# Patient Record
Sex: FEMALE | Race: WHITE | NOT HISPANIC OR LATINO
[De-identification: names, ages, dates, MRNs, and addresses within clinical notes are randomized per-mention and may not be internally consistent; named-entity substitution may affect disease eponyms.]

---

## 2017-02-01 RX ORDER — ATORVASTATIN CALCIUM 20 MG/1
TABLET, FILM COATED ORAL
Qty: 90 TABLET | Refills: 0 | Status: SHIPPED | OUTPATIENT
Start: 2017-02-01 | End: 2017-04-17

## 2017-02-10 ENCOUNTER — LAB SERVICES (OUTPATIENT)
Dept: OTHER | Age: 72
End: 2017-02-10

## 2017-02-10 ENCOUNTER — CHARTING TRANS (OUTPATIENT)
Dept: OTHER | Age: 72
End: 2017-02-10

## 2017-02-10 LAB — RAPID STREP GROUP A: POSITIVE

## 2017-02-10 ASSESSMENT — PAIN SCALES - GENERAL: PAINLEVEL_OUTOF10: 8

## 2017-02-12 ENCOUNTER — APPOINTMENT (OUTPATIENT)
Dept: GENERAL RADIOLOGY | Facility: HOSPITAL | Age: 72
End: 2017-02-12
Attending: EMERGENCY MEDICINE
Payer: MEDICARE

## 2017-02-12 ENCOUNTER — HOSPITAL ENCOUNTER (EMERGENCY)
Facility: HOSPITAL | Age: 72
Discharge: HOME | End: 2017-02-12
Attending: EMERGENCY MEDICINE
Payer: MEDICARE

## 2017-02-12 ENCOUNTER — APPOINTMENT (OUTPATIENT)
Dept: CT IMAGING | Facility: HOSPITAL | Age: 72
End: 2017-02-12
Attending: EMERGENCY MEDICINE
Payer: MEDICARE

## 2017-02-12 VITALS
OXYGEN SATURATION: 99 % | HEART RATE: 78 BPM | DIASTOLIC BLOOD PRESSURE: 88 MMHG | RESPIRATION RATE: 17 BRPM | TEMPERATURE: 97 F | SYSTOLIC BLOOD PRESSURE: 160 MMHG

## 2017-02-12 DIAGNOSIS — J02.9 ACUTE VIRAL PHARYNGITIS: Primary | ICD-10-CM

## 2017-02-12 LAB
ALBUMIN SERPL-MCNC: 3.5 G/DL (ref 3.5–4.8)
ALP LIVER SERPL-CCNC: 131 U/L (ref 55–142)
ALT SERPL-CCNC: 16 U/L (ref 14–54)
AST SERPL-CCNC: 12 U/L (ref 15–41)
BASOPHILS # BLD AUTO: 0.06 X10(3) UL (ref 0–0.1)
BASOPHILS NFR BLD AUTO: 0.4 %
BILIRUB SERPL-MCNC: 0.5 MG/DL (ref 0.1–2)
BUN BLD-MCNC: 9 MG/DL (ref 8–20)
CALCIUM BLD-MCNC: 8.5 MG/DL (ref 8.3–10.3)
CHLORIDE: 107 MMOL/L (ref 101–111)
CO2: 25 MMOL/L (ref 22–32)
CREAT BLD-MCNC: 0.99 MG/DL (ref 0.55–1.02)
EOSINOPHIL # BLD AUTO: 0.29 X10(3) UL (ref 0–0.3)
EOSINOPHIL NFR BLD AUTO: 2 %
ERYTHROCYTE [DISTWIDTH] IN BLOOD BY AUTOMATED COUNT: 14.9 % (ref 11.5–16)
GLUCOSE BLD-MCNC: 155 MG/DL (ref 70–99)
HCT VFR BLD AUTO: 36 % (ref 34–50)
HGB BLD-MCNC: 12 G/DL (ref 12–16)
IMMATURE GRANULOCYTE COUNT: 0.06 X10(3) UL (ref 0–1)
IMMATURE GRANULOCYTE RATIO %: 0.4 %
LYMPHOCYTES # BLD AUTO: 2.58 X10(3) UL (ref 0.9–4)
LYMPHOCYTES NFR BLD AUTO: 18.1 %
M PROTEIN MFR SERPL ELPH: 7.1 G/DL (ref 6.1–8.3)
MCH RBC QN AUTO: 27.6 PG (ref 27–33.2)
MCHC RBC AUTO-ENTMCNC: 33.3 G/DL (ref 31–37)
MCV RBC AUTO: 82.9 FL (ref 81–100)
MONOCYTES # BLD AUTO: 0.76 X10(3) UL (ref 0.1–0.6)
MONOCYTES NFR BLD AUTO: 5.3 %
NEUTROPHIL ABS PRELIM: 10.49 X10 (3) UL (ref 1.3–6.7)
NEUTROPHILS # BLD AUTO: 10.49 X10(3) UL (ref 1.3–6.7)
NEUTROPHILS NFR BLD AUTO: 73.8 %
PLATELET # BLD AUTO: 368 10(3)UL (ref 150–450)
POTASSIUM SERPL-SCNC: 3.7 MMOL/L (ref 3.6–5.1)
RBC # BLD AUTO: 4.34 X10(6)UL (ref 3.8–5.1)
RED CELL DISTRIBUTION WIDTH-SD: 45.5 FL (ref 35.1–46.3)
SODIUM SERPL-SCNC: 141 MMOL/L (ref 136–144)
WBC # BLD AUTO: 14.2 X10(3) UL (ref 4–13)

## 2017-02-12 PROCEDURE — 70491 CT SOFT TISSUE NECK W/DYE: CPT

## 2017-02-12 PROCEDURE — 96374 THER/PROPH/DIAG INJ IV PUSH: CPT

## 2017-02-12 PROCEDURE — 80053 COMPREHEN METABOLIC PANEL: CPT | Performed by: EMERGENCY MEDICINE

## 2017-02-12 PROCEDURE — 70360 X-RAY EXAM OF NECK: CPT

## 2017-02-12 PROCEDURE — 99284 EMERGENCY DEPT VISIT MOD MDM: CPT

## 2017-02-12 PROCEDURE — 85025 COMPLETE CBC W/AUTO DIFF WBC: CPT | Performed by: EMERGENCY MEDICINE

## 2017-02-12 RX ORDER — AMOXICILLIN 875 MG/1
875 TABLET, COATED ORAL 2 TIMES DAILY
COMMUNITY
End: 2017-02-20

## 2017-02-12 RX ORDER — METHYLPREDNISOLONE 4 MG/1
TABLET ORAL
Qty: 1 PACKAGE | Refills: 0 | Status: SHIPPED | OUTPATIENT
Start: 2017-02-12 | End: 2017-02-17

## 2017-02-12 RX ORDER — DEXAMETHASONE SODIUM PHOSPHATE 4 MG/ML
10 VIAL (ML) INJECTION ONCE
Status: COMPLETED | OUTPATIENT
Start: 2017-02-12 | End: 2017-02-12

## 2017-02-12 RX ORDER — MAGNESIUM HYDROXIDE/ALUMINUM HYDROXICE/SIMETHICONE 120; 1200; 1200 MG/30ML; MG/30ML; MG/30ML
10 SUSPENSION ORAL 4 TIMES DAILY PRN
Qty: 148 ML | Refills: 0 | Status: SHIPPED | OUTPATIENT
Start: 2017-02-12 | End: 2017-06-15

## 2017-02-12 RX ORDER — MAGNESIUM HYDROXIDE/ALUMINUM HYDROXICE/SIMETHICONE 120; 1200; 1200 MG/30ML; MG/30ML; MG/30ML
30 SUSPENSION ORAL ONCE
Status: COMPLETED | OUTPATIENT
Start: 2017-02-12 | End: 2017-02-12

## 2017-02-12 RX ORDER — CLINDAMYCIN HYDROCHLORIDE 300 MG/1
300 CAPSULE ORAL 3 TIMES DAILY
Qty: 30 CAPSULE | Refills: 0 | Status: SHIPPED | OUTPATIENT
Start: 2017-02-12 | End: 2017-02-22

## 2017-02-12 NOTE — ED INITIAL ASSESSMENT (HPI)
Pt presents with complaint of sore throat states dx with strep 3 days ago placed on antibiotics pain worse tonight

## 2017-02-12 NOTE — ED PROVIDER NOTES
Patient Seen in: [facility] Emergency Department    History   Patient presents with:  Sore Throat    Stated Complaint: h/o strep, sore throat sx worse tonight.     HPI    28-year-old female with a history of anxiety, type 2 diabetes, rheumatoid arthr MOUTH 2 (TWO) TIMES DAILY. escitalopram 20 MG Oral Tab,  Take 1 tablet (20 mg total) by mouth daily. QUEtiapine Fumarate 50 MG Oral Tab,  Take 1 tablet (50 mg total) by mouth nightly.    ClonazePAM 0.5 MG Oral Tab,  Take 1 tablet (0.5 mg total) by mouth Extraocular movements are intact. Pupils equally round reactive to light. Patient is noted to have mild anterior cervical lymphadenopathy. Her oropharynx is injected.   She has multiple ulcerated lesions on the soft palate and does not appear to be consi shortened and thickened and may be subject to technique but cannot rule out epiglottitis. Patient clinically does not present with symptoms suggesting epiglottitis. She has no respiratory difficulty. She is tolerating secretions.   Radiology had suggeste

## 2017-02-20 ENCOUNTER — OFFICE VISIT (OUTPATIENT)
Dept: FAMILY MEDICINE CLINIC | Facility: CLINIC | Age: 72
End: 2017-02-20

## 2017-02-20 VITALS
TEMPERATURE: 98 F | BODY MASS INDEX: 36.8 KG/M2 | DIASTOLIC BLOOD PRESSURE: 76 MMHG | OXYGEN SATURATION: 98 % | HEART RATE: 80 BPM | HEIGHT: 62 IN | RESPIRATION RATE: 18 BRPM | SYSTOLIC BLOOD PRESSURE: 126 MMHG | WEIGHT: 200 LBS

## 2017-02-20 DIAGNOSIS — R07.81 RIB PAIN ON LEFT SIDE: ICD-10-CM

## 2017-02-20 DIAGNOSIS — M54.12 CERVICAL RADICULOPATHY: Primary | ICD-10-CM

## 2017-02-20 DIAGNOSIS — J01.10 ACUTE NON-RECURRENT FRONTAL SINUSITIS: ICD-10-CM

## 2017-02-20 DIAGNOSIS — J35.01 CHRONIC TONSILLITIS: ICD-10-CM

## 2017-02-20 PROCEDURE — 99214 OFFICE O/P EST MOD 30 MIN: CPT | Performed by: FAMILY MEDICINE

## 2017-02-20 RX ORDER — AMOXICILLIN AND CLAVULANATE POTASSIUM 875; 125 MG/1; MG/1
1 TABLET, FILM COATED ORAL 2 TIMES DAILY
Qty: 20 TABLET | Refills: 0 | Status: SHIPPED | OUTPATIENT
Start: 2017-02-20 | End: 2017-03-02

## 2017-02-20 RX ORDER — FLUTICASONE PROPIONATE 50 MCG
2 SPRAY, SUSPENSION (ML) NASAL DAILY
Qty: 1 BOTTLE | Refills: 1 | Status: SHIPPED | OUTPATIENT
Start: 2017-02-20 | End: 2017-06-15

## 2017-02-20 RX ORDER — ESCITALOPRAM OXALATE 10 MG/1
TABLET ORAL
Refills: 3 | COMMUNITY
Start: 2016-12-30 | End: 2017-02-20

## 2017-02-20 NOTE — PROGRESS NOTES
HPI:    Patient ID: Makenna Yoder is a 80year old female. HPI Comments: No leg swelling or leg cramps. Sore Throat   This is a chronic problem. Episode onset: 2-3 wks. Progression since onset: slightly improved.  The pain is worse on the left shena and neck pain. Skin: Negative for itching. Neurological: Negative for tingling and numbness. All other systems reviewed and are negative.              Current Outpatient Prescriptions:  Amoxicillin-Pot Clavulanate 875-125 MG Oral Tab Take 1 tablet by Physical Exam   Constitutional: She appears well-developed and well-nourished. No distress.    HENT:   Right Ear: External ear normal.   Left Ear: External ear normal.   Nose: Nose normal.   Mouth/Throat: Oropharynx is clear and moist. No oropharyngeal ex Fluticasone Propionate 50 MCG/ACT Nasal Suspension; 2 sprays by Each Nare route daily. Dispense: 1 Bottle; Refill: 1    3. Rib pain on left side  - XR RIBS WITH CHEST (3 VIEWS), LEFT  (CPT=71101); Future    4.  Cervical radiculopathy  spurling test negativ

## 2017-02-21 ENCOUNTER — HOSPITAL ENCOUNTER (OUTPATIENT)
Dept: GENERAL RADIOLOGY | Age: 72
Discharge: HOME | End: 2017-02-21
Attending: FAMILY MEDICINE
Payer: MEDICARE

## 2017-02-21 DIAGNOSIS — R07.81 RIB PAIN ON LEFT SIDE: ICD-10-CM

## 2017-02-21 PROCEDURE — 71101 X-RAY EXAM UNILAT RIBS/CHEST: CPT

## 2017-02-24 ENCOUNTER — OFFICE VISIT (OUTPATIENT)
Dept: FAMILY MEDICINE CLINIC | Facility: CLINIC | Age: 72
End: 2017-02-24

## 2017-02-24 VITALS
HEIGHT: 62 IN | RESPIRATION RATE: 16 BRPM | BODY MASS INDEX: 35.7 KG/M2 | SYSTOLIC BLOOD PRESSURE: 126 MMHG | WEIGHT: 194 LBS | HEART RATE: 82 BPM | DIASTOLIC BLOOD PRESSURE: 80 MMHG | TEMPERATURE: 99 F

## 2017-02-24 DIAGNOSIS — B00.1 COLD SORE: ICD-10-CM

## 2017-02-24 DIAGNOSIS — J03.90 TONSILLITIS: Primary | ICD-10-CM

## 2017-02-24 DIAGNOSIS — R19.7 DIARRHEA, UNSPECIFIED TYPE: ICD-10-CM

## 2017-02-24 PROCEDURE — 99214 OFFICE O/P EST MOD 30 MIN: CPT | Performed by: FAMILY MEDICINE

## 2017-02-25 NOTE — PROGRESS NOTES
HPI:    Patient ID: Gurjit Lewis is a 80year old female. HPI Comments: Cold sore on left upper lip x 4 days. No change. No treatment tried. Minimal pain. Sore Throat   This is a new problem.  The current episode started more than 1 month ago Oral Tab Take 1 tablet (20 mg total) by mouth daily. Disp: 30 tablet Rfl: 3   QUEtiapine Fumarate 50 MG Oral Tab Take 1 tablet (50 mg total) by mouth nightly.  Disp: 30 tablet Rfl: 3   ClonazePAM 0.5 MG Oral Tab Take 1 tablet (0.5 mg total) by mouth 2 (two) discharge. No scleral icterus. Neck: Normal range of motion. Neck supple. No thyromegaly present. Improving left submandibular gland swelling. Decreased tenderness also.    Cardiovascular: Normal rate, regular rhythm, normal heart sounds and intact dis

## 2017-02-28 ENCOUNTER — PATIENT OUTREACH (OUTPATIENT)
Dept: CASE MANAGEMENT | Age: 72
End: 2017-02-28

## 2017-03-02 ENCOUNTER — PATIENT OUTREACH (OUTPATIENT)
Dept: CASE MANAGEMENT | Age: 72
End: 2017-03-02

## 2017-03-02 DIAGNOSIS — E78.5 HYPERLIPIDEMIA, UNSPECIFIED HYPERLIPIDEMIA TYPE: ICD-10-CM

## 2017-03-02 DIAGNOSIS — E11.9 TYPE 2 DIABETES MELLITUS WITHOUT COMPLICATION, WITHOUT LONG-TERM CURRENT USE OF INSULIN (HCC): ICD-10-CM

## 2017-03-02 DIAGNOSIS — F32.A DEPRESSIVE DISORDER: Primary | ICD-10-CM

## 2017-03-02 PROCEDURE — 99490 CHRNC CARE MGMT STAFF 1ST 20: CPT

## 2017-03-02 NOTE — PROGRESS NOTES
3/2/2017  Spoke to Jose Duffy at length about CCM, current care plan and performed CCM assessment with Jose Duffy reviewed meds and compliance. Reviewed pt No diagnosis found.      Support system:  and Daughter  (patient has a son to not a good support system) patient just ana luisa a picture of her daughter and son           Diabetes changes.      Care Plan: Reviewed and updated where needed  Sending education on: plate diabetic education and depression   Time Spent Total: 30 min medical record review, telephone comm

## 2017-03-03 ENCOUNTER — OFFICE VISIT (OUTPATIENT)
Dept: FAMILY MEDICINE CLINIC | Facility: CLINIC | Age: 72
End: 2017-03-03

## 2017-03-03 VITALS
TEMPERATURE: 98 F | HEART RATE: 80 BPM | WEIGHT: 194 LBS | OXYGEN SATURATION: 98 % | SYSTOLIC BLOOD PRESSURE: 122 MMHG | HEIGHT: 62 IN | DIASTOLIC BLOOD PRESSURE: 76 MMHG | RESPIRATION RATE: 16 BRPM | BODY MASS INDEX: 35.7 KG/M2

## 2017-03-03 DIAGNOSIS — K52.1 ANTIBIOTIC-ASSOCIATED DIARRHEA: Primary | ICD-10-CM

## 2017-03-03 DIAGNOSIS — E04.9 GOITER: ICD-10-CM

## 2017-03-03 DIAGNOSIS — R53.83 FATIGUE, UNSPECIFIED TYPE: ICD-10-CM

## 2017-03-03 DIAGNOSIS — B00.2: ICD-10-CM

## 2017-03-03 DIAGNOSIS — T36.95XA ANTIBIOTIC-ASSOCIATED DIARRHEA: Primary | ICD-10-CM

## 2017-03-03 PROCEDURE — 99213 OFFICE O/P EST LOW 20 MIN: CPT | Performed by: FAMILY MEDICINE

## 2017-03-03 NOTE — PROGRESS NOTES
Here with . Has had a very tumultuous medical encounter over the past several weeks started with weakness and flulike illness then she developed very serious sore throat and was concerned about loss of airway.   She was seen in our practice several

## 2017-03-07 RX ORDER — LANCETS 28 GAUGE
EACH MISCELLANEOUS
Qty: 200 EACH | Refills: 2 | Status: SHIPPED | OUTPATIENT
Start: 2017-03-07 | End: 2019-01-03

## 2017-03-07 RX ORDER — BLOOD-GLUCOSE METER
KIT MISCELLANEOUS
Qty: 200 STRIP | Refills: 2 | Status: SHIPPED | OUTPATIENT
Start: 2017-03-07 | End: 2019-01-03

## 2017-03-08 ENCOUNTER — PATIENT OUTREACH (OUTPATIENT)
Dept: CASE MANAGEMENT | Age: 72
End: 2017-03-08

## 2017-03-08 NOTE — PROGRESS NOTES
Spoke to patient called to see how she is doing. Patient relates doing okay feeling a little better day by day. Patient relates her knee weakness is getting better. [facility].  Pt is wait

## 2017-04-04 ENCOUNTER — PATIENT OUTREACH (OUTPATIENT)
Dept: CASE MANAGEMENT | Age: 72
End: 2017-04-04

## 2017-04-04 DIAGNOSIS — F32.A DEPRESSION, UNSPECIFIED DEPRESSION TYPE: Primary | ICD-10-CM

## 2017-04-04 DIAGNOSIS — IMO0002 OSTEOARTHROSIS INVOLVING LOWER LEG: ICD-10-CM

## 2017-04-04 DIAGNOSIS — E11.9 TYPE 2 DIABETES MELLITUS WITHOUT COMPLICATION, WITHOUT LONG-TERM CURRENT USE OF INSULIN (HCC): ICD-10-CM

## 2017-04-04 DIAGNOSIS — E78.5 HYPERLIPIDEMIA, UNSPECIFIED HYPERLIPIDEMIA TYPE: ICD-10-CM

## 2017-04-04 PROCEDURE — 99490 CHRNC CARE MGMT STAFF 1ST 20: CPT

## 2017-04-04 NOTE — PROGRESS NOTES
Called patient to follow up with her and see how things were doing with her .  Last time we talked she was worried that her  had cancer because he had something show up on a test. Since then it seems her  has been cleared but is going t for diabetic care. Time spent with patient 22  Time spent this month  22      Next follow up Patient is going to call Thursday or Friday after appointment if I don't hear from her I will give a call to follow up.

## 2017-04-05 ENCOUNTER — TELEPHONE (OUTPATIENT)
Dept: FAMILY MEDICINE CLINIC | Facility: CLINIC | Age: 72
End: 2017-04-05

## 2017-04-06 ENCOUNTER — LAB ENCOUNTER (OUTPATIENT)
Dept: LAB | Facility: HOSPITAL | Age: 72
End: 2017-04-06
Attending: INTERNAL MEDICINE
Payer: MEDICARE

## 2017-04-06 DIAGNOSIS — R19.7 DIARRHEA, UNSPECIFIED TYPE: ICD-10-CM

## 2017-04-06 DIAGNOSIS — R14.0 ABDOMINAL DISTENSION (GASEOUS): ICD-10-CM

## 2017-04-06 DIAGNOSIS — R19.4 CHANGE IN BOWEL HABITS: ICD-10-CM

## 2017-04-06 PROCEDURE — 87045 FECES CULTURE AEROBIC BACT: CPT

## 2017-04-06 PROCEDURE — 87046 STOOL CULTR AEROBIC BACT EA: CPT

## 2017-04-06 PROCEDURE — 87209 SMEAR COMPLEX STAIN: CPT

## 2017-04-06 PROCEDURE — 87177 OVA AND PARASITES SMEARS: CPT

## 2017-04-06 PROCEDURE — 87015 SPECIMEN INFECT AGNT CONCNTJ: CPT

## 2017-04-06 PROCEDURE — 82656 EL-1 FECAL QUAL/SEMIQ: CPT

## 2017-04-06 PROCEDURE — 87269 GIARDIA AG IF: CPT

## 2017-04-06 PROCEDURE — 87427 SHIGA-LIKE TOXIN AG IA: CPT

## 2017-04-11 ENCOUNTER — TELEPHONE (OUTPATIENT)
Dept: FAMILY MEDICINE CLINIC | Facility: CLINIC | Age: 72
End: 2017-04-11

## 2017-04-11 NOTE — TELEPHONE ENCOUNTER
Documentation for Diabetic supplies faxed to [phone] including last 3 OV notes, Diabetic Educator notes dated 3/4/15 and labs dated 2-12-17 and 10-26-16 with confirmation received. Task completed.

## 2017-04-17 RX ORDER — ATORVASTATIN CALCIUM 20 MG/1
TABLET, FILM COATED ORAL
Qty: 90 TABLET | Refills: 0 | Status: SHIPPED | OUTPATIENT
Start: 2017-04-17 | End: 2017-06-28

## 2017-04-26 ENCOUNTER — HOSPITAL ENCOUNTER (OUTPATIENT)
Dept: CT IMAGING | Facility: HOSPITAL | Age: 72
Discharge: HOME | End: 2017-04-26
Attending: INTERNAL MEDICINE
Payer: MEDICARE

## 2017-04-26 DIAGNOSIS — R14.0 ABDOMINAL BLOATING: ICD-10-CM

## 2017-04-26 DIAGNOSIS — R10.12 LEFT UPPER QUADRANT PAIN: ICD-10-CM

## 2017-04-26 PROCEDURE — 74177 CT ABD & PELVIS W/CONTRAST: CPT

## 2017-06-08 ENCOUNTER — HOSPITAL ENCOUNTER (OUTPATIENT)
Dept: MRI IMAGING | Facility: HOSPITAL | Age: 72
Discharge: HOME | End: 2017-06-08
Attending: PHYSICIAN ASSISTANT
Payer: MEDICARE

## 2017-06-08 DIAGNOSIS — M54.9 BACK PAIN, UNSPECIFIED BACK LOCATION, UNSPECIFIED BACK PAIN LATERALITY, UNSPECIFIED CHRONICITY: ICD-10-CM

## 2017-06-08 DIAGNOSIS — M43.16 SPONDYLOLISTHESIS, LUMBAR REGION: ICD-10-CM

## 2017-06-08 PROCEDURE — 72148 MRI LUMBAR SPINE W/O DYE: CPT | Performed by: PHYSICIAN ASSISTANT

## 2017-06-13 ENCOUNTER — TELEPHONE (OUTPATIENT)
Dept: FAMILY MEDICINE CLINIC | Facility: CLINIC | Age: 72
End: 2017-06-13

## 2017-06-13 DIAGNOSIS — G95.20 SPINAL CORD COMPRESSION (HCC): ICD-10-CM

## 2017-06-13 DIAGNOSIS — E78.5 HYPERLIPIDEMIA, UNSPECIFIED HYPERLIPIDEMIA TYPE: ICD-10-CM

## 2017-06-13 DIAGNOSIS — M54.16 LUMBAR RADICULOPATHY, CHRONIC: ICD-10-CM

## 2017-06-13 DIAGNOSIS — E11.9 TYPE 2 DIABETES MELLITUS WITHOUT COMPLICATION, WITHOUT LONG-TERM CURRENT USE OF INSULIN (HCC): ICD-10-CM

## 2017-06-13 DIAGNOSIS — Z01.818 PREOP EXAMINATION: Primary | ICD-10-CM

## 2017-06-13 NOTE — TELEPHONE ENCOUNTER
Patient called and scheduled a pre-op physical with Dr Francisco Carpenter for 6-21-17. She is having surgery with Dr Nabeel Muniz on 7-17-17. She states she is due to have her A1C checked. She would like to have this done at time of her preop.    Can her order [facility]

## 2017-06-13 NOTE — TELEPHONE ENCOUNTER
Pt having surgery on 7/17/17, per Dr. Jennyfer Carmona needs:    Pre-op Medical clearance is necessary.     The following needs to be completed pre-operatively:  H & P--scheduled 6/21/17  CBC  Comprehensive Metabolic Panel  PT/PTT/INR  UA with reflex to culture  EKG  M

## 2017-06-15 ENCOUNTER — TELEPHONE (OUTPATIENT)
Dept: FAMILY MEDICINE CLINIC | Facility: CLINIC | Age: 72
End: 2017-06-15

## 2017-06-15 RX ORDER — OLANZAPINE 2.5 MG/1
2.5 TABLET ORAL NIGHTLY
COMMUNITY
End: 2017-06-20

## 2017-06-16 ENCOUNTER — PATIENT OUTREACH (OUTPATIENT)
Dept: CASE MANAGEMENT | Age: 72
End: 2017-06-16

## 2017-06-16 DIAGNOSIS — F32.A DEPRESSION, UNSPECIFIED DEPRESSION TYPE: ICD-10-CM

## 2017-06-16 DIAGNOSIS — M51.37 DDD (DEGENERATIVE DISC DISEASE), LUMBOSACRAL: Primary | ICD-10-CM

## 2017-06-16 DIAGNOSIS — E11.9 TYPE 2 DIABETES MELLITUS WITHOUT COMPLICATION, WITHOUT LONG-TERM CURRENT USE OF INSULIN (HCC): ICD-10-CM

## 2017-06-16 PROCEDURE — 99490 CHRNC CARE MGMT STAFF 1ST 20: CPT

## 2017-06-16 NOTE — PROGRESS NOTES
6/16/2017  Spoke to Chaparro Davila at length about CCM, current care plan and performed CCM assessment with Chaparro Davila reviewed meds and compliance.  Reviewed Patient Active Problem List:     Depression hx suicide attempt     Chronic back pain     Cervical radiculopathy information to help them especially after surgery. Patient did finish the cavus she painted for her daughter and grandson. Patient is working at keeping herself busy to try and not to worry about the procedure.  Patient has an appointment with Dr. Pires Lob Rehana Romo, [facility] ([facility] 14 )    Monday July 17, 2017  9:00 AM Hospital Surgery with Lazaro Valencia MD [facility] at [address] [place] [address])    Friday July 21, 2017 10:00 AM Follow up w

## 2017-06-21 ENCOUNTER — HOSPITAL ENCOUNTER (OUTPATIENT)
Dept: PHYSICAL THERAPY | Facility: HOSPITAL | Age: 72
Discharge: HOME | End: 2017-06-21
Attending: ORTHOPAEDIC SURGERY
Payer: MEDICARE

## 2017-06-21 DIAGNOSIS — M48.061 LUMBAR STENOSIS: ICD-10-CM

## 2017-06-26 ENCOUNTER — APPOINTMENT (OUTPATIENT)
Dept: LAB | Age: 72
End: 2017-06-26
Attending: FAMILY MEDICINE
Payer: MEDICARE

## 2017-06-26 ENCOUNTER — LABORATORY ENCOUNTER (OUTPATIENT)
Dept: LAB | Age: 72
End: 2017-06-26
Attending: FAMILY MEDICINE
Payer: MEDICARE

## 2017-06-26 DIAGNOSIS — M54.16 LUMBAR RADICULOPATHY, CHRONIC: ICD-10-CM

## 2017-06-26 DIAGNOSIS — Z01.818 PREOP EXAMINATION: ICD-10-CM

## 2017-06-26 DIAGNOSIS — E11.9 TYPE 2 DIABETES MELLITUS WITHOUT COMPLICATION, WITHOUT LONG-TERM CURRENT USE OF INSULIN (HCC): ICD-10-CM

## 2017-06-26 DIAGNOSIS — G95.20 SPINAL CORD COMPRESSION (HCC): ICD-10-CM

## 2017-06-26 DIAGNOSIS — E78.5 HYPERLIPIDEMIA, UNSPECIFIED HYPERLIPIDEMIA TYPE: ICD-10-CM

## 2017-06-26 PROCEDURE — 83036 HEMOGLOBIN GLYCOSYLATED A1C: CPT

## 2017-06-26 PROCEDURE — 87081 CULTURE SCREEN ONLY: CPT

## 2017-06-26 PROCEDURE — 93010 ELECTROCARDIOGRAM REPORT: CPT | Performed by: INTERNAL MEDICINE

## 2017-06-26 PROCEDURE — 85730 THROMBOPLASTIN TIME PARTIAL: CPT

## 2017-06-26 PROCEDURE — 81003 URINALYSIS AUTO W/O SCOPE: CPT

## 2017-06-26 PROCEDURE — 85610 PROTHROMBIN TIME: CPT

## 2017-06-26 PROCEDURE — 93005 ELECTROCARDIOGRAM TRACING: CPT

## 2017-06-26 PROCEDURE — 36415 COLL VENOUS BLD VENIPUNCTURE: CPT

## 2017-06-28 ENCOUNTER — OFFICE VISIT (OUTPATIENT)
Dept: FAMILY MEDICINE CLINIC | Facility: CLINIC | Age: 72
End: 2017-06-28

## 2017-06-28 VITALS
WEIGHT: 183 LBS | DIASTOLIC BLOOD PRESSURE: 80 MMHG | TEMPERATURE: 98 F | HEART RATE: 76 BPM | HEIGHT: 64 IN | RESPIRATION RATE: 16 BRPM | SYSTOLIC BLOOD PRESSURE: 130 MMHG | BODY MASS INDEX: 31.24 KG/M2

## 2017-06-28 DIAGNOSIS — M51.26 LUMBAR DISC HERNIATION: ICD-10-CM

## 2017-06-28 DIAGNOSIS — M54.50 CHRONIC RIGHT-SIDED LOW BACK PAIN WITHOUT SCIATICA: ICD-10-CM

## 2017-06-28 DIAGNOSIS — E11.9 CONTROLLED TYPE 2 DIABETES MELLITUS WITHOUT COMPLICATION, WITHOUT LONG-TERM CURRENT USE OF INSULIN (HCC): ICD-10-CM

## 2017-06-28 DIAGNOSIS — Z01.818 PREOP EXAMINATION: Primary | ICD-10-CM

## 2017-06-28 DIAGNOSIS — G89.29 CHRONIC RIGHT-SIDED LOW BACK PAIN WITHOUT SCIATICA: ICD-10-CM

## 2017-06-28 DIAGNOSIS — E78.5 HYPERLIPIDEMIA, UNSPECIFIED HYPERLIPIDEMIA TYPE: ICD-10-CM

## 2017-06-28 DIAGNOSIS — R94.31 ABNORMAL EKG: ICD-10-CM

## 2017-06-28 PROCEDURE — 99214 OFFICE O/P EST MOD 30 MIN: CPT | Performed by: FAMILY MEDICINE

## 2017-06-28 RX ORDER — ATORVASTATIN CALCIUM 20 MG/1
TABLET, FILM COATED ORAL
Qty: 90 TABLET | Refills: 3 | Status: SHIPPED | OUTPATIENT
Start: 2017-06-28 | End: 2018-07-09

## 2017-06-29 NOTE — PROGRESS NOTES
HPI:    Patient ID: Craig Li is a 80year old female. Pt is here for preop. She has had chronic low back pain which limits her standing to just 3 mins at a time. Tried conservative treatment without relief.   She had MRI which showed bulging d for 2 weeks max at a time, then 1 week break before reusing.  Disp: 45 g Rfl: 2   FREESTYLE LANCETS Does not apply Misc TEST ONCE DAILY Disp: 200 each Rfl: 2   FREESTYLE LITE TEST In Vitro Strip TEST ONCE DAILY Disp: 200 strip Rfl: 2   Iron Combinations (IR insulin (hcc)  Labs are fine  EKG with slight abnormality  Getting Stress test to make sure heart is ok for surgery  HLD and DMII controlled. No orders of the defined types were placed in this encounter.       Meds This Visit:  Signed Prescriptions Disp

## 2017-07-01 ENCOUNTER — HOSPITAL ENCOUNTER (OUTPATIENT)
Dept: CV DIAGNOSTICS | Facility: HOSPITAL | Age: 72
Discharge: HOME | End: 2017-07-01
Attending: FAMILY MEDICINE
Payer: MEDICARE

## 2017-07-01 DIAGNOSIS — G89.29 CHRONIC RIGHT-SIDED LOW BACK PAIN WITHOUT SCIATICA: ICD-10-CM

## 2017-07-01 DIAGNOSIS — Z01.818 PREOP EXAMINATION: ICD-10-CM

## 2017-07-01 DIAGNOSIS — R94.31 ABNORMAL EKG: ICD-10-CM

## 2017-07-01 DIAGNOSIS — M51.26 LUMBAR DISC HERNIATION: ICD-10-CM

## 2017-07-01 DIAGNOSIS — M54.50 CHRONIC RIGHT-SIDED LOW BACK PAIN WITHOUT SCIATICA: ICD-10-CM

## 2017-07-01 PROCEDURE — 93018 CV STRESS TEST I&R ONLY: CPT | Performed by: FAMILY MEDICINE

## 2017-07-01 PROCEDURE — 78452 HT MUSCLE IMAGE SPECT MULT: CPT | Performed by: FAMILY MEDICINE

## 2017-07-01 PROCEDURE — 93017 CV STRESS TEST TRACING ONLY: CPT | Performed by: FAMILY MEDICINE

## 2017-07-05 ENCOUNTER — TELEPHONE (OUTPATIENT)
Dept: FAMILY MEDICINE CLINIC | Facility: CLINIC | Age: 72
End: 2017-07-05

## 2017-07-07 ENCOUNTER — PATIENT OUTREACH (OUTPATIENT)
Dept: CASE MANAGEMENT | Age: 72
End: 2017-07-07

## 2017-07-07 DIAGNOSIS — F32.A DEPRESSIVE DISORDER: ICD-10-CM

## 2017-07-07 DIAGNOSIS — IMO0002 OSTEOARTHROSIS INVOLVING LOWER LEG: ICD-10-CM

## 2017-07-07 DIAGNOSIS — M54.12 CERVICAL RADICULOPATHY: ICD-10-CM

## 2017-07-07 DIAGNOSIS — E11.9 TYPE 2 DIABETES MELLITUS WITHOUT COMPLICATION, WITHOUT LONG-TERM CURRENT USE OF INSULIN (HCC): ICD-10-CM

## 2017-07-07 DIAGNOSIS — F60.3 BORDERLINE PERSONALITY DISORDER (HCC): ICD-10-CM

## 2017-07-07 DIAGNOSIS — M51.37 DDD (DEGENERATIVE DISC DISEASE), LUMBOSACRAL: ICD-10-CM

## 2017-07-07 DIAGNOSIS — E78.5 HYPERLIPIDEMIA, UNSPECIFIED HYPERLIPIDEMIA TYPE: ICD-10-CM

## 2017-07-07 PROCEDURE — 99490 CHRNC CARE MGMT STAFF 1ST 20: CPT

## 2017-07-07 NOTE — PROGRESS NOTES
7/7/2017  Spoke to Neville Barron at length about CCM, current care plan and performed CCM assessment with Neville Barron reviewed meds and compliance.  Reviewed pt Patient Active Problem List:     Depression hx suicide attempt     Chronic back pain     Cervical radiculopath potential barriers are present that could prevent compliance with medication regimen. At this time, no barriers reported. [place] reports is stable on all medications and denies experiencing any side effects.     Your appointments     Date & Time Appointment available to seniors and persons with disabilities who reside in [facility] or [address] [place] [address]. Seniors include all residents age 72 years and older.  Persons with disabilities must be at least 12years old and obtain an

## 2017-07-11 ENCOUNTER — ANESTHESIA EVENT (OUTPATIENT)
Dept: SURGERY | Facility: HOSPITAL | Age: 72
DRG: 458 | End: 2017-07-11
Payer: MEDICARE

## 2017-07-17 ENCOUNTER — SURGERY (OUTPATIENT)
Age: 72
End: 2017-07-17

## 2017-07-17 ENCOUNTER — ANESTHESIA (OUTPATIENT)
Dept: SURGERY | Facility: HOSPITAL | Age: 72
DRG: 458 | End: 2017-07-17
Payer: MEDICARE

## 2017-07-17 ENCOUNTER — HOSPITAL ENCOUNTER (INPATIENT)
Facility: HOSPITAL | Age: 72
LOS: 1 days | Discharge: HOME | DRG: 458 | End: 2017-07-18
Attending: ORTHOPAEDIC SURGERY | Admitting: ORTHOPAEDIC SURGERY
Payer: MEDICARE

## 2017-07-17 ENCOUNTER — APPOINTMENT (OUTPATIENT)
Dept: GENERAL RADIOLOGY | Facility: HOSPITAL | Age: 72
DRG: 458 | End: 2017-07-17
Attending: ORTHOPAEDIC SURGERY
Payer: MEDICARE

## 2017-07-17 DIAGNOSIS — M48.061 LUMBAR STENOSIS: Primary | ICD-10-CM

## 2017-07-17 LAB
ERYTHROCYTE [DISTWIDTH] IN BLOOD BY AUTOMATED COUNT: 15.6 % (ref 11.5–16)
EST. AVERAGE GLUCOSE BLD GHB EST-MCNC: 148 MG/DL (ref 68–126)
GLUCOSE BLD-MCNC: 117 MG/DL (ref 65–99)
GLUCOSE BLD-MCNC: 127 MG/DL (ref 65–99)
GLUCOSE BLD-MCNC: 128 MG/DL (ref 65–99)
GLUCOSE BLD-MCNC: 155 MG/DL (ref 65–99)
GLUCOSE BLD-MCNC: 169 MG/DL (ref 65–99)
HBA1C MFR BLD HPLC: 6.8 % (ref ?–5.7)
HCT VFR BLD AUTO: 32.9 % (ref 34–50)
HGB BLD-MCNC: 10.4 G/DL (ref 12–16)
MCH RBC QN AUTO: 26.9 PG (ref 27–33.2)
MCHC RBC AUTO-ENTMCNC: 31.6 G/DL (ref 31–37)
MCV RBC AUTO: 85.2 FL (ref 81–100)
PLATELET # BLD AUTO: 344 10(3)UL (ref 150–450)
RBC # BLD AUTO: 3.86 X10(6)UL (ref 3.8–5.1)
RED CELL DISTRIBUTION WIDTH-SD: 48 FL (ref 35.1–46.3)
WBC # BLD AUTO: 22 X10(3) UL (ref 4–13)

## 2017-07-17 PROCEDURE — 76001 XR FLUOROSCOPE EXAM >1 HR EXTENSIVE (CPT=76001): CPT | Performed by: ORTHOPAEDIC SURGERY

## 2017-07-17 PROCEDURE — 4A11X4G MONITORING OF PERIPHERAL NERVOUS ELECTRICAL ACTIVITY, INTRAOPERATIVE, EXTERNAL APPROACH: ICD-10-PCS | Performed by: ORTHOPAEDIC SURGERY

## 2017-07-17 PROCEDURE — 0ST40ZZ RESECTION OF LUMBOSACRAL DISC, OPEN APPROACH: ICD-10-PCS | Performed by: ORTHOPAEDIC SURGERY

## 2017-07-17 PROCEDURE — 00BY0ZZ EXCISION OF LUMBAR SPINAL CORD, OPEN APPROACH: ICD-10-PCS | Performed by: ORTHOPAEDIC SURGERY

## 2017-07-17 PROCEDURE — 99222 1ST HOSP IP/OBS MODERATE 55: CPT | Performed by: HOSPITALIST

## 2017-07-17 PROCEDURE — 82962 GLUCOSE BLOOD TEST: CPT

## 2017-07-17 PROCEDURE — 07DS3ZZ EXTRACTION OF VERTEBRAL BONE MARROW, PERCUTANEOUS APPROACH: ICD-10-PCS | Performed by: ORTHOPAEDIC SURGERY

## 2017-07-17 PROCEDURE — 0SG30A1 FUSION OF LUMBOSACRAL JOINT WITH INTERBODY FUSION DEVICE, POSTERIOR APPROACH, POSTERIOR COLUMN, OPEN APPROACH: ICD-10-PCS | Performed by: ORTHOPAEDIC SURGERY

## 2017-07-17 PROCEDURE — 0ST20ZZ RESECTION OF LUMBAR VERTEBRAL DISC, OPEN APPROACH: ICD-10-PCS | Performed by: ORTHOPAEDIC SURGERY

## 2017-07-17 PROCEDURE — 0SG00A1 FUSION OF LUMBAR VERTEBRAL JOINT WITH INTERBODY FUSION DEVICE, POSTERIOR APPROACH, POSTERIOR COLUMN, OPEN APPROACH: ICD-10-PCS | Performed by: ORTHOPAEDIC SURGERY

## 2017-07-17 DEVICE — RELINE LCK SCRW 5.5 OPEN TULIP: Type: IMPLANTABLE DEVICE | Site: BACK | Status: FUNCTIONAL

## 2017-07-17 DEVICE — RELINE MAS MOD SCREW 6.5X45 2C: Type: IMPLANTABLE DEVICE | Site: BACK | Status: FUNCTIONAL

## 2017-07-17 DEVICE — RELINE MAS MOD REDUCTION EXT: Type: IMPLANTABLE DEVICE | Site: BACK | Status: FUNCTIONAL

## 2017-07-17 DEVICE — OBLIQUE TLIF 10X10X30MM 12D: Type: IMPLANTABLE DEVICE | Site: BACK | Status: FUNCTIONAL

## 2017-07-17 DEVICE — RELINE MAS TI ROD 5.5X55 LRDTC: Type: IMPLANTABLE DEVICE | Site: BACK | Status: FUNCTIONAL

## 2017-07-17 DEVICE — RELINE MAS RED SCREW 6.5X45 2C: Type: IMPLANTABLE DEVICE | Site: BACK | Status: FUNCTIONAL

## 2017-07-17 DEVICE — OBLIQUE TLIF 8X10X30MM 12D: Type: IMPLANTABLE DEVICE | Site: BACK | Status: FUNCTIONAL

## 2017-07-17 DEVICE — OSTEOCEL PRO BONE MATRIX LG: Type: IMPLANTABLE DEVICE | Site: BACK | Status: FUNCTIONAL

## 2017-07-17 RX ORDER — ONDANSETRON 2 MG/ML
4 INJECTION INTRAMUSCULAR; INTRAVENOUS AS NEEDED
Status: DISCONTINUED | OUTPATIENT
Start: 2017-07-17 | End: 2017-07-17 | Stop reason: HOSPADM

## 2017-07-17 RX ORDER — NALOXONE HYDROCHLORIDE 0.4 MG/ML
80 INJECTION, SOLUTION INTRAMUSCULAR; INTRAVENOUS; SUBCUTANEOUS AS NEEDED
Status: DISCONTINUED | OUTPATIENT
Start: 2017-07-17 | End: 2017-07-17 | Stop reason: HOSPADM

## 2017-07-17 RX ORDER — DEXTROSE MONOHYDRATE 25 G/50ML
50 INJECTION, SOLUTION INTRAVENOUS
Status: DISCONTINUED | OUTPATIENT
Start: 2017-07-17 | End: 2017-07-18

## 2017-07-17 RX ORDER — HYDROMORPHONE HYDROCHLORIDE 1 MG/ML
INJECTION, SOLUTION INTRAMUSCULAR; INTRAVENOUS; SUBCUTANEOUS
Status: COMPLETED
Start: 2017-07-17 | End: 2017-07-17

## 2017-07-17 RX ORDER — ONDANSETRON 2 MG/ML
4 INJECTION INTRAMUSCULAR; INTRAVENOUS EVERY 4 HOURS PRN
Status: ACTIVE | OUTPATIENT
Start: 2017-07-17 | End: 2017-07-18

## 2017-07-17 RX ORDER — ESCITALOPRAM OXALATE 20 MG/1
20 TABLET ORAL DAILY
Status: DISCONTINUED | OUTPATIENT
Start: 2017-07-17 | End: 2017-07-18

## 2017-07-17 RX ORDER — CELECOXIB 200 MG/1
CAPSULE ORAL
Status: COMPLETED
Start: 2017-07-17 | End: 2017-07-17

## 2017-07-17 RX ORDER — POLYETHYLENE GLYCOL 3350 17 G/17G
17 POWDER, FOR SOLUTION ORAL DAILY PRN
Status: DISCONTINUED | OUTPATIENT
Start: 2017-07-17 | End: 2017-07-18

## 2017-07-17 RX ORDER — BISACODYL 10 MG
10 SUPPOSITORY, RECTAL RECTAL
Status: DISCONTINUED | OUTPATIENT
Start: 2017-07-17 | End: 2017-07-18

## 2017-07-17 RX ORDER — GABAPENTIN 600 MG/1
600 TABLET ORAL ONCE
Status: COMPLETED | OUTPATIENT
Start: 2017-07-17 | End: 2017-07-17

## 2017-07-17 RX ORDER — SODIUM PHOSPHATE, DIBASIC AND SODIUM PHOSPHATE, MONOBASIC 7; 19 G/133ML; G/133ML
1 ENEMA RECTAL ONCE AS NEEDED
Status: DISCONTINUED | OUTPATIENT
Start: 2017-07-17 | End: 2017-07-18

## 2017-07-17 RX ORDER — MORPHINE SULFATE 15 MG/1
15 TABLET ORAL EVERY 4 HOURS PRN
Status: DISCONTINUED | OUTPATIENT
Start: 2017-07-17 | End: 2017-07-18

## 2017-07-17 RX ORDER — DEXTROSE MONOHYDRATE 25 G/50ML
50 INJECTION, SOLUTION INTRAVENOUS
Status: DISCONTINUED | OUTPATIENT
Start: 2017-07-17 | End: 2017-07-17 | Stop reason: HOSPADM

## 2017-07-17 RX ORDER — METOCLOPRAMIDE HYDROCHLORIDE 5 MG/ML
10 INJECTION INTRAMUSCULAR; INTRAVENOUS EVERY 6 HOURS PRN
Status: DISCONTINUED | OUTPATIENT
Start: 2017-07-17 | End: 2017-07-18

## 2017-07-17 RX ORDER — DIPHENHYDRAMINE HCL 25 MG
25 CAPSULE ORAL EVERY 4 HOURS PRN
Status: DISCONTINUED | OUTPATIENT
Start: 2017-07-17 | End: 2017-07-18

## 2017-07-17 RX ORDER — CEFAZOLIN SODIUM 1 G/3ML
INJECTION, POWDER, FOR SOLUTION INTRAMUSCULAR; INTRAVENOUS
Status: DISCONTINUED | OUTPATIENT
Start: 2017-07-17 | End: 2017-07-17 | Stop reason: HOSPADM

## 2017-07-17 RX ORDER — BACITRACIN 50000 [USP'U]/1
INJECTION, POWDER, LYOPHILIZED, FOR SOLUTION INTRAMUSCULAR AS NEEDED
Status: DISCONTINUED | OUTPATIENT
Start: 2017-07-17 | End: 2017-07-17 | Stop reason: HOSPADM

## 2017-07-17 RX ORDER — CLONAZEPAM 0.5 MG/1
0.5 TABLET ORAL 2 TIMES DAILY PRN
Status: DISCONTINUED | OUTPATIENT
Start: 2017-07-17 | End: 2017-07-18

## 2017-07-17 RX ORDER — HYDROMORPHONE HYDROCHLORIDE 1 MG/ML
0.4 INJECTION, SOLUTION INTRAMUSCULAR; INTRAVENOUS; SUBCUTANEOUS EVERY 5 MIN PRN
Status: DISCONTINUED | OUTPATIENT
Start: 2017-07-17 | End: 2017-07-17 | Stop reason: HOSPADM

## 2017-07-17 RX ORDER — QUETIAPINE 25 MG/1
50 TABLET, FILM COATED ORAL NIGHTLY
Status: DISCONTINUED | OUTPATIENT
Start: 2017-07-17 | End: 2017-07-18

## 2017-07-17 RX ORDER — BUPIVACAINE HYDROCHLORIDE AND EPINEPHRINE 5; 5 MG/ML; UG/ML
INJECTION, SOLUTION EPIDURAL; INTRACAUDAL; PERINEURAL AS NEEDED
Status: DISCONTINUED | OUTPATIENT
Start: 2017-07-17 | End: 2017-07-17 | Stop reason: HOSPADM

## 2017-07-17 RX ORDER — DIPHENHYDRAMINE HYDROCHLORIDE 50 MG/ML
25 INJECTION INTRAMUSCULAR; INTRAVENOUS EVERY 4 HOURS PRN
Status: DISCONTINUED | OUTPATIENT
Start: 2017-07-17 | End: 2017-07-18

## 2017-07-17 RX ORDER — OXYCODONE HYDROCHLORIDE 10 MG/1
10 TABLET ORAL EVERY 4 HOURS PRN
Status: DISCONTINUED | OUTPATIENT
Start: 2017-07-17 | End: 2017-07-18

## 2017-07-17 RX ORDER — ATORVASTATIN CALCIUM 20 MG/1
20 TABLET, FILM COATED ORAL NIGHTLY
Status: DISCONTINUED | OUTPATIENT
Start: 2017-07-17 | End: 2017-07-18

## 2017-07-17 RX ORDER — GABAPENTIN 600 MG/1
TABLET ORAL
Status: COMPLETED
Start: 2017-07-17 | End: 2017-07-17

## 2017-07-17 RX ORDER — SODIUM CHLORIDE 9 MG/ML
INJECTION, SOLUTION INTRAVENOUS CONTINUOUS
Status: DISCONTINUED | OUTPATIENT
Start: 2017-07-17 | End: 2017-07-18

## 2017-07-17 RX ORDER — SODIUM CHLORIDE, SODIUM LACTATE, POTASSIUM CHLORIDE, CALCIUM CHLORIDE 600; 310; 30; 20 MG/100ML; MG/100ML; MG/100ML; MG/100ML
INJECTION, SOLUTION INTRAVENOUS CONTINUOUS
Status: DISCONTINUED | OUTPATIENT
Start: 2017-07-17 | End: 2017-07-18

## 2017-07-17 RX ORDER — SENNOSIDES 8.6 MG
17.2 TABLET ORAL NIGHTLY
Status: DISCONTINUED | OUTPATIENT
Start: 2017-07-17 | End: 2017-07-18

## 2017-07-17 RX ORDER — DIAZEPAM 5 MG/1
5 TABLET ORAL EVERY 6 HOURS PRN
Status: DISCONTINUED | OUTPATIENT
Start: 2017-07-17 | End: 2017-07-18

## 2017-07-17 RX ORDER — CELECOXIB 200 MG/1
200 CAPSULE ORAL ONCE
Status: COMPLETED | OUTPATIENT
Start: 2017-07-17 | End: 2017-07-17

## 2017-07-17 RX ORDER — DOCUSATE SODIUM 100 MG/1
100 CAPSULE, LIQUID FILLED ORAL 2 TIMES DAILY
Status: DISCONTINUED | OUTPATIENT
Start: 2017-07-17 | End: 2017-07-18

## 2017-07-17 RX ORDER — OXYCODONE HYDROCHLORIDE 5 MG/1
5 TABLET ORAL EVERY 4 HOURS PRN
Status: DISCONTINUED | OUTPATIENT
Start: 2017-07-17 | End: 2017-07-18

## 2017-07-17 RX ORDER — ONDANSETRON 2 MG/ML
INJECTION INTRAMUSCULAR; INTRAVENOUS
Status: COMPLETED
Start: 2017-07-17 | End: 2017-07-17

## 2017-07-17 RX ORDER — ONDANSETRON 2 MG/ML
4 INJECTION INTRAMUSCULAR; INTRAVENOUS ONCE
Status: COMPLETED | OUTPATIENT
Start: 2017-07-17 | End: 2017-07-17

## 2017-07-17 RX ORDER — CYCLOBENZAPRINE HCL 10 MG
10 TABLET ORAL EVERY 8 HOURS PRN
Status: DISCONTINUED | OUTPATIENT
Start: 2017-07-17 | End: 2017-07-18

## 2017-07-17 NOTE — CONSULTS
[facility]  CONSULT     Janice Neto Rubin Patient Status:  Inpatient    10/12/1945 MRN [id number]   [facility] 3SW-A Attending Wade Tavares MD   [facility] Day # 0 PCP Ernestine Avalos DO     Reason for consult: Medical Management    Re Stroke Mother    • cad [Other] [OTHER] Sister        Allergies: No Known Allergies    Medications:    No current facility-administered medications on file prior to encounter.    Current Outpatient Prescriptions on File Prior to Encounter:  acetaminophen 500 cyanosis. Integument: No rashes or lesions. Psychiatric: Appropriate mood and affect. Diagnostic Data:      Labs:  No results for input(s): WBC, HGB, MCV, PLT, BAND, INR in the last 72 hours.     Invalid input(s): LYM#, MONO#, BASOS#, EOSIN#    No r

## 2017-07-17 NOTE — ANESTHESIA POSTPROCEDURE EVALUATION
[address] Patient Status:  Surgery Admit   Age/Gender 80year old female MRN [id number]   [facility] SURGERY Attending Joseph Garcia MD   [facility] Day # 0 PCP Yeni Barrientos DO       Anesthesia Post-op Note    Procedure

## 2017-07-17 NOTE — BRIEF OP NOTE
Pre-Operative Diagnosis: LUMBAR 4-LUMBAR 5, LUMBAR 5-SARAL 1 STENOSIS, SPONDYLOLISTHESIS, KYPHOSIS     Post-Operative Diagnosis: LUMBAR 4-LUMBAR 5, LUMBAR 5-SARAL 1 STENOSIS, SPONDYLOLISTHESIS, KYPHOSIS     Procedure Performed:   Procedure(s):  LUMBAR 4

## 2017-07-17 NOTE — PLAN OF CARE
PAIN - ADULT    • Verbalizes/displays adequate comfort level or patient's stated pain goal Progressing        SAFETY ADULT - FALL    • Free from fall injury Progressing            Patient has pain controlled, drowsy, tolerating meals, will try to ambulate

## 2017-07-17 NOTE — ANESTHESIA PREPROCEDURE EVALUATION
PRE-OP EVALUATION    Patient Name: Adam Ewing    Pre-op Diagnosis: LUMBAR 4-LUMBAR 5, LUMBAR 5-SARAL 1 STENOSIS, SPONDYLOLISTHESIS, KYPHOSIS    Procedure(s):  LUMBAR 4-LUMBAR 5, LUMBAR 5-SACRAL 1 DECOMPRESSION INSTRUMENTED FUSION      Surgeon(s) and (ALEVE OR) Take by mouth as needed. Disp:  Rfl:    acetaminophen 500 MG Oral Tab Take 2 tablets by mouth (1000 mg) the morning of surgery.  Disp: 2 tablet Rfl: 0   [DISCONTINUED] ClonazePAM 0.5 MG Oral Tab Take 1 tablet (0.5 mg total) by mouth 2 (two) times 14:12,  Inverted T waves have replaced nonspecific T wave abnormality in Inferior leads  Confirmed by Karl Solorzano M.D., [address] (9) on 6/26/2017 12:39:05 PM    ECG reviewed.   Exercise tolerance: good     MET: >4    (+) obesity     (+) hyperlipidemia patient.       Plan/risks discussed with: patient                Present on Admission:  **None**

## 2017-07-17 NOTE — H&P
Progress Notes        HPI:    Patient ID: Leydi Williamson is a 80year old female.     Pt is here for preop. She has had chronic low back pain which limits her standing to just 3 mins at a time. Tried conservative treatment without relief.   She had MRI topically 2 (two) times daily. Use for 2 weeks max at a time, then 1 week break before reusing.  Disp: 45 g Rfl: 2   FREESTYLE LANCETS Does not apply Misc TEST ONCE DAILY Disp: 200 each Rfl: 2   FREESTYLE LITE TEST In Vitro Strip TEST ONCE DAILY Disp: 200 s complication, without long-term current use of insulin (hcc)  Labs are fine  EKG with slight abnormality  Getting Stress test to make sure heart is ok for surgery  HLD and DMII controlled.     No orders of the defined types were placed in this encounter.

## 2017-07-17 NOTE — PROGRESS NOTES
S: She denies back pain or leg pain. No numbness    Inspection:  Awake alert No acute distress. No difficulty breathing     Blood pressure 108/62, pulse 91, temperature 97.9 °F (36.6 °C), temperature source Oral, resp.  rate 16, height 5' 4\" (1.626 m), we

## 2017-07-18 VITALS
BODY MASS INDEX: 32.61 KG/M2 | HEIGHT: 64 IN | RESPIRATION RATE: 16 BRPM | SYSTOLIC BLOOD PRESSURE: 140 MMHG | HEART RATE: 77 BPM | WEIGHT: 191 LBS | DIASTOLIC BLOOD PRESSURE: 42 MMHG | TEMPERATURE: 99 F | OXYGEN SATURATION: 95 %

## 2017-07-18 LAB
ERYTHROCYTE [DISTWIDTH] IN BLOOD BY AUTOMATED COUNT: 15.5 % (ref 11.5–16)
GLUCOSE BLD-MCNC: 114 MG/DL (ref 65–99)
GLUCOSE BLD-MCNC: 130 MG/DL (ref 65–99)
HCT VFR BLD AUTO: 30.9 % (ref 34–50)
HGB BLD-MCNC: 9.7 G/DL (ref 12–16)
MCH RBC QN AUTO: 27 PG (ref 27–33.2)
MCHC RBC AUTO-ENTMCNC: 31.4 G/DL (ref 31–37)
MCV RBC AUTO: 86.1 FL (ref 81–100)
PLATELET # BLD AUTO: 294 10(3)UL (ref 150–450)
RBC # BLD AUTO: 3.59 X10(6)UL (ref 3.8–5.1)
RED CELL DISTRIBUTION WIDTH-SD: 48.7 FL (ref 35.1–46.3)
WBC # BLD AUTO: 12.3 X10(3) UL (ref 4–13)

## 2017-07-18 PROCEDURE — 82962 GLUCOSE BLOOD TEST: CPT

## 2017-07-18 PROCEDURE — 99232 SBSQ HOSP IP/OBS MODERATE 35: CPT | Performed by: INTERNAL MEDICINE

## 2017-07-18 RX ORDER — HYDROCODONE BITARTRATE AND ACETAMINOPHEN 10; 325 MG/1; MG/1
2 TABLET ORAL EVERY 6 HOURS PRN
Status: DISCONTINUED | OUTPATIENT
Start: 2017-07-18 | End: 2017-07-18

## 2017-07-18 RX ORDER — HYDROCODONE BITARTRATE AND ACETAMINOPHEN 10; 325 MG/1; MG/1
1 TABLET ORAL EVERY 6 HOURS PRN
Status: DISCONTINUED | OUTPATIENT
Start: 2017-07-18 | End: 2017-07-18

## 2017-07-18 NOTE — PLAN OF CARE
Diabetes/Glucose Control    • Glucose maintained within prescribed range Progressing        PAIN - ADULT    • Verbalizes/displays adequate comfort level or patient's stated pain goal Progressing        Patient/Family Goals    • Patient/Family [facility]

## 2017-07-18 NOTE — PHYSICAL THERAPY NOTE
PHYSICAL THERAPY QUICK EVALUATION - INPATIENT    Room Number: 382/382-A  Evaluation Date: 7/18/2017  Presenting Problem: S/p L4-L5,L5-S1 Decompression /Instrumented fusion on 07/17/17  Physician Order: PT Eval and Treat    Problem List  Active Problems: recovering @ home.  does not drive though daughter lives close by & will be assisting with driving needs. No recent falls reported. Patient played tennis prior to surgery. SUBJECTIVE  \"Pain is better after walking. \" Patient was participatory & mo FIM definations    Skilled Therapy Provided: Evaluation completed,Patient was educated & instructed in post surgical precautions & proper body mechanics,Issued handouts for same. Patient was able to don chair back brace independently while sitting @ edge of

## 2017-07-18 NOTE — PROGRESS NOTES
[facility]  Progress Note     Moisés Mendoza Patient Status:  Inpatient    10/12/1945 MRN [id number]   [facility] 3SW-A Attending Anna Vargas MD   [address] Day # 1 PCP Shelton Daily DO     Chief Complaint: lumbar stenosis, back p pain control  3. PT/OT  2. DM2  1. SSI  3. Anxiety/Depression  1. Continue home meds  4.  Dyslipidemia  1. statin    Plan of care: DC planning    Quality:  · DVT Prophylaxis: SCD  · CODE status: Full  · Moore: none  · Central line: none    Estimated date of

## 2017-07-18 NOTE — PROGRESS NOTES
Patient attended discharge spine education/snack class. Printed discharge education sheet provided and reviewed. Teach back done. Questions solicited and answered. Tolerated activity well.

## 2017-07-18 NOTE — PROGRESS NOTES
S: She has controlled back pain no leg pain. Feels well. Inspection:  Awake alert No acute distress. No difficulty breathing     Blood pressure 119/65, pulse 78, temperature 98.2 °F (36.8 °C), temperature source Oral, resp.  rate 16, height 5' 4\" (1.62

## 2017-07-18 NOTE — CM/SW NOTE
07/18/17 1600   CM/SW Referral Data   Referral Source Physician;Social Work (self-referral)   Reason for Referral Discharge planning   Informant Patient   Pertinent Medical Hx   Primary Care Physician Name Melia Lack   Patient Info   Patient's Mental Status

## 2017-07-18 NOTE — PLAN OF CARE
Patient cleared from all services for discharge. Patient states that her daughter will pick her up and she works until [employer] today. Writer informed patient that discharge ready within 30 minutes.  Patient requests a 4:00pm discharge as that is when her transp

## 2017-07-19 ENCOUNTER — PATIENT OUTREACH (OUTPATIENT)
Dept: CASE MANAGEMENT | Age: 72
End: 2017-07-19

## 2017-07-19 DIAGNOSIS — Z02.9 ENCOUNTERS FOR ADMINISTRATIVE PURPOSE: ICD-10-CM

## 2017-07-19 NOTE — PROGRESS NOTES
Initial Post Discharge Follow Up   Discharge Date: 7/18/17  Contact Date: 7/19/2017    Consent Verification:  Assessment Completed With: Patient  HIPAA Verified?   Yes    Discharge Dx:  S/p LUMBAR 4-LUMBAR 5, LUMBAR 5-SACRAL 1 DECOMPRESSION INSTRUMENTED Oral Tab Take 1 tablet by mouth every 8 hours as needed for muscle spasms (Patient taking differently: Take 1 tablet by mouth every 8 hours as needed for muscle spasms ) Disp: 30 tablet Rfl: 1   HYDROcodone-acetaminophen  MG Oral Tab Take 1-2 tablets medication’s side effects discussed? yes  o Do you have any questions about your new medication?  No  • Did you  your discharge medications when you left the hospital? Yes  • May I go over your medications with you to make sure we are not missing any 2017  2:30 PM CDT Follow up with Haris Marquez MD [address] ([address])    Aug 29, 2017  2:30 PM CDT POSTOP with Eden Ma MD [address] at [address] [place], [place] [address]Elsie Bloom, [place], Casilda Koyanagi numbness/tingling in extremities, etc.  Pt to call Dr. Ramón Arce office with an update and further recommendations. [x]  Discharge Summary, medications reviewed/discussed/and reconciled with patient, and orders reviewed and discussed.

## 2017-07-20 NOTE — CM/SW NOTE
07/20/17 0800   Discharge disposition   Discharged to: Home or Self   Discharge transportation Private car   d/c 7/18

## 2017-07-26 ENCOUNTER — OFFICE VISIT (OUTPATIENT)
Dept: FAMILY MEDICINE CLINIC | Facility: CLINIC | Age: 72
End: 2017-07-26

## 2017-07-26 VITALS
DIASTOLIC BLOOD PRESSURE: 70 MMHG | HEIGHT: 64 IN | SYSTOLIC BLOOD PRESSURE: 128 MMHG | RESPIRATION RATE: 18 BRPM | TEMPERATURE: 98 F | BODY MASS INDEX: 32.44 KG/M2 | HEART RATE: 82 BPM | WEIGHT: 190 LBS | OXYGEN SATURATION: 96 %

## 2017-07-26 DIAGNOSIS — R30.0 BURNING WITH URINATION: Primary | ICD-10-CM

## 2017-07-26 DIAGNOSIS — M54.42 CHRONIC BILATERAL LOW BACK PAIN WITH BILATERAL SCIATICA: ICD-10-CM

## 2017-07-26 DIAGNOSIS — M79.89 LEFT LEG SWELLING: ICD-10-CM

## 2017-07-26 DIAGNOSIS — K59.00 CONSTIPATION, UNSPECIFIED CONSTIPATION TYPE: ICD-10-CM

## 2017-07-26 DIAGNOSIS — M54.41 CHRONIC BILATERAL LOW BACK PAIN WITH BILATERAL SCIATICA: ICD-10-CM

## 2017-07-26 DIAGNOSIS — G89.29 CHRONIC BILATERAL LOW BACK PAIN WITH BILATERAL SCIATICA: ICD-10-CM

## 2017-07-26 LAB
APPEARANCE: CLEAR
BILIRUBIN: NEGATIVE
GLUCOSE (URINE DIPSTICK): NEGATIVE MG/DL
KETONES (URINE DIPSTICK): NEGATIVE MG/DL
LEUKOCYTES: NEGATIVE
MULTISTIX LOT#: NORMAL NUMERIC
NITRITE, URINE: NEGATIVE
OCCULT BLOOD: NEGATIVE
PH, URINE: 5.5 (ref 4.5–8)
SPECIFIC GRAVITY: 1.02 (ref 1–1.03)
URINE-COLOR: YELLOW
UROBILINOGEN,SEMI-QN: 0.2 MG/DL (ref 0–1.9)

## 2017-07-26 PROCEDURE — 87186 SC STD MICRODIL/AGAR DIL: CPT | Performed by: FAMILY MEDICINE

## 2017-07-26 PROCEDURE — 99495 TRANSJ CARE MGMT MOD F2F 14D: CPT | Performed by: FAMILY MEDICINE

## 2017-07-26 PROCEDURE — 87086 URINE CULTURE/COLONY COUNT: CPT | Performed by: FAMILY MEDICINE

## 2017-07-26 PROCEDURE — 87088 URINE BACTERIA CULTURE: CPT | Performed by: FAMILY MEDICINE

## 2017-07-26 PROCEDURE — 81003 URINALYSIS AUTO W/O SCOPE: CPT | Performed by: FAMILY MEDICINE

## 2017-07-26 RX ORDER — NITROFURANTOIN 25; 75 MG/1; MG/1
100 CAPSULE ORAL 2 TIMES DAILY
Qty: 14 CAPSULE | Refills: 0 | Status: SHIPPED | OUTPATIENT
Start: 2017-07-26 | End: 2018-02-05 | Stop reason: ALTCHOICE

## 2017-07-26 RX ORDER — AMOXICILLIN 250 MG
2 CAPSULE ORAL DAILY
Qty: 40 TABLET | Refills: 1 | Status: SHIPPED | OUTPATIENT
Start: 2017-07-26 | End: 2017-12-07

## 2017-07-29 NOTE — OPERATIVE REPORT
[facility]    PATIENT'S NAME: Marco Antonio Caban   ATTENDING PHYSICIAN: Herrera Barraza M.D. OPERATING PHYSICIAN: Herrera Barraza M.D.    PATIENT ACCOUNT#:   [de-identified]    LOCATION:  [address] [place] [address]  MEDICAL RECORD #:   [id number]       DATE OF BIRTH: data.  All bony prominences were padded. The back was sterilely prepped and draped. AP fluoroscopy was wheeled in. We marked pedicles at all levels outlined above bilaterally.   Two separate incisions 1-1/2 fingerbreadths lateral to this approximately an tissue over the pars and facet. Pre-operative measurements documenting a mismatch between pelvic incidence and lumbar lordosis was made, thus documenting clinically significant kyphosis. Our attention first turned to the L4-L5 level.   The facet was o removed with sharp curettes, Kerrisons, and a pituitary. It was sent to Pathology for analysis. Our attention then turned to utilization of the separate contralateral incision.   With a retractor in place we localized the far lateral region of the level decompressed. Contralateral decompression was undertaken thus creating a bilateral decompression and bilateral microforaminotomy and hemilaminotomy using undercutting technique.   With undercutting technique and a Young ball in place, we excised ligamentu obtained. Paulieies Dills was placed over the neural elements. Tulip heads were placed on the contralateral side. Then, with dilators in place, the screws were placed. Instrumentation was placed on the right over K-wires in standard technique and fluoroscopy.

## 2017-07-31 NOTE — PROGRESS NOTES
HPI:    Edda De Jesus is a 80year old female here today for hospital follow up. Discharge Date: 7/18/17  Hospital Discharge Diagnosis: lumar radiculopathy  TCM Diagnosis: lumbar radiculopath  Moderate or High Risk?  Moderate  Discharge Summary was Rfl: 0   QUEtiapine Fumarate 50 MG Oral Tab Take 1 tablet (50 mg total) by mouth nightly. Disp: 30 tablet Rfl: 0   ClonazePAM 0.5 MG Oral Tab Take 1 tablet (0.5 mg total) by mouth 2 (two) times daily as needed.  Disp: 60 tablet Rfl: 1   MetFORMIN HCl 500 MG right knee   • Type II or unspecified type diabetes mellitus without mention of complication, not stated as uncontrolled    • Visual impairment     glasses      Past Surgical History:  No date: APPENDECTOMY  No date: COLONOSCOPY  No date: LAPAROSCOPY,DIAGN sensory are grossly intact      ASSESSMENT/ PLAN:   Diagnoses and all orders for this visit:    Burning with urination  -     URINALYSIS, AUTO, W/O SCOPE  -     URINE CULTURE, ROUTINE  -     Nitrofurantoin Monohyd Macro (MACROBID) 100 MG Oral Cap;  Take 1 c

## 2017-08-02 ENCOUNTER — OFFICE VISIT (OUTPATIENT)
Dept: FAMILY MEDICINE CLINIC | Facility: CLINIC | Age: 72
End: 2017-08-02

## 2017-08-02 VITALS
WEIGHT: 190 LBS | HEIGHT: 62 IN | SYSTOLIC BLOOD PRESSURE: 124 MMHG | DIASTOLIC BLOOD PRESSURE: 80 MMHG | OXYGEN SATURATION: 98 % | HEART RATE: 74 BPM | BODY MASS INDEX: 34.96 KG/M2 | RESPIRATION RATE: 16 BRPM | TEMPERATURE: 98 F

## 2017-08-02 DIAGNOSIS — N39.0 URINARY TRACT INFECTION WITHOUT HEMATURIA, SITE UNSPECIFIED: Primary | ICD-10-CM

## 2017-08-02 LAB
APPEARANCE: CLEAR
MULTISTIX LOT#: NORMAL NUMERIC
PH, URINE: 6 (ref 4.5–8)
PROTEIN (URINE DIPSTICK): 30 MG/DL
SPECIFIC GRAVITY: 1.02 (ref 1–1.03)
UROBILINOGEN,SEMI-QN: 0.2 MG/DL (ref 0–1.9)

## 2017-08-02 PROCEDURE — 99213 OFFICE O/P EST LOW 20 MIN: CPT | Performed by: FAMILY MEDICINE

## 2017-08-02 PROCEDURE — 81003 URINALYSIS AUTO W/O SCOPE: CPT | Performed by: FAMILY MEDICINE

## 2017-08-02 NOTE — PROGRESS NOTES
HPI:    Patient ID: More Saldana is a 80year old female. Dysuria    This is a new problem. Episode onset: 1.5 wks ago. The problem occurs every urination. The problem has been resolved. The quality of the pain is described as burning.  The pain is 2 (TWO) TIMES DAILY. Disp: 180 tablet Rfl: 3   atorvastatin 20 MG Oral Tab TAKE 1 TABLET BY MOUTH EVERY EVENING AT BEDTIME Disp: 90 tablet Rfl: 3   OMEPRAZOLE OR Take 20 mg by mouth every other day.  Disp:  Rfl:    Magnesium Hydroxide (MILK OF MAGNESIA OR) URINALYSIS, AUTO, W/O SCOPE      Orders Placed This Encounter      URINALYSIS, AUTO, W/O SCOPE    Meds This Visit:  No prescriptions requested or ordered in this encounter    Imaging & Referrals:  None       BT#[id number]

## 2017-09-01 ENCOUNTER — PATIENT OUTREACH (OUTPATIENT)
Dept: CASE MANAGEMENT | Age: 72
End: 2017-09-01

## 2017-09-01 NOTE — PROGRESS NOTES
9/1/2017  Spoke to Judy Hoffmann at length about CCM, current care plan and performed CCM assessment with Judy Hoffmann reviewed meds and compliance.  Reviewed pt Patient Active Problem List:     Depression hx suicide attempt     Chronic back pain     Cervical radiculopath [facility] )    Oct 06, 2017 10:00 AM CDT Follow up with Mel Pate, [address] [facility] ([facility] )    Oct 10, 2017  2:50 PM CDT POSTOP with Aileen Flores MD [address] ([address] [facility] [employer] at [address] [facility])

## 2017-09-19 ENCOUNTER — TELEPHONE (OUTPATIENT)
Dept: FAMILY MEDICINE CLINIC | Facility: CLINIC | Age: 72
End: 2017-09-19

## 2017-09-19 NOTE — TELEPHONE ENCOUNTER
Diabetes eye exam done on  8/14/17 [facility]  [facility]

## 2017-09-20 ENCOUNTER — MED REC SCAN ONLY (OUTPATIENT)
Dept: FAMILY MEDICINE CLINIC | Facility: CLINIC | Age: 72
End: 2017-09-20

## 2017-10-05 ENCOUNTER — PATIENT OUTREACH (OUTPATIENT)
Dept: CASE MANAGEMENT | Age: 72
End: 2017-10-05

## 2017-10-05 NOTE — PROGRESS NOTES
Coordination of education, review of chart, update to pt record, compilation and mailing resources/materials: Flu education, Why get the flu vaccine, and request to get flu vaccine with PCP and or Kayla mccoy.   Time: 5 min

## 2017-11-06 ENCOUNTER — HOSPITAL ENCOUNTER (OUTPATIENT)
Dept: PHYSICAL THERAPY | Facility: HOSPITAL | Age: 72
Setting detail: THERAPIES SERIES
End: 2017-11-06
Attending: ORTHOPAEDIC SURGERY
Payer: MEDICARE

## 2017-11-06 DIAGNOSIS — Z98.1 STATUS POST LUMBAR SPINAL FUSION: ICD-10-CM

## 2017-11-06 DIAGNOSIS — M79.605 LEFT LEG PAIN: ICD-10-CM

## 2017-11-06 DIAGNOSIS — Z47.89 ORTHOPEDIC AFTERCARE: ICD-10-CM

## 2017-11-06 PROCEDURE — 97162 PT EVAL MOD COMPLEX 30 MIN: CPT

## 2017-11-06 PROCEDURE — 97110 THERAPEUTIC EXERCISES: CPT

## 2017-11-08 ENCOUNTER — HOSPITAL ENCOUNTER (OUTPATIENT)
Dept: PHYSICAL THERAPY | Facility: HOSPITAL | Age: 72
Setting detail: THERAPIES SERIES
Discharge: HOME | End: 2017-11-08
Attending: ORTHOPAEDIC SURGERY
Payer: MEDICARE

## 2017-11-08 PROCEDURE — 97140 MANUAL THERAPY 1/> REGIONS: CPT

## 2017-11-08 PROCEDURE — 97110 THERAPEUTIC EXERCISES: CPT

## 2017-11-08 NOTE — PROGRESS NOTES
Dx: Left leg pain (M79.605)  Status post lumbar spinal fusion (Z98.1)  Orthopedic aftercare (Z47.89)           Authorized # of Visits:  NA (10 per POC)          Next MD visit: none scheduled  Fall Risk: standard         Precautions: n/a             Subject nerve to improve participation in daily activities (10 visits)  · Pt will be independent and compliant with comprehensive HEP to maintain progress achieved in PT (10 visits)      Plan: continue with neural mobilization; lumbar-pelvic stabilization  Date: 1

## 2017-11-13 ENCOUNTER — HOSPITAL ENCOUNTER (OUTPATIENT)
Dept: PHYSICAL THERAPY | Facility: HOSPITAL | Age: 72
Setting detail: THERAPIES SERIES
Discharge: HOME | End: 2017-11-13
Attending: ORTHOPAEDIC SURGERY
Payer: MEDICARE

## 2017-11-13 PROCEDURE — 97110 THERAPEUTIC EXERCISES: CPT

## 2017-11-13 PROCEDURE — 97140 MANUAL THERAPY 1/> REGIONS: CPT

## 2017-11-13 NOTE — PROGRESS NOTES
Dx: Left leg pain (M79.605)  Status post lumbar spinal fusion (Z98.1)  Orthopedic aftercare (Z47.89)           Authorized # of Visits:  NA (10 per POC)          Next MD visit: none scheduled  Fall Risk: standard         Precautions: n/a             Subject independent and compliant with comprehensive HEP to maintain progress achieved in PT (10 visits)      Plan: continue with neural mobilization; lumbar-pelvic stabilization  Date: 11/8/2017 [place]#: 2/10 Date: 11/13/17               [place]#: 3/10 Date:

## 2017-11-15 ENCOUNTER — HOSPITAL ENCOUNTER (OUTPATIENT)
Dept: PHYSICAL THERAPY | Facility: HOSPITAL | Age: 72
Setting detail: THERAPIES SERIES
Discharge: HOME | End: 2017-11-15
Attending: ORTHOPAEDIC SURGERY
Payer: MEDICARE

## 2017-11-15 PROCEDURE — 97140 MANUAL THERAPY 1/> REGIONS: CPT

## 2017-11-15 PROCEDURE — 97110 THERAPEUTIC EXERCISES: CPT

## 2017-11-15 NOTE — PROGRESS NOTES
Dx: Left leg pain (M79.605)  Status post lumbar spinal fusion (Z98.1)  Orthopedic aftercare (Z47.89)           Authorized # of Visits:  NA (10 per POC)          Next MD visit: none scheduled  Fall Risk: standard         Precautions: n/a             Subject for improved lumbar mobility to tolerate sitting >30 minutes for work and home activities (10 visits)  · Pt will demonstrate decreased tone in R piriformis to facilitate decrease in radicular symptoms (10 visits)  · Pt will demonstrate improved neural mobi

## 2017-11-20 ENCOUNTER — APPOINTMENT (OUTPATIENT)
Dept: PHYSICAL THERAPY | Facility: HOSPITAL | Age: 72
End: 2017-11-20
Attending: ORTHOPAEDIC SURGERY
Payer: MEDICARE

## 2017-11-22 ENCOUNTER — HOSPITAL ENCOUNTER (OUTPATIENT)
Dept: PHYSICAL THERAPY | Facility: HOSPITAL | Age: 72
Setting detail: THERAPIES SERIES
Discharge: HOME | End: 2017-11-22
Attending: ORTHOPAEDIC SURGERY
Payer: MEDICARE

## 2017-11-22 PROCEDURE — 97110 THERAPEUTIC EXERCISES: CPT

## 2017-11-22 PROCEDURE — 97140 MANUAL THERAPY 1/> REGIONS: CPT

## 2017-11-22 NOTE — PROGRESS NOTES
Dx: Left leg pain (M79.605)  Status post lumbar spinal fusion (Z98.1)  Orthopedic aftercare (Z47.89)           Authorized # of Visits:  NA (10 per POC)          Next MD visit: 11/28/17 appt with Dr. Meenakshi Siu  Fall Risk: standard         Precautions: n/a decreased paraspinal mm tension for improved lumbar mobility to tolerate sitting >30 minutes for work and home activities (10 visits)  · Pt will demonstrate decreased tone in R piriformis to facilitate decrease in radicular symptoms (10 visits)  · Pt will mobilization in s/l         R sciatic nerve glides, distal  R peroneals STM         R clam shells BTB x 20         --      Skilled Services: pt education, manual therapy, neural mobilization    Charges: man x 2 25, there ex x 1 15'       Total Timed Treatm

## 2017-11-27 ENCOUNTER — HOSPITAL ENCOUNTER (OUTPATIENT)
Dept: PHYSICAL THERAPY | Facility: HOSPITAL | Age: 72
Setting detail: THERAPIES SERIES
Discharge: HOME | End: 2017-11-27
Attending: ORTHOPAEDIC SURGERY
Payer: MEDICARE

## 2017-11-27 PROCEDURE — 97112 NEUROMUSCULAR REEDUCATION: CPT

## 2017-11-27 PROCEDURE — 97110 THERAPEUTIC EXERCISES: CPT

## 2017-11-27 PROCEDURE — 97140 MANUAL THERAPY 1/> REGIONS: CPT

## 2017-11-29 ENCOUNTER — HOSPITAL ENCOUNTER (OUTPATIENT)
Dept: PHYSICAL THERAPY | Facility: HOSPITAL | Age: 72
Setting detail: THERAPIES SERIES
Discharge: HOME | End: 2017-11-29
Attending: ORTHOPAEDIC SURGERY
Payer: MEDICARE

## 2017-11-29 PROCEDURE — 97110 THERAPEUTIC EXERCISES: CPT

## 2017-11-29 PROCEDURE — 97140 MANUAL THERAPY 1/> REGIONS: CPT

## 2017-11-29 NOTE — PROGRESS NOTES
Dx: Left leg pain (M79.605)  Status post lumbar spinal fusion (Z98.1)  Orthopedic aftercare (Z47.89)           Authorized # of Visits:  NA (10 per POC)          Next MD visit: 11/28/17 appt with Dr. Irlanda Cruz  Fall Risk: standard         Precautions: n/a nerve to improve participation in daily activities. Progressing  · Pt will be independent and compliant with comprehensive HEP to maintain progress achieved in PT.  Progressing/continuous      Plan: continue with neural mobilization; lumbar-pelvic stabiliza stretch (R PSIS pain provocation)    -- -- R hip extension mobilization in s/l R hip extension mobilization in s/l [facility] abdominal stabilization 2 x 10 with manual feedback T board calf stretch 30 sec x 2       R sciatic nerve glides, distal  R peroneals ST

## 2017-12-04 ENCOUNTER — HOSPITAL ENCOUNTER (OUTPATIENT)
Dept: PHYSICAL THERAPY | Facility: HOSPITAL | Age: 72
Setting detail: THERAPIES SERIES
Discharge: HOME | End: 2017-12-04
Attending: ORTHOPAEDIC SURGERY
Payer: MEDICARE

## 2017-12-04 PROCEDURE — 97110 THERAPEUTIC EXERCISES: CPT

## 2017-12-04 PROCEDURE — 97140 MANUAL THERAPY 1/> REGIONS: CPT

## 2017-12-04 PROCEDURE — 97112 NEUROMUSCULAR REEDUCATION: CPT

## 2017-12-04 NOTE — PROGRESS NOTES
Dx: Left leg pain (M79.605)  Status post lumbar spinal fusion (Z98.1)  Orthopedic aftercare (Z47.89)           Authorized # of Visits:  NA (10 per POC)          Next MD visit: 11/28/17 appt with Dr. Jennyfer Carmona  Fall Risk: standard         Precautions: n/a improved neural mobility of R sciatic nerve to improve participation in daily activities. Progressing  · Pt will be independent and compliant with comprehensive HEP to maintain progress achieved in PT.  Progressing/continuous      Plan: continue with neural rotation 3 x 10, R, manual stabilization to avoid trunk rotation SKTC abdominal stabilization with manual feedback 2 x 10  R ROMA/LARYIR stretch (R PSIS pain provocation) R   -- -- R hip extension mobilization in s/l R hip extension mobilization in s/l DK

## 2017-12-06 ENCOUNTER — HOSPITAL ENCOUNTER (OUTPATIENT)
Dept: PHYSICAL THERAPY | Facility: HOSPITAL | Age: 72
Setting detail: THERAPIES SERIES
Discharge: HOME | End: 2017-12-06
Attending: ORTHOPAEDIC SURGERY
Payer: MEDICARE

## 2017-12-06 PROCEDURE — 97140 MANUAL THERAPY 1/> REGIONS: CPT

## 2017-12-06 PROCEDURE — 97112 NEUROMUSCULAR REEDUCATION: CPT

## 2017-12-06 PROCEDURE — 97110 THERAPEUTIC EXERCISES: CPT

## 2017-12-06 NOTE — PROGRESS NOTES
Dx: Left leg pain (M79.605)  Status post lumbar spinal fusion (Z98.1)  Orthopedic aftercare (Z47.89)           Authorized # of Visits:  NA (10 per POC)          Next MD visit: 11/28/17 appt with Dr. Teena Khan  Fall Risk: standard         Precautions: n/a 3/10 Date: 11/15/17              TX#: 4/10 Date: 11/22/17              TX#: 5/10 Date: 11/27/17              TX#: 6/10 Date: 11/29/17              TX#: 7/10 Date:  12/4/17             TX#: 8/10 Date:  12/6/17             TX#: 9/14   NU step L5 5' L5 5' L5 extension mobilization in s/l DKTC abdominal stabilization 2 x 10 with manual feedback T board calf stretch 30 sec x 2 30 sec x 2 30 sec x 2      R sciatic nerve glides, distal  R peroneals STM Lateral knee fall outs abdominal stabilization x 12, R DLP 1 b

## 2017-12-07 PROBLEM — M79.18 MYOFASCIAL PAIN ON RIGHT SIDE: Status: ACTIVE | Noted: 2017-12-07

## 2017-12-07 PROBLEM — Z98.1 S/P LUMBAR FUSION: Status: ACTIVE | Noted: 2017-12-07

## 2017-12-07 PROBLEM — M25.551 PAIN OF RIGHT HIP JOINT: Status: ACTIVE | Noted: 2017-12-07

## 2017-12-07 PROBLEM — M54.16 LUMBAR RADICULOPATHY: Status: ACTIVE | Noted: 2017-12-07

## 2017-12-11 ENCOUNTER — HOSPITAL ENCOUNTER (OUTPATIENT)
Dept: PHYSICAL THERAPY | Facility: HOSPITAL | Age: 72
Setting detail: THERAPIES SERIES
Discharge: HOME | End: 2017-12-11
Attending: ORTHOPAEDIC SURGERY
Payer: MEDICARE

## 2017-12-11 PROCEDURE — 97110 THERAPEUTIC EXERCISES: CPT

## 2017-12-11 PROCEDURE — 97112 NEUROMUSCULAR REEDUCATION: CPT

## 2017-12-11 NOTE — PROGRESS NOTES
Dx: Left leg pain (M79.605)  Status post lumbar spinal fusion (Z98.1)  Orthopedic aftercare (Z47.89)           Authorized # of Visits:  NA (10 per POC)          Next MD visit: 11/28/17 appt with Dr. Irlanda Cruz  Fall Risk: standard         Precautions: n/a home activities. Progressing  · Pt will demonstrate decreased tone in R piriformis to facilitate decrease in radicular symptoms. Progressing  · Pt will demonstrate improved neural mobility of R sciatic nerve to improve participation in daily activities.  Pr forward/backward steps on left     DLP 1 black cord 3 x 10 DKTC abdominal stabilization x 15 L/R  Lateral knee fall outs abdominal stabilization x 15, R X 15 L/R Seated on SB: arm raises  Marching  APT/PPT  Arm leg/raises 3 x 10     -- Squats with wall bar

## 2017-12-13 ENCOUNTER — HOSPITAL ENCOUNTER (OUTPATIENT)
Dept: PHYSICAL THERAPY | Facility: HOSPITAL | Age: 72
Setting detail: THERAPIES SERIES
Discharge: HOME | End: 2017-12-13
Attending: ORTHOPAEDIC SURGERY
Payer: MEDICARE

## 2017-12-13 PROCEDURE — 97112 NEUROMUSCULAR REEDUCATION: CPT

## 2017-12-13 PROCEDURE — 97110 THERAPEUTIC EXERCISES: CPT

## 2017-12-13 NOTE — PROGRESS NOTES
Dx: Left leg pain (M79.605)  Status post lumbar spinal fusion (Z98.1)  Orthopedic aftercare (Z47.89)           Authorized # of Visits:  NA (10 per POC)          Next MD visit: 11/28/17 appt with Dr. Lee Shepherd  Fall Risk: standard         Precautions: n/a Progressing  · Pt will be independent and compliant with comprehensive HEP to maintain progress achieved in PT.  Progressing/continuous      Plan: continue with neural mobilization; lumbar-pelvic stabilization  Date: 11/8/2017 TX#: 2/10 Date: 11/13/17 rocking     DLP 1 black cord 3 x 10 DKTC abdominal stabilization x 15 L/R  Lateral knee fall outs abdominal stabilization x 15, R X 15 L/R Seated on SB: arm raises  Marching  APT/PPT  Arm leg/raises 3 x 10 Airex cone taps x 15 L/R  Airex DLS balance with E

## 2017-12-18 ENCOUNTER — HOSPITAL ENCOUNTER (OUTPATIENT)
Dept: PHYSICAL THERAPY | Facility: HOSPITAL | Age: 72
Setting detail: THERAPIES SERIES
Discharge: HOME | End: 2017-12-18
Attending: ORTHOPAEDIC SURGERY
Payer: MEDICARE

## 2017-12-18 PROCEDURE — 97110 THERAPEUTIC EXERCISES: CPT

## 2017-12-18 PROCEDURE — 97140 MANUAL THERAPY 1/> REGIONS: CPT

## 2017-12-18 NOTE — PROGRESS NOTES
Dx: Left leg pain (M79.605)  Status post lumbar spinal fusion (Z98.1)  Orthopedic aftercare (Z47.89)           Authorized # of Visits:  NA (10 per POC)          Next MD visit: 11/28/17 appt with Dr. Octavio Son  Fall Risk: standard         Precautions: n/a with comprehensive HEP to maintain progress achieved in PT.  Progressing/continuous      Plan: continue with neural mobilization; lumbar-pelvic stabilization  Date: 11/8/2017 TX#: 2/10 Date:  12/6/17             TX#: 9/14 Date:  12/11/17             TX#: 8 mini-squats x 15        Shuttle R leg press 1 black, 1 yellow cords 2 x 10  R SLB with plyoback x 15 -- (B) hamstring and piriformis stretch  --   Skilled Services: pt education, manual therapy, neural mobilization    Charges: there ex x 2 30', man x 1 10

## 2017-12-20 ENCOUNTER — HOSPITAL ENCOUNTER (OUTPATIENT)
Dept: PHYSICAL THERAPY | Facility: HOSPITAL | Age: 72
Setting detail: THERAPIES SERIES
Discharge: HOME | End: 2017-12-20
Attending: ORTHOPAEDIC SURGERY
Payer: MEDICARE

## 2017-12-20 PROCEDURE — 97140 MANUAL THERAPY 1/> REGIONS: CPT

## 2017-12-20 PROCEDURE — 97110 THERAPEUTIC EXERCISES: CPT

## 2017-12-20 NOTE — PROGRESS NOTES
Physical Therapy Discharge Summary    Pt has attended 13 visits in Physical Therapy. Subjective: Maury Ellison reports some residual hypersensitivity along lateral aspect of right lower leg and dorsum of R foot.  No longer feels burning, numbness and/or pain a mobility of right hip caudal and lateral glides  Palpation: No tenderness at R PSIS. Strength:  Hip extension: R: 3/5; L: 3/5    Flexibility: Decreased flexibility restrictions in R piriformis, hip flexors and quadriceps, gastrocnemius    Special tests: [phone]. Sincerely,  Electronically signed by therapist: Alton Amaya, PT    [de-identified] certification required: Yes  I certify the need for these services furnished under this plan of treatment and while under my care.     X____________________ balance T board stretch and rocking    Airex cone tapping x 15 L/R HEP review; posture review; body mechanics review    Sitting on SB: alternate arm raises x 10 L/R  Marching x 15 L/R  On Airex: marching x 15 L/R  Airex single leg stance with cone tap x 15

## 2018-01-12 ENCOUNTER — CHARTING TRANS (OUTPATIENT)
Dept: OTHER | Age: 73
End: 2018-01-12

## 2018-01-15 ENCOUNTER — PATIENT OUTREACH (OUTPATIENT)
Dept: CASE MANAGEMENT | Age: 73
End: 2018-01-15

## 2018-01-15 NOTE — PROGRESS NOTES
Called patient and left a message for patient to call back when she can. Reviewed patient chart. Time spent 6 mins.

## 2018-02-05 ENCOUNTER — TELEPHONE (OUTPATIENT)
Dept: FAMILY MEDICINE CLINIC | Facility: CLINIC | Age: 73
End: 2018-02-05

## 2018-02-05 ENCOUNTER — HOSPITAL ENCOUNTER (OUTPATIENT)
Dept: GENERAL RADIOLOGY | Age: 73
Discharge: HOME | End: 2018-02-05
Attending: FAMILY MEDICINE
Payer: MEDICARE

## 2018-02-05 ENCOUNTER — PRIOR ORIGINAL RECORDS (OUTPATIENT)
Dept: OTHER | Age: 73
End: 2018-02-05

## 2018-02-05 ENCOUNTER — OFFICE VISIT (OUTPATIENT)
Dept: FAMILY MEDICINE CLINIC | Facility: CLINIC | Age: 73
End: 2018-02-05

## 2018-02-05 ENCOUNTER — APPOINTMENT (OUTPATIENT)
Dept: LAB | Age: 73
End: 2018-02-05
Attending: FAMILY MEDICINE
Payer: MEDICARE

## 2018-02-05 VITALS
OXYGEN SATURATION: 97 % | HEIGHT: 64 IN | BODY MASS INDEX: 32.1 KG/M2 | DIASTOLIC BLOOD PRESSURE: 88 MMHG | RESPIRATION RATE: 20 BRPM | SYSTOLIC BLOOD PRESSURE: 124 MMHG | WEIGHT: 188 LBS | TEMPERATURE: 98 F | HEART RATE: 97 BPM

## 2018-02-05 DIAGNOSIS — Z23 NEEDS FLU SHOT: ICD-10-CM

## 2018-02-05 DIAGNOSIS — Z00.00 MEDICARE ANNUAL WELLNESS VISIT, SUBSEQUENT: ICD-10-CM

## 2018-02-05 DIAGNOSIS — R05.9 COUGH: ICD-10-CM

## 2018-02-05 DIAGNOSIS — E11.9 TYPE 2 DIABETES MELLITUS WITHOUT COMPLICATION, WITHOUT LONG-TERM CURRENT USE OF INSULIN (HCC): ICD-10-CM

## 2018-02-05 DIAGNOSIS — Z12.39 BREAST CANCER SCREENING: ICD-10-CM

## 2018-02-05 DIAGNOSIS — Z23 NEED FOR VACCINATION FOR PNEUMOCOCCUS: ICD-10-CM

## 2018-02-05 DIAGNOSIS — L40.9 PSORIASIS: ICD-10-CM

## 2018-02-05 DIAGNOSIS — Z01.419 ENCOUNTER FOR WELL WOMAN EXAM WITH ROUTINE GYNECOLOGICAL EXAM: ICD-10-CM

## 2018-02-05 DIAGNOSIS — Z00.00 MEDICARE ANNUAL WELLNESS VISIT, SUBSEQUENT: Primary | ICD-10-CM

## 2018-02-05 DIAGNOSIS — Z23 NEED FOR VACCINATION: ICD-10-CM

## 2018-02-05 LAB
ALBUMIN SERPL-MCNC: 3.5 G/DL (ref 3.5–4.8)
ALP LIVER SERPL-CCNC: 116 U/L (ref 55–142)
ALT SERPL-CCNC: 19 U/L (ref 14–54)
AST SERPL-CCNC: 14 U/L (ref 15–41)
BASOPHILS # BLD AUTO: 0.05 X10(3) UL (ref 0–0.1)
BASOPHILS NFR BLD AUTO: 0.4 %
BILIRUB SERPL-MCNC: 0.5 MG/DL (ref 0.1–2)
BUN BLD-MCNC: 11 MG/DL (ref 8–20)
CALCIUM BLD-MCNC: 8.9 MG/DL (ref 8.3–10.3)
CHLORIDE: 107 MMOL/L (ref 101–111)
CHOLEST SMN-MCNC: 167 MG/DL (ref ?–200)
CO2: 27 MMOL/L (ref 22–32)
CREAT BLD-MCNC: 1.02 MG/DL (ref 0.55–1.02)
EOSINOPHIL # BLD AUTO: 0.14 X10(3) UL (ref 0–0.3)
EOSINOPHIL NFR BLD AUTO: 1.1 %
ERYTHROCYTE [DISTWIDTH] IN BLOOD BY AUTOMATED COUNT: 15.1 % (ref 11.5–16)
EST. AVERAGE GLUCOSE BLD GHB EST-MCNC: 148 MG/DL (ref 68–126)
GLUCOSE BLD-MCNC: 113 MG/DL (ref 70–99)
HBA1C MFR BLD HPLC: 6.8 % (ref ?–5.7)
HCT VFR BLD AUTO: 38.8 % (ref 34–50)
HDLC SERPL-MCNC: 51 MG/DL (ref 45–?)
HDLC SERPL: 3.27 {RATIO} (ref ?–4.44)
HGB BLD-MCNC: 12.3 G/DL (ref 12–16)
IMMATURE GRANULOCYTE COUNT: 0.08 X10(3) UL (ref 0–1)
IMMATURE GRANULOCYTE RATIO %: 0.6 %
LDLC SERPL CALC-MCNC: 92 MG/DL (ref ?–130)
LYMPHOCYTES # BLD AUTO: 3.04 X10(3) UL (ref 0.9–4)
LYMPHOCYTES NFR BLD AUTO: 23.2 %
M PROTEIN MFR SERPL ELPH: 7.4 G/DL (ref 6.1–8.3)
MCH RBC QN AUTO: 27.6 PG (ref 27–33.2)
MCHC RBC AUTO-ENTMCNC: 31.7 G/DL (ref 31–37)
MCV RBC AUTO: 87.2 FL (ref 81–100)
MONOCYTES # BLD AUTO: 0.53 X10(3) UL (ref 0.1–0.6)
MONOCYTES NFR BLD AUTO: 4 %
NEUTROPHIL ABS PRELIM: 9.28 X10 (3) UL (ref 1.3–6.7)
NEUTROPHILS # BLD AUTO: 9.28 X10(3) UL (ref 1.3–6.7)
NEUTROPHILS NFR BLD AUTO: 70.7 %
NONHDLC SERPL-MCNC: 116 MG/DL (ref ?–130)
PLATELET # BLD AUTO: 449 10(3)UL (ref 150–450)
POTASSIUM SERPL-SCNC: 4.3 MMOL/L (ref 3.6–5.1)
RBC # BLD AUTO: 4.45 X10(6)UL (ref 3.8–5.1)
RED CELL DISTRIBUTION WIDTH-SD: 48.4 FL (ref 35.1–46.3)
SODIUM SERPL-SCNC: 141 MMOL/L (ref 136–144)
TRIGL SERPL-MCNC: 118 MG/DL (ref ?–150)
VLDLC SERPL CALC-MCNC: 24 MG/DL (ref 5–40)
WBC # BLD AUTO: 13.1 X10(3) UL (ref 4–13)

## 2018-02-05 PROCEDURE — 36415 COLL VENOUS BLD VENIPUNCTURE: CPT | Performed by: FAMILY MEDICINE

## 2018-02-05 PROCEDURE — 90670 PCV13 VACCINE IM: CPT | Performed by: FAMILY MEDICINE

## 2018-02-05 PROCEDURE — G0008 ADMIN INFLUENZA VIRUS VAC: HCPCS | Performed by: FAMILY MEDICINE

## 2018-02-05 PROCEDURE — 80053 COMPREHEN METABOLIC PANEL: CPT | Performed by: FAMILY MEDICINE

## 2018-02-05 PROCEDURE — 90686 IIV4 VACC NO PRSV 0.5 ML IM: CPT | Performed by: FAMILY MEDICINE

## 2018-02-05 PROCEDURE — 87624 HPV HI-RISK TYP POOLED RSLT: CPT | Performed by: FAMILY MEDICINE

## 2018-02-05 PROCEDURE — 71046 X-RAY EXAM CHEST 2 VIEWS: CPT | Performed by: FAMILY MEDICINE

## 2018-02-05 PROCEDURE — 83036 HEMOGLOBIN GLYCOSYLATED A1C: CPT

## 2018-02-05 PROCEDURE — 85025 COMPLETE CBC W/AUTO DIFF WBC: CPT | Performed by: FAMILY MEDICINE

## 2018-02-05 PROCEDURE — G0439 PPPS, SUBSEQ VISIT: HCPCS | Performed by: FAMILY MEDICINE

## 2018-02-05 PROCEDURE — 88175 CYTOPATH C/V AUTO FLUID REDO: CPT | Performed by: FAMILY MEDICINE

## 2018-02-05 PROCEDURE — 80061 LIPID PANEL: CPT | Performed by: FAMILY MEDICINE

## 2018-02-05 PROCEDURE — G0009 ADMIN PNEUMOCOCCAL VACCINE: HCPCS | Performed by: FAMILY MEDICINE

## 2018-02-05 RX ORDER — BETAMETHASONE DIPROPIONATE 0.5 MG/G
1 CREAM TOPICAL 2 TIMES DAILY
Qty: 45 G | Refills: 1 | Status: SHIPPED | OUTPATIENT
Start: 2018-02-05 | End: 2018-05-21

## 2018-02-05 NOTE — TELEPHONE ENCOUNTER
Received some injections today--Pneumonia and Flu shot. Now has the shakes, heart is beating fast.  Also Short of Breath. Thinks she is having a reaction to the shot. Call sent to Triage.

## 2018-02-05 NOTE — TELEPHONE ENCOUNTER
Symptoms started about 1 hour ago, pulse was 105 and pt took it twice. Felt short of breath. Took a Clonazepam 1/2 hour ago. Heart \"beating\" feels like it is \"settling down\". Inquired if needs to call 911, pt states no.  Pt talking well with no shortnes

## 2018-02-05 NOTE — PATIENT INSTRUCTIONS
Get Diabetic eye exam      Advance Medical Directive    An advance medical directive is a form that lets you plan ahead for the care you’d want if you could no longer express your wishes.  This statement outlines the medical treatment you’d want or names th

## 2018-02-05 NOTE — TELEPHONE ENCOUNTER
Per YP: have pt come back. Informed pt, pt will leave her home now for here. Again, pt states symptoms are gone now. Task completed.

## 2018-02-05 NOTE — PROGRESS NOTES
HPI:   Cassidy Ryder is a 71year old female who presents for a Medicare Subsequent Annual Wellness visit (Pt already had Initial Annual Wellness). Cough   This is a chronic problem. Episode onset: 1/1/18. The problem has been gradually improving.  Sanjeev Sims on screening of functional status.    Vision Problems? : Yes                Depression Screening (PHQ-2/PHQ-9): Over the LAST 2 WEEKS   Little interest or pleasure in doing things (over the last two weeks)?: Several days (reports lots of anxiety)  Feeling d Osteopenia     Borderline personality disorder     Major depressive disorder, recurrent episode, moderate (HCC)     MDD (major depressive disorder)     Depressive disorder     Fracture of metatarsal bone of left foot     Trigger ring finger of right hand daily. ClonazePAM 0.5 MG Oral Tab Take 1 tablet (0.5 mg total) by mouth 2 (two) times daily as needed. QUEtiapine Fumarate 50 MG Oral Tab Take 1 tablet (50 mg total) by mouth nightly.    MetFORMIN HCl 500 MG Oral Tab TAKE 1 TABLET BY MOUTH 2 (TWO) TIMES otherwise  SKIN: denies any unusual skin lesions  EYES: denies blurred vision or double vision  HEENT: denies nasal congestion, sinus pain or ST  LUNGS: denies shortness of breath with exertion  CARDIOVASCULAR: denies chest pain on exertion  GI: denies abd 01/29/2016   • Flulaval 0.5 ml 6 months & older (66434) 02/05/2018   • HIGH DOSE FLU 65 YRS AND OLDER PRSV FREE SINGLE D (14454) FLU CLINIC 10/28/2016   • Influenza Vaccine, No Preserv, 3YR + 10/03/2014   • Methylprednisolone 40 Mg Inj 12/07/2017, 02/02/20 Exercise  How would you describe your daily physical activity?: Moderate  How would you describe your current health state?: Fair  How do you maintain positive mental well-being?: Social Interaction      This section provided for quick review of chart, sep applicable     Immunizations (Update Immunization Activity if applicable)     Influenza  Covered Annually 02/05/2018 Please get every year    Pneumococcal 15 (Prevnar)  Covered Once after 65 No vaccine history found Please get once after your 65th birthday exam  Annually Data entered on: 8/14/2017   Last Dilated Eye Exam 8/14/2017     No flowsheet data found.             Template: [facility]B MEDICARE ANNUAL ASSESSMENT FEMALE Cecilio Marin [[id number]]

## 2018-02-06 LAB — HPV I/H RISK 1 DNA SPEC QL NAA+PROBE: NEGATIVE

## 2018-03-14 ENCOUNTER — PATIENT OUTREACH (OUTPATIENT)
Dept: CASE MANAGEMENT | Age: 73
End: 2018-03-14

## 2018-03-14 NOTE — PROGRESS NOTES
Sent removal letter from [facility]. Have not been able to get a hold of patient since July.

## 2018-03-21 ENCOUNTER — PATIENT OUTREACH (OUTPATIENT)
Dept: CASE MANAGEMENT | Age: 73
End: 2018-03-21

## 2018-03-21 DIAGNOSIS — F32.A DEPRESSIVE DISORDER: ICD-10-CM

## 2018-03-21 DIAGNOSIS — E11.9 TYPE 2 DIABETES MELLITUS WITHOUT COMPLICATION, WITHOUT LONG-TERM CURRENT USE OF INSULIN (HCC): ICD-10-CM

## 2018-03-21 DIAGNOSIS — F60.3 BORDERLINE PERSONALITY DISORDER (HCC): ICD-10-CM

## 2018-03-21 DIAGNOSIS — F33.1 MAJOR DEPRESSIVE DISORDER, RECURRENT EPISODE, MODERATE (HCC): ICD-10-CM

## 2018-03-21 DIAGNOSIS — F41.1 ANXIETY STATE: ICD-10-CM

## 2018-03-21 PROCEDURE — 99490 CHRNC CARE MGMT STAFF 1ST 20: CPT

## 2018-03-21 NOTE — PROGRESS NOTES
3/21/2018  Spoke to Elisabet Smith at length about CCM, current care plan and performed CCM assessment with Elisabet Smith reviewed meds and compliance.  Reviewed pt Anxiety state  Borderline personality disorder  Depressive disorder  Type 2 diabetes mellitus without complic with medication regimen. At this time, no barriers reported. Luis Ray reports is stable on all medications and denies experiencing any side effects.     Your appointments     Date & Time Appointment Department [facility])    Mar 23, 2018 10:00 AM CDT Follow up wi

## 2018-04-12 ENCOUNTER — PATIENT OUTREACH (OUTPATIENT)
Dept: CASE MANAGEMENT | Age: 73
End: 2018-04-12

## 2018-04-12 NOTE — PROGRESS NOTES
Patient relates overall she is doing fine.  Patient relates she will call me if she would like to re enroll in the program.

## 2018-04-13 PROBLEM — M17.11 PRIMARY OSTEOARTHRITIS OF RIGHT KNEE: Status: ACTIVE | Noted: 2018-04-13

## 2018-04-17 ENCOUNTER — PRIOR ORIGINAL RECORDS (OUTPATIENT)
Dept: OTHER | Age: 73
End: 2018-04-17

## 2018-04-23 ENCOUNTER — HOSPITAL ENCOUNTER (OUTPATIENT)
Dept: CV DIAGNOSTICS | Facility: HOSPITAL | Age: 73
Discharge: HOME | End: 2018-04-23
Attending: INTERNAL MEDICINE
Payer: MEDICARE

## 2018-04-23 DIAGNOSIS — R00.2 PALPITATIONS: ICD-10-CM

## 2018-04-23 DIAGNOSIS — R07.9 CHEST PAIN: ICD-10-CM

## 2018-04-23 PROCEDURE — 93227 XTRNL ECG REC<48 HR R&I: CPT | Performed by: INTERNAL MEDICINE

## 2018-04-23 PROCEDURE — 93226 XTRNL ECG REC<48 HR SCAN A/R: CPT | Performed by: INTERNAL MEDICINE

## 2018-04-23 PROCEDURE — 93225 XTRNL ECG REC<48 HRS REC: CPT | Performed by: INTERNAL MEDICINE

## 2018-04-24 ENCOUNTER — HOSPITAL ENCOUNTER (OUTPATIENT)
Dept: CV DIAGNOSTICS | Facility: HOSPITAL | Age: 73
Discharge: HOME | End: 2018-04-24
Attending: INTERNAL MEDICINE
Payer: MEDICARE

## 2018-04-24 ENCOUNTER — PRIOR ORIGINAL RECORDS (OUTPATIENT)
Dept: OTHER | Age: 73
End: 2018-04-24

## 2018-04-24 DIAGNOSIS — R07.9 CHEST PAIN: ICD-10-CM

## 2018-04-24 DIAGNOSIS — R00.2 PALPITATIONS: ICD-10-CM

## 2018-04-24 PROCEDURE — 78452 HT MUSCLE IMAGE SPECT MULT: CPT | Performed by: INTERNAL MEDICINE

## 2018-04-24 PROCEDURE — 93017 CV STRESS TEST TRACING ONLY: CPT | Performed by: INTERNAL MEDICINE

## 2018-04-24 PROCEDURE — 93018 CV STRESS TEST I&R ONLY: CPT | Performed by: INTERNAL MEDICINE

## 2018-04-25 ENCOUNTER — PRIOR ORIGINAL RECORDS (OUTPATIENT)
Dept: OTHER | Age: 73
End: 2018-04-25

## 2018-04-27 ENCOUNTER — PRIOR ORIGINAL RECORDS (OUTPATIENT)
Dept: OTHER | Age: 73
End: 2018-04-27

## 2018-05-02 ENCOUNTER — MYAURORA ACCOUNT LINK (OUTPATIENT)
Dept: OTHER | Age: 73
End: 2018-05-02

## 2018-05-02 ENCOUNTER — PRIOR ORIGINAL RECORDS (OUTPATIENT)
Dept: OTHER | Age: 73
End: 2018-05-02

## 2018-05-04 ENCOUNTER — LAB ENCOUNTER (OUTPATIENT)
Dept: LAB | Facility: HOSPITAL | Age: 73
End: 2018-05-04
Attending: INTERNAL MEDICINE
Payer: MEDICARE

## 2018-05-04 DIAGNOSIS — R69 DIAGNOSIS UNKNOWN: Primary | ICD-10-CM

## 2018-05-04 PROCEDURE — 84520 ASSAY OF UREA NITROGEN: CPT

## 2018-05-04 PROCEDURE — 36415 COLL VENOUS BLD VENIPUNCTURE: CPT

## 2018-05-04 PROCEDURE — 82565 ASSAY OF CREATININE: CPT

## 2018-05-09 LAB
ALBUMIN: 3.5 G/DL
ALKALINE PHOSPHATATE(ALK PHOS): 116 IU/L
BILIRUBIN TOTAL: 0.5 MG/DL
BUN: 11 MG/DL
CALCIUM: 8.9 MG/DL
CHLORIDE: 107 MEQ/L
CHOLESTEROL, TOTAL: 167 MG/DL
CREATININE, SERUM: 1.02 MG/DL
GLUCOSE: 113 MG/DL
HDL CHOLESTEROL: 51 MG/DL
HEMATOCRIT: 38.8 %
HEMOGLOBIN: 12.3 G/DL
LDL CHOLESTEROL: 92 MG/DL
PLATELETS: 449 K/UL
POTASSIUM, SERUM: 4.3 MEQ/L
PROTEIN, TOTAL: 7.4 G/DL
RED BLOOD COUNT: 4.45 X 10-6/U
SGOT (AST): 14 IU/L
SGPT (ALT): 19 IU/L
SODIUM: 141 MEQ/L
TRIGLYCERIDES: 118 MG/DL
WHITE BLOOD COUNT: 13.1 X 10-3/U

## 2018-05-14 ENCOUNTER — HOSPITAL ENCOUNTER (OUTPATIENT)
Dept: CT IMAGING | Facility: HOSPITAL | Age: 73
Discharge: HOME | End: 2018-05-14
Attending: INTERNAL MEDICINE
Payer: MEDICARE

## 2018-05-14 DIAGNOSIS — R00.2 HEART PALPITATIONS: ICD-10-CM

## 2018-05-14 DIAGNOSIS — R07.2 CHEST PAIN, PRECORDIAL: ICD-10-CM

## 2018-05-14 PROCEDURE — 75574 CT ANGIO HRT W/3D IMAGE: CPT | Performed by: INTERNAL MEDICINE

## 2018-05-14 RX ORDER — NITROGLYCERIN 0.4 MG/1
TABLET SUBLINGUAL
Status: COMPLETED
Start: 2018-05-14 | End: 2018-05-14

## 2018-05-14 RX ADMIN — NITROGLYCERIN 0.4 MG: 0.4 TABLET SUBLINGUAL at 08:39:00

## 2018-05-16 ENCOUNTER — HOSPITAL ENCOUNTER (OUTPATIENT)
Dept: PHYSICAL THERAPY | Facility: HOSPITAL | Age: 73
Setting detail: THERAPIES SERIES
Discharge: HOME | End: 2018-05-16
Attending: ORTHOPAEDIC SURGERY
Payer: MEDICARE

## 2018-05-16 ENCOUNTER — PRIOR ORIGINAL RECORDS (OUTPATIENT)
Dept: OTHER | Age: 73
End: 2018-05-16

## 2018-05-16 PROCEDURE — 97110 THERAPEUTIC EXERCISES: CPT

## 2018-05-16 PROCEDURE — 97162 PT EVAL MOD COMPLEX 30 MIN: CPT

## 2018-05-16 NOTE — PROGRESS NOTES
LOWER EXTREMITY EVALUATION:   Referring Physician: Dr. Xenia Larson  Diagnosis: Sprain of medial collateral ligament of right knee, subsequent encounter (U44.288Q)  Osteoarthritis of right knee, unspecified osteoarthritis type (M17.11)  Pes anserine bursitis Metformin); depression; R PCL and medial meniscus tear     ASSESSMENT  Davy Lawrence is a 70 yo female who presents to physical therapy for R knee pain after falling through screen door onto her patio and twisting her knee two weeks ago.  Has history of tear to rig quad stretch in prone; quad sets with tactile feedback, SAQ's, SLR's  Evaluation followed by patient education provided on plan of care, continue with ice, hinged brace, HEP.    Patient was instructed in and issued a HEP for: short arc quads, SLR\"s, quad s treatment options and has agreed to actively participate in planning and for this course of care. Thank you for your referral. Please co-sign or sign and return this letter via fax as soon as possible to [phone].  If you have any questions, please c

## 2018-05-18 ENCOUNTER — HOSPITAL ENCOUNTER (OUTPATIENT)
Dept: PHYSICAL THERAPY | Facility: HOSPITAL | Age: 73
Setting detail: THERAPIES SERIES
Discharge: HOME | End: 2018-05-18
Attending: ORTHOPAEDIC SURGERY
Payer: MEDICARE

## 2018-05-18 PROCEDURE — 97110 THERAPEUTIC EXERCISES: CPT

## 2018-05-18 PROCEDURE — 97014 ELECTRIC STIMULATION THERAPY: CPT

## 2018-05-18 PROCEDURE — 97140 MANUAL THERAPY 1/> REGIONS: CPT

## 2018-05-18 NOTE — PROGRESS NOTES
Dx:  Sprain of medial collateral ligament of right knee, subsequent encounter (I63.881L)  Osteoarthritis of right knee, unspecified osteoarthritis type (M17.11)  Pes anserine bursitis (M70.50)  Authorized # of Visits:  12 per POC         Next MD visit: non TX#: 2/12 Date:               TX#: 3/   Date:               TX#: 4/ Date:               TX#: 5/ Date:               TX#: 6/ Date:               TX#: 7/ Date:               TX#: 8/   STM R knee 8' MCL, quad tendon, quads, ITB         Patellar mobs, R, Gr II

## 2018-05-21 DIAGNOSIS — L40.9 PSORIASIS: ICD-10-CM

## 2018-05-23 ENCOUNTER — HOSPITAL ENCOUNTER (OUTPATIENT)
Dept: PHYSICAL THERAPY | Facility: HOSPITAL | Age: 73
Setting detail: THERAPIES SERIES
Discharge: HOME | End: 2018-05-23
Attending: ORTHOPAEDIC SURGERY
Payer: MEDICARE

## 2018-05-23 PROCEDURE — 97140 MANUAL THERAPY 1/> REGIONS: CPT

## 2018-05-23 PROCEDURE — 97016 VASOPNEUMATIC DEVICE THERAPY: CPT

## 2018-05-23 PROCEDURE — 97110 THERAPEUTIC EXERCISES: CPT

## 2018-05-23 NOTE — PROGRESS NOTES
Dx:  Sprain of medial collateral ligament of right knee, subsequent encounter (Q35.150C)  Osteoarthritis of right knee, unspecified osteoarthritis type (M17.11)  Pes anserine bursitis (M70.50)  Authorized # of Visits:  12 per POC         Next MD visit: non visits)  · Pt will demonstrate increased hip ER/ABD strength to 4+/5 to perform stepping and squatting activities without excessive femoral IR/ADD (12 visits)  · Pt will improve SLS to 5s to improve safety with gait on uneven surfaces such as grass and sli

## 2018-05-25 ENCOUNTER — HOSPITAL ENCOUNTER (OUTPATIENT)
Dept: PHYSICAL THERAPY | Facility: HOSPITAL | Age: 73
Setting detail: THERAPIES SERIES
Discharge: HOME | End: 2018-05-25
Attending: ORTHOPAEDIC SURGERY
Payer: MEDICARE

## 2018-05-25 PROCEDURE — 97140 MANUAL THERAPY 1/> REGIONS: CPT

## 2018-05-25 PROCEDURE — 97016 VASOPNEUMATIC DEVICE THERAPY: CPT

## 2018-05-25 PROCEDURE — 97110 THERAPEUTIC EXERCISES: CPT

## 2018-05-25 RX ORDER — BETAMETHASONE DIPROPIONATE 0.5 MG/G
CREAM TOPICAL
Qty: 45 G | Refills: 1 | Status: ON HOLD | OUTPATIENT
Start: 2018-05-25 | End: 2019-01-24

## 2018-05-25 NOTE — PROGRESS NOTES
Dx:  Sprain of medial collateral ligament of right knee, subsequent encounter (T00.745Z)  Osteoarthritis of right knee, unspecified osteoarthritis type (M17.11)  Pes anserine bursitis (M70.50)  Authorized # of Visits:  12 per POC         Next MD visit: non 3/12   Date:  5/25/18             TX#: 4/12 Date:               TX#: 5/ Date:               TX#: 6/ Date:               TX#: 7/ Date:               TX#: 8/ STM R knee 8' MCL, quad tendon, quads, ITB Patellar mobs, R, Gr II-III R, Gr III       Patellar mo

## 2018-05-30 ENCOUNTER — HOSPITAL ENCOUNTER (OUTPATIENT)
Dept: PHYSICAL THERAPY | Facility: HOSPITAL | Age: 73
Setting detail: THERAPIES SERIES
Discharge: HOME | End: 2018-05-30
Attending: ORTHOPAEDIC SURGERY
Payer: MEDICARE

## 2018-05-30 PROCEDURE — 97110 THERAPEUTIC EXERCISES: CPT

## 2018-05-30 PROCEDURE — 97016 VASOPNEUMATIC DEVICE THERAPY: CPT

## 2018-05-30 NOTE — PROGRESS NOTES
Dx:  Sprain of medial collateral ligament of right knee, subsequent encounter (F65.329P)  Osteoarthritis of right knee, unspecified osteoarthritis type (M17.11)  Pes anserine bursitis (M70.50)  Authorized # of Visits:  12 per POC         Next MD visit: non for pain relief, [facility] knee strengthening in partial range, hip abduction/extension  Date: 5/18/2018 TX#: 2/12 Date: 5/23/18              TX#: 3/12   Date:  5/25/18             TX#: 4/12 Date: 5/30/18              TX#: 5/12 Date:               TX#: 6/ Date:

## 2018-06-01 ENCOUNTER — HOSPITAL ENCOUNTER (OUTPATIENT)
Dept: PHYSICAL THERAPY | Facility: HOSPITAL | Age: 73
Setting detail: THERAPIES SERIES
Discharge: HOME | End: 2018-06-01
Attending: ORTHOPAEDIC SURGERY
Payer: MEDICARE

## 2018-06-01 PROCEDURE — 97016 VASOPNEUMATIC DEVICE THERAPY: CPT

## 2018-06-01 PROCEDURE — 97110 THERAPEUTIC EXERCISES: CPT

## 2018-06-01 PROCEDURE — 97140 MANUAL THERAPY 1/> REGIONS: CPT

## 2018-06-01 NOTE — PROGRESS NOTES
Dx:  Sprain of medial collateral ligament of right knee, subsequent encounter (J33.051K)  Osteoarthritis of right knee, unspecified osteoarthritis type (M17.11)  Pes anserine bursitis (M70.50)  Authorized # of Visits:  12 per POC         Next MD visit: non 6/1/18             TX#: 6/12 Date:               TX#: 7/ Date:               TX#: 8/ STM R knee 8' MCL, quad tendon, quads, ITB Patellar mobs, R, Gr II-III R, Gr III -- R, Gr III     Patellar mobs, R, Gr II-III R knee valgus gapping at 60 deg of knee FL

## 2018-06-04 ENCOUNTER — HOSPITAL ENCOUNTER (OUTPATIENT)
Dept: PHYSICAL THERAPY | Facility: HOSPITAL | Age: 73
Setting detail: THERAPIES SERIES
Discharge: HOME | End: 2018-06-04
Attending: ORTHOPAEDIC SURGERY
Payer: MEDICARE

## 2018-06-04 PROCEDURE — 97110 THERAPEUTIC EXERCISES: CPT

## 2018-06-04 PROCEDURE — 97140 MANUAL THERAPY 1/> REGIONS: CPT

## 2018-06-04 PROCEDURE — 97016 VASOPNEUMATIC DEVICE THERAPY: CPT

## 2018-06-04 NOTE — PROGRESS NOTES
Progress Physical Therapy Summary    Pt has attended 8 visits in Physical Therapy. Subjective: Pain is > 50% better. No ibuprofen for the last few days. Continues to wear brace.  She is hesitant to walk normally because feels like R knee wants to give ascend 1 flight of stairs reciprocally without UE assist. Progressing;   · Pt will increase hip and knee strength to grossly 4+/5 to be able to get up and down from the floor safely.  Progressing  · Pt will demonstrate increased hip ER/ABD strength to 4+/5 furnished under this plan of treatment and while under my care.     X___________________________________________________ Date____________________    Certification From: 6/4/2424  To:9/2/2018            Date: 5/18/2018 TX#: 2/12 Date: 5/23/18              TX 15 sec, form correction  4\" FSU's x 15    4\" LSU's x12 (pain)  Tandem walk 6\" x 15    6\" X 15    Calf stretch    Skilled Services: manual therapy, patient education    Charges: there ex x 2 25, manual x 1 20, compression x 1 15       Total Timed Treatm

## 2018-06-06 PROBLEM — S83.411D SPRAIN OF MEDIAL COLLATERAL LIGAMENT OF RIGHT KNEE, SUBSEQUENT ENCOUNTER: Status: ACTIVE | Noted: 2018-06-06

## 2018-06-06 PROBLEM — M70.50 PES ANSERINE BURSITIS: Status: ACTIVE | Noted: 2018-06-06

## 2018-06-06 NOTE — ADDENDUM NOTE
Encounter addended by: Reece Briggs PT on: 6/6/2018 10:47 AM<BR>    Actions taken: Charge Capture section accepted

## 2018-06-08 ENCOUNTER — HOSPITAL ENCOUNTER (OUTPATIENT)
Dept: PHYSICAL THERAPY | Facility: HOSPITAL | Age: 73
Setting detail: THERAPIES SERIES
Discharge: HOME | End: 2018-06-08
Attending: ORTHOPAEDIC SURGERY
Payer: MEDICARE

## 2018-06-08 PROCEDURE — 97016 VASOPNEUMATIC DEVICE THERAPY: CPT

## 2018-06-08 PROCEDURE — 97110 THERAPEUTIC EXERCISES: CPT

## 2018-06-08 PROCEDURE — 97140 MANUAL THERAPY 1/> REGIONS: CPT

## 2018-06-08 NOTE — PROGRESS NOTES
Dx:  Sprain of medial collateral ligament of right knee, subsequent encounter (K02.149T)  Osteoarthritis of right knee, unspecified osteoarthritis type (M17.11)  Pes anserine bursitis (M70.50)  Authorized # of Visits:  12 per POC         Next MD visit: non TX#: 7/12 Date: 6/8/18              TX#: 8/12   STM R knee 8' MCL, quad tendon, quads, ITB Patellar mobs, R, Gr II-III R, Gr III -- R, Gr III R, Gr III NU step L4 5'   Patellar mobs, R, Gr II-III R knee valgus gapping at 60 deg of knee FL for jacob elroy   Skilled Services: manual therapy, patient education    Charges: there ex x 2 30, manual x 1 10, compression x 1 15       Total Timed Treatment: 55 min  Total Treatment Time: 60 min

## 2018-06-11 ENCOUNTER — HOSPITAL ENCOUNTER (OUTPATIENT)
Dept: PHYSICAL THERAPY | Facility: HOSPITAL | Age: 73
Setting detail: THERAPIES SERIES
Discharge: HOME | End: 2018-06-11
Attending: ORTHOPAEDIC SURGERY
Payer: MEDICARE

## 2018-06-11 PROCEDURE — 97110 THERAPEUTIC EXERCISES: CPT

## 2018-06-11 PROCEDURE — 97140 MANUAL THERAPY 1/> REGIONS: CPT

## 2018-06-11 PROCEDURE — 95992 CANALITH REPOSITIONING PROC: CPT

## 2018-06-11 PROCEDURE — 97016 VASOPNEUMATIC DEVICE THERAPY: CPT

## 2018-06-11 NOTE — PROGRESS NOTES
Dx:  Sprain of medial collateral ligament of right knee, subsequent encounter (B17.981I)  Osteoarthritis of right knee, unspecified osteoarthritis type (M17.11)  Pes anserine bursitis (M70.50)  Authorized # of Visits:  12 per POC         Next MD visit: non TX#: 9/12      STM R knee 8' MCL, quad tendon, quads, ITB Patellar mobs, R, Gr II-III R, Gr III -- R, Gr III R, Gr III NU step L4 5' --      Patellar mobs, R, Gr II-III R knee valgus gapping at 60 deg of knee FL for pain relief, intermittent 10' 10' 15    Calf stretch Airex R weight-shfit (hands on) with short-term balance (hands off)  T board calf stretch  T board A/P rocking T board calf stretch 30 sec x 2      Skilled Services: manual therapy, patient education    Charges: there ex x 2 30, manual x

## 2018-06-15 ENCOUNTER — APPOINTMENT (OUTPATIENT)
Dept: PHYSICAL THERAPY | Facility: HOSPITAL | Age: 73
End: 2018-06-15
Payer: MEDICARE

## 2018-06-18 ENCOUNTER — HOSPITAL ENCOUNTER (OUTPATIENT)
Dept: PHYSICAL THERAPY | Facility: HOSPITAL | Age: 73
Setting detail: THERAPIES SERIES
Discharge: HOME | End: 2018-06-18
Attending: FAMILY MEDICINE
Payer: MEDICARE

## 2018-06-18 PROCEDURE — 97140 MANUAL THERAPY 1/> REGIONS: CPT

## 2018-06-18 PROCEDURE — 97110 THERAPEUTIC EXERCISES: CPT

## 2018-06-18 PROCEDURE — 97016 VASOPNEUMATIC DEVICE THERAPY: CPT

## 2018-06-18 NOTE — PROGRESS NOTES
Dx:  Sprain of medial collateral ligament of right knee, subsequent encounter (F86.273R)  Osteoarthritis of right knee, unspecified osteoarthritis type (M17.11)  Pes anserine bursitis (M70.50)  Authorized # of Visits:  12 per POC         Next MD visit: non TX#: 4/12 Date: 5/30/18              TX#: 5/12 Date:  6/1/18             TX#: 6/12 Date: 6/4/18              TX#: 7/12 Date: 6/8/18              TX#: 8/12 Date: 6/11/18              TX#: 9/12 Date: 6/18/18              TX#: 10/12     [facility] R knee 8' [facility], stance with weight-shift R and hold   Forward lunge stance with weight-shift R  Lateral lunge with weight-shift R X 10    X 10 X 10    X 10  Orange XB lateral steps x 20 L/R --       Airex R weight shift and hold Hamstring curls in standing x 20 L/R  SLB t

## 2018-06-22 ENCOUNTER — HOSPITAL ENCOUNTER (OUTPATIENT)
Dept: PHYSICAL THERAPY | Facility: HOSPITAL | Age: 73
Setting detail: THERAPIES SERIES
Discharge: HOME | End: 2018-06-22
Attending: ORTHOPAEDIC SURGERY
Payer: MEDICARE

## 2018-06-22 PROCEDURE — 97140 MANUAL THERAPY 1/> REGIONS: CPT

## 2018-06-22 PROCEDURE — 97016 VASOPNEUMATIC DEVICE THERAPY: CPT

## 2018-06-22 PROCEDURE — 97110 THERAPEUTIC EXERCISES: CPT

## 2018-06-22 NOTE — PROGRESS NOTES
Physical Therapy Discharge Summary    Pt has attended 11 visits in Physical Therapy. Subjective: Judy Hoffmann states that her level of pain in R knee has not changed during the past four sessions. She continues to be limited with walking, stairs.  She experie pain with sitting or knee flexion  · Pt will improve quad strength to 5/5 to ascend 1 flight of stairs reciprocally without UE assist. Not met   · Pt will increase hip and knee strength to grossly 4+/5 to be able to get up and down from the floor safely.  Josie Dale Date: 6/22/18              TX#: 11/12    STM R knee 8' MCL, quad tendon, quads, ITB Patellar mobs, R, Gr II-III R, Gr III -- R, Gr III R, Gr III NU step L4 5' -- -- Stationary bike l1 5'    Patellar mobs, R, Gr II-III R knee valgus gapping at 60 deg of kne weight-shift R X 10    X 10 X 10    X 10  Orange XB lateral steps x 20 L/R -- --      Airex R weight shift and hold Hamstring curls in standing x 20 L/R  SLB training (B)  Wall squat, partial, with hold 5 x 15 sec, form correction  4\" FSU's x 15    4\" LS

## 2018-06-29 ENCOUNTER — PRIOR ORIGINAL RECORDS (OUTPATIENT)
Dept: OTHER | Age: 73
End: 2018-06-29

## 2018-07-03 ENCOUNTER — PRIOR ORIGINAL RECORDS (OUTPATIENT)
Dept: OTHER | Age: 73
End: 2018-07-03

## 2018-07-03 PROBLEM — M89.8X6 PAIN OF RIGHT TIBIA: Status: ACTIVE | Noted: 2018-07-03

## 2018-07-10 RX ORDER — ATORVASTATIN CALCIUM 20 MG/1
TABLET, FILM COATED ORAL
Qty: 90 TABLET | Refills: 2 | Status: SHIPPED | OUTPATIENT
Start: 2018-07-10 | End: 2018-12-18

## 2018-07-11 ENCOUNTER — TELEPHONE (OUTPATIENT)
Dept: FAMILY MEDICINE CLINIC | Facility: CLINIC | Age: 73
End: 2018-07-11

## 2018-07-11 NOTE — TELEPHONE ENCOUNTER
See attached drug interaction notification:  Drug interaction-cardia and lipitor    Looks like Diltiazem ordered by another office. Last OV YP 2-5-18.

## 2018-07-12 NOTE — TELEPHONE ENCOUNTER
Spoke with pharmacist at [facility], it is Diltiazam with Lipitor, gave approval for Lipitor per YP. Task completed.

## 2018-07-13 NOTE — TELEPHONE ENCOUNTER
Called and told patient OZ'M were called in. Looks like YP wanted to know if she wanted Atorvastatin or Pravastatin. Atorvastatin was ordered. Told patient if we ordered the wrong medication to call us and let us know.

## 2018-07-15 ENCOUNTER — HOSPITAL ENCOUNTER (OUTPATIENT)
Dept: MRI IMAGING | Facility: HOSPITAL | Age: 73
Discharge: HOME | End: 2018-07-15
Attending: ORTHOPAEDIC SURGERY
Payer: MEDICARE

## 2018-07-15 DIAGNOSIS — M89.8X6 PAIN OF RIGHT TIBIA: ICD-10-CM

## 2018-07-15 PROCEDURE — 73718 MRI LOWER EXTREMITY W/O DYE: CPT | Performed by: ORTHOPAEDIC SURGERY

## 2018-10-24 ENCOUNTER — APPOINTMENT (OUTPATIENT)
Dept: LAB | Age: 73
End: 2018-10-24
Attending: FAMILY MEDICINE
Payer: MEDICARE

## 2018-10-24 DIAGNOSIS — E11.9 TYPE 2 DIABETES MELLITUS WITHOUT COMPLICATION, WITHOUT LONG-TERM CURRENT USE OF INSULIN (HCC): ICD-10-CM

## 2018-10-24 PROCEDURE — 85025 COMPLETE CBC W/AUTO DIFF WBC: CPT | Performed by: FAMILY MEDICINE

## 2018-10-24 PROCEDURE — 36415 COLL VENOUS BLD VENIPUNCTURE: CPT

## 2018-10-24 PROCEDURE — 80053 COMPREHEN METABOLIC PANEL: CPT | Performed by: FAMILY MEDICINE

## 2018-10-24 PROCEDURE — 83036 HEMOGLOBIN GLYCOSYLATED A1C: CPT

## 2018-10-29 ENCOUNTER — PRIOR ORIGINAL RECORDS (OUTPATIENT)
Dept: OTHER | Age: 73
End: 2018-10-29

## 2018-11-02 VITALS
SYSTOLIC BLOOD PRESSURE: 102 MMHG | DIASTOLIC BLOOD PRESSURE: 60 MMHG | TEMPERATURE: 98.2 F | HEIGHT: 64 IN | HEART RATE: 94 BPM | RESPIRATION RATE: 18 BRPM | WEIGHT: 185 LBS | BODY MASS INDEX: 31.58 KG/M2

## 2018-11-05 VITALS
HEART RATE: 90 BPM | DIASTOLIC BLOOD PRESSURE: 60 MMHG | BODY MASS INDEX: 34.15 KG/M2 | SYSTOLIC BLOOD PRESSURE: 104 MMHG | WEIGHT: 200 LBS | RESPIRATION RATE: 18 BRPM | HEIGHT: 64 IN | TEMPERATURE: 98.2 F

## 2018-12-10 ENCOUNTER — PATIENT OUTREACH (OUTPATIENT)
Dept: FAMILY MEDICINE CLINIC | Facility: CLINIC | Age: 73
End: 2018-12-10

## 2018-12-18 ENCOUNTER — OFFICE VISIT (OUTPATIENT)
Dept: FAMILY MEDICINE CLINIC | Facility: CLINIC | Age: 73
End: 2018-12-18
Payer: MEDICARE

## 2018-12-18 VITALS
TEMPERATURE: 98 F | DIASTOLIC BLOOD PRESSURE: 76 MMHG | HEIGHT: 64 IN | WEIGHT: 198 LBS | BODY MASS INDEX: 33.8 KG/M2 | HEART RATE: 80 BPM | SYSTOLIC BLOOD PRESSURE: 118 MMHG

## 2018-12-18 DIAGNOSIS — E11.9 CONTROLLED TYPE 2 DIABETES MELLITUS WITHOUT COMPLICATION, WITHOUT LONG-TERM CURRENT USE OF INSULIN (HCC): Primary | ICD-10-CM

## 2018-12-18 DIAGNOSIS — Z12.39 BREAST CANCER SCREENING: ICD-10-CM

## 2018-12-18 PROCEDURE — 82570 ASSAY OF URINE CREATININE: CPT | Performed by: FAMILY MEDICINE

## 2018-12-18 PROCEDURE — 99213 OFFICE O/P EST LOW 20 MIN: CPT | Performed by: FAMILY MEDICINE

## 2018-12-18 PROCEDURE — 82043 UR ALBUMIN QUANTITATIVE: CPT | Performed by: FAMILY MEDICINE

## 2018-12-18 RX ORDER — ATORVASTATIN CALCIUM 20 MG/1
TABLET, FILM COATED ORAL
Qty: 90 TABLET | Refills: 1 | Status: ON HOLD | OUTPATIENT
Start: 2019-01-02 | End: 2019-01-24

## 2018-12-18 NOTE — PROGRESS NOTES
HPI:    Patient ID: Paola August is a 68year old female. Diabetes   She presents for her follow-up diabetic visit. She has type 2 diabetes mellitus. No MedicAlert identification noted. Her disease course has been stable.  There are no diabetic associ daily. Disp: 7 tablet Rfl: 1   QUEtiapine Fumarate 50 MG Oral Tab Take 1 tablet (50 mg total) by mouth nightly. Disp: 30 tablet Rfl: 2   DilTIAZem HCl 120 MG Oral Tab Take 120 mg by mouth 4 (four) times daily.  Disp:  Rfl:    Meloxicam 15 MG Oral Tab Take 1 cancer screening    Return in about 10 days (around 12/28/2018), or if symptoms worsen or fail to improve.     1. Controlled type 2 diabetes mellitus without complication, without long-term current use of insulin (HCC)  - HEMOGLOBIN A1C; Future  - Pt's suga

## 2019-01-01 ENCOUNTER — EXTERNAL RECORD (OUTPATIENT)
Dept: HEALTH INFORMATION MANAGEMENT | Facility: OTHER | Age: 74
End: 2019-01-01

## 2019-01-03 ENCOUNTER — TELEPHONE (OUTPATIENT)
Dept: FAMILY MEDICINE CLINIC | Facility: CLINIC | Age: 74
End: 2019-01-03

## 2019-01-03 RX ORDER — BLOOD-GLUCOSE METER
KIT MISCELLANEOUS
Qty: 200 STRIP | Refills: 2 | Status: SHIPPED | OUTPATIENT
Start: 2019-01-03 | End: 2021-04-13

## 2019-01-03 RX ORDER — LANCETS 28 GAUGE
EACH MISCELLANEOUS
Qty: 200 EACH | Refills: 2 | Status: SHIPPED | OUTPATIENT
Start: 2019-01-03 | End: 2021-04-13

## 2019-01-04 ENCOUNTER — PRIOR ORIGINAL RECORDS (OUTPATIENT)
Dept: OTHER | Age: 74
End: 2019-01-04

## 2019-01-07 ENCOUNTER — HOSPITAL ENCOUNTER (OUTPATIENT)
Dept: MAMMOGRAPHY | Age: 74
Discharge: HOME | End: 2019-01-07
Attending: FAMILY MEDICINE
Payer: MEDICARE

## 2019-01-07 ENCOUNTER — PRIOR ORIGINAL RECORDS (OUTPATIENT)
Dept: OTHER | Age: 74
End: 2019-01-07

## 2019-01-07 ENCOUNTER — HOSPITAL ENCOUNTER (OUTPATIENT)
Dept: PHYSICAL THERAPY | Facility: HOSPITAL | Age: 74
Discharge: HOME | End: 2019-01-07
Attending: ORTHOPAEDIC SURGERY
Payer: MEDICARE

## 2019-01-07 ENCOUNTER — LAB ENCOUNTER (OUTPATIENT)
Dept: LAB | Facility: HOSPITAL | Age: 74
End: 2019-01-07
Attending: FAMILY MEDICINE
Payer: MEDICARE

## 2019-01-07 DIAGNOSIS — Z12.39 BREAST CANCER SCREENING: ICD-10-CM

## 2019-01-07 DIAGNOSIS — M17.11 PRIMARY OSTEOARTHRITIS OF RIGHT KNEE: ICD-10-CM

## 2019-01-07 LAB
ANION GAP SERPL CALC-SCNC: 8 MMOL/L (ref 0–18)
ANTIBODY SCREEN: NEGATIVE
BASOPHILS # BLD AUTO: 0.08 X10(3) UL (ref 0–0.1)
BASOPHILS NFR BLD AUTO: 0.6 %
BUN BLD-MCNC: 14 MG/DL (ref 8–20)
BUN/CREAT SERPL: 13 (ref 10–20)
CALCIUM BLD-MCNC: 9.1 MG/DL (ref 8.3–10.3)
CHLORIDE SERPL-SCNC: 106 MMOL/L (ref 101–111)
CO2 SERPL-SCNC: 25 MMOL/L (ref 22–32)
CREAT BLD-MCNC: 1.08 MG/DL (ref 0.55–1.02)
EOSINOPHIL # BLD AUTO: 0.35 X10(3) UL (ref 0–0.3)
EOSINOPHIL NFR BLD AUTO: 2.7 %
ERYTHROCYTE [DISTWIDTH] IN BLOOD BY AUTOMATED COUNT: 15 % (ref 11.5–16)
GLUCOSE BLD-MCNC: 116 MG/DL (ref 70–99)
HCT VFR BLD AUTO: 37.4 % (ref 34–50)
HGB BLD-MCNC: 11.7 G/DL (ref 12–16)
IMMATURE GRANULOCYTE COUNT: 0.07 X10(3) UL (ref 0–1)
IMMATURE GRANULOCYTE RATIO %: 0.5 %
LYMPHOCYTES # BLD AUTO: 3.7 X10(3) UL (ref 0.9–4)
LYMPHOCYTES NFR BLD AUTO: 28.2 %
MCH RBC QN AUTO: 26.7 PG (ref 27–33.2)
MCHC RBC AUTO-ENTMCNC: 31.3 G/DL (ref 31–37)
MCV RBC AUTO: 85.4 FL (ref 81–100)
MONOCYTES # BLD AUTO: 0.69 X10(3) UL (ref 0.1–1)
MONOCYTES NFR BLD AUTO: 5.3 %
NEUTROPHIL ABS PRELIM: 8.21 X10 (3) UL (ref 1.3–6.7)
NEUTROPHILS # BLD AUTO: 8.21 X10(3) UL (ref 1.3–6.7)
NEUTROPHILS NFR BLD AUTO: 62.7 %
OSMOLALITY SERPL CALC.SUM OF ELEC: 289 MOSM/KG (ref 275–295)
PLATELET # BLD AUTO: 474 10(3)UL (ref 150–450)
POTASSIUM SERPL-SCNC: 4.3 MMOL/L (ref 3.6–5.1)
RBC # BLD AUTO: 4.38 X10(6)UL (ref 3.8–5.1)
RED CELL DISTRIBUTION WIDTH-SD: 47.4 FL (ref 35.1–46.3)
RH BLOOD TYPE: POSITIVE
SODIUM SERPL-SCNC: 139 MMOL/L (ref 136–144)
WBC # BLD AUTO: 13.1 X10(3) UL (ref 4–13)

## 2019-01-07 PROCEDURE — 87081 CULTURE SCREEN ONLY: CPT

## 2019-01-07 PROCEDURE — 36415 COLL VENOUS BLD VENIPUNCTURE: CPT

## 2019-01-07 PROCEDURE — 86900 BLOOD TYPING SEROLOGIC ABO: CPT

## 2019-01-07 PROCEDURE — 86850 RBC ANTIBODY SCREEN: CPT

## 2019-01-07 PROCEDURE — 77067 SCR MAMMO BI INCL CAD: CPT | Performed by: FAMILY MEDICINE

## 2019-01-07 PROCEDURE — 77063 BREAST TOMOSYNTHESIS BI: CPT | Performed by: FAMILY MEDICINE

## 2019-01-07 PROCEDURE — 85025 COMPLETE CBC W/AUTO DIFF WBC: CPT

## 2019-01-07 PROCEDURE — 86901 BLOOD TYPING SEROLOGIC RH(D): CPT

## 2019-01-07 PROCEDURE — 80048 BASIC METABOLIC PNL TOTAL CA: CPT

## 2019-01-08 ENCOUNTER — OFFICE VISIT (OUTPATIENT)
Dept: FAMILY MEDICINE CLINIC | Facility: CLINIC | Age: 74
End: 2019-01-08
Payer: MEDICARE

## 2019-01-08 ENCOUNTER — TELEPHONE (OUTPATIENT)
Dept: FAMILY MEDICINE CLINIC | Facility: CLINIC | Age: 74
End: 2019-01-08

## 2019-01-08 VITALS
RESPIRATION RATE: 16 BRPM | HEIGHT: 64 IN | BODY MASS INDEX: 34.15 KG/M2 | SYSTOLIC BLOOD PRESSURE: 128 MMHG | DIASTOLIC BLOOD PRESSURE: 80 MMHG | TEMPERATURE: 98 F | OXYGEN SATURATION: 98 % | WEIGHT: 200 LBS | HEART RATE: 104 BPM

## 2019-01-08 DIAGNOSIS — Z01.818 PRE-OP EXAMINATION: Primary | ICD-10-CM

## 2019-01-08 DIAGNOSIS — R30.0 DYSURIA: ICD-10-CM

## 2019-01-08 LAB
APPEARANCE: CLEAR
MULTISTIX LOT#: NORMAL NUMERIC
PH, URINE: 5.5 (ref 4.5–8)
SPECIFIC GRAVITY: 1.01 (ref 1–1.03)
URINE-COLOR: YELLOW
UROBILINOGEN,SEMI-QN: 0.2 MG/DL (ref 0–1.9)

## 2019-01-08 PROCEDURE — 99214 OFFICE O/P EST MOD 30 MIN: CPT | Performed by: FAMILY MEDICINE

## 2019-01-08 PROCEDURE — 81003 URINALYSIS AUTO W/O SCOPE: CPT | Performed by: FAMILY MEDICINE

## 2019-01-08 RX ORDER — CIPROFLOXACIN 250 MG/1
250 TABLET, FILM COATED ORAL 2 TIMES DAILY
Qty: 14 TABLET | Refills: 0 | Status: SHIPPED | OUTPATIENT
Start: 2019-01-08 | End: 2019-01-15

## 2019-01-08 RX ORDER — IBUPROFEN 600 MG/1
TABLET ORAL
Refills: 1 | Status: ON HOLD | COMMUNITY
Start: 2018-12-22 | End: 2019-01-26

## 2019-01-08 NOTE — TELEPHONE ENCOUNTER
Pre admission testing informed no EKG done pt was cleared by cardiology Dr. Mariangel Andrea per Dr. Carley Chung note.

## 2019-01-08 NOTE — TELEPHONE ENCOUNTER
Rx Request      Disp:                    R:      Associated Dx:    Last Visit:    Last Refilled:    Protocol Passed?  Annika Ferro       No[  ]

## 2019-01-08 NOTE — TELEPHONE ENCOUNTER
Dr Sulaiman Tracey like the Pt to come in for a nurse visit next week and have her urine rechecked.  Please call to schedule an appointment

## 2019-01-08 NOTE — PROGRESS NOTES
I have been asked to service medical consultant for this patient. She is scheduled for January 24 right knee replacement by Dr. Jerilyn Friedman at the [address]   She is failed any and all nonsurgical remedies and poses pain and decline of quality of nontender she has a swollen tender right knee he has never had a DVT nor pulmonary embolus. We spent some time talking about the significance of DVT prophylaxis.     Mental status exam normal    The assessment severe disabling internal derangement of the

## 2019-01-09 ENCOUNTER — PRIOR ORIGINAL RECORDS (OUTPATIENT)
Dept: OTHER | Age: 74
End: 2019-01-09

## 2019-01-09 ENCOUNTER — TELEPHONE (OUTPATIENT)
Dept: FAMILY MEDICINE CLINIC | Facility: CLINIC | Age: 74
End: 2019-01-09

## 2019-01-09 NOTE — TELEPHONE ENCOUNTER
Pt states someone called her and left a message about coming in to see a nurse but her  erased it  Please call back ok to leave detailed message  Pt states she can come anytime next week

## 2019-01-15 ENCOUNTER — PRIOR ORIGINAL RECORDS (OUTPATIENT)
Dept: OTHER | Age: 74
End: 2019-01-15

## 2019-01-15 ENCOUNTER — TELEPHONE (OUTPATIENT)
Dept: FAMILY MEDICINE CLINIC | Facility: CLINIC | Age: 74
End: 2019-01-15

## 2019-01-15 NOTE — TELEPHONE ENCOUNTER
Pt states since Friday has been having vaginal bleeding,  Wearing a pad,  No pain,   Pt concerned, no available openings.  Advised pt to go to ER for eval. Pt agrees and will go today

## 2019-01-16 ENCOUNTER — OFFICE VISIT (OUTPATIENT)
Dept: FAMILY MEDICINE CLINIC | Facility: CLINIC | Age: 74
End: 2019-01-16
Payer: MEDICARE

## 2019-01-16 VITALS
DIASTOLIC BLOOD PRESSURE: 76 MMHG | WEIGHT: 200.81 LBS | HEART RATE: 104 BPM | SYSTOLIC BLOOD PRESSURE: 120 MMHG | OXYGEN SATURATION: 98 % | RESPIRATION RATE: 18 BRPM | TEMPERATURE: 98 F | HEIGHT: 64 IN | BODY MASS INDEX: 34.28 KG/M2

## 2019-01-16 DIAGNOSIS — R30.0 DYSURIA: Primary | ICD-10-CM

## 2019-01-16 PROBLEM — N95.2 POST-MENOPAUSAL ATROPHIC VAGINITIS: Status: ACTIVE | Noted: 2019-01-16

## 2019-01-16 LAB
GLUCOSE (URINE DIPSTICK): NEGATIVE MG/DL
MULTISTIX LOT#: ABNORMAL NUMERIC
NITRITE, URINE: NEGATIVE
OCCULT BLOOD: NEGATIVE
PH, URINE: 6 (ref 4.5–8)
SPECIFIC GRAVITY: 1.3 (ref 1–1.03)

## 2019-01-16 PROCEDURE — 87186 SC STD MICRODIL/AGAR DIL: CPT | Performed by: FAMILY MEDICINE

## 2019-01-16 PROCEDURE — 81003 URINALYSIS AUTO W/O SCOPE: CPT | Performed by: FAMILY MEDICINE

## 2019-01-16 PROCEDURE — 87088 URINE BACTERIA CULTURE: CPT | Performed by: FAMILY MEDICINE

## 2019-01-16 PROCEDURE — 99214 OFFICE O/P EST MOD 30 MIN: CPT | Performed by: FAMILY MEDICINE

## 2019-01-16 PROCEDURE — 87086 URINE CULTURE/COLONY COUNT: CPT | Performed by: FAMILY MEDICINE

## 2019-01-16 NOTE — PROGRESS NOTES
Patient is here for follow-up I had done a preoperative evaluation for next weeks right knee replacement and it was discovered that she had a UTI that was adequately treated with antibiotics.   She has completed the antibiotic course and is back here with a

## 2019-01-17 ENCOUNTER — PRIOR ORIGINAL RECORDS (OUTPATIENT)
Dept: OTHER | Age: 74
End: 2019-01-17

## 2019-01-17 ENCOUNTER — MYAURORA ACCOUNT LINK (OUTPATIENT)
Dept: OTHER | Age: 74
End: 2019-01-17

## 2019-01-17 ENCOUNTER — HOSPITAL ENCOUNTER (OUTPATIENT)
Dept: GENERAL RADIOLOGY | Facility: HOSPITAL | Age: 74
Discharge: HOME | End: 2019-01-17
Attending: INTERNAL MEDICINE
Payer: MEDICARE

## 2019-01-17 ENCOUNTER — APPOINTMENT (OUTPATIENT)
Dept: LAB | Facility: HOSPITAL | Age: 74
End: 2019-01-17
Attending: INTERNAL MEDICINE
Payer: MEDICARE

## 2019-01-17 DIAGNOSIS — Z01.818 PRE-OP TESTING: ICD-10-CM

## 2019-01-17 LAB
BUN: 14 MG/DL
CALCIUM: 9.1 MG/DL
CHLORIDE: 106 MEQ/L
CREATININE, SERUM: 1.08 MG/DL
GLUCOSE: 116 MG/DL
HEMATOCRIT: 37.4 %
HEMOGLOBIN: 11.7 G/DL
PLATELETS: 474 K/UL
POTASSIUM, SERUM: 4.3 MEQ/L
RED BLOOD COUNT: 4.38 X 10-6/U
SODIUM: 139 MEQ/L
WHITE BLOOD COUNT: 13.1 X 10-3/U

## 2019-01-17 PROCEDURE — 71046 X-RAY EXAM CHEST 2 VIEWS: CPT | Performed by: INTERNAL MEDICINE

## 2019-01-17 NOTE — H&P
Jefm Esthela  : 10/12/1945  ACCOUNT:  [id number]  [phone]  PCP:    TODAY'S DATE: 2019  DICTATED BY:  [Dr. Jonas Hauser      CHIEF COMPLAINT: [Followup of Chest pain, precordial.]    HPI:    [On 2019, Marly Alfaro, a 68year old female, premature CAD. Negative for AAA. SOCIAL HISTORY: SMOKING: Former tobacco use. used to smoke but quit. CAFFEINE: 2 cups daily. ALCOHOL: denies drinking. EXERCISE: no regular exercise. DIET: diabetic. MARITAL STATUS: .      ALLERGIES: No Known Allergi 04/17/18 DULoxetine HCl        60MG      1 daily                                  04/17/18 MetFORMIN HCl ER      500MG     2 x day                                  04/17/18 SEROquel              50MG      daily

## 2019-01-18 RX ORDER — QUETIAPINE 50 MG/1
50 TABLET, FILM COATED ORAL NIGHTLY
COMMUNITY
End: 2019-03-15

## 2019-01-18 RX ORDER — ASPIRIN 81 MG/1
81 TABLET, CHEWABLE ORAL DAILY
Status: ON HOLD | COMMUNITY
End: 2019-01-24

## 2019-01-18 RX ORDER — HYDROCODONE BITARTRATE AND ACETAMINOPHEN 10; 325 MG/1; MG/1
1-2 TABLET ORAL EVERY 4 HOURS PRN
COMMUNITY
End: 2019-02-22 | Stop reason: ALTCHOICE

## 2019-01-19 ENCOUNTER — HOSPITAL ENCOUNTER (EMERGENCY)
Facility: HOSPITAL | Age: 74
Discharge: HOME | End: 2019-01-19
Attending: EMERGENCY MEDICINE
Payer: MEDICARE

## 2019-01-19 ENCOUNTER — APPOINTMENT (OUTPATIENT)
Dept: ULTRASOUND IMAGING | Facility: HOSPITAL | Age: 74
End: 2019-01-19
Attending: EMERGENCY MEDICINE
Payer: MEDICARE

## 2019-01-19 VITALS
SYSTOLIC BLOOD PRESSURE: 124 MMHG | WEIGHT: 200 LBS | HEIGHT: 64 IN | RESPIRATION RATE: 18 BRPM | DIASTOLIC BLOOD PRESSURE: 87 MMHG | TEMPERATURE: 97 F | BODY MASS INDEX: 34.15 KG/M2 | OXYGEN SATURATION: 98 % | HEART RATE: 97 BPM

## 2019-01-19 DIAGNOSIS — N93.9 ABNORMAL UTERINE BLEEDING: Primary | ICD-10-CM

## 2019-01-19 LAB
ALBUMIN SERPL-MCNC: 3.3 G/DL (ref 3.1–4.5)
ALBUMIN/GLOB SERPL: 0.9 {RATIO} (ref 1–2)
ALP LIVER SERPL-CCNC: 111 U/L (ref 55–142)
ALT SERPL-CCNC: 16 U/L (ref 14–54)
ANION GAP SERPL CALC-SCNC: 8 MMOL/L (ref 0–18)
APTT PPP: 30.2 SECONDS (ref 26.1–34.6)
AST SERPL-CCNC: 18 U/L (ref 15–41)
BASOPHILS # BLD AUTO: 0.06 X10(3) UL (ref 0–0.1)
BASOPHILS NFR BLD AUTO: 0.6 %
BILIRUB SERPL-MCNC: 0.3 MG/DL (ref 0.1–2)
BILIRUB UR QL STRIP.AUTO: NEGATIVE
BUN BLD-MCNC: 11 MG/DL (ref 8–20)
BUN/CREAT SERPL: 11 (ref 10–20)
CALCIUM BLD-MCNC: 8.8 MG/DL (ref 8.3–10.3)
CHLORIDE SERPL-SCNC: 106 MMOL/L (ref 101–111)
CLARITY UR REFRACT.AUTO: CLEAR
CO2 SERPL-SCNC: 25 MMOL/L (ref 22–32)
CREAT BLD-MCNC: 1 MG/DL (ref 0.55–1.02)
EOSINOPHIL # BLD AUTO: 0.3 X10(3) UL (ref 0–0.3)
EOSINOPHIL NFR BLD AUTO: 3 %
ERYTHROCYTE [DISTWIDTH] IN BLOOD BY AUTOMATED COUNT: 14.9 % (ref 11.5–16)
GLOBULIN PLAS-MCNC: 3.7 G/DL (ref 2.8–4.4)
GLUCOSE BLD-MCNC: 176 MG/DL (ref 70–99)
GLUCOSE UR STRIP.AUTO-MCNC: NEGATIVE MG/DL
HCT VFR BLD AUTO: 36.4 % (ref 34–50)
HGB BLD-MCNC: 11.8 G/DL (ref 12–16)
IMMATURE GRANULOCYTE COUNT: 0.05 X10(3) UL (ref 0–1)
IMMATURE GRANULOCYTE RATIO %: 0.5 %
INR BLD: 0.93 (ref 0.9–1.1)
KETONES UR STRIP.AUTO-MCNC: NEGATIVE MG/DL
LEUKOCYTE ESTERASE UR QL STRIP.AUTO: NEGATIVE
LYMPHOCYTES # BLD AUTO: 2.51 X10(3) UL (ref 0.9–4)
LYMPHOCYTES NFR BLD AUTO: 24.9 %
M PROTEIN MFR SERPL ELPH: 7 G/DL (ref 6.4–8.2)
MCH RBC QN AUTO: 27.3 PG (ref 27–33.2)
MCHC RBC AUTO-ENTMCNC: 32.4 G/DL (ref 31–37)
MCV RBC AUTO: 84.1 FL (ref 81–100)
MONOCYTES # BLD AUTO: 0.53 X10(3) UL (ref 0.1–1)
MONOCYTES NFR BLD AUTO: 5.3 %
NEUTROPHIL ABS PRELIM: 6.64 X10 (3) UL (ref 1.3–6.7)
NEUTROPHILS # BLD AUTO: 6.64 X10(3) UL (ref 1.3–6.7)
NEUTROPHILS NFR BLD AUTO: 65.7 %
NITRITE UR QL STRIP.AUTO: NEGATIVE
OSMOLALITY SERPL CALC.SUM OF ELEC: 292 MOSM/KG (ref 275–295)
PH UR STRIP.AUTO: 7 [PH] (ref 4.5–8)
PLATELET # BLD AUTO: 461 10(3)UL (ref 150–450)
POTASSIUM SERPL-SCNC: 3.9 MMOL/L (ref 3.6–5.1)
PROT UR STRIP.AUTO-MCNC: NEGATIVE MG/DL
PSA SERPL DL<=0.01 NG/ML-MCNC: 12.8 SECONDS (ref 12.4–14.7)
RBC # BLD AUTO: 4.33 X10(6)UL (ref 3.8–5.1)
RED CELL DISTRIBUTION WIDTH-SD: 45.6 FL (ref 35.1–46.3)
SODIUM SERPL-SCNC: 139 MMOL/L (ref 136–144)
SP GR UR STRIP.AUTO: 1.01 (ref 1–1.03)
UROBILINOGEN UR STRIP.AUTO-MCNC: <2 MG/DL
WBC # BLD AUTO: 10.1 X10(3) UL (ref 4–13)

## 2019-01-19 PROCEDURE — 96361 HYDRATE IV INFUSION ADD-ON: CPT

## 2019-01-19 PROCEDURE — 87086 URINE CULTURE/COLONY COUNT: CPT | Performed by: EMERGENCY MEDICINE

## 2019-01-19 PROCEDURE — 99285 EMERGENCY DEPT VISIT HI MDM: CPT

## 2019-01-19 PROCEDURE — 85025 COMPLETE CBC W/AUTO DIFF WBC: CPT | Performed by: EMERGENCY MEDICINE

## 2019-01-19 PROCEDURE — 96360 HYDRATION IV INFUSION INIT: CPT

## 2019-01-19 PROCEDURE — 87660 TRICHOMONAS VAGIN DIR PROBE: CPT | Performed by: EMERGENCY MEDICINE

## 2019-01-19 PROCEDURE — 76830 TRANSVAGINAL US NON-OB: CPT | Performed by: EMERGENCY MEDICINE

## 2019-01-19 PROCEDURE — 87510 GARDNER VAG DNA DIR PROBE: CPT | Performed by: EMERGENCY MEDICINE

## 2019-01-19 PROCEDURE — 81001 URINALYSIS AUTO W/SCOPE: CPT | Performed by: EMERGENCY MEDICINE

## 2019-01-19 PROCEDURE — 85730 THROMBOPLASTIN TIME PARTIAL: CPT | Performed by: EMERGENCY MEDICINE

## 2019-01-19 PROCEDURE — 80053 COMPREHEN METABOLIC PANEL: CPT | Performed by: EMERGENCY MEDICINE

## 2019-01-19 PROCEDURE — 76856 US EXAM PELVIC COMPLETE: CPT | Performed by: EMERGENCY MEDICINE

## 2019-01-19 PROCEDURE — 99284 EMERGENCY DEPT VISIT MOD MDM: CPT

## 2019-01-19 PROCEDURE — 85610 PROTHROMBIN TIME: CPT | Performed by: EMERGENCY MEDICINE

## 2019-01-19 PROCEDURE — 87480 CANDIDA DNA DIR PROBE: CPT | Performed by: EMERGENCY MEDICINE

## 2019-01-19 NOTE — ED NOTES
Lab contacted at this time regarding urine culture. They have urine culture tube in lab at this time.

## 2019-01-19 NOTE — ED NOTES
Pt back from [address] without incident. Pt awake and alert, skin w/d,resps reg/unlabored. Pt appears comfortable. Pt states she voided in [address]. Pt aware we are in need of a urine specimen.  Pt given water per request.

## 2019-01-19 NOTE — ED INITIAL ASSESSMENT (HPI)
Pt presents to the ED with complaints of abdominal cramping and vaginal bleeding today. Pt states she Is concerned because she is scheduled for knee replacement this week.  Pt states she did have some vaginal bleeding a week ago, ws seen by Dr Teresa Prieto at t

## 2019-01-20 NOTE — ED PROVIDER NOTES
Patient Seen in: [facility] Emergency Department    History   Patient presents with:  Eval-G (gynecologic)    Stated Complaint: Vaginal bleeding, no anticoagulants. Having Rt knee surgery on Thurs    HPI    Patient presents with vaginal bleeding.   Michael Brink vomiting)    • Psoriasis    • Rheumatoid arthritis ([facility] [address]    • Sleep apnea    • Stroke [facility])     per patient she was unaware but it was noted on a MRI-no effects   • Torn meniscus     right knee   • Type II or unspecified type diabetes mellitus without ment cm (5' 4\")   Wt 90.7 kg   SpO2 98%   BMI 34.33 kg/m²         Physical Exam    General: Alert and oriented x3 in no acute distress. HEENT: Normocephalic, atraumatic, pupils equal round and reactive to light, oropharynx clear. Neck: Supple.   Arnel Easter RAINBOW DRAW GOLD   URINE CULTURE, ROUTINE            Us Pelvis Ev (trns Abd And Endovag) (SIA=[id number])    Result Date: 1/19/2019  PROCEDURE:  US PELVIS EV (TRNS ABD AND ENDOVAG) (PDI=[id number])  COMPARISON:  None.   INDICATIONS:  Vaginal bleeding, urine shows rare bacteria but I suspect may be a contaminated specimen. She does not have symptoms of urinary tract infection so will not be started on antibiotics at this time although her urine was sent for culture.     Disposition and Plan     Clinical

## 2019-01-21 ENCOUNTER — HOSPITAL ENCOUNTER (OUTPATIENT)
Dept: INTERVENTIONAL RADIOLOGY/VASCULAR | Facility: HOSPITAL | Age: 74
Discharge: HOME | DRG: 470 | End: 2019-01-21
Attending: INTERNAL MEDICINE | Admitting: INTERNAL MEDICINE
Payer: MEDICARE

## 2019-01-21 VITALS
BODY MASS INDEX: 34.15 KG/M2 | RESPIRATION RATE: 17 BRPM | DIASTOLIC BLOOD PRESSURE: 61 MMHG | TEMPERATURE: 98 F | HEIGHT: 64 IN | OXYGEN SATURATION: 96 % | WEIGHT: 200 LBS | SYSTOLIC BLOOD PRESSURE: 125 MMHG | HEART RATE: 80 BPM

## 2019-01-21 DIAGNOSIS — I25.10 CAD (CORONARY ARTERY DISEASE): ICD-10-CM

## 2019-01-21 DIAGNOSIS — R07.9 CHEST PAIN: ICD-10-CM

## 2019-01-21 LAB — GLUCOSE BLD-MCNC: 154 MG/DL (ref 65–99)

## 2019-01-21 PROCEDURE — 4A023N7 MEASUREMENT OF CARDIAC SAMPLING AND PRESSURE, LEFT HEART, PERCUTANEOUS APPROACH: ICD-10-PCS | Performed by: INTERNAL MEDICINE

## 2019-01-21 PROCEDURE — B2111ZZ FLUOROSCOPY OF MULTIPLE CORONARY ARTERIES USING LOW OSMOLAR CONTRAST: ICD-10-PCS | Performed by: INTERNAL MEDICINE

## 2019-01-21 PROCEDURE — 99152 MOD SED SAME PHYS/QHP 5/>YRS: CPT

## 2019-01-21 PROCEDURE — B2151ZZ FLUOROSCOPY OF LEFT HEART USING LOW OSMOLAR CONTRAST: ICD-10-PCS | Performed by: INTERNAL MEDICINE

## 2019-01-21 PROCEDURE — 99153 MOD SED SAME PHYS/QHP EA: CPT

## 2019-01-21 PROCEDURE — 93458 L HRT ARTERY/VENTRICLE ANGIO: CPT

## 2019-01-21 PROCEDURE — 82962 GLUCOSE BLOOD TEST: CPT

## 2019-01-21 RX ORDER — HEPARIN SODIUM 5000 [USP'U]/ML
INJECTION, SOLUTION INTRAVENOUS; SUBCUTANEOUS
Status: COMPLETED
Start: 2019-01-21 | End: 2019-01-21

## 2019-01-21 RX ORDER — MIDAZOLAM HYDROCHLORIDE 1 MG/ML
INJECTION INTRAMUSCULAR; INTRAVENOUS
Status: COMPLETED
Start: 2019-01-21 | End: 2019-01-21

## 2019-01-21 RX ORDER — VERAPAMIL HYDROCHLORIDE 2.5 MG/ML
INJECTION, SOLUTION INTRAVENOUS
Status: COMPLETED
Start: 2019-01-21 | End: 2019-01-21

## 2019-01-21 RX ORDER — SODIUM CHLORIDE 9 MG/ML
INJECTION, SOLUTION INTRAVENOUS CONTINUOUS
Status: DISCONTINUED | OUTPATIENT
Start: 2019-01-21 | End: 2019-01-21

## 2019-01-21 RX ORDER — LIDOCAINE HYDROCHLORIDE 10 MG/ML
INJECTION, SOLUTION EPIDURAL; INFILTRATION; INTRACAUDAL; PERINEURAL
Status: COMPLETED
Start: 2019-01-21 | End: 2019-01-21

## 2019-01-21 RX ADMIN — SODIUM CHLORIDE: 9 INJECTION, SOLUTION INTRAVENOUS at 07:20:00

## 2019-01-21 NOTE — PROCEDURES
[facility] via Radial Artery Procedure Note    Bouchra Soto Location: Cath Lab    CSN [id number] MRN [id number]   Admission Date 1/21/2019 Procedure Date 1/21/2019   Attending Physician Corrina Hodge MD Procedure Physician Razia Blankenship, occluded. There are well-formed left to right collaterals. 2.  The LAD system is calcified with no significant disease  3. The left main coronary artery has no significant disease  4. The circumflex artery has no significant disease    Impression:  1.

## 2019-01-21 NOTE — PROGRESS NOTES
Discharge instructions explained to the patient and family,questions were answered, vitals are  Stable, iv removed, perlude removed and dressing applied,right radial access is clean and dry,patient was taken in a wheelchair to the car and went home in stab
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
## 2019-01-24 ENCOUNTER — APPOINTMENT (OUTPATIENT)
Dept: GENERAL RADIOLOGY | Facility: HOSPITAL | Age: 74
DRG: 470 | End: 2019-01-24
Attending: ORTHOPAEDIC SURGERY
Payer: MEDICARE

## 2019-01-24 ENCOUNTER — HOSPITAL ENCOUNTER (INPATIENT)
Facility: HOSPITAL | Age: 74
LOS: 2 days | Discharge: HOME HEALTH | DRG: 470 | End: 2019-01-26
Attending: ORTHOPAEDIC SURGERY | Admitting: ORTHOPAEDIC SURGERY
Payer: MEDICARE

## 2019-01-24 ENCOUNTER — ANESTHESIA EVENT (OUTPATIENT)
Dept: SURGERY | Facility: HOSPITAL | Age: 74
DRG: 470 | End: 2019-01-24
Payer: MEDICARE

## 2019-01-24 ENCOUNTER — ANESTHESIA (OUTPATIENT)
Dept: SURGERY | Facility: HOSPITAL | Age: 74
DRG: 470 | End: 2019-01-24
Payer: MEDICARE

## 2019-01-24 DIAGNOSIS — M17.11 PRIMARY OSTEOARTHRITIS OF RIGHT KNEE: Primary | ICD-10-CM

## 2019-01-24 LAB
GLUCOSE BLD-MCNC: 112 MG/DL (ref 65–99)
GLUCOSE BLD-MCNC: 123 MG/DL (ref 65–99)
GLUCOSE BLD-MCNC: 176 MG/DL (ref 65–99)
GLUCOSE BLD-MCNC: 233 MG/DL (ref 65–99)

## 2019-01-24 PROCEDURE — 0SRC0J9 REPLACEMENT OF RIGHT KNEE JOINT WITH SYNTHETIC SUBSTITUTE, CEMENTED, OPEN APPROACH: ICD-10-PCS | Performed by: ORTHOPAEDIC SURGERY

## 2019-01-24 PROCEDURE — 73560 X-RAY EXAM OF KNEE 1 OR 2: CPT | Performed by: ORTHOPAEDIC SURGERY

## 2019-01-24 PROCEDURE — 3E0T3BZ INTRODUCTION OF ANESTHETIC AGENT INTO PERIPHERAL NERVES AND PLEXI, PERCUTANEOUS APPROACH: ICD-10-PCS | Performed by: ANESTHESIOLOGY

## 2019-01-24 PROCEDURE — 99223 1ST HOSP IP/OBS HIGH 75: CPT | Performed by: HOSPITALIST

## 2019-01-24 DEVICE — ATTUNE KNEE SYSTEM TIBIAL INSERT ROTATING PLATFORM POSTERIOR STABILIZED 6 6MM AOX
Type: IMPLANTABLE DEVICE | Site: KNEE | Status: FUNCTIONAL
Brand: ATTUNE

## 2019-01-24 DEVICE — ATTUNE PATELLA MEDIALIZED ANATOMIC 35MM CEMENTED AOX
Type: IMPLANTABLE DEVICE | Site: KNEE | Status: FUNCTIONAL
Brand: ATTUNE

## 2019-01-24 DEVICE — SMARTSET GHV GENTAMICIN HIGH VISCOSITY BONE CEMENT 40G
Type: IMPLANTABLE DEVICE | Site: KNEE | Status: FUNCTIONAL
Brand: SMARTSET

## 2019-01-24 DEVICE — ATTUNE KNEE SYSTEM FEMORAL POSTERIOR STABILIZED NARROW SIZE 6N RIGHT CEMENTED
Type: IMPLANTABLE DEVICE | Site: KNEE | Status: FUNCTIONAL
Brand: ATTUNE

## 2019-01-24 RX ORDER — SODIUM CHLORIDE 9 MG/ML
INJECTION, SOLUTION INTRAVENOUS CONTINUOUS
Status: DISCONTINUED | OUTPATIENT
Start: 2019-01-24 | End: 2019-01-24 | Stop reason: HOSPADM

## 2019-01-24 RX ORDER — CEFAZOLIN SODIUM/WATER 2 G/20 ML
2 SYRINGE (ML) INTRAVENOUS EVERY 8 HOURS
Status: COMPLETED | OUTPATIENT
Start: 2019-01-24 | End: 2019-01-25

## 2019-01-24 RX ORDER — SODIUM PHOSPHATE, DIBASIC AND SODIUM PHOSPHATE, MONOBASIC 7; 19 G/133ML; G/133ML
1 ENEMA RECTAL ONCE AS NEEDED
Status: DISCONTINUED | OUTPATIENT
Start: 2019-01-24 | End: 2019-01-26

## 2019-01-24 RX ORDER — POLYETHYLENE GLYCOL 3350 17 G/17G
17 POWDER, FOR SOLUTION ORAL DAILY PRN
Status: DISCONTINUED | OUTPATIENT
Start: 2019-01-24 | End: 2019-01-26

## 2019-01-24 RX ORDER — CLONAZEPAM 0.5 MG/1
0.5 TABLET ORAL 2 TIMES DAILY PRN
COMMUNITY
End: 2019-02-25

## 2019-01-24 RX ORDER — QUETIAPINE 25 MG/1
50 TABLET, FILM COATED ORAL NIGHTLY
Status: DISCONTINUED | OUTPATIENT
Start: 2019-01-24 | End: 2019-01-26

## 2019-01-24 RX ORDER — ONDANSETRON 2 MG/ML
4 INJECTION INTRAMUSCULAR; INTRAVENOUS EVERY 4 HOURS PRN
Status: ACTIVE | OUTPATIENT
Start: 2019-01-24 | End: 2019-01-26

## 2019-01-24 RX ORDER — ACETAMINOPHEN 325 MG/1
650 TABLET ORAL 4 TIMES DAILY
Status: COMPLETED | OUTPATIENT
Start: 2019-01-24 | End: 2019-01-25

## 2019-01-24 RX ORDER — ACETAMINOPHEN 325 MG/1
650 TABLET ORAL ONCE
Status: COMPLETED | OUTPATIENT
Start: 2019-01-24 | End: 2019-01-24

## 2019-01-24 RX ORDER — DILTIAZEM HYDROCHLORIDE 60 MG/1
120 TABLET, FILM COATED ORAL DAILY
Status: DISCONTINUED | OUTPATIENT
Start: 2019-01-24 | End: 2019-01-26

## 2019-01-24 RX ORDER — HYDROMORPHONE HYDROCHLORIDE 1 MG/ML
0.8 INJECTION, SOLUTION INTRAMUSCULAR; INTRAVENOUS; SUBCUTANEOUS EVERY 2 HOUR PRN
Status: ACTIVE | OUTPATIENT
Start: 2019-01-24 | End: 2019-01-26

## 2019-01-24 RX ORDER — ATORVASTATIN CALCIUM 20 MG/1
20 TABLET, FILM COATED ORAL NIGHTLY
COMMUNITY
End: 2019-10-11

## 2019-01-24 RX ORDER — HYDROMORPHONE HYDROCHLORIDE 1 MG/ML
0.4 INJECTION, SOLUTION INTRAMUSCULAR; INTRAVENOUS; SUBCUTANEOUS EVERY 5 MIN PRN
Status: DISCONTINUED | OUTPATIENT
Start: 2019-01-24 | End: 2019-01-24 | Stop reason: HOSPADM

## 2019-01-24 RX ORDER — MEPERIDINE HYDROCHLORIDE 25 MG/ML
12.5 INJECTION INTRAMUSCULAR; INTRAVENOUS; SUBCUTANEOUS AS NEEDED
Status: DISCONTINUED | OUTPATIENT
Start: 2019-01-24 | End: 2019-01-24 | Stop reason: HOSPADM

## 2019-01-24 RX ORDER — SENNOSIDES 8.6 MG
17.2 TABLET ORAL NIGHTLY
Status: DISCONTINUED | OUTPATIENT
Start: 2019-01-24 | End: 2019-01-26

## 2019-01-24 RX ORDER — SODIUM CHLORIDE 9 MG/ML
INJECTION, SOLUTION INTRAVENOUS CONTINUOUS
Status: DISCONTINUED | OUTPATIENT
Start: 2019-01-24 | End: 2019-01-26

## 2019-01-24 RX ORDER — MELATONIN
325
Status: DISCONTINUED | OUTPATIENT
Start: 2019-01-25 | End: 2019-01-26

## 2019-01-24 RX ORDER — SODIUM CHLORIDE, SODIUM LACTATE, POTASSIUM CHLORIDE, CALCIUM CHLORIDE 600; 310; 30; 20 MG/100ML; MG/100ML; MG/100ML; MG/100ML
INJECTION, SOLUTION INTRAVENOUS CONTINUOUS
Status: DISCONTINUED | OUTPATIENT
Start: 2019-01-24 | End: 2019-01-24 | Stop reason: HOSPADM

## 2019-01-24 RX ORDER — QUETIAPINE 25 MG/1
50 TABLET, FILM COATED ORAL NIGHTLY
Status: DISCONTINUED | OUTPATIENT
Start: 2019-01-24 | End: 2019-01-24

## 2019-01-24 RX ORDER — METOCLOPRAMIDE HYDROCHLORIDE 5 MG/ML
10 INJECTION INTRAMUSCULAR; INTRAVENOUS AS NEEDED
Status: DISCONTINUED | OUTPATIENT
Start: 2019-01-24 | End: 2019-01-24 | Stop reason: HOSPADM

## 2019-01-24 RX ORDER — TIZANIDINE 2 MG/1
2 TABLET ORAL 3 TIMES DAILY PRN
Status: DISCONTINUED | OUTPATIENT
Start: 2019-01-24 | End: 2019-01-26

## 2019-01-24 RX ORDER — DIPHENHYDRAMINE HYDROCHLORIDE 50 MG/ML
12.5 INJECTION INTRAMUSCULAR; INTRAVENOUS EVERY 4 HOURS PRN
Status: DISCONTINUED | OUTPATIENT
Start: 2019-01-24 | End: 2019-01-26

## 2019-01-24 RX ORDER — DULOXETIN HYDROCHLORIDE 30 MG/1
90 CAPSULE, DELAYED RELEASE ORAL DAILY
Status: DISCONTINUED | OUTPATIENT
Start: 2019-01-24 | End: 2019-01-26

## 2019-01-24 RX ORDER — CLONAZEPAM 0.5 MG/1
0.5 TABLET ORAL 2 TIMES DAILY PRN
Status: DISCONTINUED | OUTPATIENT
Start: 2019-01-24 | End: 2019-01-26

## 2019-01-24 RX ORDER — KETOROLAC TROMETHAMINE 30 MG/ML
15 INJECTION, SOLUTION INTRAMUSCULAR; INTRAVENOUS EVERY 6 HOURS PRN
Status: DISPENSED | OUTPATIENT
Start: 2019-01-24 | End: 2019-01-25

## 2019-01-24 RX ORDER — ZOLPIDEM TARTRATE 5 MG/1
5 TABLET ORAL NIGHTLY PRN
Status: DISCONTINUED | OUTPATIENT
Start: 2019-01-24 | End: 2019-01-26

## 2019-01-24 RX ORDER — BISACODYL 10 MG
10 SUPPOSITORY, RECTAL RECTAL
Status: DISCONTINUED | OUTPATIENT
Start: 2019-01-24 | End: 2019-01-26

## 2019-01-24 RX ORDER — MIDAZOLAM HYDROCHLORIDE 1 MG/ML
1 INJECTION INTRAMUSCULAR; INTRAVENOUS EVERY 5 MIN PRN
Status: DISCONTINUED | OUTPATIENT
Start: 2019-01-24 | End: 2019-01-24 | Stop reason: HOSPADM

## 2019-01-24 RX ORDER — ATORVASTATIN CALCIUM 20 MG/1
20 TABLET, FILM COATED ORAL NIGHTLY
Status: DISCONTINUED | OUTPATIENT
Start: 2019-01-24 | End: 2019-01-26

## 2019-01-24 RX ORDER — ONDANSETRON 2 MG/ML
4 INJECTION INTRAMUSCULAR; INTRAVENOUS AS NEEDED
Status: DISCONTINUED | OUTPATIENT
Start: 2019-01-24 | End: 2019-01-24 | Stop reason: HOSPADM

## 2019-01-24 RX ORDER — NALOXONE HYDROCHLORIDE 0.4 MG/ML
80 INJECTION, SOLUTION INTRAMUSCULAR; INTRAVENOUS; SUBCUTANEOUS AS NEEDED
Status: DISCONTINUED | OUTPATIENT
Start: 2019-01-24 | End: 2019-01-24 | Stop reason: HOSPADM

## 2019-01-24 RX ORDER — HYDROMORPHONE HYDROCHLORIDE 1 MG/ML
0.2 INJECTION, SOLUTION INTRAMUSCULAR; INTRAVENOUS; SUBCUTANEOUS EVERY 2 HOUR PRN
Status: ACTIVE | OUTPATIENT
Start: 2019-01-24 | End: 2019-01-26

## 2019-01-24 RX ORDER — ASPIRIN 81 MG/1
81 TABLET ORAL DAILY
COMMUNITY
End: 2019-02-01 | Stop reason: ALTCHOICE

## 2019-01-24 RX ORDER — HYDROMORPHONE HYDROCHLORIDE 1 MG/ML
0.4 INJECTION, SOLUTION INTRAMUSCULAR; INTRAVENOUS; SUBCUTANEOUS EVERY 2 HOUR PRN
Status: DISPENSED | OUTPATIENT
Start: 2019-01-24 | End: 2019-01-26

## 2019-01-24 RX ORDER — BETAMETHASONE DIPROPIONATE 0.5 MG/G
CREAM TOPICAL AS NEEDED
COMMUNITY
End: 2019-02-22

## 2019-01-24 RX ORDER — OXYCODONE HYDROCHLORIDE 15 MG/1
15 TABLET ORAL EVERY 4 HOURS PRN
Status: ACTIVE | OUTPATIENT
Start: 2019-01-24 | End: 2019-01-26

## 2019-01-24 RX ORDER — ACETAMINOPHEN 500 MG
1000 TABLET ORAL ONCE
Status: DISCONTINUED | OUTPATIENT
Start: 2019-01-24 | End: 2019-01-24 | Stop reason: HOSPADM

## 2019-01-24 RX ORDER — PANTOPRAZOLE SODIUM 20 MG/1
20 TABLET, DELAYED RELEASE ORAL
Status: DISCONTINUED | OUTPATIENT
Start: 2019-01-25 | End: 2019-01-26

## 2019-01-24 RX ORDER — SODIUM CHLORIDE, SODIUM LACTATE, POTASSIUM CHLORIDE, CALCIUM CHLORIDE 600; 310; 30; 20 MG/100ML; MG/100ML; MG/100ML; MG/100ML
INJECTION, SOLUTION INTRAVENOUS CONTINUOUS
Status: DISCONTINUED | OUTPATIENT
Start: 2019-01-24 | End: 2019-01-24

## 2019-01-24 RX ORDER — CEFAZOLIN SODIUM/WATER 2 G/20 ML
2 SYRINGE (ML) INTRAVENOUS ONCE
Status: COMPLETED | OUTPATIENT
Start: 2019-01-24 | End: 2019-01-24

## 2019-01-24 RX ORDER — DEXTROSE MONOHYDRATE 25 G/50ML
50 INJECTION, SOLUTION INTRAVENOUS
Status: DISCONTINUED | OUTPATIENT
Start: 2019-01-24 | End: 2019-01-24 | Stop reason: HOSPADM

## 2019-01-24 RX ORDER — DIPHENHYDRAMINE HYDROCHLORIDE 50 MG/ML
25 INJECTION INTRAMUSCULAR; INTRAVENOUS ONCE AS NEEDED
Status: ACTIVE | OUTPATIENT
Start: 2019-01-24 | End: 2019-01-24

## 2019-01-24 RX ORDER — OXYCODONE HYDROCHLORIDE 5 MG/1
5 TABLET ORAL EVERY 4 HOURS PRN
Status: DISPENSED | OUTPATIENT
Start: 2019-01-24 | End: 2019-01-26

## 2019-01-24 RX ORDER — OXYCODONE HYDROCHLORIDE 10 MG/1
10 TABLET ORAL EVERY 4 HOURS PRN
Status: DISPENSED | OUTPATIENT
Start: 2019-01-24 | End: 2019-01-26

## 2019-01-24 RX ORDER — DIPHENHYDRAMINE HCL 25 MG
25 CAPSULE ORAL EVERY 4 HOURS PRN
Status: DISCONTINUED | OUTPATIENT
Start: 2019-01-24 | End: 2019-01-26

## 2019-01-24 RX ORDER — DOCUSATE SODIUM 100 MG/1
100 CAPSULE, LIQUID FILLED ORAL 2 TIMES DAILY
Status: DISCONTINUED | OUTPATIENT
Start: 2019-01-24 | End: 2019-01-26

## 2019-01-24 RX ORDER — METOCLOPRAMIDE HYDROCHLORIDE 5 MG/ML
10 INJECTION INTRAMUSCULAR; INTRAVENOUS EVERY 6 HOURS PRN
Status: ACTIVE | OUTPATIENT
Start: 2019-01-24 | End: 2019-01-26

## 2019-01-24 RX ORDER — ACETAMINOPHEN 325 MG/1
TABLET ORAL
Status: COMPLETED
Start: 2019-01-24 | End: 2019-01-24

## 2019-01-24 RX ORDER — TRAMADOL HYDROCHLORIDE 50 MG/1
50 TABLET ORAL EVERY 6 HOURS PRN
Status: DISCONTINUED | OUTPATIENT
Start: 2019-01-24 | End: 2019-01-26
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
## 2019-01-24 NOTE — OPERATIVE REPORT
PATIENT'S NAME: Naseem Locke   ATTENDING PHYSICIAN: Danis Cross MD   OPERATING PHYSICIAN: Danis Cross MD   PATIENT ACCOUNT#:   [de-identified]    LOCATION:  [address] [place] [address],[address] RECORD #:   [id number]    YOB: 1945  ADMI adequately visualized. Irrisept irrigation was placed in the wound and allowed to sit for 1 minute before evacuating the solution. The knee was then flexed and the tibia subluxed anteriorly.   The drill was used to gain access to the intramedullary canal. Attention was turned to the patella. The caliper was used to measure initial thickness before using the saw in a freehand technique to resect the articular surface of the patella. A size 35 was the appropriate fit and this was placed.   Drill holes were m tissue was then closed with inverted 2-0 Vicryl suture and the skin was closed with staples. A sterile dressing was placed. The patient tolerated the procedure well. There were no complications. Blood loss was approximately 20 mL.   Tourniquet time was

## 2019-01-24 NOTE — INTERVAL H&P NOTE
Pre-op Diagnosis: Primary osteoarthritis of right knee [M17.11]    The above referenced H&P was reviewed by Danis Cross MD on 1/24/2019, the patient was examined and no significant changes have occurred in the patient's condition since the H&P was per

## 2019-01-24 NOTE — ANESTHESIA POSTPROCEDURE EVALUATION
[address] Patient Status:  Surgery Admit   Age/Gender 68year old female MRN [id number]   [facility] SURGERY Attending Vineet Ibarra MD   Hosp Day # 0 PCP Janis Zaidi DO       Anesthesia Post-op Note    Procedu

## 2019-01-24 NOTE — ANESTHESIA PREPROCEDURE EVALUATION
PRE-OP EVALUATION    Patient Name: Doretta Fleischer    Pre-op Diagnosis: Primary osteoarthritis of right knee [M17.11]    Procedure(s):  RIGHT TOTAL KNEE ARTHROPLASTY    Surgeon(s) and Role:     Desi Ronquillo MD - Primary    Pre-op vitals reviewed. occlusion but good collateral flow    ECG reviewed.       MET: >4    (+) obesity  (+) hypertension   (+) hyperlipidemia  (+) CAD                                Endo/Other      (+) diabetes                     (+) arthritis  (+) rheumatoid arthritis     Pulm 01/19/2019         Airway      Mallampati: III  Mouth opening: <3 FB  TM distance: 4 - 6 cm  Neck ROM: limited Cardiovascular    Cardiovascular exam normal.  Rhythm: regular  Rate: normal  (-) murmur   Dental      Dental appliance(s): veneers       Pulmona

## 2019-01-24 NOTE — BRIEF OP NOTE
Pre-Operative Diagnosis: Primary osteoarthritis of right knee [M17.11]     Post-Operative Diagnosis: Primary osteoarthritis of right knee [M17.11]      Procedure Performed:   Procedure(s):  RIGHT TOTAL KNEE ARTHROPLASTY    Surgeon(s) and Role:     * Theo

## 2019-01-24 NOTE — PROGRESS NOTES
Pt received post op alert and oriented. Vitals stable. Rt knee ace wrap dressing is clean and dry. Sensation diminished to rt lower leg. Able to move and wiggle toes to right. Denies pain at present. Eager to eat. DTV.  Post op plan of care discussed with mateo

## 2019-01-24 NOTE — H&P
Jose Conklin   1/8/2019 9:00 AM Office Visit   MRN: [id number]   Description: 68year old female Provider: Patty Casper DO Department: [facility] [phone] Sheet   Click to print Donavonessa Circe 858 for scanning Patient has a current complaint of urinary frequency. Today's urinalysis showed only some white cells. She admittedly took a small portion of her 's penicillin in spite of her allergy to the same drug.    Physical examination a very pleasant lady in

## 2019-01-25 ENCOUNTER — APPOINTMENT (OUTPATIENT)
Dept: ULTRASOUND IMAGING | Facility: HOSPITAL | Age: 74
DRG: 470 | End: 2019-01-25
Attending: PHYSICIAN ASSISTANT
Payer: MEDICARE

## 2019-01-25 PROBLEM — Z47.89 ORTHOPEDIC AFTERCARE: Status: ACTIVE | Noted: 2019-01-25

## 2019-01-25 LAB
ERYTHROCYTE [DISTWIDTH] IN BLOOD BY AUTOMATED COUNT: 15.3 % (ref 11.5–16)
GLUCOSE BLD-MCNC: 159 MG/DL (ref 65–99)
GLUCOSE BLD-MCNC: 189 MG/DL (ref 65–99)
GLUCOSE BLD-MCNC: 213 MG/DL (ref 65–99)
GLUCOSE BLD-MCNC: 247 MG/DL (ref 65–99)
HCT VFR BLD AUTO: 31.5 % (ref 34–50)
HGB BLD-MCNC: 10.5 G/DL (ref 12–16)
MCH RBC QN AUTO: 27.6 PG (ref 27–33.2)
MCHC RBC AUTO-ENTMCNC: 33.3 G/DL (ref 31–37)
MCV RBC AUTO: 82.7 FL (ref 81–100)
PLATELET # BLD AUTO: 419 10(3)UL (ref 150–450)
RBC # BLD AUTO: 3.81 X10(6)UL (ref 3.8–5.1)
RED CELL DISTRIBUTION WIDTH-SD: 46.1 FL (ref 35.1–46.3)
WBC # BLD AUTO: 22 X10(3) UL (ref 4–13)

## 2019-01-25 PROCEDURE — 99232 SBSQ HOSP IP/OBS MODERATE 35: CPT | Performed by: HOSPITALIST

## 2019-01-25 PROCEDURE — 93971 EXTREMITY STUDY: CPT | Performed by: PHYSICIAN ASSISTANT

## 2019-01-25 RX ORDER — HYDROCODONE BITARTRATE AND ACETAMINOPHEN 10; 325 MG/1; MG/1
2 TABLET ORAL EVERY 4 HOURS PRN
Status: DISCONTINUED | OUTPATIENT
Start: 2019-01-26 | End: 2019-01-26

## 2019-01-25 RX ORDER — HYDROCODONE BITARTRATE AND ACETAMINOPHEN 10; 325 MG/1; MG/1
1 TABLET ORAL EVERY 4 HOURS PRN
Status: DISCONTINUED | OUTPATIENT
Start: 2019-01-26 | End: 2019-01-26

## 2019-01-25 NOTE — OCCUPATIONAL THERAPY NOTE
OCCUPATIONAL THERAPY QUICK EVALUATION - INPATIENT    Room Number: 353/353-A  Evaluation Date: 1/25/2019     Type of Evaluation: Quick Eval  Presenting Problem: s/p R TKA 1/24/19    Physician Order: IP Consult to Occupational Therapy  Reason for Therapy:  A 7/17/2017    Performed by Wade Tavares MD at [facility]   • KNEE TOTAL REPLACEMENT Right 1/24/2019    Performed by Sarai Yoder MD at [facility] MAIN OR   • LAPAROSCOPY,DIAGNOSTIC     • LUMBAR LAMINECTOMY POST LAT INTERBODY FUS 2 LEVEL N/A 7/17/2017    Per much help from another person does the patient currently need…  -   Putting on and taking off regular lower body clothing?: A Little(supervision)   -   Bathing (including washing, rinsing, drying)?: A Little(supervision)  -   Toileting, which includes usin transfers and dynamic reaching safely, without loss of balance, and at supervision to modified independent level; patient reports will have supervision at home from . Patient also with good recall and return demo following knee/surgical precautions.
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
## 2019-01-25 NOTE — PROGRESS NOTES
[facility]  Progress Note    Humberto Hollins Patient Status:  Inpatient    10/12/1945 MRN [id number]   [facility] 3SW-A Attending Deedee Peterson MD   Hosp Day # 1 PCP Cassie Kirby DO     SUBJECTIVE:  INTERVAL HISTORY: S/P  1  Pro 01/19/19   1113   PTP  12.8   INR  0.93         ASSESSMENT/PLAN:  1. US to rule out DVT  2. Continue pain management  3. Continue DVT prophylaxis   4. Continue PT/OT  5.  Discharge planning: Home with home health likely tomorrow as long as US negative and p

## 2019-01-25 NOTE — PROGRESS NOTES
[facility]  Progress Note     Paola August Patient Status:  Inpatient    10/12/1945 MRN [id number]   [facility] 3SW-A Attending Lawson Pérez MD   Hosp Day # 1 PCP Catrachito Haynes DO     Chief Complaint: DM  S:  Anxious.  or 90 mg Oral Daily   • DilTIAZem HCl  120 mg Oral Daily     ASSESSMENT / PLAN:   1. Osteoarthritis status post right total knee replacement  1. Per Dr. Chelo Pelaez   2. Spinal DJD- no acute issue   3. Type 2 diabetes  1. Insulin protocol  4.  HTN- Cont. cardizem

## 2019-01-25 NOTE — OCCUPATIONAL THERAPY NOTE
Attempted to see patient for OT eval this am, however patient off floor for extended period at ultrasound. Will reattempt as able. RN aware.

## 2019-01-25 NOTE — HOME CARE LIAISON
MET WITH PTNT TO DISCUSS HOME HEALTH SERVICES AND COVERAGE CRITERIA. PTNT AGREEABLE TO Bassam Washington. PTNT GIVEN RESIDENTIAL BROCHURE. RESIDENTIAL WITH PROVIDE SN/PT ON DISCHARGE.     Thank you for this referral,   Bard Dhaliwal

## 2019-01-25 NOTE — CM/SW NOTE
67 yo sp total knee replacement. Post op orders for discharge planning. PT is recommending home physical therapy. [facility] ortho joint journey plan for pt to discharge home with [facility]. Referral made to Morgan Almeida with [address]  She will meet wit

## 2019-01-26 VITALS
SYSTOLIC BLOOD PRESSURE: 117 MMHG | RESPIRATION RATE: 18 BRPM | TEMPERATURE: 99 F | WEIGHT: 198 LBS | OXYGEN SATURATION: 99 % | BODY MASS INDEX: 33.8 KG/M2 | HEIGHT: 64 IN | DIASTOLIC BLOOD PRESSURE: 59 MMHG | HEART RATE: 86 BPM

## 2019-01-26 LAB
ERYTHROCYTE [DISTWIDTH] IN BLOOD BY AUTOMATED COUNT: 15.4 % (ref 11.5–16)
GLUCOSE BLD-MCNC: 150 MG/DL (ref 65–99)
GLUCOSE BLD-MCNC: 157 MG/DL (ref 65–99)
HCT VFR BLD AUTO: 26.5 % (ref 34–50)
HGB BLD-MCNC: 8.7 G/DL (ref 12–16)
MCH RBC QN AUTO: 27.1 PG (ref 27–33.2)
MCHC RBC AUTO-ENTMCNC: 32.8 G/DL (ref 31–37)
MCV RBC AUTO: 82.6 FL (ref 81–100)
PLATELET # BLD AUTO: 350 10(3)UL (ref 150–450)
RBC # BLD AUTO: 3.21 X10(6)UL (ref 3.8–5.1)
RED CELL DISTRIBUTION WIDTH-SD: 46.6 FL (ref 35.1–46.3)
WBC # BLD AUTO: 16.3 X10(3) UL (ref 4–13)

## 2019-01-26 PROCEDURE — 99232 SBSQ HOSP IP/OBS MODERATE 35: CPT | Performed by: HOSPITALIST

## 2019-01-26 NOTE — PROGRESS NOTES
PIV removed. Pt has all belongings. Scripts at home for norco, ferrous sulfate, colace, and eliquis already filled. Pt went to DC class yesterday. Put DC video on at this time. Pt's daughter picking her up today.
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
## 2019-01-26 NOTE — PROGRESS NOTES
[facility]  Progress Note     Doretta Fleischer Patient Status:  Inpatient    10/12/1945 MRN [id number]   [facility] 3SW-A Attending Baron Derek MD   Hosp Day # 2 PCP Nghia Umana DO     Chief Complaint: DM  S:  Anxious abo 2. Spinal DJD- no acute issue   3. Type 2 diabetes  1. Insulin protocol  4. HTN- Cont. cardizem  5.  Dyslipidemia- statin    Ok to DC from medical standpoint     Quality:  · DVT Prophylaxis: Eliquis   · CODE status: full    Earnest Clay MD

## 2019-01-26 NOTE — PROGRESS NOTES
[facility]  Progress Note    Adam Coleyey Patient Status:  Inpatient    10/12/1945 MRN [id number]   [facility] 3SW-A Attending Darcie Alonso MD   [facility] Day # 2 PCP Elías Carrillo DO     Subjective:  Adam Ewing is a(n) 27 diabetes mellitus without complication, without long-term current use of insulin (HCC)     Anxiety     S/P lumbar fusion     Pain of right hip joint     Lumbar radiculopathy     Myofascial pain on right side     Primary osteoarthritis of right knee  Global

## 2019-01-26 NOTE — PROGRESS NOTES
Acute Pain Service    Post Op Day 2 Ortho Note    Assessed patient in chair. Patient rates pain 3/10 at rest and 4-5/10 with activity. Patient states Baron Arthur is working well to manage pain; denies itching/nausea/dizziness.     Patient able to bear weight on s

## 2019-01-26 NOTE — PROGRESS NOTES
Pt very aprehensive about discharging home today. States her  will not be able to help her much with ambulation. Pt states , \" I feel weak today. \"  Pt states she has a long hallway to go down after going in her front door of her apartment complex.

## 2019-01-26 NOTE — PROGRESS NOTES
Spoke with Dr. Sukhi Marina, pt can resume ASA 81mg after she is finished with eliquis script.  Pt states she takes ASA 81mg daily , not (325mg) which she took once before her angiogram test.

## 2019-01-28 ENCOUNTER — TELEPHONE (OUTPATIENT)
Dept: INTERNAL MEDICINE CLINIC | Facility: CLINIC | Age: 74
End: 2019-01-28

## 2019-01-28 ENCOUNTER — OFFICE VISIT (OUTPATIENT)
Dept: INTERNAL MEDICINE CLINIC | Facility: CLINIC | Age: 74
End: 2019-01-28
Payer: MEDICARE

## 2019-01-28 ENCOUNTER — PATIENT OUTREACH (OUTPATIENT)
Dept: CASE MANAGEMENT | Age: 74
End: 2019-01-28

## 2019-01-28 VITALS
OXYGEN SATURATION: 95 % | HEIGHT: 64 IN | BODY MASS INDEX: 34.37 KG/M2 | WEIGHT: 201.31 LBS | DIASTOLIC BLOOD PRESSURE: 74 MMHG | RESPIRATION RATE: 16 BRPM | HEART RATE: 106 BPM | SYSTOLIC BLOOD PRESSURE: 132 MMHG | TEMPERATURE: 98 F

## 2019-01-28 DIAGNOSIS — Z09 HOSPITAL DISCHARGE FOLLOW-UP: ICD-10-CM

## 2019-01-28 DIAGNOSIS — E11.9 TYPE 2 DIABETES MELLITUS WITHOUT COMPLICATION, WITHOUT LONG-TERM CURRENT USE OF INSULIN (HCC): ICD-10-CM

## 2019-01-28 DIAGNOSIS — Z13.31 SCREENING FOR DEPRESSION: ICD-10-CM

## 2019-01-28 DIAGNOSIS — E78.5 HYPERLIPIDEMIA, UNSPECIFIED HYPERLIPIDEMIA TYPE: ICD-10-CM

## 2019-01-28 DIAGNOSIS — Z02.9 ENCOUNTERS FOR UNSPECIFIED ADMINISTRATIVE PURPOSE: ICD-10-CM

## 2019-01-28 DIAGNOSIS — M17.11 PRIMARY OSTEOARTHRITIS OF RIGHT KNEE: ICD-10-CM

## 2019-01-28 DIAGNOSIS — F32.A DEPRESSIVE DISORDER: ICD-10-CM

## 2019-01-28 DIAGNOSIS — Z96.651 S/P TKR (TOTAL KNEE REPLACEMENT), RIGHT: Primary | ICD-10-CM

## 2019-01-28 DIAGNOSIS — I10 ESSENTIAL HYPERTENSION: ICD-10-CM

## 2019-01-28 DIAGNOSIS — Z91.81 AT RISK FOR FALLING: ICD-10-CM

## 2019-01-28 PROBLEM — M19.90 DEGENERATIVE JOINT DISEASE: Status: ACTIVE | Noted: 2019-01-28

## 2019-01-28 PROCEDURE — 1111F DSCHRG MED/CURRENT MED MERGE: CPT | Performed by: CLINICAL NURSE SPECIALIST

## 2019-01-28 PROCEDURE — 99495 TRANSJ CARE MGMT MOD F2F 14D: CPT | Performed by: CLINICAL NURSE SPECIALIST

## 2019-01-28 NOTE — DISCHARGE SUMMARY
Patient ID:  Carmella Desai  [id number]  53 year old  10/12/1945    Admit Date: 1/24/2019    Discharge Date and Time: 1/26/2019     Attending Physician: London De La Vega MD    Reason for admission: Primary osteoarthritis of right knee [M17.11]    Tiff Winston Historical    HYDROcodone-acetaminophen  MG Oral Tab  Take 1 tablet by mouth every 4 (four) hours as needed for Pain., Historical    apixaban 2.5 MG Oral Tab  Take 1 tablet (2.5 mg total) by mouth 2 (two) times daily for 12 days. , Normal, Disp-24 tab
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
## 2019-01-28 NOTE — CM/SW NOTE
01/28/19 0700   Discharge disposition   Expected discharge disposition Home-Health   Name of Facillity/Home Care/Hospice Residential   Discharge transportation Private car   DC 1/26/19

## 2019-01-28 NOTE — PATIENT INSTRUCTIONS
Patient Instructions:  1. Follow up with Dr. Maddy Su on 2/1/19  2. Follow up with Dr. Selene Lerma Physician Assistant on 1/29/19 at [address]  [phone]    Hold aspirin while you are taking Eliquis.        TOP FALL PREVENTION TIP use often within easy reach. BATHROOM:  Install grab bars on the bathroom walls beside the tub, shower and toilet. Use a non skid rubber mat in the tub/shower.   If you are unsteady on your feet you may want to use a shower chair/bench and a hand held connor

## 2019-01-28 NOTE — PROGRESS NOTES
Initial Post Discharge Follow Up   Discharge Date: 1/26/19  Contact Date: 1/28/2019    Consent Verification:  Assessment Completed With: Patient  HIPAA Verified?   Yes    Discharge Dx:  S/p right TKA    General:   • How have you been since your discharge heavy lifting or strenuous exercise, no baths (shower OK), hot tubs, pools, etc until cleared by surgeon. • Were you given a specific diet to follow at discharge?    no    Medications:  Pt states she does not have her med list in front of her as she is ge daily. Disp:  Rfl:      • May I go over your medications with you to make sure we are not missing anything?no, pt states she is upstairs getting ready for her appt and does not have meds with her or med list.   Are you having any concerns with constipation [facility])    Feb 01, 2019 10:00 AM CST Postop with DO Lizzie Marshall 26, [address] [place] [address], [place] ([address] [facility])    Feb 08, 2019  9:00 AM CST POSTOP with Marco Ross MD [facility] bring all medications and blood glucose meter/supplies if applicable.

## 2019-01-28 NOTE — PROGRESS NOTES
TRANSITIONAL CARE CLINIC PHARMACIST MEDICATION RECONCILIATION        Humberto Hollins MRN [id number]    10/12/1945 PCP Cassie Kirby DO       Comments: Medication history completed in [address] [place] [address] by pharmacist with patient and daughter.  Pa daily.    • Vitamin B-12 (VITAMIN B12) 1000 MCG Oral Tab Take 1,000 mcg by mouth daily. • Acetaminophen (ACETAMINOPHEN EXTRA STRENGTH) 500 MG Oral Cap Take 500-1,000 mg by mouth every 6 (six) hours as needed.       • FREESTYLE LANCETS Does not apply Misc

## 2019-01-28 NOTE — TELEPHONE ENCOUNTER
Call placed to patient and her daughter to follow up on patient status. Patient reported to JIHAN BILLINGS this AM (via phone) that she had fallen in the shower and was waiting for her daughter to arrive. Awaiting return call from patient or daughter.   Francine Live We

## 2019-02-01 ENCOUNTER — OFFICE VISIT (OUTPATIENT)
Dept: FAMILY MEDICINE CLINIC | Facility: CLINIC | Age: 74
End: 2019-02-01
Payer: MEDICARE

## 2019-02-01 VITALS
SYSTOLIC BLOOD PRESSURE: 108 MMHG | HEART RATE: 108 BPM | RESPIRATION RATE: 16 BRPM | HEIGHT: 64 IN | BODY MASS INDEX: 33.8 KG/M2 | WEIGHT: 198 LBS | DIASTOLIC BLOOD PRESSURE: 80 MMHG | TEMPERATURE: 98 F | OXYGEN SATURATION: 98 %

## 2019-02-01 DIAGNOSIS — M23.91 INTERNAL DERANGEMENT OF RIGHT KNEE: Primary | ICD-10-CM

## 2019-02-01 PROCEDURE — 99213 OFFICE O/P EST LOW 20 MIN: CPT | Performed by: FAMILY MEDICINE

## 2019-02-01 NOTE — PROGRESS NOTES
Here for transitional care visit. Within the past month has had a right knee replacement that went uneventfully. Unfortunately however last week she accidentally fell applying a severe valgus deformity to her right knee.   She was evaluated with wound misty

## 2019-02-04 ENCOUNTER — TELEPHONE (OUTPATIENT)
Dept: FAMILY MEDICINE CLINIC | Facility: CLINIC | Age: 74
End: 2019-02-04

## 2019-02-04 NOTE — TELEPHONE ENCOUNTER
Pt had knee surgery and is taking norco  She is down to her last pill  She has been trying to call her surgeon and states no one has returned her call from that office  She wants to know if Dr. Suzy Ontiveros can refill rx for her   Please advise

## 2019-02-08 PROBLEM — Z96.651 STATUS POST TOTAL RIGHT KNEE REPLACEMENT: Status: ACTIVE | Noted: 2019-01-28

## 2019-02-20 ENCOUNTER — HOSPITAL ENCOUNTER (OUTPATIENT)
Dept: PHYSICAL THERAPY | Facility: HOSPITAL | Age: 74
Setting detail: THERAPIES SERIES
Discharge: HOME | End: 2019-02-20
Attending: ORTHOPAEDIC SURGERY
Payer: MEDICARE

## 2019-02-20 DIAGNOSIS — Z96.651 STATUS POST TOTAL RIGHT KNEE REPLACEMENT: ICD-10-CM

## 2019-02-20 PROCEDURE — 97110 THERAPEUTIC EXERCISES: CPT

## 2019-02-20 PROCEDURE — 97140 MANUAL THERAPY 1/> REGIONS: CPT

## 2019-02-20 PROCEDURE — 97161 PT EVAL LOW COMPLEX 20 MIN: CPT

## 2019-02-20 NOTE — PROGRESS NOTES
LOWER EXTREMITY EVALUATION:   Referring Physician: Dr. Chelo Pelaez  Diagnosis:   Status post total right knee replacement (R40.263)     Date of Service: 2/20/2019     PATIENT SUMMARY   Jocelynn Garcia is a 68year old y/o female who presents to therapy t post-surgical presentation; bruising posterior right calf  Gait: mildly antalgic Trendelenburg gait on R; lacking terminal knee extension and has impaired heel/toe pattern on right  Palpation: generalized tenderness and warmth to palpation around right kne getting down to the floor (8 visits)  · Pt will improve quad strength to 5/5 to ascend 1 flight of stairs reciprocally without UE assist (8 visits)  · Pt will increase hip and knee strength to grossly 4+/5 to be able to get up and down from the floor safel

## 2019-02-22 ENCOUNTER — HOSPITAL ENCOUNTER (OUTPATIENT)
Dept: PHYSICAL THERAPY | Facility: HOSPITAL | Age: 74
Setting detail: THERAPIES SERIES
Discharge: HOME | End: 2019-02-22
Attending: ORTHOPAEDIC SURGERY
Payer: MEDICARE

## 2019-02-22 ENCOUNTER — OFFICE VISIT (OUTPATIENT)
Dept: FAMILY MEDICINE CLINIC | Facility: CLINIC | Age: 74
End: 2019-02-22
Payer: MEDICARE

## 2019-02-22 VITALS
BODY MASS INDEX: 33.72 KG/M2 | SYSTOLIC BLOOD PRESSURE: 110 MMHG | HEART RATE: 80 BPM | WEIGHT: 197.5 LBS | RESPIRATION RATE: 16 BRPM | TEMPERATURE: 98 F | HEIGHT: 64 IN | DIASTOLIC BLOOD PRESSURE: 68 MMHG
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
## 2019-02-22 DIAGNOSIS — S00.86XA INFECTED INSECT BITE OF FACE, INITIAL ENCOUNTER: Primary | ICD-10-CM

## 2019-02-22 DIAGNOSIS — L08.9 INFECTED INSECT BITE OF FACE, INITIAL ENCOUNTER: Primary | ICD-10-CM

## 2019-02-22 PROCEDURE — 97016 VASOPNEUMATIC DEVICE THERAPY: CPT

## 2019-02-22 PROCEDURE — 97110 THERAPEUTIC EXERCISES: CPT

## 2019-02-22 PROCEDURE — 99213 OFFICE O/P EST LOW 20 MIN: CPT | Performed by: FAMILY MEDICINE

## 2019-02-22 PROCEDURE — 97140 MANUAL THERAPY 1/> REGIONS: CPT

## 2019-02-22 RX ORDER — BETAMETHASONE DIPROPIONATE 0.5 MG/G
1 CREAM TOPICAL 2 TIMES DAILY PRN
Qty: 1 TUBE | Refills: 1 | Status: SHIPPED | OUTPATIENT
Start: 2019-02-22 | End: 2020-01-21

## 2019-02-22 RX ORDER — CEPHALEXIN 500 MG/1
500 CAPSULE ORAL 4 TIMES DAILY
Qty: 40 CAPSULE | Refills: 0 | Status: ON HOLD | OUTPATIENT
Start: 2019-02-22 | End: 2019-09-19

## 2019-02-22 NOTE — PROGRESS NOTES
HPI:    Patient ID: Colby Ramos is a 68year old female. Rash   This is a new problem. Episode onset: Monday morning. The problem has been gradually worsening since onset. The affected locations include the face.  The rash is characterized by redness mouth daily. Disp:  Rfl:    OMEPRAZOLE OR Take 20 mg by mouth daily as needed. Disp:  Rfl:    Magnesium Hydroxide (MILK OF MAGNESIA OR) Take 15 mL by mouth daily as needed.    Disp:  Rfl:    Iron Combinations (IRON COMPLEX OR) Take 1 tablet by mouth gayle psoriasis.).        Imaging & Referrals:  None       OE#[id number]

## 2019-02-22 NOTE — PROGRESS NOTES
Dx: Status post total right knee replacement (U54.065)            Authorized # of Visits:  8 per POC         Next MD visit: none scheduled  Fall Risk: standard         Precautions: n/a             Subjective: States that she is concerned R knee is not doin TX#: 7/ Date:               TX#: 8/   Stationary bike 1.5 holes showing L1 5' full revolutions back/forward (R lateral thigh pain at max flexion)         R knee PROM flexion/extension         R ITB/distal hamstring, lateral retinaculum STM

## 2019-02-25 ENCOUNTER — OFFICE VISIT (OUTPATIENT)
Dept: FAMILY MEDICINE CLINIC | Facility: CLINIC | Age: 74
End: 2019-02-25
Payer: MEDICARE

## 2019-02-25 ENCOUNTER — APPOINTMENT (OUTPATIENT)
Dept: LAB | Age: 74
End: 2019-02-25
Attending: FAMILY MEDICINE
Payer: MEDICARE

## 2019-02-25 VITALS
HEIGHT: 64 IN | WEIGHT: 191 LBS | SYSTOLIC BLOOD PRESSURE: 106 MMHG | BODY MASS INDEX: 32.61 KG/M2 | HEART RATE: 106 BPM | OXYGEN SATURATION: 98 % | RESPIRATION RATE: 18 BRPM | DIASTOLIC BLOOD PRESSURE: 70 MMHG | TEMPERATURE: 98 F

## 2019-02-25 DIAGNOSIS — Z96.651 STATUS POST RIGHT KNEE REPLACEMENT: ICD-10-CM

## 2019-02-25 DIAGNOSIS — R53.83 TIRED: ICD-10-CM

## 2019-02-25 DIAGNOSIS — L30.9 HAND DERMATITIS: ICD-10-CM
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
## 2019-02-25 LAB
ANION GAP SERPL CALC-SCNC: 8 MMOL/L (ref 0–18)
BASOPHILS # BLD AUTO: 0.05 X10(3) UL (ref 0–0.2)
BASOPHILS NFR BLD AUTO: 0.5 %
BUN BLD-MCNC: 9 MG/DL (ref 7–18)
BUN/CREAT SERPL: 9.4 (ref 10–20)
CALCIUM BLD-MCNC: 9.3 MG/DL (ref 8.5–10.1)
CHLORIDE SERPL-SCNC: 109 MMOL/L (ref 98–107)
CO2 SERPL-SCNC: 24 MMOL/L (ref 21–32)
CREAT BLD-MCNC: 0.96 MG/DL (ref 0.55–1.02)
DEPRECATED RDW RBC AUTO: 51.8 FL (ref 35.1–46.3)
EOSINOPHIL # BLD AUTO: 0.14 X10(3) UL (ref 0–0.7)
EOSINOPHIL NFR BLD AUTO: 1.5 %
ERYTHROCYTE [DISTWIDTH] IN BLOOD BY AUTOMATED COUNT: 16 % (ref 11–15)
GLUCOSE BLD-MCNC: 169 MG/DL (ref 70–99)
HCT VFR BLD AUTO: 40 % (ref 35–48)
HGB BLD-MCNC: 12.4 G/DL (ref 12–16)
IMM GRANULOCYTES # BLD AUTO: 0.03 X10(3) UL (ref 0–1)
IMM GRANULOCYTES NFR BLD: 0.3 %
LYMPHOCYTES # BLD AUTO: 2.23 X10(3) UL (ref 1–4)
LYMPHOCYTES NFR BLD AUTO: 24 %
MCH RBC QN AUTO: 27.3 PG (ref 26–34)
MCHC RBC AUTO-ENTMCNC: 31 G/DL (ref 31–37)
MCV RBC AUTO: 88.1 FL (ref 80–100)
MONOCYTES # BLD AUTO: 0.45 X10(3) UL (ref 0.1–1)
MONOCYTES NFR BLD AUTO: 4.8 %
NEUTROPHILS # BLD AUTO: 6.41 X10 (3) UL (ref 1.5–7.7)
NEUTROPHILS # BLD AUTO: 6.41 X10(3) UL (ref 1.5–7.7)
NEUTROPHILS NFR BLD AUTO: 68.9 %
OSMOLALITY SERPL CALC.SUM OF ELEC: 295 MOSM/KG (ref 275–295)
PLATELET # BLD AUTO: 424 10(3)UL (ref 150–450)
POTASSIUM SERPL-SCNC: 4.6 MMOL/L (ref 3.5–5.1)
RBC # BLD AUTO: 4.54 X10(6)UL (ref 3.8–5.3)
SODIUM SERPL-SCNC: 141 MMOL/L (ref 136–145)
TSI SER-ACNC: 1 MIU/ML (ref 0.36–3.74)
WBC # BLD AUTO: 9.3 X10(3) UL (ref 4–11)

## 2019-02-25 PROCEDURE — 80048 BASIC METABOLIC PNL TOTAL CA: CPT | Performed by: FAMILY MEDICINE

## 2019-02-25 PROCEDURE — 99214 OFFICE O/P EST MOD 30 MIN: CPT | Performed by: FAMILY MEDICINE

## 2019-02-25 PROCEDURE — 84443 ASSAY THYROID STIM HORMONE: CPT

## 2019-02-25 PROCEDURE — 85025 COMPLETE CBC W/AUTO DIFF WBC: CPT | Performed by: FAMILY MEDICINE

## 2019-02-25 PROCEDURE — 36415 COLL VENOUS BLD VENIPUNCTURE: CPT | Performed by: FAMILY MEDICINE

## 2019-02-25 NOTE — PROGRESS NOTES
HPI:    Patient ID: Betty Aquino is a 68year old female. Rash   This is a chronic problem. Episode onset: 1 week. The problem has been rapidly improving since onset. The affected locations include the face.  The rash is characterized by redness, pa Respiratory: Negative for cough and shortness of breath. Gastrointestinal: Negative for vomiting. Neurological: Negative for tingling and numbness. All other systems reviewed and are negative.              Current Outpatient Medications:  HYDROcodo Tab Take 1,000 mcg by mouth daily. Disp:  Rfl:      Allergies:  Propoxyphene            DIZZINESS   PHYSICAL EXAM:   Physical Exam   Constitutional: She appears well-developed and well-nourished.    Eyes: Conjunctivae are normal. Right eye exhibits no disch [id number]

## 2019-02-27 ENCOUNTER — HOSPITAL ENCOUNTER (OUTPATIENT)
Dept: PHYSICAL THERAPY | Facility: HOSPITAL | Age: 74
Setting detail: THERAPIES SERIES
Discharge: HOME | End: 2019-02-27
Attending: ORTHOPAEDIC SURGERY
Payer: MEDICARE

## 2019-02-27 PROCEDURE — 97110 THERAPEUTIC EXERCISES: CPT

## 2019-02-27 PROCEDURE — 97140 MANUAL THERAPY 1/> REGIONS: CPT

## 2019-02-27 PROCEDURE — 97016 VASOPNEUMATIC DEVICE THERAPY: CPT

## 2019-02-27 NOTE — PROGRESS NOTES
Dx: Status post total right knee replacement (Z83.450)            Authorized # of Visits:  8 per POC         Next MD visit: none scheduled  Fall Risk: standard         Precautions: n/a             Subjective: Reports pain along lateral side of right LE.  Sh Date:               TX#: 4/ Date:               TX#: 5/ Date:               TX#: 6/ Date:               TX#: 7/ Date:               TX#: 8/   Stationary bike 1.5 holes showing L1 5' full revolutions back/forward (R lateral thigh pain at max flexion) NU risa

## 2019-02-28 VITALS
SYSTOLIC BLOOD PRESSURE: 112 MMHG | HEIGHT: 64 IN | BODY MASS INDEX: 33.46 KG/M2 | WEIGHT: 196 LBS | DIASTOLIC BLOOD PRESSURE: 62 MMHG | HEART RATE: 82 BPM

## 2019-02-28 VITALS
SYSTOLIC BLOOD PRESSURE: 114 MMHG | DIASTOLIC BLOOD PRESSURE: 70 MMHG | WEIGHT: 195 LBS | BODY MASS INDEX: 33.29 KG/M2 | HEIGHT: 64 IN | HEART RATE: 88 BPM

## 2019-02-28 VITALS
HEART RATE: 86 BPM | HEIGHT: 64 IN | BODY MASS INDEX: 34.15 KG/M2 | SYSTOLIC BLOOD PRESSURE: 138 MMHG | DIASTOLIC BLOOD PRESSURE: 76 MMHG | WEIGHT: 200 LBS

## 2019-02-28 VITALS — SYSTOLIC BLOOD PRESSURE: 112 MMHG | DIASTOLIC BLOOD PRESSURE: 76 MMHG | HEART RATE: 84 BPM | WEIGHT: 201 LBS

## 2019-02-28 VITALS
HEIGHT: 64 IN | HEART RATE: 88 BPM | DIASTOLIC BLOOD PRESSURE: 88 MMHG | WEIGHT: 193 LBS | BODY MASS INDEX: 32.95 KG/M2 | SYSTOLIC BLOOD PRESSURE: 164 MMHG

## 2019-02-28 VITALS — SYSTOLIC BLOOD PRESSURE: 137 MMHG | DIASTOLIC BLOOD PRESSURE: 85 MMHG

## 2019-03-01 ENCOUNTER — HOSPITAL ENCOUNTER (OUTPATIENT)
Dept: PHYSICAL THERAPY | Facility: HOSPITAL | Age: 74
Setting detail: THERAPIES SERIES
Discharge: HOME | End: 2019-03-01
Attending: ORTHOPAEDIC SURGERY
Payer: MEDICARE

## 2019-03-01 PROCEDURE — 97110 THERAPEUTIC EXERCISES: CPT

## 2019-03-01 PROCEDURE — 97140 MANUAL THERAPY 1/> REGIONS: CPT

## 2019-03-01 NOTE — PROGRESS NOTES
Dx: Status post total right knee replacement (G31.594)            Authorized # of Visits:  8 per POC         Next MD visit: none scheduled  Fall Risk: standard         Precautions: n/a             Subjective: Thor Goode reports pain along right lateral upper an pattern and will exhibit proper heel strike during gait and terminal knee extension in stance (8 visits)  · Pt will improve knee AROM flexion to >125 degrees to improve ability to perform car transfers and getting down to the floor (8 visits)  · Pt will im board calf stretch, A/P rocking --       Leg press #25 x 30 6\" R FSU's x 15  4\" L FSD's x 15 with R BSU ([facility])  Hurdles walk posterior  Alternating partial lunges with posterior knee extension (heel down) HEP review       Game Ready medium compression, 36

## 2019-03-04 ENCOUNTER — HOSPITAL ENCOUNTER (OUTPATIENT)
Dept: GENERAL RADIOLOGY | Age: 74
Discharge: HOME | End: 2019-03-04
Attending: FAMILY MEDICINE
Payer: MEDICARE

## 2019-03-04 ENCOUNTER — OFFICE VISIT (OUTPATIENT)
Dept: FAMILY MEDICINE CLINIC | Facility: CLINIC | Age: 74
End: 2019-03-04
Payer: MEDICARE

## 2019-03-04 VITALS
BODY MASS INDEX: 32.61 KG/M2 | DIASTOLIC BLOOD PRESSURE: 78 MMHG | WEIGHT: 191 LBS | RESPIRATION RATE: 20 BRPM | TEMPERATURE: 97 F | HEIGHT: 64 IN | HEART RATE: 99 BPM | SYSTOLIC BLOOD PRESSURE: 108 MMHG | OXYGEN SATURATION: 98 %

## 2019-03-04 DIAGNOSIS — R68.89 FLU-LIKE SYMPTOMS: ICD-10-CM
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
## 2019-03-04 DIAGNOSIS — R06.89 DECREASED BREATH SOUNDS AT LEFT LUNG BASE: ICD-10-CM

## 2019-03-04 DIAGNOSIS — R42 VERTIGO: ICD-10-CM

## 2019-03-04 PROCEDURE — 71046 X-RAY EXAM CHEST 2 VIEWS: CPT | Performed by: FAMILY MEDICINE

## 2019-03-04 PROCEDURE — 99214 OFFICE O/P EST MOD 30 MIN: CPT | Performed by: FAMILY MEDICINE

## 2019-03-04 NOTE — PROGRESS NOTES
HPI:    Patient ID: Rachel Klein is a 68year old female. Rash   This is a chronic problem. Episode onset: 2 weeks. The problem has been rapidly improving since onset. The affected locations include the face.  The rash is characterized by redness, p (500 mg total) by mouth 4 (four) times daily. Disp: 40 capsule Rfl: 0   betamethasone dipropionate 0.05 % External Cream Apply 1 Application topically 2 (two) times daily as needed (for psoriasis. ).  Disp: 1 Tube Rfl: 1   Meloxicam 15 MG Oral Tab Take 1 tab motion. Skin: Skin is warm and dry. Rash noted. No erythema. No pallor. Rash has improved, tenderness on the right side of the face but better than previous visit   Vitals reviewed.            ASSESSMENT/PLAN:   Bug bite with infection, subsequent encou

## 2019-03-05 ENCOUNTER — TELEPHONE (OUTPATIENT)
Dept: FAMILY MEDICINE CLINIC | Facility: CLINIC | Age: 74
End: 2019-03-05

## 2019-03-05 NOTE — TELEPHONE ENCOUNTER
Written by Paul Slaughter DO on 3/4/2019  3:36 PM   Normal xray. Tiny Hurricane viral.  Follow up in a few days.        Called patient - verified patient's name and  - informed pt of doctor's note - patient verbalized understanding

## 2019-03-06 ENCOUNTER — HOSPITAL ENCOUNTER (OUTPATIENT)
Dept: PHYSICAL THERAPY | Facility: HOSPITAL | Age: 74
Setting detail: THERAPIES SERIES
Discharge: HOME | End: 2019-03-06
Attending: ORTHOPAEDIC SURGERY
Payer: MEDICARE

## 2019-03-06 PROCEDURE — 97140 MANUAL THERAPY 1/> REGIONS: CPT

## 2019-03-06 PROCEDURE — 97110 THERAPEUTIC EXERCISES: CPT

## 2019-03-06 NOTE — PROGRESS NOTES
Progress Physical Therapy Summary    Pt has attended 5 visits in Physical Therapy. Subjective: Judy Hoffmann reports improvements with levels of pain and function.  Reports intermittent right knee pain on medial side that can increase up to 6/10; reports later proper heel strike during gait and terminal knee extension in stance. Progressing  · Pt will improve knee AROM flexion to >125 degrees to improve ability to perform car transfers and getting down to the floor.  Progressing  · Pt will improve quad strength t Yes  I certify the need for these services furnished under this plan of treatment and while under my care.     X___________________________________________________ Date____________________    Certification From: 7/1/2617  To:6/4/2019        Date: 2/22/2019

## 2019-03-07 RX ORDER — ESCITALOPRAM OXALATE 5 MG/1
TABLET ORAL
COMMUNITY

## 2019-03-07 RX ORDER — CLONAZEPAM 0.5 MG/1
1 TABLET ORAL DAILY
COMMUNITY

## 2019-03-07 RX ORDER — AMOXICILLIN 875 MG/1
TABLET, COATED ORAL
COMMUNITY
End: 2019-09-17 | Stop reason: ALTCHOICE

## 2019-03-07 RX ORDER — DULOXETIN HYDROCHLORIDE 30 MG/1
CAPSULE, DELAYED RELEASE ORAL
COMMUNITY

## 2019-03-08 ENCOUNTER — HOSPITAL ENCOUNTER (OUTPATIENT)
Dept: PHYSICAL THERAPY | Facility: HOSPITAL | Age: 74
Setting detail: THERAPIES SERIES
Discharge: HOME | End: 2019-03-08
Attending: ORTHOPAEDIC SURGERY
Payer: MEDICARE

## 2019-03-08 PROCEDURE — 97140 MANUAL THERAPY 1/> REGIONS: CPT

## 2019-03-08 PROCEDURE — 97110 THERAPEUTIC EXERCISES: CPT

## 2019-03-08 NOTE — PROGRESS NOTES
Dx: Status post total right knee replacement (N89.665)            Authorized # of Visits:  8 per POC         Next MD visit: none scheduled  Fall Risk: standard         Precautions: n/a             Subjective: [place] states that she had an appointment with Date:   3/6/19            TX#: 5/8 Date: 3/8/19              TX#: 6/8 Date:               TX#: 7/ Date:               TX#: 8/   Stationary bike 1.5 holes showing L1 5' full revolutions back/forward (R lateral thigh pain at max flexion) NU step L5 5' L5 5'

## 2019-03-11 ENCOUNTER — HOSPITAL ENCOUNTER (OUTPATIENT)
Dept: PHYSICAL THERAPY | Facility: HOSPITAL | Age: 74
Setting detail: THERAPIES SERIES
Discharge: HOME | End: 2019-03-11
Attending: FAMILY MEDICINE
Payer: MEDICARE

## 2019-03-11 PROCEDURE — 97110 THERAPEUTIC EXERCISES: CPT

## 2019-03-11 NOTE — PROGRESS NOTES
Dx: Status post total right knee replacement (C15.585)            Authorized # of Visits:  8 per POC         Next MD visit: none scheduled  Fall Risk: standard         Precautions: n/a             Subjective: Has been having some pain in right lateral hip TX#: 2/8 Date:    2/27/19           TX#: 3/8   Date:   3/1/19            TX#: 4/8 Date:   3/6/19            TX#: 5/8 Date: 3/8/19              TX#: 6/8 Date:   3/11/19            TX#: 7/8 Date:               TX#: 8/   Stationary bike 1.5 holes showing L1 5 medium compression, 36F, 10' 10' -- -- -- --    Skilled Services:  Pt education, manual therapy, progression of there ex    Charges: there ex x 3 40'      Total Timed Treatment: 40 min  Total Treatment Time: 45 miin

## 2019-03-13 ENCOUNTER — HOSPITAL ENCOUNTER (OUTPATIENT)
Dept: PHYSICAL THERAPY | Facility: HOSPITAL | Age: 74
Setting detail: THERAPIES SERIES
Discharge: HOME | End: 2019-03-13
Attending: ORTHOPAEDIC SURGERY
Payer: MEDICARE

## 2019-03-13 PROCEDURE — 97110 THERAPEUTIC EXERCISES: CPT

## 2019-03-13 NOTE — PROGRESS NOTES
Dx: Status post total right knee replacement (G90.010)            Authorized # of Visits:  8 per POC         Next MD visit: none scheduled  Fall Risk: standard         Precautions: n/a             Subjective: She is still recovering from her \"bug\"; has 2/22/2019 TX#: 2/8 Date:    2/27/19           TX#: 3/8   Date:   3/1/19            TX#: 4/8 Date:   3/6/19            TX#: 5/8 Date: 3/8/19              TX#: 6/11 Date:   3/11/19            TX#: 7/11 Date: 3/13/19              TX#: 8/11   Stationary bike 1 knee extension (heel down) HEP review -- Cone taps 2 x 10 L/R    T board calf stretch 30 sec x 2  T board A/P rocking Leg press 2 black cords 2 x 10  R leg press only 1 black and 1 yellow cords 3 x 10  --    1 black and 1 grey, R leg press  3 x 10    Game

## 2019-03-15 ENCOUNTER — APPOINTMENT (OUTPATIENT)
Dept: PHYSICAL THERAPY | Facility: HOSPITAL | Age: 74
End: 2019-03-15
Attending: ORTHOPAEDIC SURGERY
Payer: MEDICARE

## 2019-03-20 ENCOUNTER — HOSPITAL ENCOUNTER (OUTPATIENT)
Dept: PHYSICAL THERAPY | Facility: HOSPITAL | Age: 74
Setting detail: THERAPIES SERIES
Discharge: HOME | End: 2019-03-20
Attending: ORTHOPAEDIC SURGERY
Payer: MEDICARE

## 2019-03-20 PROCEDURE — 97110 THERAPEUTIC EXERCISES: CPT

## 2019-03-20 NOTE — PROGRESS NOTES
Dx: Status post total right knee replacement (W78.641)            Authorized # of Visits:  8 per POC         Next MD visit: none scheduled  Fall Risk: standard         Precautions: n/a             Subjective: Pt states that R knee has been feeling pretty Date:    2/27/19           TX#: 3/8   Date:   3/1/19            TX#: 4/8 Date:   3/6/19            TX#: 5/8 Date: 3/8/19              TX#: 6/11 Date:   3/11/19            TX#: 7/11 Date: 3/13/19              TX#: 8/11 Date: 3/20/19              TX#: 9/11 x 10  2 x 10  T board calf stretch 3 x 30 sec   Leg press #25 x 30 6\" R FSU's x 15  4\" L FSD's x 15 with R BSU ([facility])  Hurdles walk posterior  Alternating partial lunges with posterior knee extension (heel down) HEP review -- Cone taps 2 x 10 L/R    T boa

## 2019-03-22 ENCOUNTER — HOSPITAL ENCOUNTER (OUTPATIENT)
Dept: PHYSICAL THERAPY | Facility: HOSPITAL | Age: 74
Setting detail: THERAPIES SERIES
Discharge: HOME | End: 2019-03-22
Attending: ORTHOPAEDIC SURGERY
Payer: MEDICARE

## 2019-03-22 PROCEDURE — 97110 THERAPEUTIC EXERCISES: CPT

## 2019-03-22 NOTE — PROGRESS NOTES
Dx: Status post total right knee replacement (K73.647)            Authorized # of Visits:  8 per POC         Next MD visit: none scheduled  Fall Risk: standard         Precautions: n/a             Subjective: Pt states that she developed some muscle jael Date:   3/6/19            TX#: 5/8 Date: 3/8/19              TX#: 6/11 Date:   3/11/19            TX#: 7/11 Date: 3/13/19              TX#: 8/11 Date: 3/20/19              TX#: 9/11 Date: 3/22/19              TX#: 10/11   Stationary bike 1.5 holes showing 2 x 10   TKE CKC BTB 2 x 10  2 x 10  2 x 10  T board calf stretch 3 x 30 sec 3 x 30 sec   Leg press #25 x 30 6\" R FSU's x 15  4\" L FSD's x 15 with R BSU ([facility])  Hurdles walk posterior  Alternating partial lunges with posterior knee extension (heel down) H

## 2019-03-29 ENCOUNTER — HOSPITAL ENCOUNTER (OUTPATIENT)
Dept: PHYSICAL THERAPY | Facility: HOSPITAL | Age: 74
Setting detail: THERAPIES SERIES
Discharge: HOME | End: 2019-03-29
Attending: FAMILY MEDICINE
Payer: MEDICARE

## 2019-03-29 PROCEDURE — 97110 THERAPEUTIC EXERCISES: CPT

## 2019-03-29 NOTE — PROGRESS NOTES
Progress Physical Therapy Summary    Pt has attended 11 visits in Physical Therapy.      Subjective: Judy Hoffmann states that right knee has been improving and feeling better up to the last weekend of March when she developed some pain and swelling along medial Balance: SLS: R 8 sec, L 15 sec    Goals:    · Pt will improve gait pattern and will exhibit proper heel strike during gait and terminal knee extension in stance.  Progressing  · Pt will improve knee AROM flexion to >125 degrees to improve ability to Jesse Lees, PT  Physician's certification required: Yes  I certify the need for these services furnished under this plan of treatment and while under my care.     X___________________________________________________ Date____________________    Certification sdly 3 x 10    SAQ 15 x 5 sec Double leg press 1 black and 1 grey 3 x 10  R leg press 1 black cord 3 x 10  2 black 3 x 10    1 black 3 x 10 R clam shells x 20  R hip ABD in sdly x 20 4\" LSU's 2 x 10     4\" FSD's and BSU's, 2 x 10 ([facility] with BSU's) 3 x 10

## 2019-04-03 ENCOUNTER — APPOINTMENT (OUTPATIENT)
Dept: PHYSICAL THERAPY | Facility: HOSPITAL | Age: 74
End: 2019-04-03
Attending: FAMILY MEDICINE
Payer: MEDICARE

## 2019-04-05 ENCOUNTER — HOSPITAL ENCOUNTER (OUTPATIENT)
Dept: PHYSICAL THERAPY | Facility: HOSPITAL | Age: 74
Setting detail: THERAPIES SERIES
Discharge: HOME | End: 2019-04-05
Attending: FAMILY MEDICINE
Payer: MEDICARE

## 2019-04-05 PROCEDURE — 97110 THERAPEUTIC EXERCISES: CPT

## 2019-04-05 PROCEDURE — 97140 MANUAL THERAPY 1/> REGIONS: CPT

## 2019-04-05 NOTE — PROGRESS NOTES
Dx: Status post total right knee replacement (Z98.054)            Authorized # of Visits:  8 per POC         Next MD visit: none scheduled  Fall Risk: standard         Precautions: n/a             Subjective: Had appt with Dr. Scott Blind and is going back i exercises    Date: 2/22/2019 TX#: 2/8 Date: 3/8/19              TX#: 6/11 Date:   3/11/19            TX#: 7/11 Date: 3/13/19              TX#: 8/11 Date: 3/20/19              TX#: 9/11 Date: 3/22/19              TX#: 10/11 Date: 3/30/19              TX#: 1 for quad stretch Green XB lateral steps   [employer] forward hip/knee   4\" R FSU's x 10 L/R Hamstring curls #2 2 x 10  2 x 10   TKE CKC BTB 2 x 10  2 x 10  2 x 10  T board calf stretch 3 x 30 sec 3 x 30 sec STM R ITB and lateral retinaculum Foot slider at th

## 2019-04-08 RX ORDER — ATORVASTATIN CALCIUM 20 MG/1
TABLET, FILM COATED ORAL DAILY
COMMUNITY

## 2019-04-08 RX ORDER — DILTIAZEM HYDROCHLORIDE 120 MG/1
CAPSULE, COATED, EXTENDED RELEASE ORAL
COMMUNITY
End: 2019-07-22 | Stop reason: SDUPTHER

## 2019-04-08 RX ORDER — QUETIAPINE FUMARATE 50 MG/1
TABLET, FILM COATED ORAL
COMMUNITY

## 2019-04-08 RX ORDER — METFORMIN HYDROCHLORIDE 500 MG/1
TABLET, EXTENDED RELEASE ORAL
COMMUNITY

## 2019-04-10 ENCOUNTER — HOSPITAL ENCOUNTER (OUTPATIENT)
Dept: PHYSICAL THERAPY | Facility: HOSPITAL | Age: 74
Setting detail: THERAPIES SERIES
Discharge: HOME | End: 2019-04-10
Attending: FAMILY MEDICINE
Payer: MEDICARE

## 2019-04-10 PROCEDURE — 97110 THERAPEUTIC EXERCISES: CPT

## 2019-04-10 NOTE — PROGRESS NOTES
Dx: Status post total right knee replacement (P26.667)            Authorized # of Visits:  17 per POC         Next MD visit: none scheduled  Fall Risk: standard         Precautions: n/a             Subjective: States that R knee feels much better today; t gluteals strengthening; quad stretching    Date: 2/22/2019 TX#: 2/8 Date: 3/8/19              TX#: 6/11 Date:   3/11/19            TX#: 7/11 Date: 3/30/19              TX#: 11/11 Date: 4/5/19              TX#: 12/17 Date: 4/5/19              TX#: 12/17   S 10  2 x 10   TKE CKC BTB 2 x 10  STM R ITB and lateral retinaculum Foot slider at the wall bar  2 x10 with hands on the bar  1 x 10, no hands CGA for balance DLS on Airex with alternate cone taps x 15 L/R   Leg press #25 x 30 Cone taps 2 x 10 L/R    T boar

## 2019-04-12 ENCOUNTER — HOSPITAL ENCOUNTER (OUTPATIENT)
Dept: PHYSICAL THERAPY | Facility: HOSPITAL | Age: 74
Setting detail: THERAPIES SERIES
Discharge: HOME | End: 2019-04-12
Attending: FAMILY MEDICINE
Payer: MEDICARE

## 2019-04-12 PROCEDURE — 97110 THERAPEUTIC EXERCISES: CPT

## 2019-04-12 NOTE — PROGRESS NOTES
Dx: Status post total right knee replacement (V22.403)            Authorized # of Visits:  17 per POC         Next MD visit: none scheduled  Fall Risk: standard         Precautions: n/a             Subjective: R knee has been feeling good.  Tape is still o stretching    Date: 2/22/2019 TX#: 2/8 Date: 3/8/19              TX#: 6/11 Date:   3/11/19            TX#: 7/11 Date: 3/30/19              TX#: 11/11 Date: 4/5/19              TX#: 12/17 Date: 4/5/19              TX#: 12/17 Date: 4/12/19              TX#: the bar 2 x 10    Sit to stand x 10 (R foot slightly forward) Sit to stand (R foot back) 2 x 10  2 x 10  Knee flexion in prone for quad stretch Green XB lateral steps  Green XB forward hip/knee Airex alternate forward lunges (form correction)  X 15 L/R   4

## 2019-04-17 ENCOUNTER — HOSPITAL ENCOUNTER (OUTPATIENT)
Dept: PHYSICAL THERAPY | Facility: HOSPITAL | Age: 74
Setting detail: THERAPIES SERIES
Discharge: HOME | End: 2019-04-17
Attending: FAMILY MEDICINE
Payer: MEDICARE

## 2019-04-17 PROCEDURE — 97110 THERAPEUTIC EXERCISES: CPT

## 2019-04-17 PROCEDURE — 97140 MANUAL THERAPY 1/> REGIONS: CPT

## 2019-04-17 NOTE — PROGRESS NOTES
Dx: Status post total right knee replacement (U69.434)            Authorized # of Visits:  17 per POC         Next MD visit: none scheduled  Fall Risk: standard         Precautions: n/a             Subjective: Reports that she had a bit of swelling in R k maintain progress achieved in PT.  Progressing/continuous    Plan: R knee functional strengthening; eccentric quads; gluteals strengthening; quad stretching    Date: 2/22/2019 TX#: 2/8 Date: 3/8/19              TX#: 6/11 Date:   3/11/19            TX#: 7/11 Gait correction to avoid R knee hyperextension at the end of the swing phase Chair eccentric squats x 10  X 10  X 10 (form correction to avoid quad dominant pattern) X 10    R patella glides, all planes 4\" FSU's 3 x 10 2 x 10  6\" 1 x 10  SLR 3 x 10  In s

## 2019-04-19 ENCOUNTER — APPOINTMENT (OUTPATIENT)
Dept: PHYSICAL THERAPY | Facility: HOSPITAL | Age: 74
End: 2019-04-19
Attending: FAMILY MEDICINE
Payer: MEDICARE

## 2019-04-24 ENCOUNTER — HOSPITAL ENCOUNTER (OUTPATIENT)
Dept: PHYSICAL THERAPY | Facility: HOSPITAL | Age: 74
Setting detail: THERAPIES SERIES
Discharge: HOME | End: 2019-04-24
Attending: FAMILY MEDICINE
Payer: MEDICARE

## 2019-04-24 PROCEDURE — 97110 THERAPEUTIC EXERCISES: CPT

## 2019-04-24 PROCEDURE — 97140 MANUAL THERAPY 1/> REGIONS: CPT

## 2019-04-24 NOTE — PROGRESS NOTES
Physical Therapy Discharge Summary    Pt has attended 16 visits in Physical Therapy. Subjective: Kianna Ramirez reports good improvement with right knee pain and function. She states being able to walk > 2 blocks without any discomfort or difficulty.  She state improve SLS to 15s to improve safety with gait on uneven surfaces such as grass and gravel. Progress made  · Pt will be independent and compliant with comprehensive HEP to maintain progress achieved in PT.  Met    FOTO: 66/100 (was: 36/100)    Patient/Famil posterior-lateral glides (decreasead pain with knee extension after)  SLR with quad set 2 x 10   R quad stretch 3 x 45 sec (98 deg of knee flexion in prone) R hamstring stretch 3 x 30 se (lateral knee pain)  R proximal tib-fib joint posterior-lateral glide lateral steps  Elvia [place] forward hip/knee Airex alternate forward lunges (form correction)  X 15 L/R --    4\" R FSU's x 10 L/R Hamstring curls #2 2 x 10  2 x 10   TKE CKC BTB 2 x 10  STM R ITB and lateral retinaculum Foot slider at the wall bar  2 x10 with

## 2019-05-10 NOTE — TELEPHONE ENCOUNTER
Patient called to state that [facility] sent us RX for Metformin a few days ago. I explained to her that we just got it yesterday. She only has a few days left, can we help her get it called in. SUBJECTIVE:     Mua Ty is a 12 month old male, here for a routine health maintenance visit.    Patient was roomed by: Sirena Lo    Concerns/Questions:   Viral URI with low grade fever in last 24 hours, more fussy today, drinking OK, voiding well    Well Child     Social History  Patient accompanied by:  Mother  Questions or concerns?: No    Forms to complete? No  Child lives with::  Mother, father and brother  Who takes care of your child?:  Mother, father, maternal grandmother and maternal grandfather  Languages spoken in the home:  English  Recent family changes/ special stressors?:  None noted    Safety / Health Risk  Is your child around anyone who smokes?  No    TB Exposure:     No TB exposure    Car seat < 6 years old, in  back seat, rear-facing, 5-point restraint? Yes    Home Safety Survey:      Stairs Gated?:  Yes     Wood stove / Fireplace screened?  Not applicable     Poisons / cleaning supplies out of reach?:  Yes     Swimming pool?:  No     Firearms in the home?: YES          Are trigger locks present?  Yes        Is ammunition stored separately? Yes    Hearing / Vision  Hearing or vision concerns?  No concerns, hearing and vision subjectively normal    Daily Activities  Nutrition:  Good appetite, eats variety of foods    Sleep      Sleep arrangement:crib    Sleep pattern: sleeps through the night    Elimination       Urinary frequency:4-6 times per 24 hours     Stool frequency: 1-3 times per 24 hours     Stool consistency: soft     Elimination problems:  None    Dental     Water source:  Well water    Dental provider: patient does not have a dental home    Dental exam in last 6 months: No       Dental visit recommended: No  Dental Varnish Application    Contraindications: None    Dental Fluoride applied to teeth by: MA/LPN/RN    Next treatment due in:  6 months    DEVELOPMENT  Screening tool used, reviewed with parent/guardian:   ASQ 12 M Communication Gross Motor Fine Motor Problem Solving  "Personal-social   Score 45 55 55 40 15   Cutoff 15.64 21.49 34.50 27.32 21.73   Result Passed Passed Passed Passed FAILED     PROBLEM LIST  Patient Active Problem List   Diagnosis   (none) - all problems resolved or deleted     MEDICATIONS  Current Outpatient Medications   Medication Sig Dispense Refill     amoxicillin (AMOXIL) 400 MG/5ML suspension Take 5.4 mLs (432 mg) by mouth 2 times daily for 10 days 108 mL 0     sodium fluoride 0.55 (0.25 F) MG/0.6ML solution Take 0.6 mLs by mouth daily 60 mL 11      ALLERGY  No Known Allergies    IMMUNIZATIONS  Immunization History   Administered Date(s) Administered     DTAP-IPV/HIB (PENTACEL) 2018, 2018, 2018     Hep B, Peds or Adolescent 2018, 2018, 2018     HepA-ped 2 Dose 05/10/2019     Influenza Vaccine IM Ages 6-35 Months 4 Valent (PF) 2018, 2018     MMR 05/10/2019     Pneumo Conj 13-V (2010&after) 2018, 2018, 2018     Rotavirus, monovalent, 2-dose 2018, 2018     Varicella 05/10/2019       HEALTH HISTORY SINCE LAST VISIT  No surgery, major illness or injury since last physical exam    ROS  Constitutional, eye, ENT, skin, respiratory, cardiac, GI, MSK, neuro, and allergy are normal except as otherwise noted.    OBJECTIVE:   EXAM  Pulse 132   Temp 97.7  F (36.5  C) (Temporal)   Resp 16   Ht 2' 6\" (0.762 m)   Wt 23 lb 9 oz (10.7 kg)   HC 18.9\" (48 cm)   BMI 18.41 kg/m    49 %ile based on WHO (Boys, 0-2 years) Length-for-age data based on Length recorded on 5/10/2019.  80 %ile based on WHO (Boys, 0-2 years) weight-for-age data based on Weight recorded on 5/10/2019.  92 %ile based on WHO (Boys, 0-2 years) head circumference-for-age based on Head Circumference recorded on 5/10/2019.  GENERAL: Active, alert, in no acute distress.  SKIN: Clear. No significant rash, abnormal pigmentation or lesions  HEAD: Normocephalic. Normal fontanels and sutures.  EYES: Conjunctivae and cornea normal. Red " reflexes present bilaterally. Symmetric light reflex and no eye movement on cover/uncover test  EARS: Normal canals. Right tympanic membrane erythematous, bulging with purulent material. Left tympanic membrane(s) normal; gray and translucent.  NOSE: Normal without discharge.  MOUTH/THROAT: Clear. No oral lesions.  NECK: Supple, no masses.  LYMPH NODES: No adenopathy  LUNGS: Clear. No rales, rhonchi, wheezing or retractions  HEART: Regular rhythm. Normal S1/S2. No murmurs. Normal femoral pulses.  ABDOMEN: Soft, non-tender, not distended, no masses or hepatosplenomegaly. Normal umbilicus and bowel sounds.   GENITALIA: Normal male external genitalia. Dariel stage I,  Testes descended bilaterally, no hernia or hydrocele.    EXTREMITIES: Hips normal with full range of motion. Symmetric extremities, no deformities  NEUROLOGIC: Normal tone throughout. Normal reflexes for age    ASSESSMENT/PLAN:     1. Encounter for routine child health examination w/o abnormal findings    2. Acute suppurative otitis media of right ear without spontaneous rupture of tympanic membrane, recurrence not specified            ANTICIPATORY GUIDANCE  The following topics were discussed:    SOCIAL/ FAMILY:    Reading to child    Bedtime /nap routine  NUTRITION:    Table foods    Whole milk introduction    Iron, calcium sources    Choking prevention- no popcorn, nuts, gum, raisins, etc  HEALTH/ SAFETY:    Dental hygiene    Child proof home    Poison control/ ipecac not recommended    Never leave unattended    Car seat      Preventive Care Plan  Immunizations     See orders in EpicCare.  I reviewed the signs and symptoms of adverse effects and when to seek medical care if they should arise.  Referrals/Ongoing Specialty care: No   Recommend amoxicillin (AMOXIL) course. Cares per Patient Instructions.   [facility]    Resources:  [place] Child and Teen Checkups (C&TC) Schedule of Age-Related Screening Standards    FOLLOW-UP:     15  month Preventive Care visit    Alondra Moore MD, MD  [facility]

## 2019-06-14 NOTE — ED NOTES
Pt verbalized understanding of discharge instructions given and need to try to follow up with gyne prior to surgery on Thursday. Per MD, pt is to call  if pt having difficulty following up prior to Thursday.  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 She was treated with Aspirin for a "short period". No current use of Aspirin at present. Pt states neuro workup was "negative". Does not recall when last brain scan or neuro visit was  Localized swelling, mass and lump, neck  left neck  Seasonal allergies

## 2019-07-12 ENCOUNTER — APPOINTMENT (OUTPATIENT)
Dept: LAB | Age: 74
End: 2019-07-12
Attending: FAMILY MEDICINE
Payer: MEDICARE

## 2019-07-12 ENCOUNTER — OFFICE VISIT (OUTPATIENT)
Dept: FAMILY MEDICINE CLINIC | Facility: CLINIC | Age: 74
End: 2019-07-12
Payer: MEDICARE

## 2019-07-12 VITALS
HEART RATE: 91 BPM | HEIGHT: 64 IN | DIASTOLIC BLOOD PRESSURE: 74 MMHG | TEMPERATURE: 98 F | SYSTOLIC BLOOD PRESSURE: 114 MMHG | BODY MASS INDEX: 32.78 KG/M2 | WEIGHT: 192 LBS | OXYGEN SATURATION: 92 % | RESPIRATION RATE: 18 BRPM

## 2019-07-12 DIAGNOSIS — Z00.00 MEDICARE ANNUAL WELLNESS VISIT, SUBSEQUENT: ICD-10-CM

## 2019-07-12 DIAGNOSIS — E11.9 CONTROLLED TYPE 2 DIABETES MELLITUS WITHOUT COMPLICATION, WITHOUT LONG-TERM CURRENT USE OF INSULIN (HCC): ICD-10-CM

## 2019-07-12 DIAGNOSIS — M85.88 OSTEOPENIA OF LUMBAR SPINE: ICD-10-CM

## 2019-07-12 DIAGNOSIS — Z78.0 POST-MENOPAUSAL: ICD-10-CM

## 2019-07-12 DIAGNOSIS — Z00.00 MEDICARE ANNUAL WELLNESS VISIT, SUBSEQUENT: Primary | ICD-10-CM

## 2019-07-12 DIAGNOSIS — D64.9 ANEMIA, UNSPECIFIED TYPE: ICD-10-CM

## 2019-07-12 LAB
ALBUMIN SERPL-MCNC: 3.6 G/DL (ref 3.4–5)
ALBUMIN/GLOB SERPL: 1 {RATIO} (ref 1–2)
ALP LIVER SERPL-CCNC: 107 U/L (ref 55–142)
ALT SERPL-CCNC: 19 U/L (ref 13–56)
ANION GAP SERPL CALC-SCNC: 8 MMOL/L (ref 0–18)
AST SERPL-CCNC: 16 U/L (ref 15–37)
BASOPHILS # BLD AUTO: 0.07 X10(3) UL (ref 0–0.2)
BASOPHILS NFR BLD AUTO: 0.7 %
BILIRUB SERPL-MCNC: 0.5 MG/DL (ref 0.1–2)
BUN BLD-MCNC: 11 MG/DL (ref 7–18)
BUN/CREAT SERPL: 12.4 (ref 10–20)
CALCIUM BLD-MCNC: 9.6 MG/DL (ref 8.5–10.1)
CHLORIDE SERPL-SCNC: 107 MMOL/L (ref 98–112)
CHOLEST SMN-MCNC: 146 MG/DL (ref ?–200)
CO2 SERPL-SCNC: 23 MMOL/L (ref 21–32)
CREAT BLD-MCNC: 0.89 MG/DL (ref 0.55–1.02)
DEPRECATED HBV CORE AB SER IA-ACNC: 61.4 NG/ML (ref 18–340)
DEPRECATED RDW RBC AUTO: 55.5 FL (ref 35.1–46.3)
EOSINOPHIL # BLD AUTO: 0.27 X10(3) UL (ref 0–0.7)
EOSINOPHIL NFR BLD AUTO: 2.6 %
ERYTHROCYTE [DISTWIDTH] IN BLOOD BY AUTOMATED COUNT: 17.1 % (ref 11–15)
EST. AVERAGE GLUCOSE BLD GHB EST-MCNC: 154 MG/DL (ref 68–126)
GLOBULIN PLAS-MCNC: 3.6 G/DL (ref 2.8–4.4)
GLUCOSE BLD-MCNC: 127 MG/DL (ref 70–99)
HBA1C MFR BLD HPLC: 7 % (ref ?–5.7)
HCT VFR BLD AUTO: 38.9 % (ref 35–48)
HDLC SERPL-MCNC: 46 MG/DL (ref 40–59)
HGB BLD-MCNC: 12.3 G/DL (ref 12–16)
IMM GRANULOCYTES # BLD AUTO: 0.03 X10(3) UL (ref 0–1)
IMM GRANULOCYTES NFR BLD: 0.3 %
LDLC SERPL CALC-MCNC: 60 MG/DL (ref ?–100)
LYMPHOCYTES # BLD AUTO: 3.08 X10(3) UL (ref 1–4)
LYMPHOCYTES NFR BLD AUTO: 29.8 %
M PROTEIN MFR SERPL ELPH: 7.2 G/DL (ref 6.4–8.2)
MCH RBC QN AUTO: 28.1 PG (ref 26–34)
MCHC RBC AUTO-ENTMCNC: 31.6 G/DL (ref 31–37)
MCV RBC AUTO: 89 FL (ref 80–100)
MONOCYTES # BLD AUTO: 0.44 X10(3) UL (ref 0.1–1)
MONOCYTES NFR BLD AUTO: 4.3 %
NEUTROPHILS # BLD AUTO: 6.45 X10 (3) UL (ref 1.5–7.7)
NEUTROPHILS # BLD AUTO: 6.45 X10(3) UL (ref 1.5–7.7)
NEUTROPHILS NFR BLD AUTO: 62.3 %
NONHDLC SERPL-MCNC: 100 MG/DL (ref ?–130)
OSMOLALITY SERPL CALC.SUM OF ELEC: 287 MOSM/KG (ref 275–295)
PLATELET # BLD AUTO: 460 10(3)UL (ref 150–450)
POTASSIUM SERPL-SCNC: 4 MMOL/L (ref 3.5–5.1)
RBC # BLD AUTO: 4.37 X10(6)UL (ref 3.8–5.3)
SODIUM SERPL-SCNC: 138 MMOL/L (ref 136–145)
TRIGL SERPL-MCNC: 199 MG/DL (ref 30–149)
TSI SER-ACNC: 3.2 MIU/ML (ref 0.36–3.74)
VLDLC SERPL CALC-MCNC: 40 MG/DL (ref 0–30)
WBC # BLD AUTO: 10.3 X10(3) UL (ref 4–11)

## 2019-07-12 PROCEDURE — 82728 ASSAY OF FERRITIN: CPT | Performed by: FAMILY MEDICINE

## 2019-07-12 PROCEDURE — 80053 COMPREHEN METABOLIC PANEL: CPT | Performed by: FAMILY MEDICINE

## 2019-07-12 PROCEDURE — 85025 COMPLETE CBC W/AUTO DIFF WBC: CPT | Performed by: FAMILY MEDICINE

## 2019-07-12 PROCEDURE — 83036 HEMOGLOBIN GLYCOSYLATED A1C: CPT

## 2019-07-12 PROCEDURE — 84443 ASSAY THYROID STIM HORMONE: CPT

## 2019-07-12 PROCEDURE — G0439 PPPS, SUBSEQ VISIT: HCPCS | Performed by: FAMILY MEDICINE

## 2019-07-12 PROCEDURE — 36415 COLL VENOUS BLD VENIPUNCTURE: CPT

## 2019-07-12 PROCEDURE — 80061 LIPID PANEL: CPT | Performed by: FAMILY MEDICINE

## 2019-07-12 NOTE — PROGRESS NOTES
HPI:   Veena Garcia is a 68year old female who presents for a Medicare Subsequent Annual Wellness visit (Pt already had Initial Annual Wellness). Pt states she has a hx of falling down. Pt has fell 2x in the past 2 months.       Tired   This is a c states her sugars in the morning are around 130 and the highest it gets to during the day is 180)       Her last annual assessment has been over 1 year: Annual Physical due on 02/05/2019         Fall/Risk Assessment abnormal    She has been screened for [facility] on file in [address] [place] [address] Rd.    Advance care planning including the explanation and discussion of advance directives standard forms performed Face to Face with patient and Family/surrogate (if present), and forms available to patient in AVS         She has never s abnormal biopsy of kidney     Sprain of medial collateral ligament of right knee, subsequent encounter     Pes anserine bursitis     ERWIN (generalized anxiety disorder)     Pain of right tibia     Post-menopausal atrophic vaginitis     Primary osteoarthriti 1 tablet (15 mg total) by mouth daily. Acetaminophen (ACETAMINOPHEN EXTRA STRENGTH) 500 MG Oral Cap Take 500-1,000 mg by mouth every 6 (six) hours as needed. atorvastatin 20 MG Oral Tab Take 20 mg by mouth nightly.    FREESTYLE LANCETS Does not apply Systems   Constitutional: Positive for fatigue. Psychiatric/Behavioral: Positive for suicidal ideas (pt states she was about to jump off a bridge last week), decreased concentration and depressed mood. The patient is nervous/anxious.     All other systems present  Nose: Nose normal.   Mouth/Throat: Oropharynx is clear and moist. No oropharyngeal exudate or posterior oropharyngeal erythema. Eyes: Pupils are equal, round, and reactive to light.  Conjunctivae and EOM are normal. Right eye exhibits no discharg 02/02/2018   • Pneumococcal (Prevnar 13) 02/05/2018   • TDAP 08/07/2015        ASSESSMENT AND OTHER RELEVANT CHRONIC CONDITIONS:   Samantha Haile is a 68year old female who presents for a Medicare Assessment.      PLAN SUMMARY:   Diagnoses and all order Value   02/25/2019 169 (H)          Cardiovascular Disease Screening     LDL Annually LDL Cholesterol (mg/dL)   Date Value   02/05/2018 92        EKG - w/ Initial Preventative Physical Exam only, or if medically necessary Electrocardiogram date06/26/2017 mentally retarded   Persons who live in the same house as a HepB virus carrier   Homosexual men   Illicit injectable drug abusers     Tetanus Toxoid  Only covered with a cut with metal- TD and TDaP Not covered by Medicare Part B) No vaccine history found T

## 2019-07-22 RX ORDER — DILTIAZEM HYDROCHLORIDE 120 MG/1
CAPSULE, EXTENDED RELEASE ORAL
Qty: 90 CAPSULE | Refills: 1 | Status: SHIPPED | OUTPATIENT
Start: 2019-07-22 | End: 2019-12-31 | Stop reason: SDUPTHER

## 2019-07-29 ENCOUNTER — HOSPITAL ENCOUNTER (OUTPATIENT)
Dept: BONE DENSITY | Age: 74
Discharge: HOME | End: 2019-07-29
Attending: FAMILY MEDICINE
Payer: MEDICARE

## 2019-07-29 DIAGNOSIS — M85.88 OSTEOPENIA OF LUMBAR SPINE: ICD-10-CM

## 2019-07-29 DIAGNOSIS — Z78.0 POST-MENOPAUSAL: ICD-10-CM

## 2019-07-29 DIAGNOSIS — Z00.00 MEDICARE ANNUAL WELLNESS VISIT, SUBSEQUENT: ICD-10-CM

## 2019-07-29 PROBLEM — M81.0 AGE-RELATED OSTEOPOROSIS WITHOUT CURRENT PATHOLOGICAL FRACTURE: Status: ACTIVE | Noted: 2019-07-29

## 2019-07-29 PROCEDURE — 77080 DXA BONE DENSITY AXIAL: CPT | Performed by: FAMILY MEDICINE

## 2019-07-31 ENCOUNTER — TELEPHONE (OUTPATIENT)
Dept: FAMILY MEDICINE CLINIC | Facility: CLINIC | Age: 74
End: 2019-07-31

## 2019-07-31 NOTE — TELEPHONE ENCOUNTER
This medication was given to this pt alendronate 70 MG Oral Tab. Take 1 tablet (70 mg total) by mouth once a week. 12 tabs 3 refills. She does not know what it is for. ..  And would like to know before she takes it

## 2019-09-16 ENCOUNTER — TELEPHONE (OUTPATIENT)
Dept: CARDIOLOGY | Age: 74
End: 2019-09-16

## 2019-09-17 ENCOUNTER — OFFICE VISIT (OUTPATIENT)
Dept: CARDIOLOGY | Age: 74
End: 2019-09-17

## 2019-09-17 VITALS
WEIGHT: 196 LBS | HEART RATE: 80 BPM | DIASTOLIC BLOOD PRESSURE: 64 MMHG | HEIGHT: 64 IN | BODY MASS INDEX: 33.46 KG/M2 | SYSTOLIC BLOOD PRESSURE: 110 MMHG

## 2019-09-17 DIAGNOSIS — R53.83 FATIGUE, UNSPECIFIED TYPE: ICD-10-CM

## 2019-09-17 DIAGNOSIS — E78.2 HYPERLIPIDEMIA, MIXED: ICD-10-CM

## 2019-09-17 DIAGNOSIS — I25.10 CORONARY ARTERY DISEASE INVOLVING NATIVE CORONARY ARTERY OF NATIVE HEART WITHOUT ANGINA PECTORIS: Primary | ICD-10-CM

## 2019-09-17 DIAGNOSIS — R00.2 PALPITATIONS: ICD-10-CM

## 2019-09-17 PROBLEM — E11.9 TYPE 2 DIABETES MELLITUS WITHOUT COMPLICATION (CMD): Status: ACTIVE | Noted: 2018-04-17

## 2019-09-17 PROBLEM — R06.02 SOB (SHORTNESS OF BREATH): Status: ACTIVE | Noted: 2019-09-17

## 2019-09-17 PROCEDURE — 99214 OFFICE O/P EST MOD 30 MIN: CPT | Performed by: CLINICAL NURSE SPECIALIST

## 2019-09-17 SDOH — HEALTH STABILITY: MENTAL HEALTH: HOW OFTEN DO YOU HAVE A DRINK CONTAINING ALCOHOL?: NEVER

## 2019-09-17 ASSESSMENT — ENCOUNTER SYMPTOMS
COUGH: 0
SHORTNESS OF BREATH: 1
ALLERGIC/IMMUNOLOGIC COMMENTS: NO NEW FOOD ALLERGIES
CHILLS: 0
WEIGHT GAIN: 0
NERVOUS/ANXIOUS: 1
WEIGHT LOSS: 0
HEMATOCHEZIA: 0
BRUISES/BLEEDS EASILY: 0
SUSPICIOUS LESIONS: 0
FEVER: 0
LIGHT-HEADEDNESS: 0
HEMOPTYSIS: 0
DIZZINESS: 0

## 2019-09-18 ENCOUNTER — APPOINTMENT (OUTPATIENT)
Dept: CT IMAGING | Facility: HOSPITAL | Age: 74
End: 2019-09-18
Attending: EMERGENCY MEDICINE
Payer: MEDICARE

## 2019-09-18 ENCOUNTER — APPOINTMENT (OUTPATIENT)
Dept: GENERAL RADIOLOGY | Facility: HOSPITAL | Age: 74
End: 2019-09-18
Attending: PHYSICIAN ASSISTANT
Payer: MEDICARE

## 2019-09-18 ENCOUNTER — HOSPITAL ENCOUNTER (OUTPATIENT)
Facility: HOSPITAL | Age: 74
Setting detail: OBSERVATION
Discharge: HOME | End: 2019-09-19
Attending: EMERGENCY MEDICINE | Admitting: HOSPITALIST
Payer: MEDICARE

## 2019-09-18 DIAGNOSIS — R00.2 PALPITATIONS: Primary | ICD-10-CM

## 2019-09-18 DIAGNOSIS — R55 SYNCOPE, NEAR: ICD-10-CM

## 2019-09-18 LAB
ALBUMIN SERPL-MCNC: 3.5 G/DL (ref 3.4–5)
ALBUMIN/GLOB SERPL: 0.9 {RATIO} (ref 1–2)
ALP LIVER SERPL-CCNC: 134 U/L (ref 55–142)
ALT SERPL-CCNC: 19 U/L (ref 13–56)
ANION GAP SERPL CALC-SCNC: 7 MMOL/L (ref 0–18)
AST SERPL-CCNC: 19 U/L (ref 15–37)
BASOPHILS # BLD AUTO: 0.06 X10(3) UL (ref 0–0.2)
BASOPHILS NFR BLD AUTO: 0.4 %
BILIRUB SERPL-MCNC: 0.3 MG/DL (ref 0.1–2)
BILIRUB UR QL STRIP.AUTO: NEGATIVE
BUN BLD-MCNC: 9 MG/DL (ref 7–18)
BUN/CREAT SERPL: 8.3 (ref 10–20)
CALCIUM BLD-MCNC: 9.1 MG/DL (ref 8.5–10.1)
CHLORIDE SERPL-SCNC: 107 MMOL/L (ref 98–112)
CLARITY UR REFRACT.AUTO: CLEAR
CO2 SERPL-SCNC: 23 MMOL/L (ref 21–32)
CREAT BLD-MCNC: 1.09 MG/DL (ref 0.55–1.02)
DEPRECATED RDW RBC AUTO: 44.6 FL (ref 35.1–46.3)
EOSINOPHIL # BLD AUTO: 0.36 X10(3) UL (ref 0–0.7)
EOSINOPHIL NFR BLD AUTO: 2.7 %
ERYTHROCYTE [DISTWIDTH] IN BLOOD BY AUTOMATED COUNT: 14.2 % (ref 11–15)
GLOBULIN PLAS-MCNC: 3.9 G/DL (ref 2.8–4.4)
GLUCOSE BLD-MCNC: 135 MG/DL (ref 70–99)
GLUCOSE BLD-MCNC: 144 MG/DL (ref 70–99)
GLUCOSE UR STRIP.AUTO-MCNC: NEGATIVE MG/DL
HCT VFR BLD AUTO: 38.4 % (ref 35–48)
HGB BLD-MCNC: 12.6 G/DL (ref 12–16)
IMM GRANULOCYTES # BLD AUTO: 0.07 X10(3) UL (ref 0–1)
IMM GRANULOCYTES NFR BLD: 0.5 %
KETONES UR STRIP.AUTO-MCNC: NEGATIVE MG/DL
LEUKOCYTE ESTERASE UR QL STRIP.AUTO: NEGATIVE
LYMPHOCYTES # BLD AUTO: 4.21 X10(3) UL (ref 1–4)
LYMPHOCYTES NFR BLD AUTO: 31.5 %
M PROTEIN MFR SERPL ELPH: 7.4 G/DL (ref 6.4–8.2)
MCH RBC QN AUTO: 28.2 PG (ref 26–34)
MCHC RBC AUTO-ENTMCNC: 32.8 G/DL (ref 31–37)
MCV RBC AUTO: 85.9 FL (ref 80–100)
MONOCYTES # BLD AUTO: 0.76 X10(3) UL (ref 0.1–1)
MONOCYTES NFR BLD AUTO: 5.7 %
NEUTROPHILS # BLD AUTO: 7.89 X10 (3) UL (ref 1.5–7.7)
NEUTROPHILS # BLD AUTO: 7.89 X10(3) UL (ref 1.5–7.7)
NEUTROPHILS NFR BLD AUTO: 59.2 %
NITRITE UR QL STRIP.AUTO: NEGATIVE
OSMOLALITY SERPL CALC.SUM OF ELEC: 285 MOSM/KG (ref 275–295)
PH UR STRIP.AUTO: 6 [PH] (ref 4.5–8)
PLATELET # BLD AUTO: 449 10(3)UL (ref 150–450)
POTASSIUM SERPL-SCNC: 4.2 MMOL/L (ref 3.5–5.1)
PROT UR STRIP.AUTO-MCNC: NEGATIVE MG/DL
RBC # BLD AUTO: 4.47 X10(6)UL (ref 3.8–5.3)
RBC UR QL AUTO: NEGATIVE
SODIUM SERPL-SCNC: 137 MMOL/L (ref 136–145)
SP GR UR STRIP.AUTO: 1.01 (ref 1–1.03)
TROPONIN I SERPL-MCNC: <0.045 NG/ML (ref ?–0.04)
UROBILINOGEN UR STRIP.AUTO-MCNC: <2 MG/DL
WBC # BLD AUTO: 13.4 X10(3) UL (ref 4–11)

## 2019-09-18 PROCEDURE — 70498 CT ANGIOGRAPHY NECK: CPT | Performed by: EMERGENCY MEDICINE

## 2019-09-18 PROCEDURE — 70496 CT ANGIOGRAPHY HEAD: CPT | Performed by: EMERGENCY MEDICINE

## 2019-09-18 PROCEDURE — 71045 X-RAY EXAM CHEST 1 VIEW: CPT | Performed by: PHYSICIAN ASSISTANT

## 2019-09-18 PROCEDURE — 99220 INITIAL OBSERVATION CARE,LEVL III: CPT | Performed by: HOSPITALIST

## 2019-09-18 RX ORDER — DULOXETIN HYDROCHLORIDE 30 MG/1
60 CAPSULE, DELAYED RELEASE ORAL DAILY
Status: DISCONTINUED | OUTPATIENT
Start: 2019-09-18 | End: 2019-09-19

## 2019-09-18 RX ORDER — ENOXAPARIN SODIUM 100 MG/ML
40 INJECTION SUBCUTANEOUS DAILY
Status: DISCONTINUED | OUTPATIENT
Start: 2019-09-18 | End: 2019-09-19

## 2019-09-18 RX ORDER — ACETAMINOPHEN 325 MG/1
650 TABLET ORAL EVERY 6 HOURS PRN
Status: DISCONTINUED | OUTPATIENT
Start: 2019-09-18 | End: 2019-09-19

## 2019-09-18 RX ORDER — ATORVASTATIN CALCIUM 20 MG/1
20 TABLET, FILM COATED ORAL NIGHTLY
Status: DISCONTINUED | OUTPATIENT
Start: 2019-09-18 | End: 2019-09-19

## 2019-09-18 RX ORDER — IBUPROFEN 200 MG
200 TABLET ORAL EVERY 4 HOURS PRN
Status: DISCONTINUED | OUTPATIENT
Start: 2019-09-18 | End: 2019-09-19

## 2019-09-18 RX ORDER — DILTIAZEM HYDROCHLORIDE 60 MG/1
120 TABLET, FILM COATED ORAL DAILY
Status: DISCONTINUED | OUTPATIENT
Start: 2019-09-18 | End: 2019-09-19

## 2019-09-18 RX ORDER — IBUPROFEN 400 MG/1
400 TABLET ORAL EVERY 4 HOURS PRN
Status: DISCONTINUED | OUTPATIENT
Start: 2019-09-18 | End: 2019-09-19

## 2019-09-18 RX ORDER — IBUPROFEN 600 MG/1
600 TABLET ORAL EVERY 4 HOURS PRN
Status: DISCONTINUED | OUTPATIENT
Start: 2019-09-18 | End: 2019-09-19

## 2019-09-18 RX ORDER — DEXTROSE MONOHYDRATE 25 G/50ML
50 INJECTION, SOLUTION INTRAVENOUS
Status: DISCONTINUED | OUTPATIENT
Start: 2019-09-18 | End: 2019-09-19

## 2019-09-18 RX ORDER — QUETIAPINE 100 MG/1
100 TABLET, FILM COATED ORAL NIGHTLY
Status: DISCONTINUED | OUTPATIENT
Start: 2019-09-18 | End: 2019-09-19

## 2019-09-18 NOTE — ED INITIAL ASSESSMENT (HPI)
PT c/o L sided chest pain radiating to back starting last night. Pain subsided. PT was shopping this afternoon and experienced dizziness and diaphoretic. Denies chest pain, c/o dyspnea.

## 2019-09-19 ENCOUNTER — APPOINTMENT (OUTPATIENT)
Dept: CV DIAGNOSTICS | Facility: HOSPITAL | Age: 74
End: 2019-09-19
Attending: INTERNAL MEDICINE
Payer: MEDICARE

## 2019-09-19 ENCOUNTER — APPOINTMENT (OUTPATIENT)
Dept: CV DIAGNOSTICS | Facility: HOSPITAL | Age: 74
End: 2019-09-19
Attending: HOSPITALIST
Payer: MEDICARE

## 2019-09-19 VITALS
BODY MASS INDEX: 33.61 KG/M2 | HEART RATE: 78 BPM | OXYGEN SATURATION: 99 % | TEMPERATURE: 98 F | HEIGHT: 64 IN | RESPIRATION RATE: 18 BRPM | DIASTOLIC BLOOD PRESSURE: 94 MMHG | WEIGHT: 196.88 LBS | SYSTOLIC BLOOD PRESSURE: 149 MMHG

## 2019-09-19 LAB
ATRIAL RATE: 92 BPM
EST. AVERAGE GLUCOSE BLD GHB EST-MCNC: 171 MG/DL (ref 68–126)
GLUCOSE BLD-MCNC: 130 MG/DL (ref 70–99)
GLUCOSE BLD-MCNC: 181 MG/DL (ref 70–99)
HBA1C MFR BLD HPLC: 7.6 % (ref ?–5.7)
P AXIS: 45 DEGREES
P-R INTERVAL: 148 MS
Q-T INTERVAL: 370 MS
QRS DURATION: 84 MS
QTC CALCULATION (BEZET): 457 MS
R AXIS: 33 DEGREES
T AXIS: 43 DEGREES
VENTRICULAR RATE: 92 BPM

## 2019-09-19 PROCEDURE — 99215 OFFICE O/P EST HI 40 MIN: CPT | Performed by: INTERNAL MEDICINE

## 2019-09-19 PROCEDURE — 99217 OBSERVATION CARE DISCHARGE: CPT | Performed by: HOSPITALIST

## 2019-09-19 PROCEDURE — 93018 CV STRESS TEST I&R ONLY: CPT | Performed by: INTERNAL MEDICINE

## 2019-09-19 PROCEDURE — 93017 CV STRESS TEST TRACING ONLY: CPT | Performed by: INTERNAL MEDICINE

## 2019-09-19 PROCEDURE — 78452 HT MUSCLE IMAGE SPECT MULT: CPT | Performed by: INTERNAL MEDICINE

## 2019-09-19 PROCEDURE — 93306 TTE W/DOPPLER COMPLETE: CPT | Performed by: HOSPITALIST

## 2019-09-19 RX ORDER — DILTIAZEM HYDROCHLORIDE 120 MG/1
120 CAPSULE, COATED, EXTENDED RELEASE ORAL DAILY
COMMUNITY
End: 2021-04-13

## 2019-09-19 RX ORDER — CLONAZEPAM 0.5 MG/1
1 TABLET ORAL 2 TIMES DAILY PRN
Status: DISCONTINUED | OUTPATIENT
Start: 2019-09-19 | End: 2019-09-19

## 2019-09-19 NOTE — PROGRESS NOTES
[facility]  Progress Note     Jocelynn Garcia Patient Status:  Observation    10/12/1945 MRN [id number]   [facility] 8NE-A Attending Valeria Vuong MD   Hosp Day # 0 PCP Deepa Lilly DO     Chief Complaint: Near syncope    S: Breana Vitale 100 mg Oral Nightly   • enoxaparin  40 mg Subcutaneous Daily   • Insulin Aspart Pen  1-10 Units Subcutaneous TID AC and HS   • insulin detemir  8 Units Subcutaneous Daily   • Insulin Aspart Pen  1-68 Units Subcutaneous TID CC       ASSESSMENT / PLAN:     1

## 2019-09-19 NOTE — PROGRESS NOTES
Preliminary Lexiscan Stress Test Note    No ECG changes  Denies cardiac symptoms  No ectopy noted  Nuclear pending

## 2019-09-19 NOTE — HISTORICAL OFFICE NOTE
Formatting of this note might be different from the original.    1990 [facility]    PCP: Fred Scruggs DO    Chief Complaint   Patient presents with   • Palpitations   • Shortness of Breath       HPI  Naseem Locke is a 68year old female her Neg Hx   Negative for AAA. Social History  Social History     Tobacco Use   • Smoking status: Former Smoker   Packs/day: 0.00   • Smokeless tobacco: Never Used   • Tobacco comment: Former tobacco use. used to smoke but quit.    Substance Use Topics   • allergies.    No new food allergies     Physical Exam  Visit Vitals  /64 ([place] [facility] [place]: Sitting, Cuff Size: Large Adult)   Pulse 80   Ht 5' 4\" (1.626 m)   Wt 88.9 kg (196 lb)   BMI 33.64 kg/m²     Vital signs were reviewed today gasping or apnea while sleeping. Advised patient to continue to increase her activity level during the day.     Patient to follow-up with Dr. Jorje Ramirez in 1 year    MANSOOR Yañez, CNS

## 2019-09-19 NOTE — PLAN OF CARE
Nuclear stress test negative for ischemia, EF 72%. Echo:   Conclusions:    1. Procedure narrative: Transthoracic echocardiography for ventricular     function evaluation. Image quality was adequate. Intravenous contrast     (Definity) was administered.

## 2019-09-19 NOTE — PROGRESS NOTES
Resting Quietly. Denies c/o chest pain or sob. Tele NSR with hr-70s. DC instruction given-verbalized understanding. DC tele. DC hl.
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
## 2019-09-19 NOTE — PLAN OF CARE
NURSING ADMISSION NOTE      Patient admitted via Cart  Oriented to room. Safety precautions initiated. Bed in low position. Call light in reach. Pt Aox4. RA. NSR on tele. Reporting mild pain in R knee. VSS. Admission navigator completed.  Nickolas Horan
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
## 2019-09-19 NOTE — CONSULTS
[facility]  Report of Consultation    Doretta Fleischer Patient Status:  Observation    10/12/1945 MRN [id number]   [facility] 8NE-A Attending Leonard Gr MD   Hosp Day # 0 PCP Nghia Umana DO     Reason for Consultation:  Near nausea and vomiting)    • Psoriasis    • Rheumatoid arthritis ([facility] [address]    • Sleep apnea    • Stroke [facility])     per patient she was unaware but it was noted on a MRI-no effects   • Torn meniscus     right knee   • Type II or unspecified type diabetes mellitus w Oral, Daily  •  DULoxetine HCl (CYMBALTA) DR particles cap 60 mg, 60 mg, Oral, Daily  •  QUEtiapine Fumarate (SEROQUEL) tab 100 mg, 100 mg, Oral, Nightly  •  glucose (DEX4) oral liquid 15 g, 15 g, Oral, Q15 Min PRN **OR** Glucose-Vitamin C (DEX-4) chewable clubbing, cyanosis or edema. Peripheral pulses are 2+. Neurologic: Alert and oriented, normal affect. Skin: Warm and dry. Well healed knee incision. Laboratories and Data:  Diagnostics:  EKG: NSR. Echo: pending.    Cath: see report, as summarized abo Borderline abnormal biopsy of kidney     Sprain of medial collateral ligament of right knee, subsequent encounter     Pes anserine bursitis     ERWIN (generalized anxiety disorder)     Pain of right tibia     Post-menopausal atrophic vaginitis     Primary os

## 2019-09-20 ENCOUNTER — PATIENT OUTREACH (OUTPATIENT)
Dept: CASE MANAGEMENT | Age: 74
End: 2019-09-20

## 2019-09-20 ENCOUNTER — TELEPHONE (OUTPATIENT)
Dept: CARDIOLOGY | Age: 74
End: 2019-09-20

## 2019-09-20 DIAGNOSIS — R55 NEAR SYNCOPE: ICD-10-CM

## 2019-09-20 DIAGNOSIS — Z02.9 ENCOUNTERS FOR UNSPECIFIED ADMINISTRATIVE PURPOSE: ICD-10-CM

## 2019-09-20 PROCEDURE — 1111F DSCHRG MED/CURRENT MED MERGE: CPT

## 2019-09-20 NOTE — DISCHARGE SUMMARY
[facility]OSPITAL[facility]  DISCHARGE SUMMARY     Gurjit Lewis Patient Status:  Observation    10/12/1945 MRN [id number]   [facility] 8NE-A Attending No att. providers found   Hosp Day # 0 PCP Suzette Alberts DO     Date of Admission: 2019 or significant findings and recommendations (brief descriptions):  • As above    Lab/Test results pending at Discharge:   · none    Consultants:  • Cardiology     Discharge Medication List:     Discharge Medications      CONTINUE taking these medications mupirocin 2 % Oint  Commonly known as:  BACTROBAN      Apply 1 Application topically 2 (two) times daily. Quantity:  30 g  Refills:  1     OMEPRAZOLE OR      Take 20 mg by mouth daily as needed.    Refills:  0     QUEtiapine Fumarate 100 MG Tabs  Common

## 2019-09-20 NOTE — PROGRESS NOTES
Initial Post Discharge Follow Up   Discharge Date: 9/19/19  Contact Date: 9/20/2019    Consent Verification:  Assessment Completed With: Patient  HIPAA Verified?   Yes    Discharge Dx:  Near syncope- unclear etiology    Was TCC ordered: yes    General: 2 (two) times daily. Disp: 180 tablet Rfl: 0   Meloxicam 15 MG Oral Tab Take 1 tablet (15 mg total) by mouth daily. Disp: 30 tablet Rfl: 1   mupirocin 2 % External Ointment Apply 1 Application topically 2 (two) times daily.  Disp: 30 g Rfl: 1   betamethason reviewed with pt as scheduled below. She states she is waiting for a call back from cardiology regarding a f/u appt with Dr. Velvet Dorantes.     Your appointments     Date & Time Appointment Department [facility])    Sep 24, 2019  2:00 PM CDT Follow up with Sylvia Rushing on insulin. She states she hasn't been checking her sugar regularly and has not checked it since d/c. Advised pt to start checking BG daily as instructed and bring log of readings to TCC appt.   Educated pt on the importance of checking BG daily as well a

## 2019-09-26 ENCOUNTER — OFFICE VISIT (OUTPATIENT)
Dept: INTERNAL MEDICINE CLINIC | Facility: CLINIC | Age: 74
End: 2019-09-26
Payer: MEDICARE

## 2019-09-26 ENCOUNTER — PATIENT OUTREACH (OUTPATIENT)
Dept: CASE MANAGEMENT | Age: 74
End: 2019-09-26

## 2019-09-26 VITALS
BODY MASS INDEX: 33.12 KG/M2 | TEMPERATURE: 97 F | OXYGEN SATURATION: 96 % | HEART RATE: 108 BPM | WEIGHT: 194 LBS | HEIGHT: 64 IN | DIASTOLIC BLOOD PRESSURE: 84 MMHG | RESPIRATION RATE: 22 BRPM | SYSTOLIC BLOOD PRESSURE: 130 MMHG

## 2019-09-26 DIAGNOSIS — D72.829 LEUKOCYTOSIS, UNSPECIFIED TYPE: ICD-10-CM

## 2019-09-26 DIAGNOSIS — E78.5 HYPERLIPIDEMIA, UNSPECIFIED HYPERLIPIDEMIA TYPE: ICD-10-CM

## 2019-09-26 DIAGNOSIS — E11.9 TYPE 2 DIABETES MELLITUS WITHOUT COMPLICATION, WITHOUT LONG-TERM CURRENT USE OF INSULIN (HCC): ICD-10-CM

## 2019-09-26 DIAGNOSIS — I10 ESSENTIAL HYPERTENSION: ICD-10-CM

## 2019-09-26 DIAGNOSIS — I25.10 CORONARY ARTERY DISEASE INVOLVING NATIVE CORONARY ARTERY OF NATIVE HEART WITHOUT ANGINA PECTORIS: ICD-10-CM

## 2019-09-26 DIAGNOSIS — R55 SYNCOPE, NEAR: Primary | ICD-10-CM

## 2019-09-26 LAB
BASOPHILS # BLD AUTO: 0.07 X10(3) UL (ref 0–0.2)
BASOPHILS NFR BLD AUTO: 0.8 %
DEPRECATED RDW RBC AUTO: 45.6 FL (ref 35.1–46.3)
EOSINOPHIL # BLD AUTO: 0.29 X10(3) UL (ref 0–0.7)
EOSINOPHIL NFR BLD AUTO: 3.2 %
ERYTHROCYTE [DISTWIDTH] IN BLOOD BY AUTOMATED COUNT: 14.2 % (ref 11–15)
HCT VFR BLD AUTO: 40.1 % (ref 35–48)
HGB BLD-MCNC: 12.9 G/DL (ref 12–16)
IMM GRANULOCYTES # BLD AUTO: 0.03 X10(3) UL (ref 0–1)
IMM GRANULOCYTES NFR BLD: 0.3 %
LYMPHOCYTES # BLD AUTO: 2.71 X10(3) UL (ref 1–4)
LYMPHOCYTES NFR BLD AUTO: 30.3 %
MCH RBC QN AUTO: 27.9 PG (ref 26–34)
MCHC RBC AUTO-ENTMCNC: 32.2 G/DL (ref 31–37)
MCV RBC AUTO: 86.6 FL (ref 80–100)
MONOCYTES # BLD AUTO: 0.44 X10(3) UL (ref 0.1–1)
MONOCYTES NFR BLD AUTO: 4.9 %
NEUTROPHILS # BLD AUTO: 5.4 X10 (3) UL (ref 1.5–7.7)
NEUTROPHILS # BLD AUTO: 5.4 X10(3) UL (ref 1.5–7.7)
NEUTROPHILS NFR BLD AUTO: 60.5 %
PLATELET # BLD AUTO: 433 10(3)UL (ref 150–450)
RBC # BLD AUTO: 4.63 X10(6)UL (ref 3.8–5.3)
WBC # BLD AUTO: 8.9 X10(3) UL (ref 4–11)

## 2019-09-26 PROCEDURE — 1111F DSCHRG MED/CURRENT MED MERGE: CPT | Performed by: CLINICAL NURSE SPECIALIST

## 2019-09-26 PROCEDURE — 85025 COMPLETE CBC W/AUTO DIFF WBC: CPT | Performed by: CLINICAL NURSE SPECIALIST

## 2019-09-26 PROCEDURE — 99495 TRANSJ CARE MGMT MOD F2F 14D: CPT | Performed by: CLINICAL NURSE SPECIALIST

## 2019-09-26 NOTE — PROGRESS NOTES
Joya Zhou 6      HISTORY   CHIEF COMPLAINT: post hospital follow up visit  HPI: Moisés Mendoza is a 68year old female here today for follow up after being hospitalized for sudden onset of dizziness and lighthea mouth once a week. Disp: 12 tablet Rfl: 3   metFORMIN HCl 500 MG Oral Tab Take 1 tablet (500 mg total) by mouth 2 (two) times daily. Disp: 180 tablet Rfl: 0   Meloxicam 15 MG Oral Tab Take 1 tablet (15 mg total) by mouth daily.  Disp: 30 tablet Rfl: 1   mup Psoriasis    • Rheumatoid arthritis ([facility] [address]    • Sleep apnea    • Stroke [facility])     per patient she was unaware but it was noted on a MRI-no effects   • Torn meniscus     right knee   • Type II or unspecified type diabetes mellitus without mention of complica reveals no evidence to suggest regions of significant stenosis or occlusion. No dissections are appreciated. No intracranial aneurysms. 3. There is evidence of mild calcified plaque within the carotid bulbs bilaterally without stenosis.  4. Mild diffuse a weakness  RESPIRATORY: denies cough, denies dyspnea with exertion  CARDIOVASCULAR: denies syncope, chest pain, palpitations   GI: denies abdominal pain  MUSCULOSKELETAL: states normal range of motion of extremities, reports some bilateral thigh weakness at (two) times daily as needed for Anxiety. Disp: 60 tablet Rfl: 2   QUEtiapine Fumarate 100 MG Oral Tab Take 1 tablet (100 mg total) by mouth nightly. Disp: 90 tablet Rfl: 0   alendronate 70 MG Oral Tab Take 1 tablet (70 mg total) by mouth once a week.  Disp: completed:  ? Obtained and reviewed discharge information  and continuity of care documents  ?  Reviewed need for follow-up on pending tests, need for follow-up on diagnostic tests and need for follow-up on treatments  ? specialists already aware of assumpt

## 2019-09-26 NOTE — PATIENT INSTRUCTIONS
Patient Instructions:    1. Follow up with Cardiology Dr. Tyra Krabbe October 2, 8:30am     [address]     [address] [place] [address]     [phone]    2.  Report any muscle pain, tenderness, weakness, or discolored urine to your cardiologist as there i

## 2019-09-26 NOTE — PROGRESS NOTES
TRANSITIONAL CARE CLINIC PHARMACIST MEDICATION RECONCILIATION        Samantha Haile MRN [id number]    10/12/1945 PCP Drew James DO       Comments: Medication history completed in [address] [place] [address] by pharmacist with the patient.   Sanjana kolb There is a drug interaction between the atorvastatin and the diltiazem however they are both used at lower doses so and she has been on them for a while.   She describes some leg wobbliness but her urine color is normal.    Reviewed all medications in detai

## 2019-09-27 ENCOUNTER — HOSPITAL ENCOUNTER (OUTPATIENT)
Dept: CV DIAGNOSTICS | Facility: HOSPITAL | Age: 74
Discharge: HOME | End: 2019-09-27
Attending: INTERNAL MEDICINE
Payer: MEDICARE

## 2019-09-27 DIAGNOSIS — R55 SYNCOPE, NEAR: ICD-10-CM

## 2019-09-27 PROCEDURE — 93225 XTRNL ECG REC<48 HRS REC: CPT | Performed by: INTERNAL MEDICINE

## 2019-09-27 PROCEDURE — 93226 XTRNL ECG REC<48 HR SCAN A/R: CPT | Performed by: INTERNAL MEDICINE

## 2019-09-27 PROCEDURE — 93227 XTRNL ECG REC<48 HR R&I: CPT | Performed by: INTERNAL MEDICINE

## 2019-10-02 ENCOUNTER — TELEPHONE (OUTPATIENT)
Dept: CARDIOLOGY | Age: 74
End: 2019-10-02

## 2019-10-02 ENCOUNTER — OFFICE VISIT (OUTPATIENT)
Dept: CARDIOLOGY | Age: 74
End: 2019-10-02

## 2019-10-02 VITALS
HEIGHT: 64 IN | WEIGHT: 194 LBS | SYSTOLIC BLOOD PRESSURE: 110 MMHG | BODY MASS INDEX: 33.12 KG/M2 | HEART RATE: 76 BPM | DIASTOLIC BLOOD PRESSURE: 70 MMHG

## 2019-10-02 DIAGNOSIS — E11.9 TYPE 2 DIABETES MELLITUS WITHOUT COMPLICATION, WITHOUT LONG-TERM CURRENT USE OF INSULIN (CMD): ICD-10-CM

## 2019-10-02 DIAGNOSIS — I25.10 CORONARY ARTERY DISEASE INVOLVING NATIVE CORONARY ARTERY OF NATIVE HEART WITHOUT ANGINA PECTORIS: Primary | ICD-10-CM

## 2019-10-02 DIAGNOSIS — Z86.73 OLD CEREBROVASCULAR ACCIDENT (CVA) WITHOUT LATE EFFECT: ICD-10-CM

## 2019-10-02 DIAGNOSIS — R06.02 SOB (SHORTNESS OF BREATH): ICD-10-CM

## 2019-10-02 DIAGNOSIS — E78.2 HYPERLIPIDEMIA, MIXED: ICD-10-CM

## 2019-10-02 PROCEDURE — 99215 OFFICE O/P EST HI 40 MIN: CPT | Performed by: INTERNAL MEDICINE

## 2019-10-02 RX ORDER — ALENDRONATE SODIUM 70 MG/1
70 TABLET ORAL
COMMUNITY

## 2019-10-02 ASSESSMENT — ENCOUNTER SYMPTOMS
ALLERGIC/IMMUNOLOGIC COMMENTS: NO NEW FOOD ALLERGIES
WEIGHT LOSS: 0
SUSPICIOUS LESIONS: 0
HEMOPTYSIS: 0
CHILLS: 0
HEMATOCHEZIA: 0
WEIGHT GAIN: 0
COUGH: 0
FEVER: 0
BRUISES/BLEEDS EASILY: 0

## 2019-10-02 NOTE — PROGRESS NOTES
Interval History: Blood cultures positive for Bacteroides and Staph Epi.  Pt w/ F but PICC line was still in at that point.  PICC line now removed and repeat cxs taken.    Review of Systems   Constitutional: Negative for appetite change, chills, fatigue and fever.   Respiratory: Positive for shortness of breath. Negative for cough.    Cardiovascular: Negative for chest pain and leg swelling.   Gastrointestinal: Negative for abdominal distention, constipation and nausea.   Musculoskeletal: Positive for myalgias. Negative for arthralgias.   Skin: Positive for wound.     Objective:     Vital Signs (Most Recent):  Temp: 99 °F (37.2 °C) (10/02/19 1505)  Pulse: 91 (10/02/19 1505)  Resp: 16 (10/02/19 1505)  BP: 105/74 (10/02/19 1505)  SpO2: 99 % (10/02/19 1505) Vital Signs (24h Range):  Temp:  [98.4 °F (36.9 °C)-101.1 °F (38.4 °C)] 99 °F (37.2 °C)  Pulse:  [] 91  Resp:  [14-23] 16  SpO2:  [90 %-100 %] 99 %  BP: ()/(53-89) 105/74     Weight: 84.5 kg (186 lb 4.6 oz)  Body mass index is 31 kg/m².    Estimated Creatinine Clearance: 121.6 mL/min (based on SCr of 0.7 mg/dL).    Physical Exam   Constitutional: She is oriented to person, place, and time. She appears well-developed and well-nourished. No distress.   HENT:   Head: Normocephalic and atraumatic.   Eyes: Conjunctivae are normal. No scleral icterus.   Cardiovascular: Regular rhythm. Tachycardia present.   Pulmonary/Chest: Effort normal. No respiratory distress.   Abdominal: Soft. She exhibits no distension. There is no tenderness.   Genitourinary:   Genitourinary Comments: Bailey in place   Musculoskeletal: She exhibits no edema.   Left lateral malleolar wound.   Sacral wound  Ischial wounds  Pictures in media tab   Neurological: She is alert and oriented to person, place, and time.   Skin: Skin is warm and dry. She is not diaphoretic.   Psychiatric: She has a normal mood and affect. Her behavior is normal.   Vitals reviewed.      Significant Labs: All  SPINE EVALUATION:   Referring Physician: Dr. TOURE  Diagnosis: Left leg pain (M79.605)  Status post lumbar spinal fusion (Z98.1)  Orthopedic aftercare (Z47.89)     Date of Service: 11/6/2017     PATIENT SUMMARY   Naseem Locke is a 71year old y/o fe decreased pain. Past medical history was reviewed with Gilma Stanley. Significant findings include diabetes (takes Metformin); depression; R PCL and medial meniscus tear per patient report.     Kenroy Abdalla is a 72 yo female who presents to physical therapy s pertinent labs within the past 24 hours have been reviewed.    Significant Imaging: I have reviewed all pertinent imaging results/findings within the past 24 hours.   deg  Sidebending: R reaches above knee joint line with pain increase at R PSIS; L reaches to the joint line  Rotation: R ~ 30% restricted, some pain at R PSIS; L ~ 30% restricted    Hip ROM:   R PSIS pain provocation at the end-range of R hip internal rota visits)  · Pt will improve lumbar spine AROM flexion to be able to reach mid-shins to allow increased ease with bending forward to don shoes (10 visits)  · Pt will report improved symptom centralization and absence of radicular symptoms for 3 consecutive d while under my care.     X___________________________________________________ Date____________________    Certification From: 25/0/7355  To:2/4/2018

## 2019-10-07 DIAGNOSIS — E11.9 CONTROLLED TYPE 2 DIABETES MELLITUS WITHOUT COMPLICATION, WITHOUT LONG-TERM CURRENT USE OF INSULIN (HCC): ICD-10-CM

## 2019-10-07 RX ORDER — ATORVASTATIN CALCIUM 20 MG/1
TABLET, FILM COATED ORAL
Qty: 90 TABLET | Refills: 0 | Status: SHIPPED | OUTPATIENT
Start: 2019-10-07 | End: 2019-10-11

## 2019-10-11 ENCOUNTER — OFFICE VISIT (OUTPATIENT)
Dept: FAMILY MEDICINE CLINIC | Facility: CLINIC | Age: 74
End: 2019-10-11
Payer: MEDICARE

## 2019-10-11 VITALS
RESPIRATION RATE: 18 BRPM | TEMPERATURE: 98 F | WEIGHT: 194 LBS | HEIGHT: 64 IN | BODY MASS INDEX: 33.12 KG/M2 | DIASTOLIC BLOOD PRESSURE: 72 MMHG | OXYGEN SATURATION: 98 % | HEART RATE: 98 BPM | SYSTOLIC BLOOD PRESSURE: 122 MMHG

## 2019-10-11 DIAGNOSIS — I63.9 ISCHEMIC STROKE (HCC): Primary | ICD-10-CM

## 2019-10-11 DIAGNOSIS — R42 DIZZY: ICD-10-CM

## 2019-10-11 DIAGNOSIS — Z23 NEED FOR VACCINATION FOR PNEUMOCOCCUS: ICD-10-CM

## 2019-10-11 DIAGNOSIS — E11.9 CONTROLLED TYPE 2 DIABETES MELLITUS WITHOUT COMPLICATION, WITHOUT LONG-TERM CURRENT USE OF INSULIN (HCC): ICD-10-CM

## 2019-10-11 DIAGNOSIS — R53.1 GENERALIZED WEAKNESS: ICD-10-CM

## 2019-10-11 PROCEDURE — G0008 ADMIN INFLUENZA VIRUS VAC: HCPCS | Performed by: FAMILY MEDICINE

## 2019-10-11 PROCEDURE — 1111F DSCHRG MED/CURRENT MED MERGE: CPT | Performed by: FAMILY MEDICINE

## 2019-10-11 PROCEDURE — G0009 ADMIN PNEUMOCOCCAL VACCINE: HCPCS | Performed by: FAMILY MEDICINE

## 2019-10-11 PROCEDURE — 90662 IIV NO PRSV INCREASED AG IM: CPT | Performed by: FAMILY MEDICINE

## 2019-10-11 PROCEDURE — 99214 OFFICE O/P EST MOD 30 MIN: CPT | Performed by: FAMILY MEDICINE

## 2019-10-11 PROCEDURE — 90732 PPSV23 VACC 2 YRS+ SUBQ/IM: CPT | Performed by: FAMILY MEDICINE

## 2019-10-11 RX ORDER — ATORVASTATIN CALCIUM 40 MG/1
40 TABLET, FILM COATED ORAL NIGHTLY
Qty: 90 TABLET | Refills: 1 | Status: SHIPPED | OUTPATIENT
Start: 2019-10-11 | End: 2020-05-27

## 2019-10-11 RX ORDER — ALENDRONATE SODIUM 70 MG/1
70 TABLET ORAL
COMMUNITY
End: 2019-10-11

## 2019-10-11 RX ORDER — MECLIZINE HYDROCHLORIDE 25 MG/1
25 TABLET ORAL 3 TIMES DAILY PRN
Qty: 30 TABLET | Refills: 1 | Status: SHIPPED | OUTPATIENT
Start: 2019-10-11 | End: 2020-04-21

## 2019-10-11 NOTE — PROGRESS NOTES
HPI:    Patient ID: Rachel Klein is a 68year old female. Pt had a minor 1 cm infarct stroke on 9/18. Pt remained in the hospital for one day. Pt states that she had a smaller one a couple years ago.  Pt states that since her recent stroke she has b Oral Tab Take 1 tablet (25 mg total) by mouth 3 (three) times daily as needed for Dizziness.  Disp: 30 tablet Rfl: 1   METFORMIN  MG Oral Tab TAKE ONE TABLET BY MOUTH TWICE DAILY  Disp: 180 tablet Rfl: 0   dilTIAZem HCl ER Coated Beads (CARTIA XT) 12 Musculoskeletal:   No weakness of arms and hands. Minimal weakness with left leg flexion. Skin: She is not diaphoretic. Vitals reviewed.          ASSESSMENT/PLAN:   Ischemic stroke (hcc)  (primary encounter diagnosis)  Generalized weakness  Dizzy  C TO [facility]  PNEUMOCOCCAL IMM (PNEUMOVAX)  FLU VACC HIGH DOSE PRSV FREE       YD#[id number]

## 2019-10-23 ENCOUNTER — OFFICE VISIT (OUTPATIENT)
Dept: NEUROLOGY | Facility: CLINIC | Age: 74
End: 2019-10-23
Payer: MEDICARE

## 2019-10-23 VITALS
WEIGHT: 198 LBS | RESPIRATION RATE: 16 BRPM | SYSTOLIC BLOOD PRESSURE: 122 MMHG | DIASTOLIC BLOOD PRESSURE: 78 MMHG | BODY MASS INDEX: 34 KG/M2 | HEART RATE: 82 BPM

## 2019-10-23 DIAGNOSIS — I63.81 LACUNAR INFARCTION (HCC): ICD-10-CM

## 2019-10-23 DIAGNOSIS — R26.81 UNSTEADINESS: ICD-10-CM

## 2019-10-23 PROCEDURE — 99204 OFFICE O/P NEW MOD 45 MIN: CPT | Performed by: OTHER

## 2019-10-24 ENCOUNTER — HOSPITAL ENCOUNTER (OUTPATIENT)
Dept: MRI IMAGING | Facility: HOSPITAL | Age: 74
Discharge: HOME | End: 2019-10-24
Attending: Other
Payer: MEDICARE

## 2019-10-24 DIAGNOSIS — I63.81 LACUNAR INFARCTION (HCC): ICD-10-CM

## 2019-10-24 PROCEDURE — 70551 MRI BRAIN STEM W/O DYE: CPT | Performed by: OTHER
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
## 2019-10-29 ENCOUNTER — TELEPHONE (OUTPATIENT)
Dept: NEUROLOGY | Facility: CLINIC | Age: 74
End: 2019-10-29

## 2019-10-29 NOTE — TELEPHONE ENCOUNTER
Pt states since 10/19 pt has been feeling like she has little energy, she states that the left side vision has worsened, and has difficulty with balance, she also states that she is experiencing numbness and tingling in bilateral fingers.     Pt states non

## 2019-10-29 NOTE — TELEPHONE ENCOUNTER
----- Message from Aaliyah Petersen MD sent at 10/25/2019  1:11 PM CDT -----  No new infarct.  Old right cerebellar infarct and also there are old small infarcts in basal ganglia are

## 2019-10-30 NOTE — TELEPHONE ENCOUNTER
Spoke with Dr. Brendan Verduzco, her recommendation  Is for pt to monitor symptoms and follow up in office on current appt scheduled. Pt should report to ED or UC for severe or worsening symptoms.     Attempted to contact pt, received \"Busy signal.\"

## 2019-10-31 NOTE — TELEPHONE ENCOUNTER
Spoke with pt, relayed recommendations from Dr. Brendan Verduzco below. Pt expressed understanding and was encouraged to call office back with any further questions or concerns.

## 2019-12-04 ENCOUNTER — OFFICE VISIT (OUTPATIENT)
Dept: NEUROLOGY | Facility: CLINIC | Age: 74
End: 2019-12-04
Payer: MEDICARE

## 2019-12-04 VITALS
BODY MASS INDEX: 34 KG/M2 | WEIGHT: 198 LBS | DIASTOLIC BLOOD PRESSURE: 72 MMHG | SYSTOLIC BLOOD PRESSURE: 134 MMHG | RESPIRATION RATE: 16 BRPM | HEART RATE: 72 BPM

## 2019-12-04 DIAGNOSIS — Z86.73 OLD LACUNAR STROKE WITHOUT LATE EFFECT: Primary | ICD-10-CM

## 2019-12-04 DIAGNOSIS — R26.9 GAIT DISTURBANCE: ICD-10-CM

## 2019-12-04 PROCEDURE — 99213 OFFICE O/P EST LOW 20 MIN: CPT | Performed by: OTHER

## 2019-12-04 NOTE — PROGRESS NOTES
HPI:    Patient ID: Edda De Jesus is a 69year old female. HPI     Rosalie Krueger is a 69year old female who presented for follow up. She was admitted at [facility] in Sept with sudden onset of dizziness and lightheadedness while shopping at a store. type diabetes mellitus without mention of complication, not stated as uncontrolled    • Visual impairment     glasses      Past Surgical History:   Procedure Laterality Date   • APPENDECTOMY     • BACK SURGERY  07/17/2017    L4-S1 Decomp Instru Fusion    • Medications   Medication Sig Dispense Refill   • CLONAZEPAM 1 MG Oral Tab TAKE ONE TABLET BY MOUTH TWICE DAILY AS NEEDED FOR ANXIETY 60 tablet 1   • QUETIAPINE FUMARATE 100 MG Oral Tab TAKE ONE TABLET BY MOUTH NIGHTLY 90 tablet 0   • atorvastatin 40 MG Ora Soft. Bowel sounds are normal.   Skin: Skin is warm and dry. Psychiatric:  normal mood and affect. Neurological   Awake, alert and oriented to time, place and person. Speech is fluent with intact comprehension, repetition and naming.  Normal attention a Visit:  Requested Prescriptions      No prescriptions requested or ordered in this encounter       Imaging & Referrals:  None       #[id number]

## 2019-12-04 NOTE — PROGRESS NOTES
The patient states she is still having left eye blurry. The patient is having balance issues and trouble walking with stairs. Left leg weakness. Pain on the left side of the head. Difficulty with speech and finishing sentences.

## 2019-12-05 ENCOUNTER — APPOINTMENT (OUTPATIENT)
Dept: CARDIOLOGY | Age: 74
End: 2019-12-05

## 2020-01-02 RX ORDER — DILTIAZEM HYDROCHLORIDE 120 MG/1
CAPSULE, EXTENDED RELEASE ORAL
Qty: 90 CAPSULE | Refills: 3 | Status: SHIPPED | OUTPATIENT
Start: 2020-01-02 | End: 2020-12-28

## 2020-01-08 DIAGNOSIS — E11.9 CONTROLLED TYPE 2 DIABETES MELLITUS WITHOUT COMPLICATION, WITHOUT LONG-TERM CURRENT USE OF INSULIN (HCC): ICD-10-CM

## 2020-01-21 RX ORDER — BETAMETHASONE DIPROPIONATE 0.5 MG/G
CREAM TOPICAL
Qty: 15 G | Refills: 0 | Status: SHIPPED | OUTPATIENT
Start: 2020-01-21 | End: 2020-07-06

## 2020-02-07 PROBLEM — M94.251 CHONDROMALACIA OF RIGHT HIP: Status: ACTIVE | Noted: 2020-02-07

## 2020-02-07 PROBLEM — M70.61 TROCHANTERIC BURSITIS OF RIGHT HIP: Status: ACTIVE | Noted: 2020-02-07

## 2020-02-24 ENCOUNTER — HOSPITAL ENCOUNTER (OUTPATIENT)
Dept: GENERAL RADIOLOGY | Facility: HOSPITAL | Age: 75
Discharge: HOME | End: 2020-02-24
Attending: NURSE PRACTITIONER
Payer: MEDICARE

## 2020-02-24 DIAGNOSIS — K59.00 CONSTIPATION, UNSPECIFIED CONSTIPATION TYPE: ICD-10-CM

## 2020-02-24 PROBLEM — E11.9 TYPE 2 DIABETES MELLITUS WITHOUT COMPLICATION (HCC): Status: ACTIVE | Noted: 2018-04-17

## 2020-02-24 PROBLEM — R06.02 SOB (SHORTNESS OF BREATH): Status: ACTIVE | Noted: 2019-09-17

## 2020-02-24 PROBLEM — Z86.73 OLD CEREBROVASCULAR ACCIDENT (CVA) WITHOUT LATE EFFECT: Status: ACTIVE | Noted: 2019-10-02

## 2020-02-24 PROBLEM — R00.2 PALPITATIONS: Status: ACTIVE | Noted: 2018-04-17

## 2020-02-24 PROBLEM — I25.10 CORONARY ARTERY DISEASE INVOLVING NATIVE CORONARY ARTERY OF NATIVE HEART WITHOUT ANGINA PECTORIS: Status: ACTIVE | Noted: 2019-09-17

## 2020-02-24 PROCEDURE — 74018 RADEX ABDOMEN 1 VIEW: CPT | Performed by: NURSE PRACTITIONER

## 2020-03-05 ENCOUNTER — TELEPHONE (OUTPATIENT)
Dept: NEUROLOGY | Facility: CLINIC | Age: 75
End: 2020-03-05

## 2020-03-05 NOTE — TELEPHONE ENCOUNTER
Per PSR Note:    Pt needs clearance from Dr. Mandie Rodriguez for an EGD internal with Dr. Mee Olivares    Neurology clearance letter pended for MD review and signature.

## 2020-04-07 ENCOUNTER — TELEPHONE (OUTPATIENT)
Dept: FAMILY MEDICINE CLINIC | Facility: CLINIC | Age: 75
End: 2020-04-07

## 2020-04-07 DIAGNOSIS — E11.9 CONTROLLED TYPE 2 DIABETES MELLITUS WITHOUT COMPLICATION, WITHOUT LONG-TERM CURRENT USE OF INSULIN (HCC): ICD-10-CM

## 2020-04-07 DIAGNOSIS — R05.9 COUGH: ICD-10-CM

## 2020-04-07 DIAGNOSIS — F33.1 MODERATE EPISODE OF RECURRENT MAJOR DEPRESSIVE DISORDER (HCC): ICD-10-CM

## 2020-04-07 DIAGNOSIS — E11.9 TYPE 2 DIABETES MELLITUS WITHOUT COMPLICATION, WITHOUT LONG-TERM CURRENT USE OF INSULIN (HCC): Primary | ICD-10-CM

## 2020-04-07 PROCEDURE — 99442 PHONE E/M BY PHYS 11-20 MIN: CPT | Performed by: FAMILY MEDICINE

## 2020-04-07 RX ORDER — OMEPRAZOLE 20 MG/1
20 CAPSULE, DELAYED RELEASE ORAL
Qty: 90 CAPSULE | Refills: 1 | Status: ON HOLD | OUTPATIENT
Start: 2020-04-07 | End: 2020-05-26

## 2020-04-07 NOTE — TELEPHONE ENCOUNTER
Last Office Visit: 10-11-19 with Sahara Goel for hospital follow up   Last Rx Filled: 1-10-20 180 tabs with no refills    Last Labs: 9-26-19 cbc. 9-18-19 cbc/cmp/hga1c/troponin/ua  Future Appointment: 7-15-20    Per protocol to provider

## 2020-04-07 NOTE — TELEPHONE ENCOUNTER
Virtual/Telephone Check-In    Alonso Soto verbally consents to a [facility] on 04/07/20. Patient understands and accepts financial responsibility for any deductible, co-insurance and/or co-pays associated with this service.

## 2020-04-15 ENCOUNTER — APPOINTMENT (OUTPATIENT)
Dept: CARDIOLOGY | Age: 75
End: 2020-04-15

## 2020-04-17 ENCOUNTER — HOSPITAL ENCOUNTER (OUTPATIENT)
Dept: GENERAL RADIOLOGY | Facility: HOSPITAL | Age: 75
Discharge: HOME | End: 2020-04-17
Attending: FAMILY MEDICINE
Payer: MEDICARE

## 2020-04-17 ENCOUNTER — VIRTUAL PHONE E/M (OUTPATIENT)
Dept: FAMILY MEDICINE CLINIC | Facility: CLINIC | Age: 75
End: 2020-04-17
Payer: MEDICARE

## 2020-04-17 DIAGNOSIS — R05.3 CHRONIC COUGH: ICD-10-CM

## 2020-04-17 DIAGNOSIS — R42 DIZZY: ICD-10-CM

## 2020-04-17 DIAGNOSIS — R05.3 CHRONIC COUGH: Primary | ICD-10-CM

## 2020-04-17 DIAGNOSIS — R51.9 HEADACHE DISORDER: ICD-10-CM

## 2020-04-17 DIAGNOSIS — H53.8 BLURRY VISION, LEFT EYE: ICD-10-CM

## 2020-04-17 DIAGNOSIS — R68.89 FORGETFULNESS: ICD-10-CM

## 2020-04-17 PROCEDURE — 71046 X-RAY EXAM CHEST 2 VIEWS: CPT | Performed by: FAMILY MEDICINE

## 2020-04-17 PROCEDURE — 99443 PHONE E/M BY PHYS 21-30 MIN: CPT | Performed by: FAMILY MEDICINE

## 2020-04-17 RX ORDER — PREDNISONE 20 MG/1
TABLET ORAL
Qty: 13 TABLET | Refills: 0 | Status: SHIPPED | OUTPATIENT
Start: 2020-04-17 | End: 2020-06-18 | Stop reason: ALTCHOICE

## 2020-04-17 RX ORDER — ALBUTEROL SULFATE 90 UG/1
2 AEROSOL, METERED RESPIRATORY (INHALATION) EVERY 6 HOURS PRN
Qty: 1 INHALER | Refills: 1 | Status: SHIPPED | OUTPATIENT
Start: 2020-04-17 | End: 2020-06-18 | Stop reason: ALTCHOICE

## 2020-04-17 RX ORDER — MECLIZINE HYDROCHLORIDE 25 MG/1
25 TABLET ORAL 3 TIMES DAILY PRN
Qty: 30 TABLET | Refills: 0 | Status: SHIPPED | OUTPATIENT
Start: 2020-04-17 | End: 2020-11-20

## 2020-04-17 RX ORDER — BENZONATATE 200 MG/1
200 CAPSULE ORAL EVERY 8 HOURS PRN
Qty: 30 CAPSULE | Refills: 0 | Status: SHIPPED | OUTPATIENT
Start: 2020-04-17 | End: 2020-04-17

## 2020-04-17 RX ORDER — MECLIZINE HYDROCHLORIDE 25 MG/1
25 TABLET ORAL 3 TIMES DAILY PRN
Qty: 30 TABLET | Refills: 0 | Status: SHIPPED | OUTPATIENT
Start: 2020-04-17 | End: 2020-04-17

## 2020-04-17 RX ORDER — AZITHROMYCIN 250 MG/1
TABLET, FILM COATED ORAL
Qty: 6 TABLET | Refills: 0 | Status: SHIPPED | OUTPATIENT
Start: 2020-04-17 | End: 2020-04-22

## 2020-04-17 RX ORDER — BENZONATATE 200 MG/1
200 CAPSULE ORAL EVERY 8 HOURS PRN
Qty: 30 CAPSULE | Refills: 0 | Status: SHIPPED | OUTPATIENT
Start: 2020-04-17 | End: 2020-06-18 | Stop reason: ALTCHOICE

## 2020-04-17 NOTE — PROGRESS NOTES
Van Coast  : 10/12/1945, female 69year old  Past Medical History:   Diagnosis Date   • Abdominal pain    • Anesthesia complication     remembers being intubated and start of surgery   • Anxiety state, unspecified    • Arthritis    • Back pain TUBAL LIGATION     • UPPER GI ENDOSCOPY,EXAM       ALL: Propoxyphene  Current Outpatient Medications   Medication Sig Dispense Refill   • benzonatate 200 MG Oral Cap Take 1 capsule (200 mg total) by mouth every 8 (eight) hours as needed for cough.  30 capsu week. 12 tablet 3   • Acetaminophen (ACETAMINOPHEN EXTRA STRENGTH) 500 MG Oral Cap Take 500-1,000 mg by mouth every 6 (six) hours as needed.        • FREESTYLE LANCETS Does not apply Misc TEST ONCE DAILY 200 each 2   • Glucose Blood (FREESTYLE LITE TEST) In benzonatate 200 MG Oral Cap; Take 1 capsule (200 mg total) by mouth every 8 (eight) hours as needed for cough. -     Albuterol Sulfate  (90 Base) MCG/ACT Inhalation Aero Soln;  Inhale 2 puffs into the lungs every 6 (six) hours as needed for [facility]

## 2020-04-21 RX ORDER — COVID-19 ANTIGEN TEST
220 KIT MISCELLANEOUS AS NEEDED
COMMUNITY
End: 2020-06-18 | Stop reason: ALTCHOICE

## 2020-04-21 RX ORDER — ACETAMINOPHEN 500 MG
1000 TABLET ORAL ONCE
Status: CANCELLED | OUTPATIENT
Start: 2020-04-21 | End: 2020-04-21

## 2020-04-21 NOTE — PAT NURSING NOTE
Patient was not aware of antibiotic azithromycin was ordered by [facility] on 4/17/20.   Advised patient to call Dr. Simons Back office

## 2020-04-22 ENCOUNTER — TELEPHONE (OUTPATIENT)
Dept: FAMILY MEDICINE CLINIC | Facility: CLINIC | Age: 75
End: 2020-04-22

## 2020-04-22 NOTE — TELEPHONE ENCOUNTER
If cough is gone then its fine, she doesn't need to do anything more. In future she can just make sure to always take omeprazole before taking the prednisone.

## 2020-04-22 NOTE — TELEPHONE ENCOUNTER
Just a FYI. See previous message, pt states she stopped Prednisone due to nausea and pt states she did not start Zpac. Pt states she feels better now and does not need.

## 2020-04-30 NOTE — PAT NURSING NOTE
Spoke with patient to confirm date change to 5/14/20  No changes in medical hx or medications since phone screen 4/21/20

## 2020-05-25 ENCOUNTER — LAB ENCOUNTER (OUTPATIENT)
Dept: LAB | Facility: HOSPITAL | Age: 75
End: 2020-05-25
Attending: INTERNAL MEDICINE
Payer: MEDICARE

## 2020-05-25 DIAGNOSIS — Z01.818 PRE-OP TESTING: ICD-10-CM

## 2020-05-26 ENCOUNTER — HOSPITAL ENCOUNTER (OUTPATIENT)
Facility: HOSPITAL | Age: 75
Setting detail: HOSPITAL OUTPATIENT SURGERY
Discharge: HOME | End: 2020-05-26
Attending: INTERNAL MEDICINE | Admitting: INTERNAL MEDICINE
Payer: MEDICARE

## 2020-05-26 ENCOUNTER — ANESTHESIA EVENT (OUTPATIENT)
Dept: ENDOSCOPY | Facility: HOSPITAL | Age: 75
End: 2020-05-26
Payer: MEDICARE

## 2020-05-26 ENCOUNTER — ANESTHESIA (OUTPATIENT)
Dept: ENDOSCOPY | Facility: HOSPITAL | Age: 75
End: 2020-05-26
Payer: MEDICARE

## 2020-05-26 VITALS
TEMPERATURE: 99 F | BODY MASS INDEX: 32.27 KG/M2 | OXYGEN SATURATION: 93 % | HEART RATE: 79 BPM | HEIGHT: 64 IN | SYSTOLIC BLOOD PRESSURE: 161 MMHG | WEIGHT: 189 LBS | RESPIRATION RATE: 18 BRPM | DIASTOLIC BLOOD PRESSURE: 82 MMHG

## 2020-05-26 DIAGNOSIS — R10.11 RIGHT UPPER QUADRANT PAIN: ICD-10-CM

## 2020-05-26 DIAGNOSIS — R12 HEART BURN: ICD-10-CM

## 2020-05-26 DIAGNOSIS — Z01.818 PRE-OP TESTING: Primary | ICD-10-CM

## 2020-05-26 PROCEDURE — 0DB98ZX EXCISION OF DUODENUM, VIA NATURAL OR ARTIFICIAL OPENING ENDOSCOPIC, DIAGNOSTIC: ICD-10-PCS | Performed by: INTERNAL MEDICINE

## 2020-05-26 PROCEDURE — 88342 IMHCHEM/IMCYTCHM 1ST ANTB: CPT | Performed by: INTERNAL MEDICINE

## 2020-05-26 PROCEDURE — 0DB78ZX EXCISION OF STOMACH, PYLORUS, VIA NATURAL OR ARTIFICIAL OPENING ENDOSCOPIC, DIAGNOSTIC: ICD-10-PCS | Performed by: INTERNAL MEDICINE

## 2020-05-26 PROCEDURE — 82962 GLUCOSE BLOOD TEST: CPT

## 2020-05-26 PROCEDURE — 88305 TISSUE EXAM BY PATHOLOGIST: CPT | Performed by: INTERNAL MEDICINE

## 2020-05-26 PROCEDURE — 0DB58ZX EXCISION OF ESOPHAGUS, VIA NATURAL OR ARTIFICIAL OPENING ENDOSCOPIC, DIAGNOSTIC: ICD-10-PCS | Performed by: INTERNAL MEDICINE

## 2020-05-26 RX ORDER — DEXTROSE MONOHYDRATE 25 G/50ML
50 INJECTION, SOLUTION INTRAVENOUS
Status: DISCONTINUED | OUTPATIENT
Start: 2020-05-26 | End: 2020-05-26

## 2020-05-26 RX ORDER — OMEPRAZOLE 40 MG/1
40 CAPSULE, DELAYED RELEASE ORAL DAILY
Qty: 90 CAPSULE | Refills: 3 | Status: SHIPPED | OUTPATIENT
Start: 2020-05-26 | End: 2021-04-13

## 2020-05-26 RX ORDER — LIDOCAINE HYDROCHLORIDE 10 MG/ML
INJECTION, SOLUTION EPIDURAL; INFILTRATION; INTRACAUDAL; PERINEURAL AS NEEDED
Status: DISCONTINUED | OUTPATIENT
Start: 2020-05-26 | End: 2020-05-26 | Stop reason: SURG

## 2020-05-26 RX ORDER — SODIUM CHLORIDE, SODIUM LACTATE, POTASSIUM CHLORIDE, CALCIUM CHLORIDE 600; 310; 30; 20 MG/100ML; MG/100ML; MG/100ML; MG/100ML
INJECTION, SOLUTION INTRAVENOUS CONTINUOUS
Status: DISCONTINUED | OUTPATIENT
Start: 2020-05-26 | End: 2020-05-26

## 2020-05-26 RX ORDER — NALOXONE HYDROCHLORIDE 0.4 MG/ML
80 INJECTION, SOLUTION INTRAMUSCULAR; INTRAVENOUS; SUBCUTANEOUS AS NEEDED
Status: DISCONTINUED | OUTPATIENT
Start: 2020-05-26 | End: 2020-05-26

## 2020-05-26 RX ADMIN — LIDOCAINE HYDROCHLORIDE 50 MG: 10 INJECTION, SOLUTION EPIDURAL; INFILTRATION; INTRACAUDAL; PERINEURAL at 07:17:00
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
## 2020-05-26 NOTE — OPERATIVE REPORT
Mariyakonstantin Shnaa Patient Status:  Hospital Outpatient Surgery    10/12/1945 MRN [id number]   [facility] ENDOSCOPY Attending Angelique Saunders MD   Date 2020 PCP More Mancera DO     PREOPERATIVE DIAGNOSIS/INDICATION: GERD, dyspepsi

## 2020-05-26 NOTE — ANESTHESIA POSTPROCEDURE EVALUATION
[address] Patient Status:  Hospital Outpatient Surgery   Age/Gender 69year old female MRN [id number]   Location [facility]. Attending Melissa Beltrán MD   Hosp Day # 0 PCP Allyn Butterfield DO       Anesthesia Post-o

## 2020-05-27 DIAGNOSIS — R42 DIZZY: ICD-10-CM

## 2020-05-27 DIAGNOSIS — R53.1 GENERALIZED WEAKNESS: ICD-10-CM

## 2020-05-27 DIAGNOSIS — I63.9 ISCHEMIC STROKE (HCC): ICD-10-CM

## 2020-05-27 RX ORDER — ATORVASTATIN CALCIUM 40 MG/1
TABLET, FILM COATED ORAL
Qty: 90 TABLET | Refills: 0 | Status: SHIPPED | OUTPATIENT
Start: 2020-05-27 | End: 2020-08-24

## 2020-06-15 ENCOUNTER — VIRTUAL PHONE E/M (OUTPATIENT)
Dept: FAMILY MEDICINE CLINIC | Facility: CLINIC | Age: 75
End: 2020-06-15
Payer: MEDICARE
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
## 2020-06-15 PROCEDURE — 99442 PHONE E/M BY PHYS 11-20 MIN: CPT | Performed by: FAMILY MEDICINE

## 2020-06-15 RX ORDER — AMOXICILLIN AND CLAVULANATE POTASSIUM 875; 125 MG/1; MG/1
1 TABLET, FILM COATED ORAL 2 TIMES DAILY
Qty: 20 TABLET | Refills: 0 | Status: SHIPPED | OUTPATIENT
Start: 2020-06-15 | End: 2020-06-18 | Stop reason: ALTCHOICE

## 2020-06-15 NOTE — PROGRESS NOTES
HPI:    Patient ID: Mari Yuan is a 69year old female. Daughter's dog scratched her when she was excited to see her on right arm 3 weeks ago. Most of it is healing except one red line. Not spreading. At end of line is tender small red bump.  Delicia Corea daily as needed. • Iron Combinations (IRON COMPLEX OR) Take 1 tablet by mouth daily. • Vitamin B-12 (VITAMIN B12) 1000 MCG Oral Tab Take 1,000 mcg by mouth daily.        Allergies:  Propoxyphene            DIZZINESS   PHYSICAL EXAM:   Physical Exa

## 2020-06-18 ENCOUNTER — OFFICE VISIT (OUTPATIENT)
Dept: FAMILY MEDICINE CLINIC | Facility: CLINIC | Age: 75
End: 2020-06-18
Payer: MEDICARE

## 2020-06-18 VITALS
RESPIRATION RATE: 16 BRPM | DIASTOLIC BLOOD PRESSURE: 78 MMHG | TEMPERATURE: 98 F | BODY MASS INDEX: 33.97 KG/M2 | SYSTOLIC BLOOD PRESSURE: 118 MMHG | HEIGHT: 64 IN | HEART RATE: 96 BPM | WEIGHT: 199 LBS | OXYGEN SATURATION: 98 %
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
## 2020-06-18 PROCEDURE — 99213 OFFICE O/P EST LOW 20 MIN: CPT | Performed by: FAMILY MEDICINE

## 2020-06-18 RX ORDER — AMOXICILLIN AND CLAVULANATE POTASSIUM 875; 125 MG/1; MG/1
1 TABLET, FILM COATED ORAL 2 TIMES DAILY
COMMUNITY
End: 2020-08-14 | Stop reason: ALTCHOICE

## 2020-06-20 NOTE — PROGRESS NOTES
HPI:    Patient ID: Codie Nam is a 69year old female. Dog scratch 3 weeks ago. Most of it is improving, however there is one line where she was scratched where it is still red and painful around it.   There is a small focal swelling about 3 mm • Glucose Blood (FREESTYLE LITE TEST) In Vitro Strip TEST ONCE DAILY 200 strip 2   • Magnesium Hydroxide (MILK OF MAGNESIA OR) Take 15 mL by mouth daily as needed. • Iron Combinations (IRON COMPLEX OR) Take 1 tablet by mouth daily.      • Vitamin B-

## 2020-07-06 DIAGNOSIS — E11.9 CONTROLLED TYPE 2 DIABETES MELLITUS WITHOUT COMPLICATION, WITHOUT LONG-TERM CURRENT USE OF INSULIN (HCC): ICD-10-CM

## 2020-07-06 RX ORDER — BETAMETHASONE DIPROPIONATE 0.5 MG/G
CREAM TOPICAL
Qty: 45 G | Refills: 0 | Status: SHIPPED | OUTPATIENT
Start: 2020-07-06 | End: 2021-06-11

## 2020-07-15 ENCOUNTER — OFFICE VISIT (OUTPATIENT)
Dept: FAMILY MEDICINE CLINIC | Facility: CLINIC | Age: 75
End: 2020-07-15
Payer: MEDICARE

## 2020-07-15 VITALS
TEMPERATURE: 98 F | HEART RATE: 98 BPM | SYSTOLIC BLOOD PRESSURE: 112 MMHG | BODY MASS INDEX: 35.88 KG/M2 | HEIGHT: 62.5 IN | RESPIRATION RATE: 16 BRPM | OXYGEN SATURATION: 98 % | WEIGHT: 200 LBS | DIASTOLIC BLOOD PRESSURE: 76 MMHG

## 2020-07-15 DIAGNOSIS — M81.0 AGE-RELATED OSTEOPOROSIS WITHOUT CURRENT PATHOLOGICAL FRACTURE: ICD-10-CM

## 2020-07-15 DIAGNOSIS — R47.89 EPISODE OF CHANGE IN SPEECH: ICD-10-CM

## 2020-07-15 DIAGNOSIS — G45.9 TIA (TRANSIENT ISCHEMIC ATTACK): ICD-10-CM

## 2020-07-15 DIAGNOSIS — Z86.73 HX OF ARTERIAL ISCHEMIC STROKE: ICD-10-CM

## 2020-07-15 DIAGNOSIS — Z12.31 ENCOUNTER FOR SCREENING MAMMOGRAM FOR MALIGNANT NEOPLASM OF BREAST: ICD-10-CM

## 2020-07-15 DIAGNOSIS — M46.1 SACROILIITIS (HCC): ICD-10-CM

## 2020-07-15 DIAGNOSIS — Z12.39 BREAST CANCER SCREENING: ICD-10-CM

## 2020-07-15 DIAGNOSIS — E11.69 TYPE 2 DIABETES MELLITUS WITH OTHER SPECIFIED COMPLICATION, WITHOUT LONG-TERM CURRENT USE OF INSULIN (HCC): ICD-10-CM

## 2020-07-15 DIAGNOSIS — Z00.00 MEDICARE ANNUAL WELLNESS VISIT, SUBSEQUENT: Primary | ICD-10-CM

## 2020-07-15 DIAGNOSIS — R68.89 FORGETFULNESS: ICD-10-CM

## 2020-07-15 PROCEDURE — 99212 OFFICE O/P EST SF 10 MIN: CPT | Performed by: FAMILY MEDICINE

## 2020-07-15 PROCEDURE — G0439 PPPS, SUBSEQ VISIT: HCPCS | Performed by: FAMILY MEDICINE

## 2020-07-15 RX ORDER — ASPIRIN 81 MG/1
TABLET, CHEWABLE ORAL DAILY
COMMUNITY
End: 2021-04-13

## 2020-07-15 RX ORDER — DONEPEZIL HYDROCHLORIDE 5 MG/1
5 TABLET, FILM COATED ORAL NIGHTLY
Qty: 30 TABLET | Refills: 1 | Status: SHIPPED | OUTPATIENT
Start: 2020-07-15 | End: 2020-10-05

## 2020-07-15 RX ORDER — MEMANTINE HYDROCHLORIDE 5 MG/1
5 TABLET ORAL 2 TIMES DAILY
Qty: 60 TABLET | Refills: 1 | Status: SHIPPED | OUTPATIENT
Start: 2020-07-15 | End: 2020-10-05

## 2020-07-15 NOTE — PATIENT INSTRUCTIONS
Follow up in 1 month for removing your painful skin lesion on arm and neck. Force yourself to walk around your property. Take the new memory medications. Get Mammogram and bone scan.     Get MRI

## 2020-07-15 NOTE — PROGRESS NOTES
HPI:   More Saldana is a 69year old female who presents for a Medicare Subsequent Annual Wellness visit (Pt already had Initial Annual Wellness). Hx of mini strokes. She notes that she recently may have had another one.  She notes she was on the ph Alda Kilgore has some abnormal functions as listed below:  She has difficulties Shopping for Groceries based on screening of functional status. Shop for groceries: Need some help   She has Vision problems based on screening of functional status.    Vision Pr of lumbar joint     DDD (degenerative disc disease), lumbosacral     Unspecified internal derangement of knee     Effusion of lower leg joint     Tear of PCL (posterior cruciate ligament) of knee     Diabetes ([address]     Enthesopathy of hip region left     O kg)  05/26/20 : 189 lb (85.7 kg)     Last Cholesterol Labs:   Lab Results   Component Value Date    CHOLEST 146 07/12/2019    HDL 46 07/12/2019    LDL 60 07/12/2019    TRIG 199 (H) 07/12/2019          Last Chemistry Labs:   Lab Results   Component Value Da mother; cad in her sister. SOCIAL HISTORY:   She  reports that she has never smoked. She has never used smokeless tobacco. She reports that she does not drink alcohol or use drugs.      REVIEW OF SYSTEMS:   Review of Systems   GENERAL: feels well otherwis History     Immunization History   Administered Date(s) Administered   • >=3 YRS FLUZONE OR FLUARIX QUAD PRESERVE FREE SINGLE DOSE (96809) FLU CLINIC 12/14/2015   • Depo-Medrol 40mg Inj 09/25/2014, 12/17/2014, 01/08/2015, 05/01/2015, 01/29/2016   • FLU VAC encouraged. - [facility] TAD 2D+3D SCREENING BILAT (CPT=77067/59034); Future    7. Hx of arterial ischemic stroke  Pt declines MRI at this time.  She agrees that if she has another episode of neurological symptoms she will get an MRI.  - MRI BRAIN, MRA BRAIN&NEC BRAIN&NECK (CPT=70551/04008/63044); Future    TIA (transient ischemic attack)  -     MRI BRAIN, MRA BRAIN&NECK (CPT=70461/61735/46810); Future    Episode of change in speech  -     MRI BRAIN, MRA BRAIN&NECK (CPT=7055/59722/86147);  Future    Forgetfulness data found.     Glaucoma Screening      Ophthalmology Visit Annually: Diabetics, FHx Glaucoma, AA>50, > 65 Data entered on: 9/1/2018   Last Dilated Eye Exam 9/1/2018       Bone Density Screening      Dexascan Every two years Last Dexa Scan:   XR DEX Medications (ACE/ARB, digoxin diuretics, anticonvulsants.)    Potassium  Annually Potassium (mmol/L)   Date Value   09/18/2019 4.2    No flowsheet data found.     Creatinine  Annually Creatinine (mg/dL)   Date Value   09/18/2019 1.09 (H)    No flowsheet d

## 2020-07-21 ENCOUNTER — PATIENT OUTREACH (OUTPATIENT)
Dept: CASE MANAGEMENT | Age: 75
End: 2020-07-21

## 2020-07-21 DIAGNOSIS — F41.9 ANXIETY: ICD-10-CM

## 2020-07-21 DIAGNOSIS — I25.10 CORONARY ARTERY DISEASE INVOLVING NATIVE CORONARY ARTERY OF NATIVE HEART WITHOUT ANGINA PECTORIS: ICD-10-CM

## 2020-07-21 DIAGNOSIS — F32.A DEPRESSIVE DISORDER: ICD-10-CM

## 2020-07-21 DIAGNOSIS — M51.37 DDD (DEGENERATIVE DISC DISEASE), LUMBOSACRAL: ICD-10-CM

## 2020-07-21 DIAGNOSIS — I10 ESSENTIAL HYPERTENSION: ICD-10-CM

## 2020-07-21 DIAGNOSIS — E66.9 DIABETES MELLITUS TYPE 2 IN OBESE (HCC): ICD-10-CM

## 2020-07-21 DIAGNOSIS — E11.69 DIABETES MELLITUS TYPE 2 IN OBESE (HCC): ICD-10-CM

## 2020-07-21 DIAGNOSIS — E78.2 HYPERLIPIDEMIA, MIXED: ICD-10-CM

## 2020-07-21 NOTE — PROGRESS NOTES
7/21/2020  Spoke to Rhett for CCM. Updates to patient care team/ comments: Added Dr. Valeria Grewal to care team.  Patient reported changes in medications: No changes with patient. Med Adherence  Comment: Taking as prescribed.     Jena Mendoza 112 PM Rob Verde, PhD Karlynn Kayser   7/22/2020  9:30 AM BK MR RM1 (1.5T) BK MRI [address]   7/29/2020  8:00 AM BK DEBORAH RM1 [address]   7/29/2020  8:45 AM REF BK RD REF EMG14 Ref [address]   8/12/2020  8:15 AM Waqas Callaway, DO EMG 13 EMG 95

## 2020-07-22 ENCOUNTER — HOSPITAL ENCOUNTER (OUTPATIENT)
Dept: MRI IMAGING | Age: 75
Discharge: HOME | End: 2020-07-22
Attending: FAMILY MEDICINE
Payer: MEDICARE

## 2020-07-22 DIAGNOSIS — G45.9 TIA (TRANSIENT ISCHEMIC ATTACK): ICD-10-CM

## 2020-07-22 DIAGNOSIS — R47.89 EPISODE OF CHANGE IN SPEECH: ICD-10-CM

## 2020-07-22 DIAGNOSIS — Z86.73 HX OF ARTERIAL ISCHEMIC STROKE: ICD-10-CM

## 2020-07-22 DIAGNOSIS — Z00.00 MEDICARE ANNUAL WELLNESS VISIT, SUBSEQUENT: ICD-10-CM

## 2020-07-22 PROCEDURE — 70544 MR ANGIOGRAPHY HEAD W/O DYE: CPT | Performed by: FAMILY MEDICINE

## 2020-07-22 PROCEDURE — 70551 MRI BRAIN STEM W/O DYE: CPT | Performed by: FAMILY MEDICINE

## 2020-07-22 PROCEDURE — 70547 MR ANGIOGRAPHY NECK W/O DYE: CPT | Performed by: FAMILY MEDICINE

## 2020-07-29 ENCOUNTER — LAB ENCOUNTER (OUTPATIENT)
Dept: LAB | Age: 75
End: 2020-07-29
Attending: FAMILY MEDICINE
Payer: MEDICARE

## 2020-07-29 ENCOUNTER — HOSPITAL ENCOUNTER (OUTPATIENT)
Dept: MAMMOGRAPHY | Age: 75
Discharge: HOME | End: 2020-07-29
Attending: FAMILY MEDICINE
Payer: MEDICARE

## 2020-07-29 DIAGNOSIS — E11.69 TYPE 2 DIABETES MELLITUS WITH OTHER SPECIFIED COMPLICATION, WITHOUT LONG-TERM CURRENT USE OF INSULIN (HCC): ICD-10-CM

## 2020-07-29 DIAGNOSIS — Z12.39 BREAST CANCER SCREENING: ICD-10-CM

## 2020-07-29 DIAGNOSIS — Z12.31 ENCOUNTER FOR SCREENING MAMMOGRAM FOR MALIGNANT NEOPLASM OF BREAST: ICD-10-CM

## 2020-07-29 DIAGNOSIS — E11.9 CONTROLLED TYPE 2 DIABETES MELLITUS WITHOUT COMPLICATION, WITHOUT LONG-TERM CURRENT USE OF INSULIN (HCC): ICD-10-CM

## 2020-07-29 DIAGNOSIS — Z00.00 MEDICARE ANNUAL WELLNESS VISIT, SUBSEQUENT: ICD-10-CM

## 2020-07-29 LAB
ALBUMIN SERPL-MCNC: 3.7 G/DL (ref 3.4–5)
ALBUMIN/GLOB SERPL: 0.9 {RATIO} (ref 1–2)
ALP LIVER SERPL-CCNC: 97 U/L (ref 55–142)
ALT SERPL-CCNC: 19 U/L (ref 13–56)
ANION GAP SERPL CALC-SCNC: 5 MMOL/L (ref 0–18)
AST SERPL-CCNC: 17 U/L (ref 15–37)
BASOPHILS # BLD AUTO: 0.09 X10(3) UL (ref 0–0.2)
BASOPHILS NFR BLD AUTO: 0.9 %
BILIRUB SERPL-MCNC: 0.4 MG/DL (ref 0.1–2)
BUN BLD-MCNC: 10 MG/DL (ref 7–18)
BUN/CREAT SERPL: 9.4 (ref 10–20)
CALCIUM BLD-MCNC: 9 MG/DL (ref 8.5–10.1)
CHLORIDE SERPL-SCNC: 106 MMOL/L (ref 98–112)
CHOLEST SMN-MCNC: 163 MG/DL (ref ?–200)
CO2 SERPL-SCNC: 26 MMOL/L (ref 21–32)
CREAT BLD-MCNC: 1.06 MG/DL (ref 0.55–1.02)
DEPRECATED RDW RBC AUTO: 53.2 FL (ref 35.1–46.3)
EOSINOPHIL # BLD AUTO: 0.25 X10(3) UL (ref 0–0.7)
EOSINOPHIL NFR BLD AUTO: 2.5 %
ERYTHROCYTE [DISTWIDTH] IN BLOOD BY AUTOMATED COUNT: 15.8 % (ref 11–15)
EST. AVERAGE GLUCOSE BLD GHB EST-MCNC: 169 MG/DL (ref 68–126)
GLOBULIN PLAS-MCNC: 3.9 G/DL (ref 2.8–4.4)
GLUCOSE BLD-MCNC: 159 MG/DL (ref 70–99)
HBA1C MFR BLD HPLC: 7.5 % (ref ?–5.7)
HCT VFR BLD AUTO: 42.9 % (ref 35–48)
HDLC SERPL-MCNC: 50 MG/DL (ref 40–59)
HGB BLD-MCNC: 13.2 G/DL (ref 12–16)
IMM GRANULOCYTES # BLD AUTO: 0.03 X10(3) UL (ref 0–1)
IMM GRANULOCYTES NFR BLD: 0.3 %
LDLC SERPL CALC-MCNC: 80 MG/DL (ref ?–100)
LYMPHOCYTES # BLD AUTO: 3.01 X10(3) UL (ref 1–4)
LYMPHOCYTES NFR BLD AUTO: 30.5 %
M PROTEIN MFR SERPL ELPH: 7.6 G/DL (ref 6.4–8.2)
MCH RBC QN AUTO: 28.3 PG (ref 26–34)
MCHC RBC AUTO-ENTMCNC: 30.8 G/DL (ref 31–37)
MCV RBC AUTO: 92.1 FL (ref 80–100)
MONOCYTES # BLD AUTO: 0.48 X10(3) UL (ref 0.1–1)
MONOCYTES NFR BLD AUTO: 4.9 %
NEUTROPHILS # BLD AUTO: 6 X10 (3) UL (ref 1.5–7.7)
NEUTROPHILS # BLD AUTO: 6 X10(3) UL (ref 1.5–7.7)
NEUTROPHILS NFR BLD AUTO: 60.9 %
NONHDLC SERPL-MCNC: 113 MG/DL (ref ?–130)
OSMOLALITY SERPL CALC.SUM OF ELEC: 286 MOSM/KG (ref 275–295)
PATIENT FASTING Y/N/NP: YES
PATIENT FASTING Y/N/NP: YES
PLATELET # BLD AUTO: 417 10(3)UL (ref 150–450)
POTASSIUM SERPL-SCNC: 4.7 MMOL/L (ref 3.5–5.1)
RBC # BLD AUTO: 4.66 X10(6)UL (ref 3.8–5.3)
SODIUM SERPL-SCNC: 137 MMOL/L (ref 136–145)
TRIGL SERPL-MCNC: 166 MG/DL (ref 30–149)
VLDLC SERPL CALC-MCNC: 33 MG/DL (ref 0–30)
WBC # BLD AUTO: 9.9 X10(3) UL (ref 4–11)

## 2020-07-29 PROCEDURE — 83036 HEMOGLOBIN GLYCOSYLATED A1C: CPT

## 2020-07-29 PROCEDURE — 36415 COLL VENOUS BLD VENIPUNCTURE: CPT

## 2020-07-29 PROCEDURE — 80053 COMPREHEN METABOLIC PANEL: CPT

## 2020-07-29 PROCEDURE — 85025 COMPLETE CBC W/AUTO DIFF WBC: CPT

## 2020-07-29 PROCEDURE — 77067 SCR MAMMO BI INCL CAD: CPT | Performed by: FAMILY MEDICINE

## 2020-07-29 PROCEDURE — 80061 LIPID PANEL: CPT

## 2020-07-29 PROCEDURE — 77063 BREAST TOMOSYNTHESIS BI: CPT | Performed by: FAMILY MEDICINE

## 2020-07-31 PROCEDURE — 99490 CHRNC CARE MGMT STAFF 1ST 20: CPT

## 2020-08-12 ENCOUNTER — OFFICE VISIT (OUTPATIENT)
Dept: FAMILY MEDICINE CLINIC | Facility: CLINIC | Age: 75
End: 2020-08-12
Payer: MEDICARE

## 2020-08-12 VITALS
HEIGHT: 62.5 IN | SYSTOLIC BLOOD PRESSURE: 116 MMHG | BODY MASS INDEX: 35.35 KG/M2 | DIASTOLIC BLOOD PRESSURE: 72 MMHG | HEART RATE: 96 BPM | OXYGEN SATURATION: 98 % | TEMPERATURE: 98 F | RESPIRATION RATE: 18 BRPM | WEIGHT: 197 LBS

## 2020-08-12 DIAGNOSIS — D22.9 ATYPICAL MOLE: Primary | ICD-10-CM

## 2020-08-12 PROCEDURE — 88305 TISSUE EXAM BY PATHOLOGIST: CPT | Performed by: FAMILY MEDICINE

## 2020-08-12 PROCEDURE — 11401 EXC TR-EXT B9+MARG 0.6-1 CM: CPT | Performed by: FAMILY MEDICINE

## 2020-08-13 NOTE — PROGRESS NOTES
HPI:    Patient ID: Edda De Jesus is a 69year old female. Painful mole on right arm x 2 months. Not improving. Wants to have it removed as it keeps bumping into things and causing her pain.     Also mole on right neck that is black that is catchin • FREESTYLE LANCETS Does not apply Misc TEST ONCE DAILY 200 each 2   • Glucose Blood (FREESTYLE LITE TEST) In Vitro Strip TEST ONCE DAILY 200 strip 2   • Magnesium Hydroxide (MILK OF MAGNESIA OR) Take 15 mL by mouth daily as needed.        • Iron Combi

## 2020-08-17 ENCOUNTER — TELEPHONE (OUTPATIENT)
Dept: FAMILY MEDICINE CLINIC | Facility: CLINIC | Age: 75
End: 2020-08-17

## 2020-08-17 NOTE — TELEPHONE ENCOUNTER
Patient calling stating that Dr. Louis Elaine called her and referred her to Dr. Norman Steen office.     She spoke with them and Bill Lerma,  is requesting lab result to be faxed to them ASAP so they can determine if she needs to come in for more surgery on

## 2020-08-19 ENCOUNTER — PATIENT OUTREACH (OUTPATIENT)
Dept: CASE MANAGEMENT | Age: 75
End: 2020-08-19

## 2020-08-19 DIAGNOSIS — I10 ESSENTIAL HYPERTENSION: ICD-10-CM

## 2020-08-19 DIAGNOSIS — E78.2 HYPERLIPIDEMIA, MIXED: ICD-10-CM

## 2020-08-19 DIAGNOSIS — F33.1 MAJOR DEPRESSIVE DISORDER, RECURRENT EPISODE, MODERATE (HCC): ICD-10-CM

## 2020-08-19 DIAGNOSIS — F41.1 GAD (GENERALIZED ANXIETY DISORDER): ICD-10-CM

## 2020-08-19 DIAGNOSIS — E66.9 DIABETES MELLITUS TYPE 2 IN OBESE (HCC): ICD-10-CM

## 2020-08-19 DIAGNOSIS — E11.69 DIABETES MELLITUS TYPE 2 IN OBESE (HCC): ICD-10-CM

## 2020-08-19 DIAGNOSIS — I25.10 CORONARY ARTERY DISEASE INVOLVING NATIVE CORONARY ARTERY OF NATIVE HEART WITHOUT ANGINA PECTORIS: ICD-10-CM

## 2020-08-19 DIAGNOSIS — M51.37 DDD (DEGENERATIVE DISC DISEASE), LUMBOSACRAL: ICD-10-CM

## 2020-08-19 NOTE — PROGRESS NOTES
8/19/2020  Spoke to Rhett for CCM.       Updates to patient care team/ comments: No change per patient   Patient reported changes in medications: No per per patient  Med Adherence  Comment: Taking as prescribed    Health Maintenance:   Diabetes Care Dilated barriers: N/A    • Patient Reported New Barriers And Concerns: Patient states she is to return to her dermatologist to make sure all the skin cancer was removed and have the spot on her upper left arm looked at.                     - Plan for overcoming all

## 2020-08-21 ENCOUNTER — OFFICE VISIT (OUTPATIENT)
Dept: FAMILY MEDICINE CLINIC | Facility: CLINIC | Age: 75
End: 2020-08-21
Payer: MEDICARE

## 2020-08-21 VITALS
RESPIRATION RATE: 18 BRPM | HEIGHT: 62.5 IN | DIASTOLIC BLOOD PRESSURE: 70 MMHG | OXYGEN SATURATION: 96 % | SYSTOLIC BLOOD PRESSURE: 116 MMHG | TEMPERATURE: 97 F | BODY MASS INDEX: 35.35 KG/M2 | WEIGHT: 197 LBS | HEART RATE: 96 BPM

## 2020-08-21 DIAGNOSIS — Z48.02 ENCOUNTER FOR REMOVAL OF SUTURES: ICD-10-CM

## 2020-08-21 DIAGNOSIS — C44.92 SQUAMOUS CELL SKIN CANCER: Primary | ICD-10-CM

## 2020-08-21 PROCEDURE — 99024 POSTOP FOLLOW-UP VISIT: CPT | Performed by: FAMILY MEDICINE

## 2020-08-21 NOTE — PROGRESS NOTES
HPI:    Patient ID: Carmella Desai is a 69year old female.    + squamous cell skin cancer. Margins appear clear but are only marginally clear. Sending pt to dermatologist for further removal.  Pt quesntions answered and she was reassured.     Suture R total) by mouth 3 (three) times daily as needed. 30 tablet 0   • dilTIAZem HCl ER Coated Beads (CARTIA XT) 120 MG Oral Capsule SR 24 Hr Take 120 mg by mouth daily. • alendronate 70 MG Oral Tab Take 1 tablet (70 mg total) by mouth once a week.  12 tablet

## 2020-08-22 DIAGNOSIS — R42 DIZZY: ICD-10-CM

## 2020-08-22 DIAGNOSIS — I63.9 ISCHEMIC STROKE (HCC): ICD-10-CM

## 2020-08-22 DIAGNOSIS — R53.1 GENERALIZED WEAKNESS: ICD-10-CM

## 2020-08-24 RX ORDER — ATORVASTATIN CALCIUM 40 MG/1
TABLET, FILM COATED ORAL
Qty: 90 TABLET | Refills: 0 | Status: SHIPPED | OUTPATIENT
Start: 2020-08-24 | End: 2020-11-16

## 2020-08-31 PROCEDURE — 99490 CHRNC CARE MGMT STAFF 1ST 20: CPT

## 2020-09-15 ENCOUNTER — PATIENT OUTREACH (OUTPATIENT)
Dept: CASE MANAGEMENT | Age: 75
End: 2020-09-15

## 2020-09-15 NOTE — PROGRESS NOTES
Called patient and left a message for patient to call back when they can. Reviewed patient chart.  Left contact my contact number [phone]        Time Spent This Encounter Total: 5  min medical record review  Monthly Minute Total including today: 5 lulu

## 2020-09-23 ENCOUNTER — OFFICE VISIT (OUTPATIENT)
Dept: CARDIOLOGY | Age: 75
End: 2020-09-23

## 2020-09-23 VITALS
DIASTOLIC BLOOD PRESSURE: 68 MMHG | SYSTOLIC BLOOD PRESSURE: 124 MMHG | BODY MASS INDEX: 33.63 KG/M2 | WEIGHT: 197 LBS | HEIGHT: 64 IN | HEART RATE: 76 BPM

## 2020-09-23 DIAGNOSIS — E78.2 HYPERLIPIDEMIA, MIXED: Primary | ICD-10-CM

## 2020-09-23 DIAGNOSIS — Z86.73 OLD CEREBROVASCULAR ACCIDENT (CVA) WITHOUT LATE EFFECT: ICD-10-CM

## 2020-09-23 DIAGNOSIS — I25.10 CORONARY ARTERY DISEASE INVOLVING NATIVE CORONARY ARTERY OF NATIVE HEART WITHOUT ANGINA PECTORIS: ICD-10-CM

## 2020-09-23 DIAGNOSIS — E11.9 TYPE 2 DIABETES MELLITUS WITHOUT COMPLICATION, WITHOUT LONG-TERM CURRENT USE OF INSULIN (CMD): ICD-10-CM

## 2020-09-23 PROCEDURE — 99214 OFFICE O/P EST MOD 30 MIN: CPT | Performed by: INTERNAL MEDICINE

## 2020-09-23 ASSESSMENT — ENCOUNTER SYMPTOMS
WEIGHT LOSS: 0
FEVER: 0
COUGH: 0
HEMOPTYSIS: 0
HEMATOCHEZIA: 0
BRUISES/BLEEDS EASILY: 0
CHILLS: 0
ALLERGIC/IMMUNOLOGIC COMMENTS: NO NEW FOOD ALLERGIES
SUSPICIOUS LESIONS: 0
WEIGHT GAIN: 0

## 2020-09-23 ASSESSMENT — PATIENT HEALTH QUESTIONNAIRE - PHQ9
CLINICAL INTERPRETATION OF PHQ9 SCORE: NO FURTHER SCREENING NEEDED
1. LITTLE INTEREST OR PLEASURE IN DOING THINGS: NOT AT ALL
SUM OF ALL RESPONSES TO PHQ9 QUESTIONS 1 AND 2: 0
SUM OF ALL RESPONSES TO PHQ9 QUESTIONS 1 AND 2: 0
CLINICAL INTERPRETATION OF PHQ2 SCORE: NO FURTHER SCREENING NEEDED
2. FEELING DOWN, DEPRESSED OR HOPELESS: NOT AT ALL

## 2020-10-05 DIAGNOSIS — M81.0 AGE-RELATED OSTEOPOROSIS WITHOUT CURRENT PATHOLOGICAL FRACTURE: ICD-10-CM

## 2020-10-05 RX ORDER — ALENDRONATE SODIUM 70 MG/1
TABLET ORAL
Qty: 12 TABLET | Refills: 0 | Status: SHIPPED | OUTPATIENT
Start: 2020-10-05 | End: 2021-04-13

## 2020-10-09 ENCOUNTER — PATIENT OUTREACH (OUTPATIENT)
Dept: CASE MANAGEMENT | Age: 75
End: 2020-10-09

## 2020-10-09 DIAGNOSIS — F41.9 ANXIETY: ICD-10-CM

## 2020-10-09 DIAGNOSIS — F41.1 GAD (GENERALIZED ANXIETY DISORDER): ICD-10-CM

## 2020-10-09 DIAGNOSIS — E78.2 HYPERLIPIDEMIA, MIXED: ICD-10-CM

## 2020-10-09 DIAGNOSIS — F60.3 BORDERLINE PERSONALITY DISORDER (HCC): ICD-10-CM

## 2020-10-09 DIAGNOSIS — F32.A DEPRESSIVE DISORDER: ICD-10-CM

## 2020-10-09 DIAGNOSIS — I10 ESSENTIAL HYPERTENSION: ICD-10-CM

## 2020-10-09 DIAGNOSIS — E11.69 DIABETES MELLITUS TYPE 2 IN OBESE (HCC): ICD-10-CM

## 2020-10-09 DIAGNOSIS — M51.37 DDD (DEGENERATIVE DISC DISEASE), LUMBOSACRAL: ICD-10-CM

## 2020-10-09 DIAGNOSIS — E66.9 DIABETES MELLITUS TYPE 2 IN OBESE (HCC): ICD-10-CM

## 2020-10-09 NOTE — PROGRESS NOTES
10/9/2020  Spoke to Rhett for CCM.       Updates to patient care team/ comments: No changes per patient  Patient reported changes in medications: No changes per patient  Med Adherence  Comment: Taking as prescribed    Health Maintenance: Pt aware  Diabetes [place] [id number]   10/30/2020 10:30 AM Sully Goldmann [facility] [place] [address]   11/6/2020 10:30 AM Bertell Goldmann [facility] [place] [id number]   11/13/2020 10:30 AM Elisabeth Rodriguez [facility] 12             Patient agrees to goal action plan.

## 2020-10-27 NOTE — OCCUPATIONAL THERAPY NOTE
Everything with the heart looks good.  We would like to see you back next summer with an echocardiogram.    The vascular surgeons are planning to see you back next summer to follow up on your carotid arteries.   OCCUPATIONAL THERAPY EVALUATION - INPATIENT     Room Number: 382/382-A  Evaluation Date: 7/18/2017  Type of Evaluation: Initial  Presenting Problem: s/p L4-L5, L5-S1 decompression    Physician Order: IP Consult to Occupational Therapy  Reason for Therapy: retired     Drives: Yes  Patient Regularly Uses: Glasses    Prior Level of [place]: Pt reports independence with all ADLs, IADls and functional transfers    SUBJECTIVE  \"I think its because I used to play tennis that I am doing so well\"    Patient s ASSESSMENT  AM-PAC ‘6-Clicks’ Inpatient Daily Activity Short Form  How much help from another person does the patient currently need…  -   Putting on and taking off regular lower body clothing?: A Little  -   Bathing (including washing, rinsing, drying)?[age] year old female admitted 7/17/2017 and is s/p L4-L5, L5-S1, decompression and instrumental fusion .   In this OT evaluation patient presents with the following impairments: decreased awareness into errors, anxiety, decrease knowledge of compensatory strateg

## 2020-10-31 PROCEDURE — 99490 CHRNC CARE MGMT STAFF 1ST 20: CPT

## 2020-11-04 ENCOUNTER — TELEPHONE (OUTPATIENT)
Dept: CARDIOLOGY | Age: 75
End: 2020-11-04

## 2020-11-10 ENCOUNTER — PATIENT OUTREACH (OUTPATIENT)
Dept: CASE MANAGEMENT | Age: 75
End: 2020-11-10

## 2020-11-10 DIAGNOSIS — M81.0 AGE-RELATED OSTEOPOROSIS WITHOUT CURRENT PATHOLOGICAL FRACTURE: ICD-10-CM

## 2020-11-10 DIAGNOSIS — E11.69 DIABETES MELLITUS TYPE 2 IN OBESE (HCC): ICD-10-CM

## 2020-11-10 DIAGNOSIS — I10 ESSENTIAL HYPERTENSION: ICD-10-CM

## 2020-11-10 DIAGNOSIS — F41.9 ANXIETY: ICD-10-CM

## 2020-11-10 DIAGNOSIS — E66.9 DIABETES MELLITUS TYPE 2 IN OBESE (HCC): ICD-10-CM

## 2020-11-10 DIAGNOSIS — F41.1 GAD (GENERALIZED ANXIETY DISORDER): ICD-10-CM

## 2020-11-10 DIAGNOSIS — M51.37 DDD (DEGENERATIVE DISC DISEASE), LUMBOSACRAL: ICD-10-CM

## 2020-11-10 DIAGNOSIS — E78.2 HYPERLIPIDEMIA, MIXED: ICD-10-CM

## 2020-11-10 DIAGNOSIS — F32.A DEPRESSIVE DISORDER: ICD-10-CM

## 2020-11-10 DIAGNOSIS — I25.10 CORONARY ARTERY DISEASE INVOLVING NATIVE CORONARY ARTERY OF NATIVE HEART WITHOUT ANGINA PECTORIS: ICD-10-CM

## 2020-11-10 NOTE — PROGRESS NOTES
11/10/2020  Spoke to [facility] for CCM.       Updates to patient care team/ comments: No changes per patient  Patient reported changes in medications: No changes per patient  Med Adherence  Comment: Taking as prescribed    Health Maintenance: Pt aware  Diabetes Parker eD Leon [facility] [place] [facility] 12   11/23/2020 12:00 PM [facility]   11/30/2020 12:00 PM [facility]   12/4/2020 10:30 AM Kanchan Newby [facility] [place] [facility] 12   12/11/2020 10:30 AM Aubree Rodriguez

## 2020-11-14 DIAGNOSIS — R42 DIZZY: ICD-10-CM

## 2020-11-14 DIAGNOSIS — R53.1 GENERALIZED WEAKNESS: ICD-10-CM

## 2020-11-14 DIAGNOSIS — I63.9 ISCHEMIC STROKE (HCC): ICD-10-CM

## 2020-11-16 RX ORDER — OMEPRAZOLE 20 MG/1
CAPSULE, DELAYED RELEASE ORAL
Qty: 90 CAPSULE | Refills: 0 | OUTPATIENT
Start: 2020-11-16

## 2020-11-16 RX ORDER — ATORVASTATIN CALCIUM 40 MG/1
TABLET, FILM COATED ORAL
Qty: 90 TABLET | Refills: 0 | Status: SHIPPED | OUTPATIENT
Start: 2020-11-16 | End: 2021-02-22

## 2020-11-18 ENCOUNTER — LAB ENCOUNTER (OUTPATIENT)
Dept: LAB | Facility: HOSPITAL | Age: 75
End: 2020-11-18
Attending: NURSE PRACTITIONER
Payer: MEDICARE

## 2020-11-18 ENCOUNTER — HOSPITAL ENCOUNTER (OUTPATIENT)
Dept: GENERAL RADIOLOGY | Facility: HOSPITAL | Age: 75
Discharge: HOME | End: 2020-11-18
Attending: NURSE PRACTITIONER
Payer: MEDICARE

## 2020-11-18 ENCOUNTER — APPOINTMENT (OUTPATIENT)
Dept: CV DIAGNOSTICS | Facility: HOSPITAL | Age: 75
End: 2020-11-18
Attending: NURSE PRACTITIONER
Payer: MEDICARE

## 2020-11-18 DIAGNOSIS — Z01.818 PREOPERATIVE EVALUATION TO RULE OUT SURGICAL CONTRAINDICATION: ICD-10-CM

## 2020-11-18 DIAGNOSIS — E78.00 HIGH CHOLESTEROL: ICD-10-CM

## 2020-11-18 DIAGNOSIS — E11.9 CONTROLLED TYPE 2 DIABETES MELLITUS WITHOUT COMPLICATION, WITHOUT LONG-TERM CURRENT USE OF INSULIN (HCC): ICD-10-CM

## 2020-11-18 DIAGNOSIS — Z79.899 OTHER LONG TERM (CURRENT) DRUG THERAPY: ICD-10-CM

## 2020-11-18 PROCEDURE — 83036 HEMOGLOBIN GLYCOSYLATED A1C: CPT

## 2020-11-18 PROCEDURE — 72110 X-RAY EXAM L-2 SPINE 4/>VWS: CPT | Performed by: NURSE PRACTITIONER

## 2020-11-18 PROCEDURE — 93005 ELECTROCARDIOGRAM TRACING: CPT

## 2020-11-18 PROCEDURE — 85025 COMPLETE CBC W/AUTO DIFF WBC: CPT

## 2020-11-18 PROCEDURE — 80053 COMPREHEN METABOLIC PANEL: CPT

## 2020-11-18 PROCEDURE — 36415 COLL VENOUS BLD VENIPUNCTURE: CPT

## 2020-11-18 PROCEDURE — 81003 URINALYSIS AUTO W/O SCOPE: CPT

## 2020-11-18 PROCEDURE — 80061 LIPID PANEL: CPT

## 2020-11-18 PROCEDURE — 71046 X-RAY EXAM CHEST 2 VIEWS: CPT | Performed by: NURSE PRACTITIONER

## 2020-11-18 PROCEDURE — 93010 ELECTROCARDIOGRAM REPORT: CPT | Performed by: INTERNAL MEDICINE

## 2020-11-18 PROCEDURE — 80307 DRUG TEST PRSMV CHEM ANLYZR: CPT

## 2020-11-19 ENCOUNTER — APPOINTMENT (OUTPATIENT)
Dept: LAB | Facility: HOSPITAL | Age: 75
End: 2020-11-19
Attending: NURSE PRACTITIONER
Payer: MEDICARE

## 2020-11-19 DIAGNOSIS — Z01.818 PREPROCEDURAL EXAMINATION: ICD-10-CM

## 2020-11-20 ENCOUNTER — OFFICE VISIT (OUTPATIENT)
Dept: FAMILY MEDICINE CLINIC | Facility: CLINIC | Age: 75
End: 2020-11-20
Payer: MEDICARE

## 2020-11-20 VITALS
DIASTOLIC BLOOD PRESSURE: 82 MMHG | HEIGHT: 62.5 IN | OXYGEN SATURATION: 98 % | SYSTOLIC BLOOD PRESSURE: 126 MMHG | HEART RATE: 88 BPM | BODY MASS INDEX: 35.35 KG/M2 | WEIGHT: 197 LBS | TEMPERATURE: 98 F | RESPIRATION RATE: 20 BRPM

## 2020-11-20 DIAGNOSIS — E78.00 HIGH CHOLESTEROL: ICD-10-CM

## 2020-11-20 DIAGNOSIS — E11.9 CONTROLLED TYPE 2 DIABETES MELLITUS WITHOUT COMPLICATION, WITHOUT LONG-TERM CURRENT USE OF INSULIN (HCC): Primary | ICD-10-CM

## 2020-11-20 PROCEDURE — 99213 OFFICE O/P EST LOW 20 MIN: CPT | Performed by: FAMILY MEDICINE

## 2020-11-23 ENCOUNTER — TELEPHONE (OUTPATIENT)
Dept: FAMILY MEDICINE CLINIC | Facility: CLINIC | Age: 75
End: 2020-11-23

## 2020-11-23 DIAGNOSIS — E11.9 CONTROLLED TYPE 2 DIABETES MELLITUS WITHOUT COMPLICATION, WITHOUT LONG-TERM CURRENT USE OF INSULIN (HCC): Primary | ICD-10-CM

## 2020-11-23 RX ORDER — SEMAGLUTIDE 1.34 MG/ML
0.25 INJECTION, SOLUTION SUBCUTANEOUS WEEKLY
Qty: 1 PEN | Refills: 3 | Status: SHIPPED | OUTPATIENT
Start: 2020-11-23 | End: 2020-11-24

## 2020-11-23 NOTE — TELEPHONE ENCOUNTER
We can try ozempic 0.25mg weekly instead to see if it gets covered. Otherwise does she want to try goodrx. Please if she gets ozempic then she can come for nurse visit to go over the injection and how to do it to make sure she does it right.

## 2020-11-23 NOTE — TELEPHONE ENCOUNTER
Patient was calling in regards to a Januvia script that was sent in. She said insurance is not covering and that she cant pay $1500 for a script. Please advise.

## 2020-11-23 NOTE — TELEPHONE ENCOUNTER
Pt does not want to do the injections with ozempic. She would like to try to work on her diet and cut back on Pop. She says she drinks a lot of pop and eats sugar. Okay to watch diet and recheck in 3 months? Or follow up appt to discuss?

## 2020-11-24 NOTE — TELEPHONE ENCOUNTER
Patient notified, verbalized understanding. Med rec updated. Patient states she will improve her diet and increase exercise. a1c ordered.

## 2020-11-25 ENCOUNTER — ANESTHESIA (OUTPATIENT)
Dept: POSTOP/PACU | Facility: HOSPITAL | Age: 75
End: 2020-11-25
Payer: MEDICARE

## 2020-11-25 ENCOUNTER — HOSPITAL ENCOUNTER (OUTPATIENT)
Dept: POSTOP/PACU | Facility: HOSPITAL | Age: 75
Discharge: HOME | End: 2020-11-25
Attending: Other
Payer: MEDICARE

## 2020-11-25 ENCOUNTER — ANESTHESIA EVENT (OUTPATIENT)
Dept: POSTOP/PACU | Facility: HOSPITAL | Age: 75
End: 2020-11-25
Payer: MEDICARE

## 2020-11-25 VITALS
TEMPERATURE: 99 F | RESPIRATION RATE: 17 BRPM | BODY MASS INDEX: 33.63 KG/M2 | WEIGHT: 197 LBS | SYSTOLIC BLOOD PRESSURE: 144 MMHG | DIASTOLIC BLOOD PRESSURE: 77 MMHG | HEART RATE: 77 BPM | OXYGEN SATURATION: 98 % | HEIGHT: 64 IN

## 2020-11-25 DIAGNOSIS — F33.2 MDD (MAJOR DEPRESSIVE DISORDER), RECURRENT SEVERE, WITHOUT PSYCHOSIS (HCC): ICD-10-CM

## 2020-11-25 RX ORDER — CAFFEINE AND SODIUM BENZOATE 125 MG/ML
250 INJECTION, SOLUTION INTRAMUSCULAR; INTRAVENOUS ONCE
Status: COMPLETED | OUTPATIENT
Start: 2020-11-25 | End: 2020-11-25

## 2020-11-25 RX ORDER — NALOXONE HYDROCHLORIDE 0.4 MG/ML
80 INJECTION, SOLUTION INTRAMUSCULAR; INTRAVENOUS; SUBCUTANEOUS AS NEEDED
Status: ACTIVE | OUTPATIENT
Start: 2020-11-25 | End: 2020-11-25

## 2020-11-25 RX ORDER — GLYCOPYRROLATE 0.2 MG/ML
INJECTION, SOLUTION INTRAMUSCULAR; INTRAVENOUS
Status: COMPLETED
Start: 2020-11-25 | End: 2020-11-25

## 2020-11-25 RX ORDER — KETOROLAC TROMETHAMINE 30 MG/ML
15 INJECTION, SOLUTION INTRAMUSCULAR; INTRAVENOUS EVERY 6 HOURS PRN
Status: DISCONTINUED | OUTPATIENT
Start: 2020-11-25 | End: 2020-11-27

## 2020-11-25 RX ORDER — SODIUM CHLORIDE, SODIUM LACTATE, POTASSIUM CHLORIDE, CALCIUM CHLORIDE 600; 310; 30; 20 MG/100ML; MG/100ML; MG/100ML; MG/100ML
INJECTION, SOLUTION INTRAVENOUS CONTINUOUS
Status: DISCONTINUED | OUTPATIENT
Start: 2020-11-25 | End: 2020-11-27

## 2020-11-25 RX ORDER — ONDANSETRON 2 MG/ML
4 INJECTION INTRAMUSCULAR; INTRAVENOUS ONCE
Status: COMPLETED | OUTPATIENT
Start: 2020-11-25 | End: 2020-11-25

## 2020-11-25 RX ORDER — ONDANSETRON 2 MG/ML
INJECTION INTRAMUSCULAR; INTRAVENOUS
Status: COMPLETED
Start: 2020-11-25 | End: 2020-11-25

## 2020-11-25 RX ORDER — CAFFEINE AND SODIUM BENZOATE 125 MG/ML
INJECTION, SOLUTION INTRAMUSCULAR; INTRAVENOUS
Status: COMPLETED
Start: 2020-11-25 | End: 2020-11-25

## 2020-11-25 RX ORDER — KETOROLAC TROMETHAMINE 30 MG/ML
INJECTION, SOLUTION INTRAMUSCULAR; INTRAVENOUS
Status: COMPLETED
Start: 2020-11-25 | End: 2020-11-25

## 2020-11-25 RX ORDER — LABETALOL HYDROCHLORIDE 5 MG/ML
5 INJECTION, SOLUTION INTRAVENOUS EVERY 5 MIN PRN
Status: ACTIVE | OUTPATIENT
Start: 2020-11-25 | End: 2020-11-25

## 2020-11-25 RX ORDER — KETOROLAC TROMETHAMINE 30 MG/ML
30 INJECTION, SOLUTION INTRAMUSCULAR; INTRAVENOUS EVERY 6 HOURS PRN
Status: DISCONTINUED | OUTPATIENT
Start: 2020-11-25 | End: 2020-11-27

## 2020-11-25 RX ORDER — LABETALOL HYDROCHLORIDE 5 MG/ML
INJECTION, SOLUTION INTRAVENOUS AS NEEDED
Status: DISCONTINUED | OUTPATIENT
Start: 2020-11-25 | End: 2020-11-25 | Stop reason: SURG

## 2020-11-25 RX ORDER — HYDROMORPHONE HYDROCHLORIDE 1 MG/ML
0.4 INJECTION, SOLUTION INTRAMUSCULAR; INTRAVENOUS; SUBCUTANEOUS EVERY 5 MIN PRN
Status: ACTIVE | OUTPATIENT
Start: 2020-11-25 | End: 2020-11-25

## 2020-11-25 RX ORDER — KETAMINE HYDROCHLORIDE 50 MG/ML
INJECTION, SOLUTION, CONCENTRATE INTRAMUSCULAR; INTRAVENOUS AS NEEDED
Status: DISCONTINUED | OUTPATIENT
Start: 2020-11-25 | End: 2020-11-25 | Stop reason: SURG

## 2020-11-25 RX ORDER — ACETAMINOPHEN 500 MG
1000 TABLET ORAL ONCE AS NEEDED
Status: ACTIVE | OUTPATIENT
Start: 2020-11-25 | End: 2020-11-25

## 2020-11-25 RX ORDER — GLYCOPYRROLATE 0.2 MG/ML
0.1 INJECTION, SOLUTION INTRAMUSCULAR; INTRAVENOUS ONCE
Status: COMPLETED | OUTPATIENT
Start: 2020-11-25 | End: 2020-11-25

## 2020-11-25 RX ORDER — DEXTROSE MONOHYDRATE 25 G/50ML
50 INJECTION, SOLUTION INTRAVENOUS
Status: DISCONTINUED | OUTPATIENT
Start: 2020-11-25 | End: 2020-11-27

## 2020-11-25 RX ORDER — ONDANSETRON 2 MG/ML
4 INJECTION INTRAMUSCULAR; INTRAVENOUS AS NEEDED
Status: ACTIVE | OUTPATIENT
Start: 2020-11-25 | End: 2020-11-25

## 2020-11-25 RX ORDER — KETOROLAC TROMETHAMINE 30 MG/ML
15 INJECTION, SOLUTION INTRAMUSCULAR; INTRAVENOUS ONCE
Status: COMPLETED | OUTPATIENT
Start: 2020-11-25 | End: 2020-11-25

## 2020-11-25 RX ORDER — ETOMIDATE 2 MG/ML
INJECTION INTRAVENOUS AS NEEDED
Status: DISCONTINUED | OUTPATIENT
Start: 2020-11-25 | End: 2020-11-25 | Stop reason: SURG

## 2020-11-25 RX ORDER — MIDAZOLAM HYDROCHLORIDE 1 MG/ML
1 INJECTION INTRAMUSCULAR; INTRAVENOUS EVERY 5 MIN PRN
Status: ACTIVE | OUTPATIENT
Start: 2020-11-25 | End: 2020-11-25

## 2020-11-25 RX ADMIN — KETAMINE HYDROCHLORIDE 50 MG: 50 INJECTION, SOLUTION, CONCENTRATE INTRAMUSCULAR; INTRAVENOUS at 06:59:00

## 2020-11-25 RX ADMIN — CAFFEINE AND SODIUM BENZOATE 250 MG: 125 INJECTION, SOLUTION INTRAMUSCULAR; INTRAVENOUS at 06:29:00

## 2020-11-25 RX ADMIN — SODIUM CHLORIDE, SODIUM LACTATE, POTASSIUM CHLORIDE, CALCIUM CHLORIDE: 600; 310; 30; 20 INJECTION, SOLUTION INTRAVENOUS at 06:46:00

## 2020-11-25 RX ADMIN — LABETALOL HYDROCHLORIDE 10 MG: 5 INJECTION, SOLUTION INTRAVENOUS at 06:59:00

## 2020-11-25 RX ADMIN — SODIUM CHLORIDE, SODIUM LACTATE, POTASSIUM CHLORIDE, CALCIUM CHLORIDE: 600; 310; 30; 20 INJECTION, SOLUTION INTRAVENOUS at 06:00:00

## 2020-11-25 RX ADMIN — GLYCOPYRROLATE 0.1 MG: 0.2 INJECTION, SOLUTION INTRAMUSCULAR; INTRAVENOUS at 06:32:00

## 2020-11-25 RX ADMIN — KETOROLAC TROMETHAMINE 15 MG: 30 INJECTION, SOLUTION INTRAMUSCULAR; INTRAVENOUS at 06:30:00

## 2020-11-25 RX ADMIN — ONDANSETRON 4 MG: 2 INJECTION INTRAMUSCULAR; INTRAVENOUS at 06:31:00

## 2020-11-25 RX ADMIN — ONDANSETRON 4 MG: 2 INJECTION INTRAMUSCULAR; INTRAVENOUS at 08:16:00

## 2020-11-25 RX ADMIN — SODIUM CHLORIDE, SODIUM LACTATE, POTASSIUM CHLORIDE, CALCIUM CHLORIDE: 600; 310; 30; 20 INJECTION, SOLUTION INTRAVENOUS at 07:03:00

## 2020-11-25 RX ADMIN — ETOMIDATE 12 MG: 2 INJECTION INTRAVENOUS at 06:59:00

## 2020-11-25 NOTE — PROGRESS NOTES
[facility] / [facility]  ECT Procedure Note    Colby Ramos Patient Status:  Outpatient   Age/Gender 77year old female MRN [id number]   Location [address] [facility] Attending Maryam Connelly MD   Hosp Day # 0 PCP Jon Gray immediate, recent, remote  Concentration:   fair  Suicidal ideation: no suicidal ideation    Prior to procedure, reviewed with treatment team correct patient, time of procedure and type of ECT. Also reviewed with anesthesia pre-ECT medications.     Patient

## 2020-11-25 NOTE — ANESTHESIA POSTPROCEDURE EVALUATION
[address] Patient Status:  Outpatient   Age/Gender 77year old female MRN [id number]   Location 1310 [facility] Attending Terrence Ochoa MD   Hosp Day # 0 PCP Suzette Alberts, DO       Anesthesia Post-op Not

## 2020-11-25 NOTE — PROGRESS NOTES
[facility] / [facility]  ECT History & Physical    Bouchra Soto Patient Status:  Outpatient   Age/Gender 77year old female MRN [id number]   Location 1310 [facility] Attending Kylie Marsh MD   Hosp Day # 0 PCP Karina Medina MAIN OR   • KNEE TOTAL REPLACEMENT Right 1/24/2019    Performed by Michelle Gonsales MD at [facility] MAIN OR   • LAPAROSCOPY,DIAGNOSTIC     • LUMBAR LAMINECTOMY POST LAT INTERBODY FUS 2 LEVEL N/A 7/17/2017    Performed by Eden Ma MD at [place] [address] intact, normal strength, sensation and reflexes     throughout     Impressions & Plans: Major depression recurrent severe. Bifrontal ECT    I have discussed the risks and benefits and alternatives with the patient/family.   They understand and agree to pro

## 2020-11-25 NOTE — ANESTHESIA PREPROCEDURE EVALUATION
PRE-OP EVALUATION    Patient Name: Glenda Bowling    Pre-op Diagnosis: * No surgery found *ECT    * No surgery found *    * Surgery not found *    Pre-op vitals reviewed. F33.2        Body mass index is 33.81 kg/m². Current medications reviewed.   [facility] disc disease), lumbosacral     Unspecified internal derangement of knee     Effusion of lower leg joint     Tear of PCL (posterior cruciate ligament) of knee     Diabetes ([address]     Enthesopathy of hip region left     Osteoarthrosis, unspecified whether gen • CATARACT     • COLONOSCOPY     • ESOPHAGOGASTRODUODENOSCOPY (EGD) N/A 5/26/2020    Performed by Maria Teresa August MD at [place] [address] N/A 7/17/2017    Performed by Anna Vargas MD at [place] [address] teeth present          ASA: 2   Plan: general  NPO status verified and     Post-procedure pain management plan discussed with surgeon and patient.     Comment: Risks and benefits of GA explained including but not limited to aspiration, mouth/dental/airway i

## 2020-11-30 PROCEDURE — 99490 CHRNC CARE MGMT STAFF 1ST 20: CPT

## 2020-12-02 ENCOUNTER — ANESTHESIA (OUTPATIENT)
Dept: POSTOP/PACU | Facility: HOSPITAL | Age: 75
End: 2020-12-02
Payer: MEDICARE

## 2020-12-02 ENCOUNTER — HOSPITAL ENCOUNTER (OUTPATIENT)
Dept: POSTOP/PACU | Facility: HOSPITAL | Age: 75
Discharge: HOME | End: 2020-12-02
Attending: Other
Payer: MEDICARE

## 2020-12-02 ENCOUNTER — ANESTHESIA EVENT (OUTPATIENT)
Dept: POSTOP/PACU | Facility: HOSPITAL | Age: 75
End: 2020-12-02
Payer: MEDICARE

## 2020-12-02 VITALS
SYSTOLIC BLOOD PRESSURE: 141 MMHG | BODY MASS INDEX: 34 KG/M2 | OXYGEN SATURATION: 97 % | TEMPERATURE: 98 F | DIASTOLIC BLOOD PRESSURE: 63 MMHG | RESPIRATION RATE: 19 BRPM | HEIGHT: 64 IN | HEART RATE: 80 BPM

## 2020-12-02 DIAGNOSIS — F33.2 MDD (MAJOR DEPRESSIVE DISORDER), RECURRENT SEVERE, WITHOUT PSYCHOSIS (HCC): ICD-10-CM

## 2020-12-02 RX ORDER — ONDANSETRON 2 MG/ML
INJECTION INTRAMUSCULAR; INTRAVENOUS
Status: COMPLETED
Start: 2020-12-02 | End: 2020-12-02

## 2020-12-02 RX ORDER — TRISODIUM CITRATE DIHYDRATE AND CITRIC ACID MONOHYDRATE 500; 334 MG/5ML; MG/5ML
SOLUTION ORAL
Status: COMPLETED
Start: 2020-12-02 | End: 2020-12-02

## 2020-12-02 RX ORDER — SODIUM CHLORIDE, SODIUM LACTATE, POTASSIUM CHLORIDE, CALCIUM CHLORIDE 600; 310; 30; 20 MG/100ML; MG/100ML; MG/100ML; MG/100ML
INJECTION, SOLUTION INTRAVENOUS CONTINUOUS
Status: DISCONTINUED | OUTPATIENT
Start: 2020-12-02 | End: 2020-12-04

## 2020-12-02 RX ORDER — KETAMINE HYDROCHLORIDE 50 MG/ML
INJECTION, SOLUTION, CONCENTRATE INTRAMUSCULAR; INTRAVENOUS AS NEEDED
Status: DISCONTINUED | OUTPATIENT
Start: 2020-12-02 | End: 2020-12-02 | Stop reason: SURG

## 2020-12-02 RX ORDER — TRISODIUM CITRATE DIHYDRATE AND CITRIC ACID MONOHYDRATE 500; 334 MG/5ML; MG/5ML
30 SOLUTION ORAL ONCE
Status: COMPLETED | OUTPATIENT
Start: 2020-12-02 | End: 2020-12-02

## 2020-12-02 RX ORDER — KETOROLAC TROMETHAMINE 30 MG/ML
15 INJECTION, SOLUTION INTRAMUSCULAR; INTRAVENOUS ONCE
Status: COMPLETED | OUTPATIENT
Start: 2020-12-02 | End: 2020-12-02

## 2020-12-02 RX ORDER — KETOROLAC TROMETHAMINE 30 MG/ML
INJECTION, SOLUTION INTRAMUSCULAR; INTRAVENOUS
Status: COMPLETED
Start: 2020-12-02 | End: 2020-12-02

## 2020-12-02 RX ORDER — DEXTROSE MONOHYDRATE 25 G/50ML
50 INJECTION, SOLUTION INTRAVENOUS
Status: DISCONTINUED | OUTPATIENT
Start: 2020-12-02 | End: 2020-12-04

## 2020-12-02 RX ORDER — ETOMIDATE 2 MG/ML
INJECTION INTRAVENOUS AS NEEDED
Status: DISCONTINUED | OUTPATIENT
Start: 2020-12-02 | End: 2020-12-02 | Stop reason: SURG

## 2020-12-02 RX ORDER — CAFFEINE AND SODIUM BENZOATE 125 MG/ML
INJECTION, SOLUTION INTRAMUSCULAR; INTRAVENOUS
Status: COMPLETED
Start: 2020-12-02 | End: 2020-12-02

## 2020-12-02 RX ORDER — GLYCOPYRROLATE 0.2 MG/ML
0.1 INJECTION, SOLUTION INTRAMUSCULAR; INTRAVENOUS ONCE
Status: COMPLETED | OUTPATIENT
Start: 2020-12-02 | End: 2020-12-02

## 2020-12-02 RX ORDER — ONDANSETRON 2 MG/ML
4 INJECTION INTRAMUSCULAR; INTRAVENOUS ONCE
Status: COMPLETED | OUTPATIENT
Start: 2020-12-02 | End: 2020-12-02

## 2020-12-02 RX ORDER — HYDROMORPHONE HYDROCHLORIDE 1 MG/ML
0.4 INJECTION, SOLUTION INTRAMUSCULAR; INTRAVENOUS; SUBCUTANEOUS EVERY 5 MIN PRN
Status: ACTIVE | OUTPATIENT
Start: 2020-12-02 | End: 2020-12-02

## 2020-12-02 RX ORDER — NALOXONE HYDROCHLORIDE 0.4 MG/ML
80 INJECTION, SOLUTION INTRAMUSCULAR; INTRAVENOUS; SUBCUTANEOUS AS NEEDED
Status: ACTIVE | OUTPATIENT
Start: 2020-12-02 | End: 2020-12-02

## 2020-12-02 RX ORDER — CAFFEINE AND SODIUM BENZOATE 125 MG/ML
250 INJECTION, SOLUTION INTRAMUSCULAR; INTRAVENOUS ONCE
Status: COMPLETED | OUTPATIENT
Start: 2020-12-02 | End: 2020-12-02

## 2020-12-02 RX ORDER — GLYCOPYRROLATE 0.2 MG/ML
INJECTION, SOLUTION INTRAMUSCULAR; INTRAVENOUS
Status: COMPLETED
Start: 2020-12-02 | End: 2020-12-02

## 2020-12-02 RX ADMIN — TRISODIUM CITRATE DIHYDRATE AND CITRIC ACID MONOHYDRATE 30 ML: 500; 334 SOLUTION ORAL at 06:36:00

## 2020-12-02 RX ADMIN — ETOMIDATE 12 MG: 2 INJECTION INTRAVENOUS at 07:15:00

## 2020-12-02 RX ADMIN — KETAMINE HYDROCHLORIDE 50 MG: 50 INJECTION, SOLUTION, CONCENTRATE INTRAMUSCULAR; INTRAVENOUS at 07:15:00

## 2020-12-02 RX ADMIN — KETOROLAC TROMETHAMINE 30 MG: 30 INJECTION, SOLUTION INTRAMUSCULAR; INTRAVENOUS at 06:31:00

## 2020-12-02 RX ADMIN — SODIUM CHLORIDE, SODIUM LACTATE, POTASSIUM CHLORIDE, CALCIUM CHLORIDE: 600; 310; 30; 20 INJECTION, SOLUTION INTRAVENOUS at 07:32:00

## 2020-12-02 RX ADMIN — SODIUM CHLORIDE, SODIUM LACTATE, POTASSIUM CHLORIDE, CALCIUM CHLORIDE: 600; 310; 30; 20 INJECTION, SOLUTION INTRAVENOUS at 06:15:00

## 2020-12-02 RX ADMIN — ONDANSETRON 4 MG: 2 INJECTION INTRAMUSCULAR; INTRAVENOUS at 06:29:00

## 2020-12-02 RX ADMIN — CAFFEINE AND SODIUM BENZOATE 250 MG: 125 INJECTION, SOLUTION INTRAMUSCULAR; INTRAVENOUS at 07:03:00

## 2020-12-02 RX ADMIN — SODIUM CHLORIDE, SODIUM LACTATE, POTASSIUM CHLORIDE, CALCIUM CHLORIDE: 600; 310; 30; 20 INJECTION, SOLUTION INTRAVENOUS at 07:15:00

## 2020-12-02 RX ADMIN — GLYCOPYRROLATE 0.1 MG: 0.2 INJECTION, SOLUTION INTRAMUSCULAR; INTRAVENOUS at 06:30:00

## 2020-12-02 NOTE — PROGRESS NOTES
[facility] / [facility]  ECT Procedure Note    More Saldana Patient Status:  Outpatient   Age/Gender 77year old female MRN [id number]   Location 13[address] [facility] Attending Rubi Vasques MD   Hosp Day # 0 PCP Girma Jones ketamine 50 mg IV and Succinylcholine 60 mg IV    Seizure Duration:  Motor: 62 seconds       EE seconds    Post-ECT Condition:  Treatment unremarkable    Post-ECT Medications:  None     Candice Elizondo

## 2020-12-02 NOTE — ANESTHESIA POSTPROCEDURE EVALUATION
[address] Patient Status:  Outpatient   Age/Gender 77year old female MRN [id number]   Location 1310 [facility] Attending Addie Ugarte MD   Hosp Day # 0 PCP Cely Christina, DO       Anesthesia Post-op Not

## 2020-12-02 NOTE — ANESTHESIA PREPROCEDURE EVALUATION
PRE-OP EVALUATION    Patient Name: Beverly Clemente    Pre-op Diagnosis: * No surgery found *    * No surgery found *    * Surgery not found *    Pre-op vitals reviewed.   Temp: 98.8 °F (37.1 °C)  Pulse: 84  Resp: 23  BP: 128/66  SpO2: 98 %  Body mass inde neuromuscular disease             Patient Active Problem List:     Depression hx suicide attempt     Chronic back pain     Cervical radiculopathy     Tremor     Stroke ([facility] 2004 from med record     Hyperlipidemia, mixed     Trigger finger     Contusion of Trochanteric bursitis of right hip     Lumbosacral spondylosis without myelopathy     Old cerebrovascular accident (CVA) without late effect     SOB (shortness of breath)     Sacroiliitis ([address]          Past Surgical History:   Procedure Laterality Date Airway      Mallampati: II  Mouth opening: >3 FB  TM distance: > 6 cm  Neck ROM: full Cardiovascular    Cardiovascular exam normal.         Dental    No notable dental history.          Pulmonary    Pulmonary exam normal.                 Other finding
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
## 2020-12-04 RX ORDER — SODIUM CHLORIDE, SODIUM LACTATE, POTASSIUM CHLORIDE, CALCIUM CHLORIDE 600; 310; 30; 20 MG/100ML; MG/100ML; MG/100ML; MG/100ML
INJECTION, SOLUTION INTRAVENOUS CONTINUOUS
Status: CANCELLED | OUTPATIENT
Start: 2020-12-04

## 2020-12-07 ENCOUNTER — PATIENT OUTREACH (OUTPATIENT)
Dept: CASE MANAGEMENT | Age: 75
End: 2020-12-07

## 2020-12-07 ENCOUNTER — HOSPITAL ENCOUNTER (OUTPATIENT)
Dept: POSTOP/PACU | Facility: HOSPITAL | Age: 75
Discharge: HOME | End: 2020-12-07
Attending: Other
Payer: MEDICARE

## 2020-12-07 ENCOUNTER — ANESTHESIA EVENT (OUTPATIENT)
Dept: POSTOP/PACU | Facility: HOSPITAL | Age: 75
End: 2020-12-07
Payer: MEDICARE

## 2020-12-07 ENCOUNTER — ANESTHESIA (OUTPATIENT)
Dept: POSTOP/PACU | Facility: HOSPITAL | Age: 75
End: 2020-12-07
Payer: MEDICARE

## 2020-12-07 VITALS
HEART RATE: 79 BPM | SYSTOLIC BLOOD PRESSURE: 137 MMHG | OXYGEN SATURATION: 98 % | DIASTOLIC BLOOD PRESSURE: 77 MMHG | BODY MASS INDEX: 34 KG/M2 | TEMPERATURE: 99 F | RESPIRATION RATE: 18 BRPM | HEIGHT: 64 IN

## 2020-12-07 DIAGNOSIS — F33.1 MAJOR DEPRESSIVE DISORDER, RECURRENT EPISODE, MODERATE (HCC): ICD-10-CM

## 2020-12-07 DIAGNOSIS — I10 ESSENTIAL HYPERTENSION: ICD-10-CM

## 2020-12-07 DIAGNOSIS — E11.69 DIABETES MELLITUS TYPE 2 IN OBESE (HCC): ICD-10-CM

## 2020-12-07 DIAGNOSIS — I25.10 CORONARY ARTERY DISEASE INVOLVING NATIVE CORONARY ARTERY OF NATIVE HEART WITHOUT ANGINA PECTORIS: ICD-10-CM

## 2020-12-07 DIAGNOSIS — F41.1 GAD (GENERALIZED ANXIETY DISORDER): ICD-10-CM

## 2020-12-07 DIAGNOSIS — F32.A DEPRESSIVE DISORDER: ICD-10-CM

## 2020-12-07 DIAGNOSIS — F41.9 ANXIETY: ICD-10-CM

## 2020-12-07 DIAGNOSIS — E66.9 DIABETES MELLITUS TYPE 2 IN OBESE (HCC): ICD-10-CM

## 2020-12-07 DIAGNOSIS — E78.2 HYPERLIPIDEMIA, MIXED: ICD-10-CM

## 2020-12-07 DIAGNOSIS — M51.37 DDD (DEGENERATIVE DISC DISEASE), LUMBOSACRAL: ICD-10-CM

## 2020-12-07 RX ORDER — NALOXONE HYDROCHLORIDE 0.4 MG/ML
80 INJECTION, SOLUTION INTRAMUSCULAR; INTRAVENOUS; SUBCUTANEOUS AS NEEDED
Status: ACTIVE | OUTPATIENT
Start: 2020-12-07 | End: 2020-12-07

## 2020-12-07 RX ORDER — KETOROLAC TROMETHAMINE 30 MG/ML
30 INJECTION, SOLUTION INTRAMUSCULAR; INTRAVENOUS ONCE
Status: COMPLETED | OUTPATIENT
Start: 2020-12-07 | End: 2020-12-07

## 2020-12-07 RX ORDER — KETOROLAC TROMETHAMINE 30 MG/ML
INJECTION, SOLUTION INTRAMUSCULAR; INTRAVENOUS
Status: COMPLETED
Start: 2020-12-07 | End: 2020-12-07

## 2020-12-07 RX ORDER — HYDROMORPHONE HYDROCHLORIDE 1 MG/ML
0.4 INJECTION, SOLUTION INTRAMUSCULAR; INTRAVENOUS; SUBCUTANEOUS EVERY 5 MIN PRN
Status: ACTIVE | OUTPATIENT
Start: 2020-12-07 | End: 2020-12-07

## 2020-12-07 RX ORDER — KETAMINE HYDROCHLORIDE 50 MG/ML
INJECTION, SOLUTION, CONCENTRATE INTRAMUSCULAR; INTRAVENOUS AS NEEDED
Status: DISCONTINUED | OUTPATIENT
Start: 2020-12-07 | End: 2020-12-07 | Stop reason: SURG

## 2020-12-07 RX ORDER — GLYCOPYRROLATE 0.2 MG/ML
INJECTION, SOLUTION INTRAMUSCULAR; INTRAVENOUS
Status: COMPLETED
Start: 2020-12-07 | End: 2020-12-07

## 2020-12-07 RX ORDER — CAFFEINE AND SODIUM BENZOATE 125 MG/ML
250 INJECTION, SOLUTION INTRAMUSCULAR; INTRAVENOUS ONCE
Status: COMPLETED | OUTPATIENT
Start: 2020-12-07 | End: 2020-12-07

## 2020-12-07 RX ORDER — ETOMIDATE 2 MG/ML
INJECTION INTRAVENOUS AS NEEDED
Status: DISCONTINUED | OUTPATIENT
Start: 2020-12-07 | End: 2020-12-07 | Stop reason: SURG

## 2020-12-07 RX ORDER — TRISODIUM CITRATE DIHYDRATE AND CITRIC ACID MONOHYDRATE 500; 334 MG/5ML; MG/5ML
30 SOLUTION ORAL ONCE
Status: COMPLETED | OUTPATIENT
Start: 2020-12-07 | End: 2020-12-07

## 2020-12-07 RX ORDER — SODIUM CHLORIDE, SODIUM LACTATE, POTASSIUM CHLORIDE, CALCIUM CHLORIDE 600; 310; 30; 20 MG/100ML; MG/100ML; MG/100ML; MG/100ML
INJECTION, SOLUTION INTRAVENOUS CONTINUOUS
Status: DISCONTINUED | OUTPATIENT
Start: 2020-12-07 | End: 2020-12-09

## 2020-12-07 RX ORDER — SODIUM CHLORIDE, SODIUM LACTATE, POTASSIUM CHLORIDE, CALCIUM CHLORIDE 600; 310; 30; 20 MG/100ML; MG/100ML; MG/100ML; MG/100ML
INJECTION, SOLUTION INTRAVENOUS CONTINUOUS
Status: CANCELLED | OUTPATIENT
Start: 2020-12-07

## 2020-12-07 RX ORDER — LABETALOL HYDROCHLORIDE 5 MG/ML
INJECTION, SOLUTION INTRAVENOUS AS NEEDED
Status: DISCONTINUED | OUTPATIENT
Start: 2020-12-07 | End: 2020-12-07 | Stop reason: SURG

## 2020-12-07 RX ORDER — ONDANSETRON 2 MG/ML
4 INJECTION INTRAMUSCULAR; INTRAVENOUS ONCE
Status: COMPLETED | OUTPATIENT
Start: 2020-12-07 | End: 2020-12-07

## 2020-12-07 RX ORDER — GLYCOPYRROLATE 0.2 MG/ML
0.1 INJECTION, SOLUTION INTRAMUSCULAR; INTRAVENOUS ONCE
Status: COMPLETED | OUTPATIENT
Start: 2020-12-07 | End: 2020-12-07

## 2020-12-07 RX ORDER — TRISODIUM CITRATE DIHYDRATE AND CITRIC ACID MONOHYDRATE 500; 334 MG/5ML; MG/5ML
SOLUTION ORAL
Status: COMPLETED
Start: 2020-12-07 | End: 2020-12-07

## 2020-12-07 RX ORDER — ONDANSETRON 2 MG/ML
INJECTION INTRAMUSCULAR; INTRAVENOUS
Status: COMPLETED
Start: 2020-12-07 | End: 2020-12-07

## 2020-12-07 RX ORDER — DEXTROSE MONOHYDRATE 25 G/50ML
50 INJECTION, SOLUTION INTRAVENOUS
Status: DISCONTINUED | OUTPATIENT
Start: 2020-12-07 | End: 2020-12-09

## 2020-12-07 RX ORDER — CAFFEINE AND SODIUM BENZOATE 125 MG/ML
INJECTION, SOLUTION INTRAMUSCULAR; INTRAVENOUS
Status: COMPLETED
Start: 2020-12-07 | End: 2020-12-07

## 2020-12-07 RX ADMIN — ONDANSETRON 4 MG: 2 INJECTION INTRAMUSCULAR; INTRAVENOUS at 06:22:00

## 2020-12-07 RX ADMIN — TRISODIUM CITRATE DIHYDRATE AND CITRIC ACID MONOHYDRATE 30 ML: 500; 334 SOLUTION ORAL at 06:19:00

## 2020-12-07 RX ADMIN — ETOMIDATE 12 MG: 2 INJECTION INTRAVENOUS at 06:44:00

## 2020-12-07 RX ADMIN — CAFFEINE AND SODIUM BENZOATE 250 MG: 125 INJECTION, SOLUTION INTRAMUSCULAR; INTRAVENOUS at 06:30:00

## 2020-12-07 RX ADMIN — KETAMINE HYDROCHLORIDE 50 MG: 50 INJECTION, SOLUTION, CONCENTRATE INTRAMUSCULAR; INTRAVENOUS at 06:44:00

## 2020-12-07 RX ADMIN — LABETALOL HYDROCHLORIDE 10 MG: 5 INJECTION, SOLUTION INTRAVENOUS at 06:44:00

## 2020-12-07 RX ADMIN — SODIUM CHLORIDE, SODIUM LACTATE, POTASSIUM CHLORIDE, CALCIUM CHLORIDE: 600; 310; 30; 20 INJECTION, SOLUTION INTRAVENOUS at 06:00:00

## 2020-12-07 RX ADMIN — KETOROLAC TROMETHAMINE 30 MG: 30 INJECTION, SOLUTION INTRAMUSCULAR; INTRAVENOUS at 06:21:00

## 2020-12-07 RX ADMIN — GLYCOPYRROLATE 0.1 MG: 0.2 INJECTION, SOLUTION INTRAMUSCULAR; INTRAVENOUS at 06:20:00

## 2020-12-07 NOTE — PROGRESS NOTES
[facility] / [facility]  ECT History & Physical    Opal Acron Patient Status:  Outpatient   Age/Gender 77year old female MRN [id number]   Location 1310 [facility] Attending Mckinley Hoff MD   Hosp Day # 0 PCP Rah Khan Marco Ross MD at [address] [facility] OR   • LAPAROSCOPY,DIAGNOSTIC     • LUMBAR LAMINECTOMY POST LAT INTERBODY FUS 2 LEVEL N/A 7/17/2017    Performed by Joycelyn Rosales MD at [address] [facility]   • OTHER Left     foot neuroma removed   • 1 Grant Byers status: Not on file      Intimate partner violence        Fear of current or ex partner: Not on file        Emotionally abused: Not on file        Physically abused: Not on file        Forced sexual activity: Not on file    Other Topics      Concerns: alternatives with the patient/family. They understand and agree to proceed with plan of care.     Cristine Urbano  12/7/2020

## 2020-12-07 NOTE — PROGRESS NOTES
[facility] / [facility]  ECT Procedure Note    Bernida Pel Patient Status:  Outpatient   Age/Gender 77year old female MRN [id number]   Location 1310 [facility] Attending Antony Ascencio MD   Hosp Day # 0 PCP Timbo Russell

## 2020-12-07 NOTE — PROGRESS NOTES
12/7/2020  Spoke to Jcarlos Banegas for CCM.       Updates to patient care team/ comments: No changes per patient  Patient reported changes in medications: No changes per patient  Med Adherence  Comment: Taking as prescribed    Health Maintenance: Pt aware  Zoster Va 12/11/2020 10:30 AM Louis Romero [facility] [address]   12/18/2020 10:30 AM José Manuel Rodriguez [facility] MILL 12             Patient agrees to goal action plan.  Yes per patient  Self-Management Abilities (patient reported)

## 2020-12-07 NOTE — ANESTHESIA PREPROCEDURE EVALUATION
PRE-OP EVALUATION    Patient Name: Craig Li    Pre-op Diagnosis: * No surgery found *    * No surgery found *    * Surgery not found *    Pre-op vitals reviewed.   Temp: 98.7 °F (37.1 °C)  Pulse: 82  Resp: 20  BP: 129/73  SpO2: 97 %  There is no he Cardiovascular        Exercise tolerance: good     MET: >4      (+) hypertension     (+) CAD                                Endo/Other    Negative endo/other ROS.  (+) diabetes  type 2,                          Pulmonary               (+) shortness of br Status post total right knee replacement     Degenerative joint disease     Age-related osteoporosis without current pathological fracture     Palpitations     Near syncope     Coronary artery disease involving native coronary artery of native heart withou MCH 27.6 11/18/2020    MCHC 31.3 11/18/2020    RDW 14.6 11/18/2020    .0 11/18/2020     Lab Results   Component Value Date     11/18/2020    K 4.2 11/18/2020     11/18/2020    CO2 25.0 11/18/2020    BUN 9 11/18/2020    CREATSERUM 1.04 (H

## 2020-12-07 NOTE — ANESTHESIA POSTPROCEDURE EVALUATION
[address] Patient Status:  Outpatient   Age/Gender 77year old female MRN [id number]   Location 1310 [facility] Attending Apolinar Bain MD   Hosp Day # 0 PCP Miroslava Barnes, DO       Anesthesia Post-op Not

## 2020-12-09 ENCOUNTER — ANESTHESIA EVENT (OUTPATIENT)
Dept: POSTOP/PACU | Facility: HOSPITAL | Age: 75
End: 2020-12-09
Payer: MEDICARE

## 2020-12-09 ENCOUNTER — HOSPITAL ENCOUNTER (OUTPATIENT)
Dept: POSTOP/PACU | Facility: HOSPITAL | Age: 75
Discharge: HOME | End: 2020-12-09
Attending: Other
Payer: MEDICARE

## 2020-12-09 ENCOUNTER — ANESTHESIA (OUTPATIENT)
Dept: POSTOP/PACU | Facility: HOSPITAL | Age: 75
End: 2020-12-09
Payer: MEDICARE

## 2020-12-09 VITALS
BODY MASS INDEX: 34 KG/M2 | RESPIRATION RATE: 21 BRPM | DIASTOLIC BLOOD PRESSURE: 85 MMHG | HEIGHT: 64 IN | HEART RATE: 79 BPM | SYSTOLIC BLOOD PRESSURE: 164 MMHG | OXYGEN SATURATION: 97 % | TEMPERATURE: 98 F

## 2020-12-09 DIAGNOSIS — F33.1 MAJOR DEPRESSIVE DISORDER, RECURRENT EPISODE, MODERATE (HCC): ICD-10-CM

## 2020-12-09 DIAGNOSIS — F33.2 SEVERE RECURRENT MAJOR DEPRESSION WITHOUT PSYCHOTIC FEATURES (HCC): ICD-10-CM

## 2020-12-09 RX ORDER — GLYCOPYRROLATE 0.2 MG/ML
INJECTION, SOLUTION INTRAMUSCULAR; INTRAVENOUS
Status: COMPLETED
Start: 2020-12-09 | End: 2020-12-09

## 2020-12-09 RX ORDER — HYDROMORPHONE HYDROCHLORIDE 1 MG/ML
0.4 INJECTION, SOLUTION INTRAMUSCULAR; INTRAVENOUS; SUBCUTANEOUS EVERY 5 MIN PRN
Status: ACTIVE | OUTPATIENT
Start: 2020-12-09 | End: 2020-12-09

## 2020-12-09 RX ORDER — GLYCOPYRROLATE 0.2 MG/ML
0.1 INJECTION, SOLUTION INTRAMUSCULAR; INTRAVENOUS ONCE
Status: COMPLETED | OUTPATIENT
Start: 2020-12-09 | End: 2020-12-09

## 2020-12-09 RX ORDER — CAFFEINE AND SODIUM BENZOATE 125 MG/ML
INJECTION, SOLUTION INTRAMUSCULAR; INTRAVENOUS
Status: COMPLETED
Start: 2020-12-09 | End: 2020-12-09

## 2020-12-09 RX ORDER — KETAMINE HYDROCHLORIDE 50 MG/ML
INJECTION, SOLUTION, CONCENTRATE INTRAMUSCULAR; INTRAVENOUS AS NEEDED
Status: DISCONTINUED | OUTPATIENT
Start: 2020-12-09 | End: 2020-12-09 | Stop reason: SURG

## 2020-12-09 RX ORDER — CAFFEINE AND SODIUM BENZOATE 125 MG/ML
500 INJECTION, SOLUTION INTRAMUSCULAR; INTRAVENOUS ONCE
Status: COMPLETED | OUTPATIENT
Start: 2020-12-09 | End: 2020-12-09

## 2020-12-09 RX ORDER — ACETAMINOPHEN 500 MG
1000 TABLET ORAL ONCE AS NEEDED
Status: ACTIVE | OUTPATIENT
Start: 2020-12-09 | End: 2020-12-09

## 2020-12-09 RX ORDER — TRISODIUM CITRATE DIHYDRATE AND CITRIC ACID MONOHYDRATE 500; 334 MG/5ML; MG/5ML
SOLUTION ORAL
Status: COMPLETED
Start: 2020-12-09 | End: 2020-12-09

## 2020-12-09 RX ORDER — DEXTROSE MONOHYDRATE 25 G/50ML
50 INJECTION, SOLUTION INTRAVENOUS
Status: DISCONTINUED | OUTPATIENT
Start: 2020-12-09 | End: 2020-12-11

## 2020-12-09 RX ORDER — SODIUM CHLORIDE, SODIUM LACTATE, POTASSIUM CHLORIDE, CALCIUM CHLORIDE 600; 310; 30; 20 MG/100ML; MG/100ML; MG/100ML; MG/100ML
INJECTION, SOLUTION INTRAVENOUS CONTINUOUS
Status: DISCONTINUED | OUTPATIENT
Start: 2020-12-09 | End: 2020-12-11

## 2020-12-09 RX ORDER — KETOROLAC TROMETHAMINE 30 MG/ML
30 INJECTION, SOLUTION INTRAMUSCULAR; INTRAVENOUS ONCE
Status: COMPLETED | OUTPATIENT
Start: 2020-12-09 | End: 2020-12-09

## 2020-12-09 RX ORDER — ETOMIDATE 2 MG/ML
INJECTION INTRAVENOUS AS NEEDED
Status: DISCONTINUED | OUTPATIENT
Start: 2020-12-09 | End: 2020-12-09 | Stop reason: SURG

## 2020-12-09 RX ORDER — ONDANSETRON 2 MG/ML
4 INJECTION INTRAMUSCULAR; INTRAVENOUS ONCE
Status: COMPLETED | OUTPATIENT
Start: 2020-12-09 | End: 2020-12-09

## 2020-12-09 RX ORDER — ONDANSETRON 2 MG/ML
INJECTION INTRAMUSCULAR; INTRAVENOUS
Status: COMPLETED
Start: 2020-12-09 | End: 2020-12-09

## 2020-12-09 RX ORDER — NALOXONE HYDROCHLORIDE 0.4 MG/ML
80 INJECTION, SOLUTION INTRAMUSCULAR; INTRAVENOUS; SUBCUTANEOUS AS NEEDED
Status: ACTIVE | OUTPATIENT
Start: 2020-12-09 | End: 2020-12-09

## 2020-12-09 RX ORDER — MIDAZOLAM HYDROCHLORIDE 1 MG/ML
1 INJECTION INTRAMUSCULAR; INTRAVENOUS EVERY 5 MIN PRN
Status: ACTIVE | OUTPATIENT
Start: 2020-12-09 | End: 2020-12-09

## 2020-12-09 RX ORDER — TRISODIUM CITRATE DIHYDRATE AND CITRIC ACID MONOHYDRATE 500; 334 MG/5ML; MG/5ML
30 SOLUTION ORAL ONCE
Status: COMPLETED | OUTPATIENT
Start: 2020-12-09 | End: 2020-12-09

## 2020-12-09 RX ORDER — ONDANSETRON 2 MG/ML
4 INJECTION INTRAMUSCULAR; INTRAVENOUS AS NEEDED
Status: ACTIVE | OUTPATIENT
Start: 2020-12-09 | End: 2020-12-09

## 2020-12-09 RX ORDER — LABETALOL HYDROCHLORIDE 5 MG/ML
INJECTION, SOLUTION INTRAVENOUS AS NEEDED
Status: DISCONTINUED | OUTPATIENT
Start: 2020-12-09 | End: 2020-12-09 | Stop reason: SURG

## 2020-12-09 RX ORDER — KETOROLAC TROMETHAMINE 30 MG/ML
INJECTION, SOLUTION INTRAMUSCULAR; INTRAVENOUS
Status: COMPLETED
Start: 2020-12-09 | End: 2020-12-09

## 2020-12-09 RX ADMIN — ONDANSETRON 4 MG: 2 INJECTION INTRAMUSCULAR; INTRAVENOUS at 06:03:00

## 2020-12-09 RX ADMIN — SODIUM CHLORIDE, SODIUM LACTATE, POTASSIUM CHLORIDE, CALCIUM CHLORIDE: 600; 310; 30; 20 INJECTION, SOLUTION INTRAVENOUS at 05:58:00

## 2020-12-09 RX ADMIN — LABETALOL HYDROCHLORIDE 10 MG: 5 INJECTION, SOLUTION INTRAVENOUS at 06:52:00

## 2020-12-09 RX ADMIN — ETOMIDATE 12 MG: 2 INJECTION INTRAVENOUS at 06:52:00

## 2020-12-09 RX ADMIN — KETAMINE HYDROCHLORIDE 50 MG: 50 INJECTION, SOLUTION, CONCENTRATE INTRAMUSCULAR; INTRAVENOUS at 06:52:00

## 2020-12-09 RX ADMIN — GLYCOPYRROLATE 0.1 MG: 0.2 INJECTION, SOLUTION INTRAMUSCULAR; INTRAVENOUS at 06:00:00

## 2020-12-09 RX ADMIN — SODIUM CHLORIDE, SODIUM LACTATE, POTASSIUM CHLORIDE, CALCIUM CHLORIDE: 600; 310; 30; 20 INJECTION, SOLUTION INTRAVENOUS at 06:50:00

## 2020-12-09 RX ADMIN — KETOROLAC TROMETHAMINE 30 MG: 30 INJECTION, SOLUTION INTRAMUSCULAR; INTRAVENOUS at 06:01:00

## 2020-12-09 RX ADMIN — CAFFEINE AND SODIUM BENZOATE 500 MG: 125 INJECTION, SOLUTION INTRAMUSCULAR; INTRAVENOUS at 06:28:00

## 2020-12-09 RX ADMIN — TRISODIUM CITRATE DIHYDRATE AND CITRIC ACID MONOHYDRATE 30 ML: 500; 334 SOLUTION ORAL at 06:07:00

## 2020-12-09 NOTE — ANESTHESIA PREPROCEDURE EVALUATION
PRE-OP EVALUATION    Patient Name: Debi Corea    Pre-op Diagnosis: F33.2    ECT    Pre-op vitals reviewed. Temp: 97.7 °F (36.5 °C)  Pulse: 82  Resp: 16  BP: 112/91  SpO2: 97 %  Body mass index is 33.81 kg/m². Current medications reviewed.   Hospi pain     Cervical radiculopathy     Tremor     Stroke [facility]) 2004 from med record     Hyperlipidemia, mixed     Trigger finger     Contusion of knee, right     Spondylosis of lumbar joint     DDD (degenerative disc disease), lumbosacral     Unspecified inte without late effect     SOB (shortness of breath)     Sacroiliitis ([facility] [address] [address]          Past Surgical History:   Procedure Laterality Date   • APPENDECTOMY     • BACK SURGERY  07/17/2017    L4-S1 Decomp Instru Fusion    • CATARACT     • COLONOSCOPY     • ESOPHAG normal.         Dental    No notable dental history. Pulmonary    Pulmonary exam normal.                 Other findings            ASA: 3   Plan: general  NPO status verified and patient meets guidelines.   Patient has taken beta blockers in last 24

## 2020-12-09 NOTE — PROGRESS NOTES
[facility] / [facility]  ECT Procedure Note    Remona Schlatter Patient Status:  Outpatient   Age/Gender 77year old female MRN [id number]   Location 1310 [facility] Attending Adam Flores MD   Hosp Day # 0 PCP Cornelius Goodpasture block    Seizure Duration:  Motor: 44 seconds       EE seconds    Post-ECT Condition:  Treatment unremarkable    Post-ECT Medications:  None     Ranjith Jackson

## 2020-12-09 NOTE — ANESTHESIA POSTPROCEDURE EVALUATION
[address] Patient Status:  Outpatient   Age/Gender 77year old female MRN [id number]   Location 1310 [facility] Attending Damir Kaur MD   Hosp Day # 0 PCP Monique Query, DO       Anesthesia Post-op Not
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
## 2020-12-11 RX ORDER — SODIUM CHLORIDE, SODIUM LACTATE, POTASSIUM CHLORIDE, CALCIUM CHLORIDE 600; 310; 30; 20 MG/100ML; MG/100ML; MG/100ML; MG/100ML
INJECTION, SOLUTION INTRAVENOUS CONTINUOUS
Status: CANCELLED | OUTPATIENT
Start: 2020-12-11

## 2020-12-14 ENCOUNTER — HOSPITAL ENCOUNTER (OUTPATIENT)
Dept: POSTOP/PACU | Facility: HOSPITAL | Age: 75
Discharge: HOME | End: 2020-12-14
Attending: Other
Payer: MEDICARE

## 2020-12-14 ENCOUNTER — ANESTHESIA (OUTPATIENT)
Dept: POSTOP/PACU | Facility: HOSPITAL | Age: 75
End: 2020-12-14
Payer: MEDICARE

## 2020-12-14 ENCOUNTER — ANESTHESIA EVENT (OUTPATIENT)
Dept: POSTOP/PACU | Facility: HOSPITAL | Age: 75
End: 2020-12-14
Payer: MEDICARE

## 2020-12-14 VITALS
SYSTOLIC BLOOD PRESSURE: 138 MMHG | OXYGEN SATURATION: 94 % | BODY MASS INDEX: 34 KG/M2 | TEMPERATURE: 99 F | DIASTOLIC BLOOD PRESSURE: 79 MMHG | HEIGHT: 64 IN | RESPIRATION RATE: 20 BRPM | HEART RATE: 81 BPM

## 2020-12-14 DIAGNOSIS — F33.1 MAJOR DEPRESSIVE DISORDER, RECURRENT EPISODE, MODERATE (HCC): ICD-10-CM

## 2020-12-14 DIAGNOSIS — F33.3 SEVERE EPISODE OF RECURRENT MAJOR DEPRESSIVE DISORDER, WITH PSYCHOTIC FEATURES (HCC): ICD-10-CM

## 2020-12-14 RX ORDER — SODIUM CHLORIDE, SODIUM LACTATE, POTASSIUM CHLORIDE, CALCIUM CHLORIDE 600; 310; 30; 20 MG/100ML; MG/100ML; MG/100ML; MG/100ML
INJECTION, SOLUTION INTRAVENOUS CONTINUOUS
Status: DISCONTINUED | OUTPATIENT
Start: 2020-12-14 | End: 2020-12-16

## 2020-12-14 RX ORDER — TRISODIUM CITRATE DIHYDRATE AND CITRIC ACID MONOHYDRATE 500; 334 MG/5ML; MG/5ML
SOLUTION ORAL
Status: COMPLETED
Start: 2020-12-14 | End: 2020-12-14

## 2020-12-14 RX ORDER — TRISODIUM CITRATE DIHYDRATE AND CITRIC ACID MONOHYDRATE 500; 334 MG/5ML; MG/5ML
30 SOLUTION ORAL ONCE
Status: COMPLETED | OUTPATIENT
Start: 2020-12-14 | End: 2020-12-14

## 2020-12-14 RX ORDER — LABETALOL HYDROCHLORIDE 5 MG/ML
INJECTION, SOLUTION INTRAVENOUS AS NEEDED
Status: DISCONTINUED | OUTPATIENT
Start: 2020-12-14 | End: 2020-12-14 | Stop reason: SURG

## 2020-12-14 RX ORDER — CAFFEINE AND SODIUM BENZOATE 125 MG/ML
INJECTION, SOLUTION INTRAMUSCULAR; INTRAVENOUS
Status: COMPLETED
Start: 2020-12-14 | End: 2020-12-14

## 2020-12-14 RX ORDER — ETOMIDATE 2 MG/ML
INJECTION INTRAVENOUS AS NEEDED
Status: DISCONTINUED | OUTPATIENT
Start: 2020-12-14 | End: 2020-12-14 | Stop reason: SURG

## 2020-12-14 RX ORDER — CAFFEINE AND SODIUM BENZOATE 125 MG/ML
500 INJECTION, SOLUTION INTRAMUSCULAR; INTRAVENOUS ONCE
Status: COMPLETED | OUTPATIENT
Start: 2020-12-14 | End: 2020-12-14

## 2020-12-14 RX ORDER — METOCLOPRAMIDE HYDROCHLORIDE 5 MG/ML
10 INJECTION INTRAMUSCULAR; INTRAVENOUS AS NEEDED
Status: ACTIVE | OUTPATIENT
Start: 2020-12-14 | End: 2020-12-14

## 2020-12-14 RX ORDER — DEXTROSE MONOHYDRATE 25 G/50ML
50 INJECTION, SOLUTION INTRAVENOUS
Status: DISCONTINUED | OUTPATIENT
Start: 2020-12-14 | End: 2020-12-16

## 2020-12-14 RX ORDER — ONDANSETRON 2 MG/ML
4 INJECTION INTRAMUSCULAR; INTRAVENOUS ONCE
Status: COMPLETED | OUTPATIENT
Start: 2020-12-14 | End: 2020-12-14

## 2020-12-14 RX ORDER — HYDROCODONE BITARTRATE AND ACETAMINOPHEN 5; 325 MG/1; MG/1
2 TABLET ORAL AS NEEDED
Status: DISCONTINUED | OUTPATIENT
Start: 2020-12-14 | End: 2020-12-16

## 2020-12-14 RX ORDER — NALOXONE HYDROCHLORIDE 0.4 MG/ML
80 INJECTION, SOLUTION INTRAMUSCULAR; INTRAVENOUS; SUBCUTANEOUS AS NEEDED
Status: ACTIVE | OUTPATIENT
Start: 2020-12-14 | End: 2020-12-14

## 2020-12-14 RX ORDER — HYDROMORPHONE HYDROCHLORIDE 1 MG/ML
0.4 INJECTION, SOLUTION INTRAMUSCULAR; INTRAVENOUS; SUBCUTANEOUS EVERY 5 MIN PRN
Status: ACTIVE | OUTPATIENT
Start: 2020-12-14 | End: 2020-12-14

## 2020-12-14 RX ORDER — ONDANSETRON 2 MG/ML
4 INJECTION INTRAMUSCULAR; INTRAVENOUS AS NEEDED
Status: ACTIVE | OUTPATIENT
Start: 2020-12-14 | End: 2020-12-14

## 2020-12-14 RX ORDER — GLYCOPYRROLATE 0.2 MG/ML
0.1 INJECTION, SOLUTION INTRAMUSCULAR; INTRAVENOUS ONCE
Status: COMPLETED | OUTPATIENT
Start: 2020-12-14 | End: 2020-12-14

## 2020-12-14 RX ORDER — GLYCOPYRROLATE 0.2 MG/ML
INJECTION, SOLUTION INTRAMUSCULAR; INTRAVENOUS
Status: COMPLETED
Start: 2020-12-14 | End: 2020-12-14

## 2020-12-14 RX ORDER — HYDROCODONE BITARTRATE AND ACETAMINOPHEN 5; 325 MG/1; MG/1
1 TABLET ORAL AS NEEDED
Status: DISCONTINUED | OUTPATIENT
Start: 2020-12-14 | End: 2020-12-16

## 2020-12-14 RX ORDER — KETAMINE HYDROCHLORIDE 50 MG/ML
INJECTION, SOLUTION, CONCENTRATE INTRAMUSCULAR; INTRAVENOUS AS NEEDED
Status: DISCONTINUED | OUTPATIENT
Start: 2020-12-14 | End: 2020-12-14 | Stop reason: SURG

## 2020-12-14 RX ORDER — ONDANSETRON 2 MG/ML
INJECTION INTRAMUSCULAR; INTRAVENOUS
Status: COMPLETED
Start: 2020-12-14 | End: 2020-12-14

## 2020-12-14 RX ORDER — KETOROLAC TROMETHAMINE 30 MG/ML
30 INJECTION, SOLUTION INTRAMUSCULAR; INTRAVENOUS ONCE
Status: COMPLETED | OUTPATIENT
Start: 2020-12-14 | End: 2020-12-14

## 2020-12-14 RX ORDER — DEXAMETHASONE SODIUM PHOSPHATE 4 MG/ML
4 VIAL (ML) INJECTION AS NEEDED
Status: ACTIVE | OUTPATIENT
Start: 2020-12-14 | End: 2020-12-14

## 2020-12-14 RX ORDER — KETOROLAC TROMETHAMINE 30 MG/ML
INJECTION, SOLUTION INTRAMUSCULAR; INTRAVENOUS
Status: COMPLETED
Start: 2020-12-14 | End: 2020-12-14

## 2020-12-14 RX ADMIN — GLYCOPYRROLATE 0.1 MG: 0.2 INJECTION, SOLUTION INTRAMUSCULAR; INTRAVENOUS at 06:00:00

## 2020-12-14 RX ADMIN — SODIUM CHLORIDE, SODIUM LACTATE, POTASSIUM CHLORIDE, CALCIUM CHLORIDE: 600; 310; 30; 20 INJECTION, SOLUTION INTRAVENOUS at 07:11:00

## 2020-12-14 RX ADMIN — KETAMINE HYDROCHLORIDE 50 MG: 50 INJECTION, SOLUTION, CONCENTRATE INTRAMUSCULAR; INTRAVENOUS at 06:57:00

## 2020-12-14 RX ADMIN — ETOMIDATE 12 MG: 2 INJECTION INTRAVENOUS at 06:57:00

## 2020-12-14 RX ADMIN — CAFFEINE AND SODIUM BENZOATE 500 MG: 125 INJECTION, SOLUTION INTRAMUSCULAR; INTRAVENOUS at 06:38:00

## 2020-12-14 RX ADMIN — LABETALOL HYDROCHLORIDE 10 MG: 5 INJECTION, SOLUTION INTRAVENOUS at 06:57:00

## 2020-12-14 RX ADMIN — SODIUM CHLORIDE, SODIUM LACTATE, POTASSIUM CHLORIDE, CALCIUM CHLORIDE: 600; 310; 30; 20 INJECTION, SOLUTION INTRAVENOUS at 06:00:00

## 2020-12-14 RX ADMIN — TRISODIUM CITRATE DIHYDRATE AND CITRIC ACID MONOHYDRATE 30 ML: 500; 334 SOLUTION ORAL at 06:00:00

## 2020-12-14 RX ADMIN — ONDANSETRON 4 MG: 2 INJECTION INTRAMUSCULAR; INTRAVENOUS at 06:00:00

## 2020-12-14 RX ADMIN — KETOROLAC TROMETHAMINE 30 MG: 30 INJECTION, SOLUTION INTRAMUSCULAR; INTRAVENOUS at 06:00:00

## 2020-12-14 NOTE — ANESTHESIA POSTPROCEDURE EVALUATION
[address] Patient Status:  Outpatient   Age/Gender 77year old female MRN [id number]   Location 1310 [facility] Attending Antonio Jacobs MD   Hosp Day # 0 PCP Shahana Castro, DO       Anesthesia Post-op Not

## 2020-12-14 NOTE — PROGRESS NOTES
[facility] / [facility]  ECT Procedure Note    Bouchra Soto Patient Status:  Outpatient   Age/Gender 77year old female MRN [id number]   Location 1310 [facility] Attending Kylie Marsh MD   Hosp Day # 0 CAILIN White

## 2020-12-14 NOTE — ANESTHESIA PREPROCEDURE EVALUATION
PRE-OP EVALUATION    Patient Name: Jocelynn Garcia    Pre-op Diagnosis: * No surgery found *    * No surgery found *    * Surgery not found *    Pre-op vitals reviewed.   Temp: 99.6 °F (37.6 °C)  Pulse: 87  Resp: 14  BP: 136/79  SpO2: 96 %  Body mass inde Decomp Instru Fusion    • CATARACT     • COLONOSCOPY     • ESOPHAGOGASTRODUODENOSCOPY (EGD) N/A 5/26/2020    Performed by Nadir Stewart MD at [facility].  N/A 7/17/2017    Performed by Sanjeev Ro MD at [facility] MAIN Post-procedure pain management plan discussed with surgeon and patient.       Plan/risks discussed with: patient                Present on Admission:  **None**
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
## 2020-12-16 ENCOUNTER — HOSPITAL ENCOUNTER (OUTPATIENT)
Dept: POSTOP/PACU | Facility: HOSPITAL | Age: 75
Discharge: HOME | End: 2020-12-16
Attending: Other
Payer: MEDICARE

## 2020-12-16 ENCOUNTER — ANESTHESIA EVENT (OUTPATIENT)
Dept: POSTOP/PACU | Facility: HOSPITAL | Age: 75
End: 2020-12-16
Payer: MEDICARE

## 2020-12-16 ENCOUNTER — ANESTHESIA (OUTPATIENT)
Dept: POSTOP/PACU | Facility: HOSPITAL | Age: 75
End: 2020-12-16
Payer: MEDICARE

## 2020-12-16 VITALS
TEMPERATURE: 99 F | RESPIRATION RATE: 20 BRPM | DIASTOLIC BLOOD PRESSURE: 85 MMHG | OXYGEN SATURATION: 91 % | SYSTOLIC BLOOD PRESSURE: 161 MMHG | BODY MASS INDEX: 34 KG/M2 | HEART RATE: 79 BPM | HEIGHT: 64 IN

## 2020-12-16 DIAGNOSIS — F33.2 SEVERE RECURRENT MAJOR DEPRESSION WITHOUT PSYCHOTIC FEATURES (HCC): ICD-10-CM

## 2020-12-16 DIAGNOSIS — F33.1 MAJOR DEPRESSIVE DISORDER, RECURRENT EPISODE, MODERATE (HCC): ICD-10-CM

## 2020-12-16 RX ORDER — LABETALOL HYDROCHLORIDE 5 MG/ML
INJECTION, SOLUTION INTRAVENOUS AS NEEDED
Status: DISCONTINUED | OUTPATIENT
Start: 2020-12-16 | End: 2020-12-16 | Stop reason: SURG

## 2020-12-16 RX ORDER — GLYCOPYRROLATE 0.2 MG/ML
0.1 INJECTION, SOLUTION INTRAMUSCULAR; INTRAVENOUS ONCE
Status: COMPLETED | OUTPATIENT
Start: 2020-12-16 | End: 2020-12-16

## 2020-12-16 RX ORDER — KETOROLAC TROMETHAMINE 30 MG/ML
INJECTION, SOLUTION INTRAMUSCULAR; INTRAVENOUS
Status: COMPLETED
Start: 2020-12-16 | End: 2020-12-16

## 2020-12-16 RX ORDER — CAFFEINE AND SODIUM BENZOATE 125 MG/ML
500 INJECTION, SOLUTION INTRAMUSCULAR; INTRAVENOUS ONCE
Status: COMPLETED | OUTPATIENT
Start: 2020-12-16 | End: 2020-12-16

## 2020-12-16 RX ORDER — HYDROMORPHONE HYDROCHLORIDE 1 MG/ML
0.4 INJECTION, SOLUTION INTRAMUSCULAR; INTRAVENOUS; SUBCUTANEOUS EVERY 5 MIN PRN
Status: ACTIVE | OUTPATIENT
Start: 2020-12-16 | End: 2020-12-16

## 2020-12-16 RX ORDER — ONDANSETRON 2 MG/ML
4 INJECTION INTRAMUSCULAR; INTRAVENOUS ONCE
Status: COMPLETED | OUTPATIENT
Start: 2020-12-16 | End: 2020-12-16

## 2020-12-16 RX ORDER — KETOROLAC TROMETHAMINE 30 MG/ML
30 INJECTION, SOLUTION INTRAMUSCULAR; INTRAVENOUS ONCE
Status: COMPLETED | OUTPATIENT
Start: 2020-12-16 | End: 2020-12-16

## 2020-12-16 RX ORDER — NALOXONE HYDROCHLORIDE 0.4 MG/ML
80 INJECTION, SOLUTION INTRAMUSCULAR; INTRAVENOUS; SUBCUTANEOUS AS NEEDED
Status: ACTIVE | OUTPATIENT
Start: 2020-12-16 | End: 2020-12-16

## 2020-12-16 RX ORDER — DEXTROSE MONOHYDRATE 25 G/50ML
50 INJECTION, SOLUTION INTRAVENOUS
Status: DISCONTINUED | OUTPATIENT
Start: 2020-12-16 | End: 2020-12-18

## 2020-12-16 RX ORDER — SODIUM CHLORIDE, SODIUM LACTATE, POTASSIUM CHLORIDE, CALCIUM CHLORIDE 600; 310; 30; 20 MG/100ML; MG/100ML; MG/100ML; MG/100ML
INJECTION, SOLUTION INTRAVENOUS CONTINUOUS
Status: DISCONTINUED | OUTPATIENT
Start: 2020-12-16 | End: 2020-12-18

## 2020-12-16 RX ORDER — ONDANSETRON 2 MG/ML
INJECTION INTRAMUSCULAR; INTRAVENOUS
Status: COMPLETED
Start: 2020-12-16 | End: 2020-12-16

## 2020-12-16 RX ORDER — KETAMINE HYDROCHLORIDE 50 MG/ML
INJECTION, SOLUTION, CONCENTRATE INTRAMUSCULAR; INTRAVENOUS AS NEEDED
Status: DISCONTINUED | OUTPATIENT
Start: 2020-12-16 | End: 2020-12-16 | Stop reason: SURG

## 2020-12-16 RX ORDER — TRISODIUM CITRATE DIHYDRATE AND CITRIC ACID MONOHYDRATE 500; 334 MG/5ML; MG/5ML
SOLUTION ORAL
Status: COMPLETED
Start: 2020-12-16 | End: 2020-12-16

## 2020-12-16 RX ORDER — GLYCOPYRROLATE 0.2 MG/ML
INJECTION, SOLUTION INTRAMUSCULAR; INTRAVENOUS
Status: COMPLETED
Start: 2020-12-16 | End: 2020-12-16

## 2020-12-16 RX ORDER — CAFFEINE AND SODIUM BENZOATE 125 MG/ML
INJECTION, SOLUTION INTRAMUSCULAR; INTRAVENOUS
Status: COMPLETED
Start: 2020-12-16 | End: 2020-12-16

## 2020-12-16 RX ORDER — TRISODIUM CITRATE DIHYDRATE AND CITRIC ACID MONOHYDRATE 500; 334 MG/5ML; MG/5ML
30 SOLUTION ORAL ONCE
Status: COMPLETED | OUTPATIENT
Start: 2020-12-16 | End: 2020-12-16

## 2020-12-16 RX ORDER — ETOMIDATE 2 MG/ML
INJECTION INTRAVENOUS AS NEEDED
Status: DISCONTINUED | OUTPATIENT
Start: 2020-12-16 | End: 2020-12-16 | Stop reason: SURG

## 2020-12-16 RX ADMIN — KETOROLAC TROMETHAMINE 30 MG: 30 INJECTION, SOLUTION INTRAMUSCULAR; INTRAVENOUS at 06:02:00

## 2020-12-16 RX ADMIN — SODIUM CHLORIDE, SODIUM LACTATE, POTASSIUM CHLORIDE, CALCIUM CHLORIDE: 600; 310; 30; 20 INJECTION, SOLUTION INTRAVENOUS at 06:25:00

## 2020-12-16 RX ADMIN — LABETALOL HYDROCHLORIDE 10 MG: 5 INJECTION, SOLUTION INTRAVENOUS at 06:46:00

## 2020-12-16 RX ADMIN — ETOMIDATE 12 MG: 2 INJECTION INTRAVENOUS at 06:46:00

## 2020-12-16 RX ADMIN — TRISODIUM CITRATE DIHYDRATE AND CITRIC ACID MONOHYDRATE 30 ML: 500; 334 SOLUTION ORAL at 06:24:00

## 2020-12-16 RX ADMIN — KETAMINE HYDROCHLORIDE 50 MG: 50 INJECTION, SOLUTION, CONCENTRATE INTRAMUSCULAR; INTRAVENOUS at 06:46:00

## 2020-12-16 RX ADMIN — ONDANSETRON 4 MG: 2 INJECTION INTRAMUSCULAR; INTRAVENOUS at 06:02:00

## 2020-12-16 RX ADMIN — CAFFEINE AND SODIUM BENZOATE 500 MG: 125 INJECTION, SOLUTION INTRAMUSCULAR; INTRAVENOUS at 06:25:00

## 2020-12-16 RX ADMIN — GLYCOPYRROLATE 0.1 MG: 0.2 INJECTION, SOLUTION INTRAMUSCULAR; INTRAVENOUS at 06:02:00

## 2020-12-16 NOTE — ANESTHESIA POSTPROCEDURE EVALUATION
[address] Patient Status:  Outpatient   Age/Gender 77year old female MRN [id number]   Location [address] [facility] Attending Lord David MD   Hosp Day # 0 PCP George Son, DO       Anesthesia Post-op Not

## 2020-12-16 NOTE — PROGRESS NOTES
[facility] / [facility]  ECT Procedure Note    Betty Aquino Patient Status:  Outpatient   Age/Gender 77year old female MRN [id number]   Location 1310 [facility] Attending Lucy Mitchell MD   Hosp Day # 0 PCP [place] Treatment unremarkable    Post-ECT Medications:  None     Tera Fearing
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
## 2020-12-28 RX ORDER — DILTIAZEM HYDROCHLORIDE 120 MG/1
CAPSULE, EXTENDED RELEASE ORAL
Qty: 90 CAPSULE | Refills: 0 | Status: SHIPPED | OUTPATIENT
Start: 2020-12-28 | End: 2021-03-25 | Stop reason: DRUGHIGH

## 2020-12-31 PROCEDURE — 99490 CHRNC CARE MGMT STAFF 1ST 20: CPT

## 2021-01-01 ENCOUNTER — LAB ENCOUNTER (OUTPATIENT)
Dept: LAB | Facility: HOSPITAL | Age: 76
End: 2021-01-01
Attending: Other
Payer: MEDICARE

## 2021-01-01 DIAGNOSIS — Z01.818 PREOP TESTING: ICD-10-CM

## 2021-01-02 LAB — SARS-COV-2 RNA RESP QL NAA+PROBE: NOT DETECTED

## 2021-01-04 ENCOUNTER — HOSPITAL ENCOUNTER (OUTPATIENT)
Dept: POSTOP/PACU | Facility: HOSPITAL | Age: 76
Discharge: HOME | End: 2021-01-04
Attending: Other
Payer: MEDICARE

## 2021-01-04 ENCOUNTER — ANESTHESIA (OUTPATIENT)
Dept: POSTOP/PACU | Facility: HOSPITAL | Age: 76
End: 2021-01-04
Payer: MEDICARE

## 2021-01-04 ENCOUNTER — ANESTHESIA EVENT (OUTPATIENT)
Dept: POSTOP/PACU | Facility: HOSPITAL | Age: 76
End: 2021-01-04
Payer: MEDICARE

## 2021-01-04 VITALS
DIASTOLIC BLOOD PRESSURE: 99 MMHG | RESPIRATION RATE: 22 BRPM | SYSTOLIC BLOOD PRESSURE: 126 MMHG | HEIGHT: 64 IN | HEART RATE: 92 BPM | OXYGEN SATURATION: 95 % | BODY MASS INDEX: 34 KG/M2

## 2021-01-04 DIAGNOSIS — F33.1 MAJOR DEPRESSIVE DISORDER, RECURRENT EPISODE, MODERATE (HCC): ICD-10-CM

## 2021-01-04 RX ORDER — DEXTROSE MONOHYDRATE 25 G/50ML
50 INJECTION, SOLUTION INTRAVENOUS
Status: DISCONTINUED | OUTPATIENT
Start: 2021-01-04 | End: 2021-01-06

## 2021-01-04 RX ORDER — MIDAZOLAM HYDROCHLORIDE 1 MG/ML
1 INJECTION INTRAMUSCULAR; INTRAVENOUS EVERY 5 MIN PRN
Status: ACTIVE | OUTPATIENT
Start: 2021-01-04 | End: 2021-01-04

## 2021-01-04 RX ORDER — TRISODIUM CITRATE DIHYDRATE AND CITRIC ACID MONOHYDRATE 500; 334 MG/5ML; MG/5ML
SOLUTION ORAL
Status: COMPLETED
Start: 2021-01-04 | End: 2021-01-04

## 2021-01-04 RX ORDER — KETOROLAC TROMETHAMINE 30 MG/ML
INJECTION, SOLUTION INTRAMUSCULAR; INTRAVENOUS
Status: COMPLETED
Start: 2021-01-04 | End: 2021-01-04

## 2021-01-04 RX ORDER — ONDANSETRON 2 MG/ML
INJECTION INTRAMUSCULAR; INTRAVENOUS
Status: COMPLETED
Start: 2021-01-04 | End: 2021-01-04

## 2021-01-04 RX ORDER — HYDROMORPHONE HYDROCHLORIDE 1 MG/ML
0.4 INJECTION, SOLUTION INTRAMUSCULAR; INTRAVENOUS; SUBCUTANEOUS EVERY 5 MIN PRN
Status: ACTIVE | OUTPATIENT
Start: 2021-01-04 | End: 2021-01-04

## 2021-01-04 RX ORDER — NALOXONE HYDROCHLORIDE 0.4 MG/ML
80 INJECTION, SOLUTION INTRAMUSCULAR; INTRAVENOUS; SUBCUTANEOUS AS NEEDED
Status: ACTIVE | OUTPATIENT
Start: 2021-01-04 | End: 2021-01-04

## 2021-01-04 RX ORDER — ETOMIDATE 2 MG/ML
INJECTION INTRAVENOUS AS NEEDED
Status: DISCONTINUED | OUTPATIENT
Start: 2021-01-04 | End: 2021-01-04 | Stop reason: SURG

## 2021-01-04 RX ORDER — ACETAMINOPHEN 500 MG
1000 TABLET ORAL ONCE AS NEEDED
Status: ACTIVE | OUTPATIENT
Start: 2021-01-04 | End: 2021-01-04

## 2021-01-04 RX ORDER — CAFFEINE AND SODIUM BENZOATE 125 MG/ML
INJECTION, SOLUTION INTRAMUSCULAR; INTRAVENOUS
Status: COMPLETED
Start: 2021-01-04 | End: 2021-01-04

## 2021-01-04 RX ORDER — CAFFEINE AND SODIUM BENZOATE 125 MG/ML
500 INJECTION, SOLUTION INTRAMUSCULAR; INTRAVENOUS ONCE
Status: COMPLETED | OUTPATIENT
Start: 2021-01-04 | End: 2021-01-04

## 2021-01-04 RX ORDER — KETAMINE HYDROCHLORIDE 50 MG/ML
INJECTION, SOLUTION, CONCENTRATE INTRAMUSCULAR; INTRAVENOUS AS NEEDED
Status: DISCONTINUED | OUTPATIENT
Start: 2021-01-04 | End: 2021-01-04 | Stop reason: SURG

## 2021-01-04 RX ORDER — GLYCOPYRROLATE 0.2 MG/ML
INJECTION, SOLUTION INTRAMUSCULAR; INTRAVENOUS
Status: COMPLETED
Start: 2021-01-04 | End: 2021-01-04

## 2021-01-04 RX ORDER — LABETALOL HYDROCHLORIDE 5 MG/ML
INJECTION, SOLUTION INTRAVENOUS AS NEEDED
Status: DISCONTINUED | OUTPATIENT
Start: 2021-01-04 | End: 2021-01-04 | Stop reason: SURG

## 2021-01-04 RX ORDER — SODIUM CHLORIDE, SODIUM LACTATE, POTASSIUM CHLORIDE, CALCIUM CHLORIDE 600; 310; 30; 20 MG/100ML; MG/100ML; MG/100ML; MG/100ML
INJECTION, SOLUTION INTRAVENOUS CONTINUOUS
Status: DISCONTINUED | OUTPATIENT
Start: 2021-01-04 | End: 2021-01-06

## 2021-01-04 RX ORDER — ONDANSETRON 2 MG/ML
4 INJECTION INTRAMUSCULAR; INTRAVENOUS ONCE
Status: COMPLETED | OUTPATIENT
Start: 2021-01-04 | End: 2021-01-04

## 2021-01-04 RX ORDER — KETOROLAC TROMETHAMINE 30 MG/ML
30 INJECTION, SOLUTION INTRAMUSCULAR; INTRAVENOUS ONCE
Status: COMPLETED | OUTPATIENT
Start: 2021-01-04 | End: 2021-01-04

## 2021-01-04 RX ORDER — TRISODIUM CITRATE DIHYDRATE AND CITRIC ACID MONOHYDRATE 500; 334 MG/5ML; MG/5ML
30 SOLUTION ORAL ONCE
Status: COMPLETED | OUTPATIENT
Start: 2021-01-04 | End: 2021-01-04

## 2021-01-04 RX ORDER — GLYCOPYRROLATE 0.2 MG/ML
0.1 INJECTION, SOLUTION INTRAMUSCULAR; INTRAVENOUS ONCE
Status: COMPLETED | OUTPATIENT
Start: 2021-01-04 | End: 2021-01-04

## 2021-01-04 RX ADMIN — SODIUM CHLORIDE, SODIUM LACTATE, POTASSIUM CHLORIDE, CALCIUM CHLORIDE: 600; 310; 30; 20 INJECTION, SOLUTION INTRAVENOUS at 08:03:00

## 2021-01-04 RX ADMIN — SODIUM CHLORIDE, SODIUM LACTATE, POTASSIUM CHLORIDE, CALCIUM CHLORIDE: 600; 310; 30; 20 INJECTION, SOLUTION INTRAVENOUS at 06:30:00

## 2021-01-04 RX ADMIN — ETOMIDATE 12 MG: 2 INJECTION INTRAVENOUS at 07:50:00

## 2021-01-04 RX ADMIN — ONDANSETRON 4 MG: 2 INJECTION INTRAMUSCULAR; INTRAVENOUS at 06:48:00

## 2021-01-04 RX ADMIN — GLYCOPYRROLATE 0.1 MG: 0.2 INJECTION, SOLUTION INTRAMUSCULAR; INTRAVENOUS at 06:50:00

## 2021-01-04 RX ADMIN — TRISODIUM CITRATE DIHYDRATE AND CITRIC ACID MONOHYDRATE 30 ML: 500; 334 SOLUTION ORAL at 07:10:00

## 2021-01-04 RX ADMIN — CAFFEINE AND SODIUM BENZOATE 500 MG: 125 INJECTION, SOLUTION INTRAMUSCULAR; INTRAVENOUS at 07:11:00

## 2021-01-04 RX ADMIN — KETOROLAC TROMETHAMINE 30 MG: 30 INJECTION, SOLUTION INTRAMUSCULAR; INTRAVENOUS at 06:51:00

## 2021-01-04 RX ADMIN — SODIUM CHLORIDE, SODIUM LACTATE, POTASSIUM CHLORIDE, CALCIUM CHLORIDE: 600; 310; 30; 20 INJECTION, SOLUTION INTRAVENOUS at 07:44:00

## 2021-01-04 RX ADMIN — LABETALOL HYDROCHLORIDE 10 MG: 5 INJECTION, SOLUTION INTRAVENOUS at 07:50:00

## 2021-01-04 RX ADMIN — KETAMINE HYDROCHLORIDE 50 MG: 50 INJECTION, SOLUTION, CONCENTRATE INTRAMUSCULAR; INTRAVENOUS at 07:50:00
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
## 2021-01-04 NOTE — ANESTHESIA POSTPROCEDURE EVALUATION
[address] Patient Status:  Outpatient   Age/Gender 77year old female MRN [id number]   Location 1310 [facility] Attending Bruce Egan MD   Hosp Day # 0 PCP Caroline Sims DO       Anesthesia Post-op Not
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
## 2021-01-04 NOTE — PROGRESS NOTES
[facility] / [facility]  ECT Procedure Note    Mari Yuan Patient Status:  Outpatient   Age/Gender 77year old female MRN [id number]   Location 1310 [facility] Attending Bruce Egan MD   Hosp Day # 0 PCP Fariba Winston

## 2021-01-07 ENCOUNTER — PATIENT OUTREACH (OUTPATIENT)
Dept: CASE MANAGEMENT | Age: 76
End: 2021-01-07

## 2021-01-07 NOTE — PROGRESS NOTES
Called patient and left a message for patient to call back when they can. Reviewed patient chart.  Left contact my contact number [phone]        Time Spent This Encounter Total: 5  min medical record review  Monthly Minute Total including today: 5 lulu

## 2021-01-22 ENCOUNTER — PATIENT OUTREACH (OUTPATIENT)
Dept: CASE MANAGEMENT | Age: 76
End: 2021-01-22

## 2021-01-22 DIAGNOSIS — I10 ESSENTIAL HYPERTENSION: ICD-10-CM

## 2021-01-22 DIAGNOSIS — F33.1 MAJOR DEPRESSIVE DISORDER, RECURRENT EPISODE, MODERATE (HCC): ICD-10-CM

## 2021-01-22 DIAGNOSIS — M51.37 DDD (DEGENERATIVE DISC DISEASE), LUMBOSACRAL: ICD-10-CM

## 2021-01-22 DIAGNOSIS — E66.9 DIABETES MELLITUS TYPE 2 IN OBESE (HCC): ICD-10-CM

## 2021-01-22 DIAGNOSIS — E11.69 DIABETES MELLITUS TYPE 2 IN OBESE (HCC): ICD-10-CM

## 2021-01-22 DIAGNOSIS — F41.9 ANXIETY: ICD-10-CM

## 2021-01-22 DIAGNOSIS — F41.1 GAD (GENERALIZED ANXIETY DISORDER): ICD-10-CM

## 2021-01-22 DIAGNOSIS — F60.3 BORDERLINE PERSONALITY DISORDER (HCC): ICD-10-CM

## 2021-01-22 DIAGNOSIS — E78.2 HYPERLIPIDEMIA, MIXED: ICD-10-CM

## 2021-01-22 NOTE — PROGRESS NOTES
1/22/2021  Spoke to Rhett for CCM.       Updates to patient care team/ comments: No changes per patient  Patient reported changes in medications: No changes per patient   Med Adherence  Comment: Taking as prescribed     Health Maintenance: Pt aware  Zoster 1/25/2021  2:00 PM MICKI [facility]   1/29/2021 11:30 AM Louis MCKEON [facility] 12   2/1/2021  2:00 PM MICKI Newton [address] [place] [address]   2/8/2021  2:00 PM MICKI [facility]   2/15/2021  2:

## 2021-01-27 DIAGNOSIS — Z23 NEED FOR VACCINATION: ICD-10-CM

## 2021-01-31 PROCEDURE — 99490 CHRNC CARE MGMT STAFF 1ST 20: CPT

## 2021-02-10 ENCOUNTER — TELEPHONE (OUTPATIENT)
Dept: CARDIOLOGY | Age: 76
End: 2021-02-10

## 2021-02-10 DIAGNOSIS — R00.2 PALPITATIONS: Primary | ICD-10-CM

## 2021-02-16 ENCOUNTER — OFFICE VISIT (OUTPATIENT)
Dept: CARDIOLOGY | Age: 76
End: 2021-02-16

## 2021-02-16 ENCOUNTER — ANCILLARY PROCEDURE (OUTPATIENT)
Dept: CARDIOLOGY | Age: 76
End: 2021-02-16
Attending: CLINICAL NURSE SPECIALIST

## 2021-02-16 VITALS
DIASTOLIC BLOOD PRESSURE: 78 MMHG | HEIGHT: 64 IN | SYSTOLIC BLOOD PRESSURE: 120 MMHG | WEIGHT: 191 LBS | BODY MASS INDEX: 32.61 KG/M2 | HEART RATE: 97 BPM

## 2021-02-16 DIAGNOSIS — R00.2 PALPITATIONS: ICD-10-CM

## 2021-02-16 DIAGNOSIS — Z86.73 OLD CEREBROVASCULAR ACCIDENT (CVA) WITHOUT LATE EFFECT: ICD-10-CM

## 2021-02-16 DIAGNOSIS — R06.02 SOB (SHORTNESS OF BREATH): ICD-10-CM

## 2021-02-16 DIAGNOSIS — E78.2 HYPERLIPIDEMIA, MIXED: ICD-10-CM

## 2021-02-16 DIAGNOSIS — I25.10 CORONARY ARTERY DISEASE INVOLVING NATIVE CORONARY ARTERY OF NATIVE HEART WITHOUT ANGINA PECTORIS: Primary | ICD-10-CM

## 2021-02-16 PROCEDURE — 99214 OFFICE O/P EST MOD 30 MIN: CPT | Performed by: CLINICAL NURSE SPECIALIST

## 2021-02-16 PROCEDURE — 93000 ELECTROCARDIOGRAM COMPLETE: CPT | Performed by: CLINICAL NURSE SPECIALIST

## 2021-02-16 SDOH — HEALTH STABILITY: MENTAL HEALTH: HOW OFTEN DO YOU HAVE A DRINK CONTAINING ALCOHOL?: NEVER

## 2021-02-16 ASSESSMENT — PATIENT HEALTH QUESTIONNAIRE - PHQ9
1. LITTLE INTEREST OR PLEASURE IN DOING THINGS: NOT AT ALL
CLINICAL INTERPRETATION OF PHQ9 SCORE: NO FURTHER SCREENING NEEDED
2. FEELING DOWN, DEPRESSED OR HOPELESS: NOT AT ALL
CLINICAL INTERPRETATION OF PHQ2 SCORE: NO FURTHER SCREENING NEEDED
SUM OF ALL RESPONSES TO PHQ9 QUESTIONS 1 AND 2: 0
SUM OF ALL RESPONSES TO PHQ9 QUESTIONS 1 AND 2: 0

## 2021-02-16 ASSESSMENT — ENCOUNTER SYMPTOMS
HEMATOCHEZIA: 0
WEIGHT LOSS: 0
HEMOPTYSIS: 0
CHILLS: 0
COUGH: 0
ALLERGIC/IMMUNOLOGIC COMMENTS: NO NEW FOOD ALLERGIES
BRUISES/BLEEDS EASILY: 0
FEVER: 0
SHORTNESS OF BREATH: 1
WEIGHT GAIN: 0
SUSPICIOUS LESIONS: 0

## 2021-02-17 ENCOUNTER — HOSPITAL ENCOUNTER (OUTPATIENT)
Dept: GENERAL RADIOLOGY | Age: 76
Discharge: HOME | End: 2021-02-17
Attending: FAMILY MEDICINE
Payer: MEDICARE

## 2021-02-17 ENCOUNTER — TELEMEDICINE (OUTPATIENT)
Dept: FAMILY MEDICINE CLINIC | Facility: CLINIC | Age: 76
End: 2021-02-17
Payer: MEDICARE

## 2021-02-17 ENCOUNTER — LAB ENCOUNTER (OUTPATIENT)
Dept: LAB | Age: 76
End: 2021-02-17
Attending: FAMILY MEDICINE
Payer: MEDICARE

## 2021-02-17 DIAGNOSIS — R05.9 COUGH: ICD-10-CM

## 2021-02-17 DIAGNOSIS — R05.9 COUGH: Primary | ICD-10-CM

## 2021-02-17 PROCEDURE — 99213 OFFICE O/P EST LOW 20 MIN: CPT | Performed by: FAMILY MEDICINE

## 2021-02-17 PROCEDURE — 71046 X-RAY EXAM CHEST 2 VIEWS: CPT | Performed by: FAMILY MEDICINE

## 2021-02-17 NOTE — PROGRESS NOTES
HPI:    Patient ID: Makenna Yoedr is a 77year old female. Cough  This is a new problem. Episode onset: 2 weeks ago. The problem has been unchanged. The problem occurs every few minutes. The cough is non-productive.  Associated symptoms include chills, g 0   • Omeprazole 40 MG Oral Capsule Delayed Release Take 1 capsule (40 mg total) by mouth daily. 90 capsule 3   • dilTIAZem HCl ER Coated Beads (CARTIA XT) 120 MG Oral Capsule SR 24 Hr Take 120 mg by mouth daily.      • Acetaminophen (ACETAMINOPHEN EXTRA

## 2021-02-18 LAB — SARS-COV-2 RNA RESP QL NAA+PROBE: NOT DETECTED

## 2021-02-21 DIAGNOSIS — I63.9 ISCHEMIC STROKE (HCC): ICD-10-CM

## 2021-02-21 DIAGNOSIS — R53.1 GENERALIZED WEAKNESS: ICD-10-CM

## 2021-02-21 DIAGNOSIS — R42 DIZZY: ICD-10-CM

## 2021-02-22 ENCOUNTER — HOSPITAL ENCOUNTER (OUTPATIENT)
Dept: CARDIOLOGY CLINIC | Facility: HOSPITAL | Age: 76
Discharge: HOME | End: 2021-02-22
Attending: CLINICAL NURSE SPECIALIST
Payer: MEDICARE

## 2021-02-22 DIAGNOSIS — Z86.73 OLD CEREBROVASCULAR ACCIDENT (CVA) WITHOUT LATE EFFECT: ICD-10-CM

## 2021-02-22 PROCEDURE — 93880 EXTRACRANIAL BILAT STUDY: CPT | Performed by: INTERNAL MEDICINE

## 2021-02-22 RX ORDER — ATORVASTATIN CALCIUM 40 MG/1
TABLET, FILM COATED ORAL
Qty: 90 TABLET | Refills: 0 | Status: SHIPPED | OUTPATIENT
Start: 2021-02-22 | End: 2021-04-13

## 2021-02-24 ENCOUNTER — TELEPHONE (OUTPATIENT)
Dept: CARDIOLOGY | Age: 76
End: 2021-02-24

## 2021-02-24 DIAGNOSIS — Z86.73 OLD CEREBROVASCULAR ACCIDENT (CVA) WITHOUT LATE EFFECT: ICD-10-CM

## 2021-02-26 PROCEDURE — 93272 ECG/REVIEW INTERPRET ONLY: CPT | Performed by: INTERNAL MEDICINE

## 2021-03-02 ENCOUNTER — OFFICE VISIT (OUTPATIENT)
Dept: FAMILY MEDICINE CLINIC | Facility: CLINIC | Age: 76
End: 2021-03-02
Payer: MEDICARE

## 2021-03-02 ENCOUNTER — LAB ENCOUNTER (OUTPATIENT)
Dept: LAB | Age: 76
End: 2021-03-02
Attending: FAMILY MEDICINE
Payer: MEDICARE

## 2021-03-02 VITALS
WEIGHT: 194 LBS | HEIGHT: 64 IN | DIASTOLIC BLOOD PRESSURE: 76 MMHG | HEART RATE: 78 BPM | BODY MASS INDEX: 33.12 KG/M2 | RESPIRATION RATE: 16 BRPM | TEMPERATURE: 97 F | SYSTOLIC BLOOD PRESSURE: 116 MMHG | OXYGEN SATURATION: 97 %

## 2021-03-02 DIAGNOSIS — R53.83 FATIGUE, UNSPECIFIED TYPE: ICD-10-CM

## 2021-03-02 DIAGNOSIS — I49.9 CARDIAC ARRHYTHMIA, UNSPECIFIED CARDIAC ARRHYTHMIA TYPE: ICD-10-CM

## 2021-03-02 DIAGNOSIS — E04.1 THYROID CYST: ICD-10-CM

## 2021-03-02 DIAGNOSIS — R00.2 PALPITATIONS: Primary | ICD-10-CM

## 2021-03-02 DIAGNOSIS — R05.3 CHRONIC COUGH: ICD-10-CM

## 2021-03-02 DIAGNOSIS — R00.2 PALPITATIONS: ICD-10-CM

## 2021-03-02 LAB
ALBUMIN SERPL-MCNC: 3.4 G/DL (ref 3.4–5)
ALBUMIN/GLOB SERPL: 0.9 {RATIO} (ref 1–2)
ALP LIVER SERPL-CCNC: 97 U/L
ALT SERPL-CCNC: 23 U/L
ANION GAP SERPL CALC-SCNC: 5 MMOL/L (ref 0–18)
AST SERPL-CCNC: 11 U/L (ref 15–37)
BASOPHILS # BLD AUTO: 0.08 X10(3) UL (ref 0–0.2)
BASOPHILS NFR BLD AUTO: 0.6 %
BILIRUB SERPL-MCNC: 0.3 MG/DL (ref 0.1–2)
BUN BLD-MCNC: 13 MG/DL (ref 7–18)
BUN/CREAT SERPL: 12.5 (ref 10–20)
CALCIUM BLD-MCNC: 9.5 MG/DL (ref 8.5–10.1)
CHLORIDE SERPL-SCNC: 105 MMOL/L (ref 98–112)
CO2 SERPL-SCNC: 27 MMOL/L (ref 21–32)
CREAT BLD-MCNC: 1.04 MG/DL
DEPRECATED HBV CORE AB SER IA-ACNC: 68.4 NG/ML
DEPRECATED RDW RBC AUTO: 51.8 FL (ref 35.1–46.3)
EOSINOPHIL # BLD AUTO: 0.21 X10(3) UL (ref 0–0.7)
EOSINOPHIL NFR BLD AUTO: 1.5 %
ERYTHROCYTE [DISTWIDTH] IN BLOOD BY AUTOMATED COUNT: 15.6 % (ref 11–15)
GLOBULIN PLAS-MCNC: 3.9 G/DL (ref 2.8–4.4)
GLUCOSE BLD-MCNC: 92 MG/DL (ref 70–99)
HAV IGM SER QL: 2 MG/DL (ref 1.6–2.6)
HCT VFR BLD AUTO: 40 %
HGB BLD-MCNC: 12.5 G/DL
IMM GRANULOCYTES # BLD AUTO: 0.07 X10(3) UL (ref 0–1)
IMM GRANULOCYTES NFR BLD: 0.5 %
LYMPHOCYTES # BLD AUTO: 3.47 X10(3) UL (ref 1–4)
LYMPHOCYTES NFR BLD AUTO: 25.5 %
M PROTEIN MFR SERPL ELPH: 7.3 G/DL (ref 6.4–8.2)
MCH RBC QN AUTO: 28.3 PG (ref 26–34)
MCHC RBC AUTO-ENTMCNC: 31.3 G/DL (ref 31–37)
MCV RBC AUTO: 90.5 FL
MONOCYTES # BLD AUTO: 0.63 X10(3) UL (ref 0.1–1)
MONOCYTES NFR BLD AUTO: 4.6 %
NEUTROPHILS # BLD AUTO: 9.16 X10 (3) UL (ref 1.5–7.7)
NEUTROPHILS # BLD AUTO: 9.16 X10(3) UL (ref 1.5–7.7)
NEUTROPHILS NFR BLD AUTO: 67.3 %
OSMOLALITY SERPL CALC.SUM OF ELEC: 284 MOSM/KG (ref 275–295)
PATIENT FASTING Y/N/NP: YES
PLATELET # BLD AUTO: 465 10(3)UL (ref 150–450)
POTASSIUM SERPL-SCNC: 4.4 MMOL/L (ref 3.5–5.1)
RBC # BLD AUTO: 4.42 X10(6)UL
SODIUM SERPL-SCNC: 137 MMOL/L (ref 136–145)
TSI SER-ACNC: 2.09 MIU/ML (ref 0.36–3.74)
VIT D+METAB SERPL-MCNC: 37.4 NG/ML (ref 30–100)
WBC # BLD AUTO: 13.6 X10(3) UL (ref 4–11)

## 2021-03-02 PROCEDURE — 80053 COMPREHEN METABOLIC PANEL: CPT

## 2021-03-02 PROCEDURE — 82306 VITAMIN D 25 HYDROXY: CPT

## 2021-03-02 PROCEDURE — 36415 COLL VENOUS BLD VENIPUNCTURE: CPT

## 2021-03-02 PROCEDURE — 84443 ASSAY THYROID STIM HORMONE: CPT

## 2021-03-02 PROCEDURE — 99214 OFFICE O/P EST MOD 30 MIN: CPT | Performed by: FAMILY MEDICINE

## 2021-03-02 PROCEDURE — 82728 ASSAY OF FERRITIN: CPT

## 2021-03-02 PROCEDURE — 83735 ASSAY OF MAGNESIUM: CPT

## 2021-03-02 PROCEDURE — 85025 COMPLETE CBC W/AUTO DIFF WBC: CPT

## 2021-03-02 RX ORDER — FLUTICASONE PROPIONATE 50 MCG
2 SPRAY, SUSPENSION (ML) NASAL DAILY
Qty: 1 BOTTLE | Refills: 1 | Status: SHIPPED | OUTPATIENT
Start: 2021-03-02 | End: 2021-12-13 | Stop reason: ALTCHOICE

## 2021-03-02 RX ORDER — MULTIVITAMIN WITH FOLIC ACID 400 MCG
1 TABLET ORAL DAILY
COMMUNITY
End: 2021-12-06 | Stop reason: ALTCHOICE

## 2021-03-02 RX ORDER — MULTIVIT-MIN/IRON/FOLIC ACID/K 18-600-40
2000 CAPSULE ORAL DAILY
COMMUNITY

## 2021-03-02 NOTE — PROGRESS NOTES
HPI:    Patient ID: Alonso Valera is a 77year old female. Palpitations multiple times daily that feels hard but only last a few seconds. No dizzyness directly with it, but has been feeling fatigued. Continued chronic dry cough. No CP. No fever. DAILY 200 each 2   • Glucose Blood (FREESTYLE LITE TEST) In Vitro Strip TEST ONCE DAILY 200 strip 2   • Iron Combinations (IRON COMPLEX OR) Take 1 tablet by mouth daily. • Vitamin B-12 (VITAMIN B12) 1000 MCG Oral Tab Take 1,000 mcg by mouth daily. Future  - TSH W REFLEX TO FREE T4; Future  - FERRITIN; Future  - MAGNESIUM; Future    2. Fatigue, unspecified type  - CBC WITH DIFFERENTIAL WITH PLATELET; Future  - COMP METABOLIC PANEL (14);  Future  - TSH W REFLEX TO FREE T4; Future  - VITAMIN D, 25-HYDRO

## 2021-03-02 NOTE — PATIENT INSTRUCTIONS
claritin and flonase daily x 2 weeks. Trial of decrease in duloxetine to just 2 tablets daily    Get US thyroid    Follow up in 2 weeks.

## 2021-03-03 ENCOUNTER — HOSPITAL ENCOUNTER (OUTPATIENT)
Dept: CV DIAGNOSTICS | Facility: HOSPITAL | Age: 76
Discharge: HOME | End: 2021-03-03
Attending: CLINICAL NURSE SPECIALIST
Payer: MEDICARE

## 2021-03-03 DIAGNOSIS — R06.02 SHORTNESS OF BREATH: ICD-10-CM

## 2021-03-03 DIAGNOSIS — I25.10 CORONARY ATHEROSCLEROSIS OF NATIVE CORONARY ARTERY: ICD-10-CM

## 2021-03-03 PROCEDURE — 78452 HT MUSCLE IMAGE SPECT MULT: CPT | Performed by: CLINICAL NURSE SPECIALIST

## 2021-03-03 PROCEDURE — 93018 CV STRESS TEST I&R ONLY: CPT | Performed by: CLINICAL NURSE SPECIALIST

## 2021-03-03 PROCEDURE — 93017 CV STRESS TEST TRACING ONLY: CPT | Performed by: CLINICAL NURSE SPECIALIST

## 2021-03-05 ENCOUNTER — PATIENT OUTREACH (OUTPATIENT)
Dept: CASE MANAGEMENT | Age: 76
End: 2021-03-05

## 2021-03-05 DIAGNOSIS — F32.A DEPRESSIVE DISORDER: ICD-10-CM

## 2021-03-05 DIAGNOSIS — E66.9 DIABETES MELLITUS TYPE 2 IN OBESE (HCC): ICD-10-CM

## 2021-03-05 DIAGNOSIS — I25.10 CORONARY ARTERY DISEASE INVOLVING NATIVE CORONARY ARTERY OF NATIVE HEART WITHOUT ANGINA PECTORIS: ICD-10-CM

## 2021-03-05 DIAGNOSIS — E78.2 HYPERLIPIDEMIA, MIXED: ICD-10-CM

## 2021-03-05 DIAGNOSIS — M51.37 DDD (DEGENERATIVE DISC DISEASE), LUMBOSACRAL: ICD-10-CM

## 2021-03-05 DIAGNOSIS — I10 ESSENTIAL HYPERTENSION: ICD-10-CM

## 2021-03-05 DIAGNOSIS — E11.69 DIABETES MELLITUS TYPE 2 IN OBESE (HCC): ICD-10-CM

## 2021-03-05 DIAGNOSIS — F41.9 ANXIETY: ICD-10-CM

## 2021-03-05 DIAGNOSIS — F33.1 MAJOR DEPRESSIVE DISORDER, RECURRENT EPISODE, MODERATE (HCC): ICD-10-CM

## 2021-03-05 NOTE — PROGRESS NOTES
3/5/2021  Spoke to Nicolasa Ricardo for CCM.       Updates to patient care team/ comments: No changes per patient  Patient reported changes in medications: No changes per patient   Med Adherence  Comment: Taking as prescribed     Health Maintenance: Pt aware  Zoster V RM [address]   3/23/2021 10:00 AM Anam Mast DO [facility] [address] [facility] [address]   3/26/2021 11:30 AM Brandy MCKEON [facility] [place] [address]   9/1/2021 10:45 AM Liza Willson MD [facility]             Patient agrees to goal action plan.

## 2021-03-09 ENCOUNTER — TELEPHONE (OUTPATIENT)
Dept: CARDIOLOGY | Age: 76
End: 2021-03-09

## 2021-03-17 ENCOUNTER — IMMUNIZATION (OUTPATIENT)
Dept: LAB | Age: 76
End: 2021-03-17
Attending: HOSPITALIST
Payer: MEDICARE

## 2021-03-17 DIAGNOSIS — Z23 NEED FOR VACCINATION: Primary | ICD-10-CM

## 2021-03-17 PROCEDURE — 0001A SARSCOV2 VAC 30MCG/0.3ML IM: CPT

## 2021-03-18 ENCOUNTER — HOSPITAL ENCOUNTER (OUTPATIENT)
Dept: ULTRASOUND IMAGING | Facility: HOSPITAL | Age: 76
Discharge: HOME | End: 2021-03-18
Attending: FAMILY MEDICINE
Payer: MEDICARE

## 2021-03-18 DIAGNOSIS — E04.1 THYROID CYST: ICD-10-CM

## 2021-03-18 PROCEDURE — 76536 US EXAM OF HEAD AND NECK: CPT | Performed by: FAMILY MEDICINE

## 2021-03-19 ENCOUNTER — HOSPITAL ENCOUNTER (OUTPATIENT)
Dept: LAB | Facility: HOSPITAL | Age: 76
Discharge: HOME | End: 2021-03-19
Attending: INTERNAL MEDICINE
Payer: MEDICARE

## 2021-03-19 LAB
CHOLEST SMN-MCNC: 155 MG/DL (ref ?–200)
HDLC SERPL-MCNC: 53 MG/DL (ref 40–59)
HGB BLD-MCNC: 12.2 G/DL
LDLC SERPL CALC-MCNC: 62 MG/DL (ref ?–100)
NONHDLC SERPL-MCNC: 102 MG/DL (ref ?–130)
PATIENT FASTING Y/N/NP: YES
TRIGL SERPL-MCNC: 198 MG/DL (ref 30–149)
VLDLC SERPL CALC-MCNC: 40 MG/DL (ref 0–30)

## 2021-03-19 PROCEDURE — 36415 COLL VENOUS BLD VENIPUNCTURE: CPT | Performed by: INTERNAL MEDICINE

## 2021-03-19 PROCEDURE — 80061 LIPID PANEL: CPT | Performed by: INTERNAL MEDICINE

## 2021-03-19 PROCEDURE — 85018 HEMOGLOBIN: CPT | Performed by: INTERNAL MEDICINE

## 2021-03-23 ENCOUNTER — OFFICE VISIT (OUTPATIENT)
Dept: FAMILY MEDICINE CLINIC | Facility: CLINIC | Age: 76
End: 2021-03-23
Payer: MEDICARE

## 2021-03-23 VITALS
BODY MASS INDEX: 32.95 KG/M2 | SYSTOLIC BLOOD PRESSURE: 122 MMHG | OXYGEN SATURATION: 98 % | WEIGHT: 193 LBS | RESPIRATION RATE: 16 BRPM | HEART RATE: 90 BPM | HEIGHT: 64 IN | DIASTOLIC BLOOD PRESSURE: 74 MMHG

## 2021-03-23 DIAGNOSIS — L30.9 ECZEMA, UNSPECIFIED TYPE: ICD-10-CM

## 2021-03-23 DIAGNOSIS — R00.2 PALPITATIONS: ICD-10-CM

## 2021-03-23 DIAGNOSIS — R29.898 KNEE CLICKING: ICD-10-CM
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
## 2021-03-23 DIAGNOSIS — R26.89 BALANCE PROBLEM: ICD-10-CM

## 2021-03-23 DIAGNOSIS — M25.561 ACUTE PAIN OF RIGHT KNEE: ICD-10-CM

## 2021-03-23 PROCEDURE — 99214 OFFICE O/P EST MOD 30 MIN: CPT | Performed by: FAMILY MEDICINE

## 2021-03-23 NOTE — PROGRESS NOTES
HPI/Subjective:   Patient ID: Betty Aquino is a 77year old female. Balance Problem  This is a chronic problem. This problem has been worsening since onset. Associated symptoms include loss of balance, weakness. She complains of recent fall.  Rachell loyd by: cold weather. frequent hand washing. Treatments tried: Betamethasone dipropionate 0.05 % External Cream. The treatment provided moderate relief. History/Other:   Review of Systems   Respiratory: Negative for shortness of breath.     Cardiovascular dilTIAZem HCl ER Coated Beads (CARTIA XT) 120 MG Oral Capsule SR 24 Hr Take 120 mg by mouth daily. • Acetaminophen (ACETAMINOPHEN EXTRA STRENGTH) 500 MG Oral Cap Take 500-1,000 mg by mouth every 6 (six) hours as needed.        • HAMZAH GALVIN Does n walker to prevent falls. She is advised to f/u with orthopedic to discuss knee pain further.  - ORTHOPEDIC - INTERNAL    2. Acute pain of right knee  She is advised to f/u with orthopedic to discuss this problem further.  - ORTHOPEDIC - INTERNAL    3.  Knee

## 2021-03-24 ENCOUNTER — OFFICE VISIT (OUTPATIENT)
Dept: CARDIOLOGY | Age: 76
End: 2021-03-24

## 2021-03-24 ENCOUNTER — TELEPHONE (OUTPATIENT)
Dept: CARDIOLOGY | Age: 76
End: 2021-03-24

## 2021-03-24 VITALS
HEART RATE: 76 BPM | BODY MASS INDEX: 32.78 KG/M2 | DIASTOLIC BLOOD PRESSURE: 72 MMHG | SYSTOLIC BLOOD PRESSURE: 116 MMHG | HEIGHT: 64 IN | WEIGHT: 192 LBS

## 2021-03-24 DIAGNOSIS — E78.2 HYPERLIPIDEMIA, MIXED: ICD-10-CM

## 2021-03-24 DIAGNOSIS — I25.10 CORONARY ARTERY DISEASE INVOLVING NATIVE CORONARY ARTERY OF NATIVE HEART WITHOUT ANGINA PECTORIS: Primary | ICD-10-CM

## 2021-03-24 DIAGNOSIS — R06.02 SOB (SHORTNESS OF BREATH): ICD-10-CM

## 2021-03-24 DIAGNOSIS — E11.9 TYPE 2 DIABETES MELLITUS WITHOUT COMPLICATION, WITHOUT LONG-TERM CURRENT USE OF INSULIN (CMD): ICD-10-CM

## 2021-03-24 DIAGNOSIS — R00.2 PALPITATIONS: ICD-10-CM

## 2021-03-24 DIAGNOSIS — Z86.73 OLD CEREBROVASCULAR ACCIDENT (CVA) WITHOUT LATE EFFECT: ICD-10-CM

## 2021-03-24 PROCEDURE — 99215 OFFICE O/P EST HI 40 MIN: CPT | Performed by: INTERNAL MEDICINE

## 2021-03-24 ASSESSMENT — ENCOUNTER SYMPTOMS
COUGH: 0
BRUISES/BLEEDS EASILY: 0
FEVER: 0
ALLERGIC/IMMUNOLOGIC COMMENTS: NO NEW FOOD ALLERGIES
WEIGHT GAIN: 0
CHILLS: 0
HEMATOCHEZIA: 0
WEIGHT LOSS: 0
SUSPICIOUS LESIONS: 0
HEMOPTYSIS: 0

## 2021-03-24 ASSESSMENT — PATIENT HEALTH QUESTIONNAIRE - PHQ9
1. LITTLE INTEREST OR PLEASURE IN DOING THINGS: NOT AT ALL
2. FEELING DOWN, DEPRESSED OR HOPELESS: NOT AT ALL
SUM OF ALL RESPONSES TO PHQ9 QUESTIONS 1 AND 2: 0
CLINICAL INTERPRETATION OF PHQ9 SCORE: NO FURTHER SCREENING NEEDED
CLINICAL INTERPRETATION OF PHQ2 SCORE: NO FURTHER SCREENING NEEDED
SUM OF ALL RESPONSES TO PHQ9 QUESTIONS 1 AND 2: 0

## 2021-03-25 RX ORDER — DILTIAZEM HYDROCHLORIDE 240 MG/1
240 CAPSULE, COATED, EXTENDED RELEASE ORAL DAILY
Qty: 90 CAPSULE | Refills: 3 | Status: SHIPPED | OUTPATIENT
Start: 2021-03-25

## 2021-03-31 PROCEDURE — 99490 CHRNC CARE MGMT STAFF 1ST 20: CPT

## 2021-04-02 ENCOUNTER — PATIENT OUTREACH (OUTPATIENT)
Dept: CASE MANAGEMENT | Age: 76
End: 2021-04-02

## 2021-04-02 DIAGNOSIS — I25.10 CORONARY ARTERY DISEASE INVOLVING NATIVE CORONARY ARTERY OF NATIVE HEART WITHOUT ANGINA PECTORIS: ICD-10-CM

## 2021-04-02 DIAGNOSIS — F41.9 ANXIETY: ICD-10-CM

## 2021-04-02 DIAGNOSIS — E78.2 HYPERLIPIDEMIA, MIXED: ICD-10-CM

## 2021-04-02 DIAGNOSIS — M51.37 DDD (DEGENERATIVE DISC DISEASE), LUMBOSACRAL: ICD-10-CM

## 2021-04-02 DIAGNOSIS — F33.1 MAJOR DEPRESSIVE DISORDER, RECURRENT EPISODE, MODERATE (HCC): ICD-10-CM

## 2021-04-02 DIAGNOSIS — F41.1 GAD (GENERALIZED ANXIETY DISORDER): ICD-10-CM

## 2021-04-02 DIAGNOSIS — I10 ESSENTIAL HYPERTENSION: ICD-10-CM

## 2021-04-02 NOTE — PROGRESS NOTES
4/2/2021  Spoke to Iwona Quiñonez for CCM.       Updates to patient care team/ comments: No changes per patient  Patient reported changes in medications: No changes per patient  Med Adherence  Comment: Taking as prescribed    Health Maintenance:   Zoster Vaccines(1 appointment at this time. • Patient Reported New Barriers And Concerns: Per patient none at this time.                    - Plan for overcoming all barriers: N/A    Pt aware  Future Appointments   Date Time Provider Jitendra Cantrell   4/5/2021  2:00 PM

## 2021-04-07 ENCOUNTER — IMMUNIZATION (OUTPATIENT)
Dept: LAB | Age: 76
End: 2021-04-07
Attending: HOSPITALIST
Payer: MEDICARE

## 2021-04-07 DIAGNOSIS — Z23 NEED FOR VACCINATION: Primary | ICD-10-CM

## 2021-04-07 PROCEDURE — 0002A SARSCOV2 VAC 30MCG/0.3ML IM: CPT

## 2021-04-13 PROBLEM — F33.2 MDD (MAJOR DEPRESSIVE DISORDER), RECURRENT SEVERE, WITHOUT PSYCHOSIS (HCC): Status: ACTIVE | Noted: 2021-04-13

## 2021-04-13 NOTE — ED INITIAL ASSESSMENT (HPI)
Patient arrives by ems following making statements about \"not being here anymore\" and maybe taking extra medication. Per ems patient was talking to therapist or [facility] representative today.  Admits upon arrival that she said something about taking her medica
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
## 2021-04-13 NOTE — ED PROVIDER NOTES
Patient Seen in: [facility] Emergency Department      History   Patient presents with:  Eval-P    Stated Complaint: eval p    HPI/Subjective:   HPI    45-year-old female who was sent here for evaluation for suicidal thoughts and statements.   Patient APPENDECTOMY     • BACK SURGERY  07/17/2017    L4-S1 Decomp Instru Fusion    • CATARACT     • COLONOSCOPY     • ESOPHAGOGASTRODUODENOSCOPY (EGD) N/A 5/26/2020    Performed by Weston Chamberlain MD at [place] [address] N/A 7/1 bilaterally  Abdomen: Soft nontender nondistended normal active bowel sounds without rebound, guarding or masses noted  Back nontender without CVA tenderness  Extremity no clubbing, cyanosis or edema noted.   Full range of motion noted without tenderness  N

## 2021-04-13 NOTE — PROGRESS NOTES
04/13/21 Lennie   Have you been practicing social distancing? Yes  Maki Walker reports she just received the second dose of her vaccination last week.)   Have you been wearing a mask when in the community?  Yes   Are the people y

## 2021-04-14 NOTE — ED PROVIDER NOTES
Patient seen primarily by my partner Dr. Marko Barrera. Patient had baseline blood work for medical clearance for her suicidal ideations. She has a negative Covid swab. She had minimally elevated white count but does not been febrile.   She is nontoxic-appearin

## 2021-04-14 NOTE — ED QUICK NOTES
Report given to Allison Jimenez RN at this time. Patient remains updated with plan for inpatient treatment, hoping for bed at [address]. Will follow up with [facility]. Patient ambulatory with steady gait to bathroom. Provided with phone to try and call her daughter.

## 2021-04-30 PROCEDURE — 99490 CHRNC CARE MGMT STAFF 1ST 20: CPT

## 2021-05-03 ENCOUNTER — PATIENT OUTREACH (OUTPATIENT)
Dept: CASE MANAGEMENT | Age: 76
End: 2021-05-03

## 2021-05-03 NOTE — PROGRESS NOTES
Called patient and left a message for patient to call back when they can. Reviewed patient chart.  Left contact my contact number [phone]        Time Spent This Encounter Total: 5  min medical record review  Monthly Minute Total including today: 5 lulu

## 2021-05-04 PROBLEM — Z96.651 HISTORY OF RIGHT KNEE JOINT REPLACEMENT: Status: ACTIVE | Noted: 2019-01-28

## 2021-05-04 PROBLEM — M76.891 TENDINITIS OF RIGHT KNEE: Status: ACTIVE | Noted: 2021-05-04

## 2021-05-10 ENCOUNTER — PATIENT OUTREACH (OUTPATIENT)
Dept: CASE MANAGEMENT | Age: 76
End: 2021-05-10

## 2021-05-10 DIAGNOSIS — M94.251 CHONDROMALACIA OF RIGHT HIP: ICD-10-CM

## 2021-05-10 DIAGNOSIS — F41.9 ANXIETY: ICD-10-CM

## 2021-05-10 DIAGNOSIS — I10 ESSENTIAL HYPERTENSION: ICD-10-CM

## 2021-05-10 DIAGNOSIS — M51.37 DDD (DEGENERATIVE DISC DISEASE), LUMBOSACRAL: ICD-10-CM

## 2021-05-10 DIAGNOSIS — M54.12 CERVICAL RADICULOPATHY: ICD-10-CM

## 2021-05-10 DIAGNOSIS — E78.2 HYPERLIPIDEMIA, MIXED: ICD-10-CM

## 2021-05-10 DIAGNOSIS — F33.1 MAJOR DEPRESSIVE DISORDER, RECURRENT EPISODE, MODERATE (HCC): ICD-10-CM

## 2021-05-10 DIAGNOSIS — F33.2 MDD (MAJOR DEPRESSIVE DISORDER), RECURRENT SEVERE, WITHOUT PSYCHOSIS (HCC): ICD-10-CM

## 2021-05-10 DIAGNOSIS — E66.9 DIABETES MELLITUS TYPE 2 IN OBESE (HCC): ICD-10-CM

## 2021-05-10 DIAGNOSIS — E11.69 DIABETES MELLITUS TYPE 2 IN OBESE (HCC): ICD-10-CM

## 2021-05-10 DIAGNOSIS — F41.1 GAD (GENERALIZED ANXIETY DISORDER): ICD-10-CM

## 2021-05-10 DIAGNOSIS — F32.A DEPRESSIVE DISORDER: ICD-10-CM

## 2021-05-10 NOTE — PROGRESS NOTES
5/10/2021  Spoke to Lety Ch for CCM.       Updates to patient care team/ comments: No changes per patient  Patient reported changes in medications: No changes per paitent  Med Adherence  Comment: Taking as prescribed    Health Maintenance: Pt aware  Zoster Va goal from previous outreach:                       Memory improvement and depression    • Patient reported progress toward goal:Ppatient continues to work on her depression recently spent a week at [place] although she does not feel it helped any. complete health history, and care plan established by Monique Mancini, DO, need for CCM determined from these assessments and data.

## 2021-05-21 RX ORDER — ATORVASTATIN CALCIUM 40 MG/1
TABLET, FILM COATED ORAL
Qty: 90 TABLET | Refills: 0 | Status: SHIPPED | OUTPATIENT
Start: 2021-05-21 | End: 2021-08-19

## 2021-05-31 PROCEDURE — 1111F DSCHRG MED/CURRENT MED MERGE: CPT

## 2021-05-31 PROCEDURE — 99490 CHRNC CARE MGMT STAFF 1ST 20: CPT

## 2021-06-07 ENCOUNTER — PATIENT OUTREACH (OUTPATIENT)
Dept: CASE MANAGEMENT | Age: 76
End: 2021-06-07

## 2021-06-07 NOTE — PROGRESS NOTES
Called patient unable to LM due to mailbox being full. Reviewed patient chart. Will call back at another time.        Time Spent This Encounter Total: 5  min medical record review  Monthly Minute Total including today: 5 minutes

## 2021-06-11 ENCOUNTER — OFFICE VISIT (OUTPATIENT)
Dept: FAMILY MEDICINE CLINIC | Facility: CLINIC | Age: 76
End: 2021-06-11
Payer: MEDICARE

## 2021-06-11 ENCOUNTER — LAB ENCOUNTER (OUTPATIENT)
Dept: LAB | Age: 76
End: 2021-06-11
Attending: FAMILY MEDICINE
Payer: MEDICARE

## 2021-06-11 VITALS
HEIGHT: 64 IN | OXYGEN SATURATION: 98 % | WEIGHT: 198 LBS | HEART RATE: 93 BPM | RESPIRATION RATE: 20 BRPM | SYSTOLIC BLOOD PRESSURE: 112 MMHG | BODY MASS INDEX: 33.8 KG/M2 | DIASTOLIC BLOOD PRESSURE: 60 MMHG | TEMPERATURE: 98 F

## 2021-06-11 DIAGNOSIS — R21 RASH OF FINGER: Primary | ICD-10-CM

## 2021-06-11 DIAGNOSIS — M46.1 SACROILIITIS (HCC): ICD-10-CM

## 2021-06-11 DIAGNOSIS — E11.9 CONTROLLED TYPE 2 DIABETES MELLITUS WITHOUT COMPLICATION, WITHOUT LONG-TERM CURRENT USE OF INSULIN (HCC): ICD-10-CM

## 2021-06-11 DIAGNOSIS — R10.12 LUQ ABDOMINAL PAIN: ICD-10-CM

## 2021-06-11 DIAGNOSIS — F32.A DEPRESSION, UNSPECIFIED DEPRESSION TYPE: ICD-10-CM

## 2021-06-11 PROCEDURE — 36415 COLL VENOUS BLD VENIPUNCTURE: CPT

## 2021-06-11 PROCEDURE — 83036 HEMOGLOBIN GLYCOSYLATED A1C: CPT

## 2021-06-11 PROCEDURE — 80053 COMPREHEN METABOLIC PANEL: CPT

## 2021-06-11 PROCEDURE — 85025 COMPLETE CBC W/AUTO DIFF WBC: CPT

## 2021-06-11 PROCEDURE — 99214 OFFICE O/P EST MOD 30 MIN: CPT | Performed by: FAMILY MEDICINE

## 2021-06-11 RX ORDER — BETAMETHASONE DIPROPIONATE 0.5 MG/G
1 CREAM TOPICAL 2 TIMES DAILY
Qty: 45 G | Refills: 2 | Status: SHIPPED | OUTPATIENT
Start: 2021-06-11

## 2021-06-11 RX ORDER — KETOCONAZOLE 20 MG/G
1 CREAM TOPICAL DAILY
Qty: 1 EACH | Refills: 1 | Status: SHIPPED | OUTPATIENT
Start: 2021-06-11

## 2021-06-11 NOTE — PATIENT INSTRUCTIONS
Take the magnesium citrate now then start miralax daily x 3 months. Then switch to metamucil or benafiber daily. If this doesn't work in 2 months then we can consider colace.     Get elbow donut brace

## 2021-06-11 NOTE — PROGRESS NOTES
HPI/Subjective:   Patient ID: Paola August is a 77year old female. Diabetes  She presents for her follow-up diabetic visit. She has type 2 diabetes mellitus. Disease course: 11/18/20 a1c of 7.4. Hypoglycemia symptoms include nervousness/anxiousness. The pain is present in the gluteal. The pain is mild. Exacerbated by: walking. Associated symptoms include abdominal pain. Pertinent negatives include no weight loss.  (+ popping) Risk factors include obesity, sedentary lifestyle, poor posture and menopause Tab Take 1 tablet (1 mg total) by mouth 2 (two) times daily as needed for Anxiety. 60 tablet 0   • QUEtiapine Fumarate 100 MG Oral Tab TAKE 1 TABLET BY MOUTH AT NIGHT.  PATIENT MAY TAKE AN ADDITIONAL TABLET IF UNABLE TO SLEEP UP TO 3 TIMES A WEEK 90 tablet guarding or rebound. Hernia: No hernia is present.    Skin:     Comments: Redness and peeling of multiple fingers of b/l hands       Assessment & Plan:   Rash of finger  (primary encounter diagnosis)  Sacroiliitis ([address]  Controlled type 2 diabetes richie to 1 week as needed, then 1 week break before restarting. • ketoconazole 2 % External Cream 1 each 1     Sig: Apply 1 Application topically daily.        Imaging & Referrals:  OP REFERRAL TO [facility]  XR ABDOMEN (1 VIEW) (CPT=74018)  US ABDOMEN LIMITED (C

## 2021-06-29 ENCOUNTER — PATIENT OUTREACH (OUTPATIENT)
Dept: CASE MANAGEMENT | Age: 76
End: 2021-06-29

## 2021-06-29 NOTE — PROGRESS NOTES
Called patient and left a message for patient to call back when they can. Reviewed patient chart.  Left contact my contact number [phone]        Time Spent This Encounter Total: 5  min medical record review  Monthly Minute Total including today: 10  mi

## 2021-07-08 NOTE — PROGRESS NOTES
Progress Physical Therapy Summary    Pt has attended 6 visits in Physical Therapy. Subjective: Cheryl García reports no longer having pain in right groin area and anterior-lateral upper thigh. She reports continued R buttock pain with walking.  She notices dorian bilaterally at L2-S1 myotomes.     Lumbar ROM:   Flexion: pain across R iliac crest/posterior on coming back up   Extension: 10 deg  Sidebending: R reaches to knee joint line, no pain; L reaches to the joint line, increased pain on R side  Rotation: R ~ 30% minutes for work and home activities. Progressing  · Pt will demonstrate decreased tone in R piriformis to facilitate decrease in radicular symptoms.  Progressing  · Pt will demonstrate improved neural mobility of R sciatic nerve to improve participation in 7/ Date:               TX#: 8/   NU step L5 5' L5 5' L5 5' L5 5' L5 5'     STM R piriformis and gluteals  Foam rolling R ITB and lateral quads STM R piriformis and gluteals; also peroneals  Foam rolling R ITB and lateral quads Manual sciatic nerve glides, no IV

## 2021-07-09 ENCOUNTER — OFFICE VISIT (OUTPATIENT)
Dept: HEMATOLOGY/ONCOLOGY | Age: 76
End: 2021-07-09
Attending: INTERNAL MEDICINE
Payer: MEDICARE

## 2021-07-09 VITALS
DIASTOLIC BLOOD PRESSURE: 84 MMHG | BODY MASS INDEX: 34 KG/M2 | TEMPERATURE: 97 F | WEIGHT: 200 LBS | HEART RATE: 89 BPM | SYSTOLIC BLOOD PRESSURE: 150 MMHG | OXYGEN SATURATION: 98 % | RESPIRATION RATE: 18 BRPM

## 2021-07-09 DIAGNOSIS — D64.9 NORMOCYTIC ANEMIA: ICD-10-CM

## 2021-07-09 DIAGNOSIS — D72.828 CHRONIC NEUTROPHILIA: Primary | ICD-10-CM

## 2021-07-09 LAB
BASOPHILS # BLD AUTO: 0.07 X10(3) UL (ref 0–0.2)
BASOPHILS NFR BLD AUTO: 0.6 %
CRP SERPL-MCNC: 0.89 MG/DL (ref ?–0.3)
DEPRECATED HBV CORE AB SER IA-ACNC: 42 NG/ML
DEPRECATED RDW RBC AUTO: 48.9 FL (ref 35.1–46.3)
EOSINOPHIL # BLD AUTO: 0.26 X10(3) UL (ref 0–0.7)
EOSINOPHIL NFR BLD AUTO: 2.1 %
ERYTHROCYTE [DISTWIDTH] IN BLOOD BY AUTOMATED COUNT: 15 % (ref 11–15)
HCT VFR BLD AUTO: 36.9 %
HGB BLD-MCNC: 11.7 G/DL
HGB RETIC QN AUTO: 30.6 PG (ref 28.2–36.6)
IMM GRANULOCYTES # BLD AUTO: 0.06 X10(3) UL (ref 0–1)
IMM GRANULOCYTES NFR BLD: 0.5 %
IMM RETICS NFR: 0.15 RATIO (ref 0.1–0.3)
IRON SATURATION: 16 %
IRON SERPL-MCNC: 56 UG/DL
LYMPHOCYTES # BLD AUTO: 2.78 X10(3) UL (ref 1–4)
LYMPHOCYTES NFR BLD AUTO: 22.6 %
MCH RBC QN AUTO: 28.3 PG (ref 26–34)
MCHC RBC AUTO-ENTMCNC: 31.7 G/DL (ref 31–37)
MCV RBC AUTO: 89.1 FL
MONOCYTES # BLD AUTO: 0.54 X10(3) UL (ref 0.1–1)
MONOCYTES NFR BLD AUTO: 4.4 %
NEUTROPHILS # BLD AUTO: 8.59 X10 (3) UL (ref 1.5–7.7)
NEUTROPHILS # BLD AUTO: 8.59 X10(3) UL (ref 1.5–7.7)
NEUTROPHILS NFR BLD AUTO: 69.8 %
PLATELET # BLD AUTO: 458 10(3)UL (ref 150–450)
RBC # BLD AUTO: 4.14 X10(6)UL
RETICS # AUTO: 55.9 X10(3) UL (ref 22.5–147.5)
RETICS/RBC NFR AUTO: 1.4 %
SED RATE-ML: 33 MM/HR
TOTAL IRON BINDING CAPACITY: 359 UG/DL (ref 240–450)
TRANSFERRIN SERPL-MCNC: 241 MG/DL (ref 200–360)
VIT B12 SERPL-MCNC: >2000 PG/ML (ref 193–986)
WBC # BLD AUTO: 12.3 X10(3) UL (ref 4–11)

## 2021-07-09 PROCEDURE — 99205 OFFICE O/P NEW HI 60 MIN: CPT | Performed by: INTERNAL MEDICINE

## 2021-07-09 NOTE — CONSULTS
Cancer Center Report of Consultation    Patient Name: Glenda Bowling   YOB: 1945   Medical Record Number: [id number]   [facility]: [id number]   Consulting Physician: Nina Fiore MD  Referring Physician(s): John Phan  Date of Consultation: 7 • Visual impairment     glasses       Past Surgical History:  Past Surgical History:   Procedure Laterality Date   • APPENDECTOMY     • BACK SURGERY  07/17/2017    L4-S1 Decomp Instru Fusion    • CATARACT     • COLONOSCOPY     • LAPAROSCOPY,DIAGNOSTIC Insecurity:       Worried About [address] in the Last Year:       Ran Out of Food in the Last Year:   Transportation Needs:       Lack of Transportation (Medical):       Lack of Transportation (Non-Medical):   Physical Activity:       Days of Exe daily., Disp: , Rfl:   •  triamcinolone acetonide 0.1 % External Cream, Apply topically 2 (two) times daily as needed. , Disp: 60 g, Rfl: 0  •  Multiple Vitamin (DAILY-JOSE) Oral Tab, Take 1 tablet by mouth daily. , Disp: , Rfl:   •  Cholecalciferol (VITAMIN deficit. Lymphatics: There is no palpable lymphadenopathy throughout in the cervical, supraclavicular, or axillary regions.     Labs reviewed at this visit:  Lab Results   Component Value Date    WBC 12.3 (H) 07/09/2021    RBC 4.14 07/09/2021    HGB 11.7 ( and management, and 10 minutes for post visit review of labs and charting.         Gloria Becerril MD

## 2021-07-12 ENCOUNTER — HOSPITAL ENCOUNTER (OUTPATIENT)
Dept: GENERAL RADIOLOGY | Age: 76
Discharge: HOME | End: 2021-07-12
Attending: FAMILY MEDICINE
Payer: MEDICARE

## 2021-07-12 ENCOUNTER — HOSPITAL ENCOUNTER (OUTPATIENT)
Dept: ULTRASOUND IMAGING | Age: 76
Discharge: HOME | End: 2021-07-12
Attending: FAMILY MEDICINE
Payer: MEDICARE

## 2021-07-12 DIAGNOSIS — R10.12 LUQ ABDOMINAL PAIN: ICD-10-CM

## 2021-07-12 PROCEDURE — 74018 RADEX ABDOMEN 1 VIEW: CPT | Performed by: FAMILY MEDICINE

## 2021-07-12 PROCEDURE — 76700 US EXAM ABDOM COMPLETE: CPT | Performed by: FAMILY MEDICINE

## 2021-07-15 ENCOUNTER — HOSPITAL ENCOUNTER (OUTPATIENT)
Dept: CT IMAGING | Facility: HOSPITAL | Age: 76
Discharge: HOME | End: 2021-07-15
Attending: FAMILY MEDICINE
Payer: MEDICARE

## 2021-07-15 DIAGNOSIS — R10.12 LUQ ABDOMINAL PAIN: ICD-10-CM

## 2021-07-15 DIAGNOSIS — N28.89 KIDNEY MASS: ICD-10-CM

## 2021-07-15 LAB — CREAT BLD-MCNC: 1 MG/DL

## 2021-07-15 PROCEDURE — 82565 ASSAY OF CREATININE: CPT

## 2021-07-15 PROCEDURE — 74170 CT ABD WO CNTRST FLWD CNTRST: CPT | Performed by: FAMILY MEDICINE

## 2021-07-16 ENCOUNTER — OFFICE VISIT (OUTPATIENT)
Dept: FAMILY MEDICINE CLINIC | Facility: CLINIC | Age: 76
End: 2021-07-16
Payer: MEDICARE

## 2021-07-16 VITALS
DIASTOLIC BLOOD PRESSURE: 68 MMHG | TEMPERATURE: 98 F | HEART RATE: 90 BPM | BODY MASS INDEX: 34.15 KG/M2 | SYSTOLIC BLOOD PRESSURE: 114 MMHG | RESPIRATION RATE: 20 BRPM | WEIGHT: 200 LBS | HEIGHT: 64 IN | OXYGEN SATURATION: 96 %

## 2021-07-16 DIAGNOSIS — Z00.00 MEDICARE ANNUAL WELLNESS VISIT, SUBSEQUENT: Primary | ICD-10-CM

## 2021-07-16 DIAGNOSIS — Z00.00 ENCOUNTER FOR ANNUAL HEALTH EXAMINATION: ICD-10-CM

## 2021-07-16 DIAGNOSIS — E78.5 HYPERLIPIDEMIA, UNSPECIFIED HYPERLIPIDEMIA TYPE: ICD-10-CM

## 2021-07-16 DIAGNOSIS — Z12.31 ENCOUNTER FOR SCREENING MAMMOGRAM FOR MALIGNANT NEOPLASM OF BREAST: ICD-10-CM

## 2021-07-16 DIAGNOSIS — E11.9 CONTROLLED TYPE 2 DIABETES MELLITUS WITHOUT COMPLICATION, WITHOUT LONG-TERM CURRENT USE OF INSULIN (HCC): ICD-10-CM

## 2021-07-16 DIAGNOSIS — Z23 NEED FOR VACCINATION: ICD-10-CM

## 2021-07-16 DIAGNOSIS — R10.13 EPIGASTRIC ABDOMINAL PAIN: ICD-10-CM

## 2021-07-16 PROCEDURE — 90471 IMMUNIZATION ADMIN: CPT | Performed by: FAMILY MEDICINE

## 2021-07-16 PROCEDURE — 90750 HZV VACC RECOMBINANT IM: CPT | Performed by: FAMILY MEDICINE

## 2021-07-16 PROCEDURE — G0439 PPPS, SUBSEQ VISIT: HCPCS | Performed by: FAMILY MEDICINE

## 2021-07-16 RX ORDER — NICOTINE POLACRILEX 4 MG/1
GUM, CHEWING ORAL
Qty: 60 TABLET | Refills: 0 | Status: SHIPPED | OUTPATIENT
Start: 2021-07-16 | End: 2021-11-22

## 2021-07-19 ENCOUNTER — PATIENT OUTREACH (OUTPATIENT)
Dept: CASE MANAGEMENT | Age: 76
End: 2021-07-19

## 2021-07-19 NOTE — PROGRESS NOTES
Called patient and left a message for patient to call back when they can. Reviewed patient chart.  Left contact my contact number [phone]        Time Spent This Encounter Total: 5  min medical record review  Monthly Minute Total including today: 5 lulu

## 2021-07-29 ENCOUNTER — PATIENT OUTREACH (OUTPATIENT)
Dept: CASE MANAGEMENT | Age: 76
End: 2021-07-29

## 2021-07-29 NOTE — PROGRESS NOTES
Called patient unable to leave message will try again another time. Reviewed patient chart.  Left contact my contact number [phone]        Time Spent This Encounter Total: 3  min medical record review  Monthly Minute Total including today: 8 minutes

## 2021-07-29 NOTE — PATIENT INSTRUCTIONS
Louisa Conklin's SCREENING SCHEDULE   Tests on this list are recommended by your physician but may not be covered, or covered at this frequency, by your insurer. Please check with your insurance carrier before scheduling to verify coverage.    PREVEN (CPT=77080) 07/29/2019      No recommendations at this time   Pap and Pelvic    Pap   Covered every 2 years for women at normal risk;  Annually if at high risk -  No recommendations at this time    Chlamydia Annually if high risk -  No recommendations at th Annually Lab Results   Component Value Date    CREATSERUM 1.03 (H) 06/11/2021         BUN Annually Lab Results   Component Value Date    BUN 13 06/11/2021       Drug Serum Conc Annually No results found for: DIGOXIN, DIG, VALP              Recommended Webs

## 2021-07-29 NOTE — PROGRESS NOTES
HPI:   Codie Nam is a 77year old female who presents for a Medicare Subsequent Annual Wellness visit (Pt already had Initial Annual Wellness). DMII controlled sugars    HLD. Chronic controlled.   Taking medication regularly    + epigastric ab Advance care planning including the explanation and discussion of advance directives standard forms performed Face to Face with patient and Family/surrogate (if present), and forms available to patient in AVS     She does NOT have a Power of Victor Manuel for unspecified whether generalized or localized, lower leg     Osteopenia     Borderline personality disorder ([facility] [address]     Major depressive disorder, recurrent episode, moderate (HCC)     Major depressive disorder, recurrent (HCC)     Depressive disorder     Fra 198 (H) 03/19/2021          Last Chemistry Labs:   Lab Results   Component Value Date    AST 15 06/11/2021    ALT 18 06/11/2021    CA 9.3 06/11/2021    ALB 3.5 06/11/2021    TSH 1.780 04/13/2021    CREATSERUM 1.03 (H) 06/11/2021     (H) 06/11/2021 Take 15 mL by mouth daily as needed.          MEDICAL INFORMATION:   She  has a past medical history of Abdominal pain, Anesthesia complication, Anxiety state, unspecified, Arthritis, Back pain, Cataract, Chronic pain, Constipation, Depression, Esophageal r Left Eye Chart Acuity: 20/60   Both Eyes Visual Acuity: Corrected Both Eyes Chart Acuity: 20/40   Able To Tolerate Visual Acuity: Yes      Physical Exam    Vaccination History     Immunization History   Administered Date(s) Administered   • >=3 YRS FLUZONE vaccination    Encounter for annual health examination    Other orders  -     ZOSTER VACC RECOMBINANT IM Quirino [address]ricks [address]         Diet assessment: good     PLAN:  The patient indicates understanding of these issues and agrees to the plan.   Reinforced healthy diet, life following risk factors   • Men who are 73-68 years old and have ever smoked   • Anyone with a family history -     Colorectal Cancer Screening  Covered for ages 52-80; only need ONE of the following:    Colonoscopy   Covered every 10 years    Covered every prescription benefits -  No recommendations at this time       Diabetes      Hemoglobin A1C Annually; if last result is elevated, may be asked to retest more frequently.     Medicare covers every 3 months Lab Results   Component Value Date     (H 06

## 2021-07-31 ENCOUNTER — HOSPITAL ENCOUNTER (OUTPATIENT)
Dept: MAMMOGRAPHY | Facility: HOSPITAL | Age: 76
Discharge: HOME | End: 2021-07-31
Attending: FAMILY MEDICINE
Payer: MEDICARE

## 2021-07-31 DIAGNOSIS — Z12.31 ENCOUNTER FOR SCREENING MAMMOGRAM FOR MALIGNANT NEOPLASM OF BREAST: ICD-10-CM

## 2021-07-31 PROCEDURE — 77067 SCR MAMMO BI INCL CAD: CPT | Performed by: FAMILY MEDICINE

## 2021-07-31 PROCEDURE — 77063 BREAST TOMOSYNTHESIS BI: CPT | Performed by: FAMILY MEDICINE

## 2021-08-11 ENCOUNTER — APPOINTMENT (OUTPATIENT)
Dept: HEMATOLOGY/ONCOLOGY | Facility: HOSPITAL | Age: 76
End: 2021-08-11
Attending: INTERNAL MEDICINE
Payer: MEDICARE

## 2021-08-19 RX ORDER — ATORVASTATIN CALCIUM 40 MG/1
TABLET, FILM COATED ORAL
Qty: 90 TABLET | Refills: 0 | Status: SHIPPED | OUTPATIENT
Start: 2021-08-19 | End: 2021-12-06

## 2021-08-31 ENCOUNTER — PATIENT OUTREACH (OUTPATIENT)
Dept: CASE MANAGEMENT | Age: 76
End: 2021-08-31

## 2021-08-31 ENCOUNTER — HOSPITAL ENCOUNTER (EMERGENCY)
Facility: HOSPITAL | Age: 76
Discharge: HOME | End: 2021-08-31
Attending: EMERGENCY MEDICINE
Payer: MEDICARE

## 2021-08-31 ENCOUNTER — APPOINTMENT (OUTPATIENT)
Dept: GENERAL RADIOLOGY | Facility: HOSPITAL | Age: 76
End: 2021-08-31
Attending: EMERGENCY MEDICINE
Payer: MEDICARE

## 2021-08-31 ENCOUNTER — TELEPHONE (OUTPATIENT)
Dept: FAMILY MEDICINE CLINIC | Facility: CLINIC | Age: 76
End: 2021-08-31

## 2021-08-31 ENCOUNTER — APPOINTMENT (OUTPATIENT)
Dept: MRI IMAGING | Facility: HOSPITAL | Age: 76
End: 2021-08-31
Attending: EMERGENCY MEDICINE
Payer: MEDICARE

## 2021-08-31 VITALS
HEART RATE: 81 BPM | SYSTOLIC BLOOD PRESSURE: 150 MMHG | BODY MASS INDEX: 34.15 KG/M2 | OXYGEN SATURATION: 97 % | RESPIRATION RATE: 16 BRPM | WEIGHT: 200 LBS | DIASTOLIC BLOOD PRESSURE: 83 MMHG | TEMPERATURE: 96 F | HEIGHT: 64 IN

## 2021-08-31 DIAGNOSIS — E78.2 HYPERLIPIDEMIA, MIXED: ICD-10-CM

## 2021-08-31 DIAGNOSIS — E11.69 DIABETES MELLITUS TYPE 2 IN OBESE (HCC): ICD-10-CM

## 2021-08-31 DIAGNOSIS — M51.37 DDD (DEGENERATIVE DISC DISEASE), LUMBOSACRAL: ICD-10-CM

## 2021-08-31 DIAGNOSIS — R42 DIZZINESS: Primary | ICD-10-CM

## 2021-08-31 DIAGNOSIS — I25.10 CORONARY ARTERY DISEASE INVOLVING NATIVE CORONARY ARTERY OF NATIVE HEART WITHOUT ANGINA PECTORIS: ICD-10-CM

## 2021-08-31 DIAGNOSIS — F41.9 ANXIETY: ICD-10-CM

## 2021-08-31 DIAGNOSIS — F32.A DEPRESSIVE DISORDER: ICD-10-CM

## 2021-08-31 DIAGNOSIS — F41.1 GAD (GENERALIZED ANXIETY DISORDER): ICD-10-CM

## 2021-08-31 DIAGNOSIS — E66.9 DIABETES MELLITUS TYPE 2 IN OBESE (HCC): ICD-10-CM

## 2021-08-31 DIAGNOSIS — I10 ESSENTIAL HYPERTENSION: ICD-10-CM

## 2021-08-31 LAB
ALBUMIN SERPL-MCNC: 3.3 G/DL (ref 3.4–5)
ALBUMIN/GLOB SERPL: 0.8 {RATIO} (ref 1–2)
ALP LIVER SERPL-CCNC: 100 U/L
ALT SERPL-CCNC: 18 U/L
ANION GAP SERPL CALC-SCNC: 9 MMOL/L (ref 0–18)
AST SERPL-CCNC: 28 U/L (ref 15–37)
BASOPHILS # BLD AUTO: 0.08 X10(3) UL (ref 0–0.2)
BASOPHILS NFR BLD AUTO: 0.5 %
BILIRUB SERPL-MCNC: 0.3 MG/DL (ref 0.1–2)
BILIRUB UR QL STRIP.AUTO: NEGATIVE
BUN BLD-MCNC: 9 MG/DL (ref 7–18)
CALCIUM BLD-MCNC: 9.5 MG/DL (ref 8.5–10.1)
CHLORIDE SERPL-SCNC: 106 MMOL/L (ref 98–112)
CLARITY UR REFRACT.AUTO: CLEAR
CO2 SERPL-SCNC: 23 MMOL/L (ref 21–32)
CREAT BLD-MCNC: 1.03 MG/DL
EOSINOPHIL # BLD AUTO: 0.38 X10(3) UL (ref 0–0.7)
EOSINOPHIL NFR BLD AUTO: 2.5 %
ERYTHROCYTE [DISTWIDTH] IN BLOOD BY AUTOMATED COUNT: 14.7 %
GLOBULIN PLAS-MCNC: 3.9 G/DL (ref 2.8–4.4)
GLUCOSE BLD-MCNC: 122 MG/DL (ref 70–99)
GLUCOSE UR STRIP.AUTO-MCNC: NEGATIVE MG/DL
HCT VFR BLD AUTO: 36.3 %
HGB BLD-MCNC: 11.3 G/DL
IMM GRANULOCYTES # BLD AUTO: 0.07 X10(3) UL (ref 0–1)
IMM GRANULOCYTES NFR BLD: 0.5 %
KETONES UR STRIP.AUTO-MCNC: NEGATIVE MG/DL
LEUKOCYTE ESTERASE UR QL STRIP.AUTO: NEGATIVE
LYMPHOCYTES # BLD AUTO: 4.1 X10(3) UL (ref 1–4)
LYMPHOCYTES NFR BLD AUTO: 27.1 %
M PROTEIN MFR SERPL ELPH: 7.2 G/DL (ref 6.4–8.2)
MCH RBC QN AUTO: 27 PG (ref 26–34)
MCHC RBC AUTO-ENTMCNC: 31.1 G/DL (ref 31–37)
MCV RBC AUTO: 86.6 FL
MONOCYTES # BLD AUTO: 0.68 X10(3) UL (ref 0.1–1)
MONOCYTES NFR BLD AUTO: 4.5 %
NEUTROPHILS # BLD AUTO: 9.82 X10 (3) UL (ref 1.5–7.7)
NEUTROPHILS # BLD AUTO: 9.82 X10(3) UL (ref 1.5–7.7)
NEUTROPHILS NFR BLD AUTO: 64.9 %
NITRITE UR QL STRIP.AUTO: NEGATIVE
OSMOLALITY SERPL CALC.SUM OF ELEC: 286 MOSM/KG (ref 275–295)
PH UR STRIP.AUTO: 5 [PH] (ref 5–8)
PLATELET # BLD AUTO: 480 10(3)UL (ref 150–450)
POTASSIUM SERPL-SCNC: 4.6 MMOL/L (ref 3.5–5.1)
PROT UR STRIP.AUTO-MCNC: NEGATIVE MG/DL
RBC # BLD AUTO: 4.19 X10(6)UL
RBC UR QL AUTO: NEGATIVE
SODIUM SERPL-SCNC: 138 MMOL/L (ref 136–145)
SP GR UR STRIP.AUTO: 1.01 (ref 1–1.03)
TROPONIN I SERPL-MCNC: <0.045 NG/ML (ref ?–0.04)
UROBILINOGEN UR STRIP.AUTO-MCNC: <2 MG/DL
WBC # BLD AUTO: 15.1 X10(3) UL (ref 4–11)

## 2021-08-31 PROCEDURE — A9575 INJ GADOTERATE MEGLUMI 0.1ML: HCPCS

## 2021-08-31 PROCEDURE — 99285 EMERGENCY DEPT VISIT HI MDM: CPT

## 2021-08-31 PROCEDURE — 71045 X-RAY EXAM CHEST 1 VIEW: CPT | Performed by: EMERGENCY MEDICINE

## 2021-08-31 PROCEDURE — 96374 THER/PROPH/DIAG INJ IV PUSH: CPT

## 2021-08-31 PROCEDURE — 84484 ASSAY OF TROPONIN QUANT: CPT | Performed by: EMERGENCY MEDICINE

## 2021-08-31 PROCEDURE — 70546 MR ANGIOGRAPH HEAD W/O&W/DYE: CPT | Performed by: EMERGENCY MEDICINE

## 2021-08-31 PROCEDURE — 85025 COMPLETE CBC W/AUTO DIFF WBC: CPT | Performed by: EMERGENCY MEDICINE

## 2021-08-31 PROCEDURE — 81003 URINALYSIS AUTO W/O SCOPE: CPT | Performed by: EMERGENCY MEDICINE

## 2021-08-31 PROCEDURE — 93010 ELECTROCARDIOGRAM REPORT: CPT

## 2021-08-31 PROCEDURE — 70553 MRI BRAIN STEM W/O & W/DYE: CPT | Performed by: EMERGENCY MEDICINE

## 2021-08-31 PROCEDURE — 70549 MR ANGIOGRAPH NECK W/O&W/DYE: CPT | Performed by: EMERGENCY MEDICINE

## 2021-08-31 PROCEDURE — 93005 ELECTROCARDIOGRAM TRACING: CPT

## 2021-08-31 PROCEDURE — 99490 CHRNC CARE MGMT STAFF 1ST 20: CPT

## 2021-08-31 PROCEDURE — 80053 COMPREHEN METABOLIC PANEL: CPT | Performed by: EMERGENCY MEDICINE

## 2021-08-31 RX ORDER — LORAZEPAM 2 MG/ML
0.5 INJECTION INTRAMUSCULAR ONCE
Status: COMPLETED | OUTPATIENT
Start: 2021-08-31 | End: 2021-08-31

## 2021-08-31 NOTE — TELEPHONE ENCOUNTER
Spoke with patient who stated she began to have left shoulder pain that radiated down her arm to her hand. Patient states the pain has now subsided however she now feel weak in her lower extremities.  Patient does not know if those symptoms are normal or if

## 2021-08-31 NOTE — PROGRESS NOTES
8/31/2021  Spoke to Rhett for CCM.       Updates to patient care team/ comments: No changes per patient  Patient reported changes in medications: No changes per patient  Med Adherence  Comment: taking as prescribed    Health Maintenance: Pt aware  Diabetes aware  Future Appointments   Date Time Provider Jitendra Tamia   9/1/2021 10:45 AM Omkar Leal MD [facility]   9/15/2021 11:00 AM Jasson Aldridge MD [facility]   9/17/2021  2:00 PM Licha Nickerson [facility]   10/1

## 2021-08-31 NOTE — TELEPHONE ENCOUNTER
Spoke to pt, states 10am this morning she was watching tv and her left arm got numb to elbow and was painful. She rubbed it and then started getting headache. She tried to get up but her legs gave out. She layed down and after 20min felt better.  She munire

## 2021-09-01 LAB
ATRIAL RATE: 84 BPM
ATRIAL RATE: 91 BPM
P AXIS: 53 DEGREES
P AXIS: 55 DEGREES
P-R INTERVAL: 148 MS
P-R INTERVAL: 150 MS
Q-T INTERVAL: 356 MS
Q-T INTERVAL: 368 MS
QRS DURATION: 84 MS
QRS DURATION: 90 MS
QTC CALCULATION (BEZET): 434 MS
QTC CALCULATION (BEZET): 437 MS
R AXIS: 31 DEGREES
R AXIS: 31 DEGREES
T AXIS: 36 DEGREES
T AXIS: 38 DEGREES
VENTRICULAR RATE: 84 BPM
VENTRICULAR RATE: 91 BPM

## 2021-09-01 NOTE — ED PROVIDER NOTES
Patient Seen in: [facility] Emergency Department      History   Patient presents with:  Fatigue  Difficulty Breathing    Stated Complaint: SOB, weakness, had palpitations this morning    HPI/Subjective:   HPI    Patient is a 80-year-old female who p Laterality Date   • APPENDECTOMY     • BACK SURGERY  07/17/2017    L4-S1 Decomp Instru Fusion    • CATARACT     • COLONOSCOPY     • LAPAROSCOPY,DIAGNOSTIC     • OTHER Left     foot neuroma removed   • OTHER SURGICAL HISTORY  1982     multiple surgeries p M range of motion and neck supple. Skin:     General: Skin is warm and dry. Findings: No rash. Neurological:      General: No focal deficit present. Mental Status: She is alert and oriented to person, place, and time.       Cranial Nerves: No cr - Abnormal; Notable for the following components:    WBC 15.1 (*)     HGB 11.3 (*)     .0 (*)     Neutrophil Absolute Prelim 9.82 (*)     Neutrophil Absolute 9.82 (*)     Lymphocyte Absolute 4.10 (*)     All other components within normal limits   T moderate chronic microvascular ischemic changes. Chronic appearing     lacunar infarcts are again identified such as within the right cerebellar     hemisphere. No abnormal enhancement is seen. No evidence of acute intracranial     hemorrhage. at 10:01 PM        XR CHEST AP PORTABLE  (CPT=71045)   Final Result    PROCEDURE:  XR CHEST AP PORTABLE  (CPT=71045)         TECHNIQUE:  AP chest radiograph was obtained.          COMPARISON:  [address], XR, XR CHEST PA + LAT CHEST (CPT=71046),     2/17/2 Plan     Clinical Impression:  Dizziness  (primary encounter diagnosis)     Disposition:  Discharge  8/31/2021 10:12 pm    Follow-up:  Remi Ceron DO  [address] [facility] [address] [phone]    In 2 days  Return to the ER if

## 2021-09-07 ENCOUNTER — TELEPHONE (OUTPATIENT)
Dept: FAMILY MEDICINE CLINIC | Facility: CLINIC | Age: 76
End: 2021-09-07

## 2021-09-07 NOTE — TELEPHONE ENCOUNTER
Pt states legs feel weak,not dizzy but lightheaded. Worse when getting up from sitting. Unsure what her b/p is. No chest pain. She does not want to go to ER again. appt made tomorrow with RH to f/u from her ER visit.

## 2021-09-07 NOTE — TELEPHONE ENCOUNTER
Pt went to the ER last week for dizziness and weakness. She wants to fu with Dr. Rosalva sosa because she still does not feel well.   Offered her appt with another provider - she wants nurse to ask Dr. Rosalva Vargas to add her to his sched soon, no openings until ne

## 2021-09-08 ENCOUNTER — OFFICE VISIT (OUTPATIENT)
Dept: FAMILY MEDICINE CLINIC | Facility: CLINIC | Age: 76
End: 2021-09-08
Payer: MEDICARE

## 2021-09-08 ENCOUNTER — LAB ENCOUNTER (OUTPATIENT)
Dept: LAB | Age: 76
End: 2021-09-08
Attending: FAMILY MEDICINE
Payer: MEDICARE

## 2021-09-08 VITALS
DIASTOLIC BLOOD PRESSURE: 82 MMHG | WEIGHT: 202 LBS | OXYGEN SATURATION: 97 % | RESPIRATION RATE: 18 BRPM | SYSTOLIC BLOOD PRESSURE: 122 MMHG | HEIGHT: 64 IN | HEART RATE: 96 BPM | BODY MASS INDEX: 34.49 KG/M2

## 2021-09-08 DIAGNOSIS — H53.9 VISION CHANGES: ICD-10-CM

## 2021-09-08 DIAGNOSIS — R29.898 WEAKNESS OF BOTH LOWER EXTREMITIES: Primary | ICD-10-CM

## 2021-09-08 DIAGNOSIS — E11.9 TYPE 2 DIABETES MELLITUS WITHOUT COMPLICATION, WITHOUT LONG-TERM CURRENT USE OF INSULIN (HCC): ICD-10-CM

## 2021-09-08 DIAGNOSIS — R29.898 WEAKNESS OF BOTH LOWER EXTREMITIES: ICD-10-CM

## 2021-09-08 LAB
CK SERPL-CCNC: 47 U/L
CRP SERPL-MCNC: 1.68 MG/DL (ref ?–0.3)
EST. AVERAGE GLUCOSE BLD GHB EST-MCNC: 169 MG/DL (ref 68–126)
HBA1C MFR BLD HPLC: 7.5 % (ref ?–5.7)
VIT B12 SERPL-MCNC: 708 PG/ML (ref 193–986)

## 2021-09-08 PROCEDURE — 36415 COLL VENOUS BLD VENIPUNCTURE: CPT

## 2021-09-08 PROCEDURE — 82607 VITAMIN B-12: CPT

## 2021-09-08 PROCEDURE — 82550 ASSAY OF CK (CPK): CPT

## 2021-09-08 PROCEDURE — 83036 HEMOGLOBIN GLYCOSYLATED A1C: CPT

## 2021-09-08 PROCEDURE — 86140 C-REACTIVE PROTEIN: CPT

## 2021-09-08 PROCEDURE — 99214 OFFICE O/P EST MOD 30 MIN: CPT | Performed by: FAMILY MEDICINE

## 2021-09-08 NOTE — PROGRESS NOTES
[facility] Progress Note    SUBJECTIVE: Colby Ramos 77year old female is here today for Patient presents with:  ER F/U  Fatigue: Pt is sleeping alor, her legs feel tired, she c/o headaches      Last Wednesday, was lying on cough and her le Laterality Date   • APPENDECTOMY     • BACK SURGERY  07/17/2017    L4-S1 Decomp Instru Fusion    • CATARACT     • COLONOSCOPY     • LAPAROSCOPY,DIAGNOSTIC     • OTHER Left     foot neuroma removed   • OTHER SURGICAL HISTORY  1982     multiple surgeries p M DULoxetine HCl 30 MG Oral Cap DR Particles Take 3 capsules (90 mg total) by mouth daily. 90 capsule 0   • QUEtiapine Fumarate 100 MG Oral Tab TAKE 1 TABLET BY MOUTH AT NIGHT.  PATIENT MAY TAKE AN ADDITIONAL TABLET IF UNABLE TO SLEEP UP TO 3 TIMES A WEEK 90 stable will recommend neurology eval.        Total Time spent with patient and coordinating care:  32 minutes.     Follow up: Julian Stewart MD

## 2021-09-10 ENCOUNTER — TELEPHONE (OUTPATIENT)
Dept: FAMILY MEDICINE CLINIC | Facility: CLINIC | Age: 76
End: 2021-09-10

## 2021-09-10 NOTE — TELEPHONE ENCOUNTER
Patient calling in regards to her test results that she read in [address] Patient was seen on Wednesday. She is very concern about one result that is high and feels that is what is making her not feel well. She is requesting that doctor call her.     Pasquale

## 2021-09-13 ENCOUNTER — OFFICE VISIT (OUTPATIENT)
Dept: NEUROLOGY | Facility: CLINIC | Age: 76
End: 2021-09-13
Payer: MEDICARE

## 2021-09-13 ENCOUNTER — LAB ENCOUNTER (OUTPATIENT)
Dept: LAB | Age: 76
End: 2021-09-13
Attending: Other
Payer: MEDICARE

## 2021-09-13 VITALS
DIASTOLIC BLOOD PRESSURE: 72 MMHG | SYSTOLIC BLOOD PRESSURE: 130 MMHG | HEART RATE: 74 BPM | WEIGHT: 202 LBS | BODY MASS INDEX: 35 KG/M2 | RESPIRATION RATE: 18 BRPM

## 2021-09-13 DIAGNOSIS — R42 DIZZINESS: ICD-10-CM

## 2021-09-13 DIAGNOSIS — R29.898 BILATERAL LEG WEAKNESS: Primary | ICD-10-CM

## 2021-09-13 DIAGNOSIS — R79.82 ELEVATED C-REACTIVE PROTEIN (CRP): ICD-10-CM

## 2021-09-13 LAB
CRP SERPL-MCNC: 1.51 MG/DL (ref ?–0.3)
SED RATE-ML: 26 MM/HR

## 2021-09-13 PROCEDURE — 86140 C-REACTIVE PROTEIN: CPT

## 2021-09-13 PROCEDURE — 36415 COLL VENOUS BLD VENIPUNCTURE: CPT

## 2021-09-13 PROCEDURE — 99214 OFFICE O/P EST MOD 30 MIN: CPT | Performed by: OTHER

## 2021-09-13 PROCEDURE — 85652 RBC SED RATE AUTOMATED: CPT

## 2021-09-13 NOTE — PROGRESS NOTES
HPI:    Patient ID: Carmella Desai is a 77year old female. PCP: Dr Ralph Goodman    [facility]   Dulce Sandoval is a 77year old female who presents for evaluation of multiple problems. She came to the ED on Aug 31 with some discomfort in her left arm and SOB.  This complication, not stated as uncontrolled    • Visual impairment     glasses      Past Surgical History:   Procedure Laterality Date   • APPENDECTOMY     • BACK SURGERY  07/17/2017    L4-S1 Decomp Instru Fusion    • CATARACT     • COLONOSCOPY     • LAPAROSC % External Cream Apply 1 Application topically daily. 1 each 1   • Meloxicam 15 MG Oral Tab Take 1 tablet (15 mg total) by mouth daily. 90 tablet 0   • aspirin EC 81 MG Oral Tab EC Take 81 mg by mouth daily.      • triamcinolone acetonide 0.1 % External Cre to light touch and pinprick     Coordination: Finger-to-nose test intact    Gait: unsteady, uses a cane for suport       TESTS/IMAGING:       MRI brain and MRA head and neck    There is no restricted diffusion to suggest acute infarct.  Moderate chronic mi

## 2021-09-13 NOTE — PROGRESS NOTES
Patient states increase in weakness in the BLE. Patient states increase in tremors in her hands. Patient states dizziness is still occurring.

## 2021-09-14 ENCOUNTER — TELEPHONE (OUTPATIENT)
Dept: NEUROLOGY | Facility: CLINIC | Age: 76
End: 2021-09-14

## 2021-09-14 DIAGNOSIS — R79.82 ELEVATED C-REACTIVE PROTEIN: Primary | ICD-10-CM

## 2021-09-14 DIAGNOSIS — R70.0 ESR RAISED: Primary | ICD-10-CM

## 2021-09-14 NOTE — TELEPHONE ENCOUNTER
Patient is requesting call back regarding new CRP lab, as she states it's still high. She would like to know if the doctor would like any other testing.

## 2021-09-14 NOTE — TELEPHONE ENCOUNTER
Called patient and informed her of lab results and referral to Rheumatology. Sylvan Kanner, MD or Jeffery Cowart DO or Dr. Cecy Ching.   Rheumatology - [facility]  [address]  Phone: [phone] / Fax: [phone]

## 2021-09-14 NOTE — TELEPHONE ENCOUNTER
Laurie García MD   9/14/2021 11:19 AM CDT Back to Top        Mildly elevated ESR and CRP of unclear etiology.  We recommend seeing Rheumatology for possible fibromyalgia or other condition     Left on VM (okay per HIPAA)

## 2021-09-20 ENCOUNTER — LAB ENCOUNTER (OUTPATIENT)
Dept: LAB | Age: 76
End: 2021-09-20
Attending: FAMILY MEDICINE
Payer: MEDICARE

## 2021-09-20 ENCOUNTER — OFFICE VISIT (OUTPATIENT)
Dept: FAMILY MEDICINE CLINIC | Facility: CLINIC | Age: 76
End: 2021-09-20
Payer: MEDICARE

## 2021-09-20 VITALS
WEIGHT: 202 LBS | DIASTOLIC BLOOD PRESSURE: 76 MMHG | HEART RATE: 96 BPM | SYSTOLIC BLOOD PRESSURE: 130 MMHG | HEIGHT: 64 IN | BODY MASS INDEX: 34.49 KG/M2 | RESPIRATION RATE: 22 BRPM | TEMPERATURE: 98 F | OXYGEN SATURATION: 98 %

## 2021-09-20 DIAGNOSIS — R53.83 FATIGUE, UNSPECIFIED TYPE: Primary | ICD-10-CM

## 2021-09-20 DIAGNOSIS — R79.82 ELEVATED C-REACTIVE PROTEIN (CRP): ICD-10-CM

## 2021-09-20 DIAGNOSIS — R26.89 POOR BALANCE: ICD-10-CM

## 2021-09-20 DIAGNOSIS — M62.81 MUSCLE WEAKNESS: ICD-10-CM

## 2021-09-20 DIAGNOSIS — R06.02 SHORTNESS OF BREATH: ICD-10-CM

## 2021-09-20 DIAGNOSIS — R53.83 FATIGUE, UNSPECIFIED TYPE: ICD-10-CM

## 2021-09-20 LAB
BASOPHILS # BLD AUTO: 0.05 X10(3) UL (ref 0–0.2)
BASOPHILS NFR BLD AUTO: 0.4 %
CK SERPL-CCNC: 35 U/L
EOSINOPHIL # BLD AUTO: 0.26 X10(3) UL (ref 0–0.7)
EOSINOPHIL NFR BLD AUTO: 2 %
ERYTHROCYTE [DISTWIDTH] IN BLOOD BY AUTOMATED COUNT: 14.3 %
HCT VFR BLD AUTO: 34.4 %
HGB BLD-MCNC: 11.1 G/DL
IMM GRANULOCYTES # BLD AUTO: 0.06 X10(3) UL (ref 0–1)
IMM GRANULOCYTES NFR BLD: 0.5 %
LYMPHOCYTES # BLD AUTO: 2.98 X10(3) UL (ref 1–4)
LYMPHOCYTES NFR BLD AUTO: 22.9 %
MCH RBC QN AUTO: 27.9 PG (ref 26–34)
MCHC RBC AUTO-ENTMCNC: 32.3 G/DL (ref 31–37)
MCV RBC AUTO: 86.4 FL
MONOCYTES # BLD AUTO: 0.6 X10(3) UL (ref 0.1–1)
MONOCYTES NFR BLD AUTO: 4.6 %
NEUTROPHILS # BLD AUTO: 9.09 X10 (3) UL (ref 1.5–7.7)
NEUTROPHILS # BLD AUTO: 9.09 X10(3) UL (ref 1.5–7.7)
NEUTROPHILS NFR BLD AUTO: 69.6 %
PLATELET # BLD AUTO: 466 10(3)UL (ref 150–450)
RBC # BLD AUTO: 3.98 X10(6)UL
RHEUMATOID FACT SERPL-ACNC: <10 IU/ML (ref ?–15)
WBC # BLD AUTO: 13 X10(3) UL (ref 4–11)

## 2021-09-20 PROCEDURE — 36415 COLL VENOUS BLD VENIPUNCTURE: CPT

## 2021-09-20 PROCEDURE — 86038 ANTINUCLEAR ANTIBODIES: CPT

## 2021-09-20 PROCEDURE — 82550 ASSAY OF CK (CPK): CPT

## 2021-09-20 PROCEDURE — 86200 CCP ANTIBODY: CPT

## 2021-09-20 PROCEDURE — 99214 OFFICE O/P EST MOD 30 MIN: CPT | Performed by: FAMILY MEDICINE

## 2021-09-20 PROCEDURE — 86431 RHEUMATOID FACTOR QUANT: CPT

## 2021-09-20 PROCEDURE — 87040 BLOOD CULTURE FOR BACTERIA: CPT

## 2021-09-20 PROCEDURE — 87476 LYME DIS DNA AMP PROBE: CPT

## 2021-09-20 PROCEDURE — 85025 COMPLETE CBC W/AUTO DIFF WBC: CPT

## 2021-09-20 RX ORDER — INHALER, ASSIST DEVICES
SPACER (EA) MISCELLANEOUS
Qty: 1 EACH | Refills: 0 | Status: SHIPPED | OUTPATIENT
Start: 2021-09-20 | End: 2021-10-19 | Stop reason: ALTCHOICE

## 2021-09-20 RX ORDER — INHALER, ASSIST DEVICES
SPACER (EA) MISCELLANEOUS
Qty: 1 EACH | Refills: 0 | Status: SHIPPED | OUTPATIENT
Start: 2021-09-20 | End: 2021-09-20

## 2021-09-20 RX ORDER — ALBUTEROL SULFATE 90 UG/1
2 AEROSOL, METERED RESPIRATORY (INHALATION) EVERY 6 HOURS PRN
Qty: 1 EACH | Refills: 1 | Status: SHIPPED | OUTPATIENT
Start: 2021-09-20 | End: 2021-10-19 | Stop reason: ALTCHOICE

## 2021-09-20 NOTE — PATIENT INSTRUCTIONS
Go for blood test right now    Try albuterol inhaler with aerochamber    Cut seroquel in 1/2.     Get heart ECHO done in the next 2-3 weeks and follow up to see me afterwards

## 2021-09-21 LAB — CCP IGG SERPL-ACNC: <0.4 U/ML (ref 0–6.9)

## 2021-09-22 ENCOUNTER — OFFICE VISIT (OUTPATIENT)
Dept: HEMATOLOGY/ONCOLOGY | Facility: HOSPITAL | Age: 76
End: 2021-09-22
Attending: INTERNAL MEDICINE
Payer: MEDICARE

## 2021-09-22 ENCOUNTER — APPOINTMENT (OUTPATIENT)
Dept: CARDIOLOGY | Age: 76
End: 2021-09-22

## 2021-09-22 VITALS
SYSTOLIC BLOOD PRESSURE: 121 MMHG | TEMPERATURE: 97 F | RESPIRATION RATE: 16 BRPM | HEART RATE: 96 BPM | WEIGHT: 203 LBS | HEIGHT: 64.02 IN | DIASTOLIC BLOOD PRESSURE: 75 MMHG | OXYGEN SATURATION: 96 % | BODY MASS INDEX: 34.66 KG/M2

## 2021-09-22 DIAGNOSIS — D72.828 CHRONIC NEUTROPHILIA: Primary | ICD-10-CM

## 2021-09-22 DIAGNOSIS — D64.9 NORMOCYTIC ANEMIA: ICD-10-CM

## 2021-09-22 LAB
ANA SCREEN: NEGATIVE
IGA SERPL-MCNC: 200 MG/DL (ref 70–312)
IGM SERPL-MCNC: 38.9 MG/DL (ref 43–279)
IMMUNOGLOBULIN PNL SER-MCNC: 555 MG/DL (ref 791–1643)

## 2021-09-22 PROCEDURE — 99213 OFFICE O/P EST LOW 20 MIN: CPT | Performed by: INTERNAL MEDICINE

## 2021-09-22 NOTE — PROGRESS NOTES
[facility] Progress Note    Problem List:      Patient Active Problem List:     Depression hx suicide attempt     Chronic back pain     Cervical radiculopathy     Tremor     Stroke ([facility] [address] 2004 from med record     Hyperlipidemia, mixed     Trigger finger hip     Trochanteric bursitis of right hip     Lumbosacral spondylosis without myelopathy     Old cerebrovascular accident (CVA) without late effect     SOB (shortness of breath)     Sacroiliitis (HCC)     MDD (major depressive disorder), recurrent severe, nausea and vomiting)    • Psoriasis    • Rheumatoid arthritis ([facility] [address]    • Shortness of breath    • Sleep apnea    • Stroke [facility])     per patient she was unaware but it was noted on a MRI-no effects   • Torn meniscus     right knee   • Type II or unspecified Anxiety. , Disp: 60 tablet, Rfl: 0  METFORMIN  MG Oral Tab, TAKE ONE TABLET BY MOUTH TWICE DAILY WITH MEALS, Disp: 180 tablet, Rfl: 0  Omeprazole 20 MG Oral Tab EC, 1 tab twice daily x 2 weeks, then once daily x 2 weeks, then every other day x 1 week 1021)  Pulse: 96 (09/22 1021)  BP: 121/75 (09/22 1021)  Temp: 97.3 °F (36.3 °C) (09/22 1021)  Do Not Use - Resp Rate: --  SpO2: 96 % (09/22 1021)        Physical Examination:    Constitutional: Patient is alert and not in acute distress.   Respiratory: Adriana filling defect identified   within either renal collecting system.  Portions of the ureters are incompletely opacified. BOWEL/MESENTERY:  Bowel is normal in caliber.  No evidence of obstruction.     AORTA/VASCULAR: Dellaphilomenaeileen Jair is normal in caliber.  Atheromato

## 2021-09-23 ENCOUNTER — PATIENT OUTREACH (OUTPATIENT)
Dept: CASE MANAGEMENT | Age: 76
End: 2021-09-23

## 2021-09-23 DIAGNOSIS — M51.37 DDD (DEGENERATIVE DISC DISEASE), LUMBOSACRAL: ICD-10-CM

## 2021-09-23 DIAGNOSIS — E78.2 HYPERLIPIDEMIA, MIXED: ICD-10-CM

## 2021-09-23 DIAGNOSIS — E66.9 DIABETES MELLITUS TYPE 2 IN OBESE (HCC): ICD-10-CM

## 2021-09-23 DIAGNOSIS — F32.A DEPRESSIVE DISORDER: ICD-10-CM

## 2021-09-23 DIAGNOSIS — E11.69 DIABETES MELLITUS TYPE 2 IN OBESE (HCC): ICD-10-CM

## 2021-09-23 DIAGNOSIS — F41.9 ANXIETY: ICD-10-CM

## 2021-09-23 DIAGNOSIS — F60.3 BORDERLINE PERSONALITY DISORDER (HCC): ICD-10-CM

## 2021-09-23 DIAGNOSIS — F41.1 GAD (GENERALIZED ANXIETY DISORDER): ICD-10-CM

## 2021-09-23 DIAGNOSIS — I25.10 CORONARY ARTERY DISEASE INVOLVING NATIVE CORONARY ARTERY OF NATIVE HEART WITHOUT ANGINA PECTORIS: ICD-10-CM

## 2021-09-23 LAB
ALBUMIN SERPL ELPH-MCNC: 3.75 G/DL (ref 3.75–5.21)
ALBUMIN/GLOB SERPL: 1.23 {RATIO} (ref 1–2)
ALPHA1 GLOB SERPL ELPH-MCNC: 0.41 G/DL (ref 0.19–0.46)
ALPHA2 GLOB SERPL ELPH-MCNC: 1.06 G/DL (ref 0.48–1.05)
B-GLOBULIN SERPL ELPH-MCNC: 0.99 G/DL (ref 0.68–1.23)
BORRELIA SPECIES DNA DETECTION: NOT DETECTED
GAMMA GLOB SERPL ELPH-MCNC: 0.58 G/DL (ref 0.62–1.7)
KAPPA LC FREE SER-MCNC: 1.17 MG/DL (ref 0.33–1.94)
KAPPA LC FREE/LAMBDA FREE SER NEPH: 1.23 {RATIO} (ref 0.26–1.65)
LAMBDA LC FREE SERPL-MCNC: 0.95 MG/DL (ref 0.57–2.63)
PROT SERPL-MCNC: 6.8 G/DL (ref 6.4–8.2)

## 2021-09-23 NOTE — PROGRESS NOTES
9/23/2021  Spoke to Iwona Quiñonez for CCM.       Updates to patient care team/ comments: No changes per patient  Patient reported changes in medications: No changes per patient  Med Adherence  Comment: Taking as prescribed     Health Maintenance: Pt aware  Diabetes 10/11/2021  3:15 PM [address] [facility] CARD STRESS ECHO RM 1 ECARD ECHO [facility] AT [facility]   11/1/2021  1:10 PM Yessenia Parks MD [facility] [place]   11/22/2021  8:30 AM German Rojas MD [facility] [place]   3/2/2022  9:30 AM Alexandre Vazquez MD [facility]

## 2021-09-24 ENCOUNTER — TELEPHONE (OUTPATIENT)
Dept: HEMATOLOGY/ONCOLOGY | Facility: HOSPITAL | Age: 76
End: 2021-09-24

## 2021-09-24 NOTE — TELEPHONE ENCOUNTER
I spoke with Kailee Green. She reports that she is having the same symptoms she mentioned to Dr Tom Patterson when she saw him on 9/22/2021. She thinks she might be having fevers, but has not checked with a thermometer. She has fatigue, but denies dyspnea at rest or with exertion. She denies cold, flu symptoms. Kailee Green said she is eager to get what ever lab results have come back so she does not have to worry all weekend. I told Kailee Green I would forward this message to Dr Tom Patterson and his nurse. She will await a call back.

## 2021-09-24 NOTE — TELEPHONE ENCOUNTER
Patient called and would like to know if there are any results of her blood tests taken on Wednesday, 9/22/21. Patient is very Sharia Barr and has no energy and she has a hard time walking. She gets a fever off and on. Dr. Russel Avalos said patient has some type of inflammation. Please call patient at [phone]  Thank you.

## 2021-09-27 ENCOUNTER — TELEPHONE (OUTPATIENT)
Dept: FAMILY MEDICINE CLINIC | Facility: CLINIC | Age: 76
End: 2021-09-27

## 2021-09-27 ENCOUNTER — TELEPHONE (OUTPATIENT)
Dept: HEMATOLOGY/ONCOLOGY | Facility: HOSPITAL | Age: 76
End: 2021-09-27

## 2021-09-27 NOTE — TELEPHONE ENCOUNTER
Patient is still waiting to discuss her test results, she saw Dr Gwendolyn Jain on 9/22 and still doesn't feel good so she wants to know what the test results show.  Patient called on 9/24 and left a message she wanted to discuss test results but has not received a call back yet

## 2021-09-27 NOTE — TELEPHONE ENCOUNTER
TODAY DR MERLOS CALLED HER AND SAID THAT HE WAS GOING TO CALL DR OLVERA AND NOTIFY HIM ABOUT THE PATIENTS LABS. SHE WAS TOLD TO CALL US AND FIND OUT THROUGH DR OLVERA WHAT THE NEXT STEP IS AFTER HE SPEAKS WITH DR VALENTINE [facility]. PLS CALL HER BACK TO [facility]VISE.

## 2021-09-28 ENCOUNTER — TELEPHONE (OUTPATIENT)
Dept: FAMILY MEDICINE CLINIC | Facility: CLINIC | Age: 76
End: 2021-09-28

## 2021-09-28 NOTE — TELEPHONE ENCOUNTER
Pt states she is waiting to hear from Dr. Shelli Lunsford office and Dr. Errol Jose  Notified pt both have been notified., Dr. Mason Vanegas was paged to speak to Dr. Errol Jose. Have not heard back from Dr. Errol Jose yet.   Message resent to [address] [facility] pt if symptoms worsening to go t

## 2021-09-28 NOTE — TELEPHONE ENCOUNTER
Pt has headache, c protein is bad, no strength, can't walk around. She saw Michaela Kellogg who sent her back to [place]. She never heard from [place]. Wanted to see [place] sooner than Monday. I told her there was nothing sooner.

## 2021-09-28 NOTE — PROGRESS NOTES
Subjective:   Patient ID: Dandy Barrera is a 77year old female. Had blurry vision and went to ED and then to neuro. Blurry vision is intermittent. Has not gone to ophthomology yet.   During eval she was found to have elevated CRP and is very costa SLEEP UP TO 3 TIMES A WEEK 90 tablet 0   • betamethasone dipropionate 0.05 % External Cream Apply 1 Application topically 2 (two) times daily. Use for up to 1 week as needed, then 1 week break before restarting.  45 g 2   • ketoconazole 2 % External Cream A Abdominal:      General: Bowel sounds are normal. There is no distension. Tenderness: There is no abdominal tenderness.        Assessment & Plan:   Fatigue, unspecified type  (primary encounter diagnosis)  Elevated C-reactive protein (CRP)  Poor kya Wheezing.    • Spacer/Aero-Holding Chambers (AEROCHAMBER MV) Does not apply Misc 1 each 0     Sig: Use with albuterol inhaler       Imaging & Referrals:  CARD ECHO 2D DOPPLER (CPT=93306)

## 2021-09-30 PROCEDURE — 99490 CHRNC CARE MGMT STAFF 1ST 20: CPT

## 2021-10-01 RX ORDER — PREDNISONE 20 MG/1
TABLET ORAL
Qty: 13 TABLET | Refills: 0 | Status: SHIPPED | OUTPATIENT
Start: 2021-10-01 | End: 2021-10-19 | Stop reason: ALTCHOICE

## 2021-10-01 NOTE — TELEPHONE ENCOUNTER
Will try prednisone for her symptoms. Spoke with Dr. Michaela Kellogg its likely CRP elevation from chronic diseases she already has like diabetes, knee issues, asthma. Likely not from infection or cancer. Nothing specific in the testing so far.   Option is bone m

## 2021-10-01 NOTE — TELEPHONE ENCOUNTER
Patient notified, verbalized understanding. Patient states she is really lethargic for 2 weeks now. Used Inhaler made breathing worse, states it clogged her whole system, nose and lungs, patient SOB at times.     Daughter will  prednisone from [facility]

## 2021-10-04 ENCOUNTER — LAB ENCOUNTER (OUTPATIENT)
Dept: LAB | Age: 76
End: 2021-10-04
Attending: FAMILY MEDICINE
Payer: MEDICARE

## 2021-10-04 ENCOUNTER — OFFICE VISIT (OUTPATIENT)
Dept: FAMILY MEDICINE CLINIC | Facility: CLINIC | Age: 76
End: 2021-10-04
Payer: MEDICARE

## 2021-10-04 VITALS
RESPIRATION RATE: 18 BRPM | WEIGHT: 201.38 LBS | TEMPERATURE: 98 F | DIASTOLIC BLOOD PRESSURE: 76 MMHG | BODY MASS INDEX: 34.38 KG/M2 | SYSTOLIC BLOOD PRESSURE: 118 MMHG | HEIGHT: 64 IN | OXYGEN SATURATION: 98 % | HEART RATE: 94 BPM

## 2021-10-04 DIAGNOSIS — R53.83 FATIGUE, UNSPECIFIED TYPE: Primary | ICD-10-CM

## 2021-10-04 DIAGNOSIS — D72.829 LEUKOCYTOSIS, UNSPECIFIED TYPE: ICD-10-CM

## 2021-10-04 DIAGNOSIS — R53.83 FATIGUE, UNSPECIFIED TYPE: ICD-10-CM

## 2021-10-04 PROCEDURE — 81003 URINALYSIS AUTO W/O SCOPE: CPT

## 2021-10-04 PROCEDURE — 36415 COLL VENOUS BLD VENIPUNCTURE: CPT

## 2021-10-04 PROCEDURE — 86615 BORDETELLA ANTIBODY: CPT

## 2021-10-04 PROCEDURE — 86665 EPSTEIN-BARR CAPSID VCA: CPT

## 2021-10-04 PROCEDURE — 86664 EPSTEIN-BARR NUCLEAR ANTIGEN: CPT

## 2021-10-04 PROCEDURE — 99213 OFFICE O/P EST LOW 20 MIN: CPT | Performed by: FAMILY MEDICINE

## 2021-10-04 RX ORDER — DILTIAZEM HYDROCHLORIDE 120 MG/1
120 CAPSULE, COATED, EXTENDED RELEASE ORAL DAILY
Qty: 30 CAPSULE | Refills: 2 | Status: SHIPPED | OUTPATIENT
Start: 2021-10-04 | End: 2021-11-22

## 2021-10-04 NOTE — PROGRESS NOTES
Subjective:   Patient ID: Leydi Williamson is a 77year old female. CRP is about 1.51 she thought it said 151 so she was very worried. Her CRP is just mildly elevated and she is following up on this.   She has mildly low hgb, mildly high wbc ad platele Take 81 mg by mouth daily. • triamcinolone acetonide 0.1 % External Cream Apply topically 2 (two) times daily as needed. 60 g 0   • Multiple Vitamin (DAILY-JOSE) Oral Tab Take 1 tablet by mouth daily.      • Cholecalciferol (VITAMIN D) 50 MCG (2000 UT) unspecified type  1. Fatigue, unspecified type  - B PERTUSSIS IGG/IGM AB, QUANT; Future  - EBV, CHRONIC/ACTIVE INFECTION; Future  - URINALYSIS WITH CULTURE REFLEX; Future    2. Leukocytosis, unspecified type  - B PERTUSSIS IGG/IGM AB, QUANT;  Future  - EBV,

## 2021-10-04 NOTE — TELEPHONE ENCOUNTER
I spoke to pt she states she is taking Diltizam 240 mg capsules. Pt states she is breaking apart capsules and  the medication and only taking 1/2.   I instructed pt this is not safe she is not getting the correct doseage  And these are extended rel

## 2021-10-04 NOTE — PATIENT INSTRUCTIONS
Get blood test and urine test done now. Start the prednisone and take it like it says in the bottle. Call if not feeling better in 2 weeks. Lets work on getting you the right dose of the \"cardia'    Get CBC done in December from Dr. Jesus Sánchez.

## 2021-10-04 NOTE — TELEPHONE ENCOUNTER
Call pt to confirm \"cardia\" she stated during our visit is which drug. Have her spell it out and get the dosage. Pt is taking 1/2 of her diltizem capsule.   Can you check with pharmacy if those can be cut in half or not, if not then please sent new 1/2

## 2021-10-06 NOTE — TELEPHONE ENCOUNTER
Did you check with pharmacy if it can be done that way or not? If so no problem. If not then tell her that it could be making her feel sick and that she needs to switch to new px sent.

## 2021-10-11 ENCOUNTER — HOSPITAL ENCOUNTER (OUTPATIENT)
Dept: CV DIAGNOSTICS | Facility: HOSPITAL | Age: 76
Discharge: HOME | End: 2021-10-11
Attending: FAMILY MEDICINE
Payer: MEDICARE

## 2021-10-11 DIAGNOSIS — R06.02 SHORTNESS OF BREATH: ICD-10-CM

## 2021-10-11 DIAGNOSIS — R53.83 FATIGUE, UNSPECIFIED TYPE: ICD-10-CM

## 2021-10-11 PROCEDURE — 93306 TTE W/DOPPLER COMPLETE: CPT | Performed by: FAMILY MEDICINE

## 2021-10-18 ENCOUNTER — TELEPHONE (OUTPATIENT)
Dept: CARDIOLOGY | Age: 76
End: 2021-10-18

## 2021-10-19 ENCOUNTER — PATIENT OUTREACH (OUTPATIENT)
Dept: CASE MANAGEMENT | Age: 76
End: 2021-10-19

## 2021-10-19 DIAGNOSIS — F60.3 BORDERLINE PERSONALITY DISORDER (HCC): ICD-10-CM

## 2021-10-19 DIAGNOSIS — F41.9 ANXIETY: ICD-10-CM

## 2021-10-19 DIAGNOSIS — M51.37 DDD (DEGENERATIVE DISC DISEASE), LUMBOSACRAL: ICD-10-CM

## 2021-10-19 NOTE — PROGRESS NOTES
10/19/2021  Spoke to Rhett for CCM.       Updates to patient care team/ comments: No changes per patient  Patient reported changes in medications: No changes per patient  Med Adherence  Comment: taking as prescribed     Health Maintenance:   Diabetes Care D Appointments   Date Time Provider Jitendra Cantrell   10/25/2021 11:00 AM Fred Stephens DO [facility] [address] [facility] [address]   10/29/2021  2:00 PM [facility]   11/22/2021  8:30 AM Yanni Michael MD [facility]   3/2/2022  9:30 AM B

## 2021-10-25 ENCOUNTER — OFFICE VISIT (OUTPATIENT)
Dept: FAMILY MEDICINE CLINIC | Facility: CLINIC | Age: 76
End: 2021-10-25
Payer: MEDICARE

## 2021-10-25 VITALS
HEART RATE: 108 BPM | DIASTOLIC BLOOD PRESSURE: 70 MMHG | OXYGEN SATURATION: 98 % | HEIGHT: 64 IN | WEIGHT: 196 LBS | SYSTOLIC BLOOD PRESSURE: 116 MMHG | RESPIRATION RATE: 16 BRPM | BODY MASS INDEX: 33.46 KG/M2

## 2021-10-25 DIAGNOSIS — I50.42 CHRONIC COMBINED SYSTOLIC AND DIASTOLIC CONGESTIVE HEART FAILURE (HCC): ICD-10-CM

## 2021-10-25 DIAGNOSIS — R25.1 SHAKING: ICD-10-CM

## 2021-10-25 DIAGNOSIS — G89.29 CHRONIC CHEST PAIN: Primary | ICD-10-CM

## 2021-10-25 DIAGNOSIS — R07.9 CHRONIC CHEST PAIN: Primary | ICD-10-CM

## 2021-10-25 DIAGNOSIS — R06.02 SOB (SHORTNESS OF BREATH): ICD-10-CM

## 2021-10-25 DIAGNOSIS — R53.83 FATIGUE, UNSPECIFIED TYPE: ICD-10-CM

## 2021-10-25 PROCEDURE — 93000 ELECTROCARDIOGRAM COMPLETE: CPT | Performed by: FAMILY MEDICINE

## 2021-10-25 PROCEDURE — 99214 OFFICE O/P EST MOD 30 MIN: CPT | Performed by: FAMILY MEDICINE

## 2021-10-25 RX ORDER — NITROGLYCERIN 0.4 MG/1
0.4 TABLET SUBLINGUAL EVERY 5 MIN PRN
Qty: 12 TABLET | Refills: 0 | Status: SHIPPED | OUTPATIENT
Start: 2021-10-25 | End: 2021-11-22

## 2021-10-25 RX ORDER — METOPROLOL SUCCINATE 25 MG/1
25 TABLET, EXTENDED RELEASE ORAL DAILY
Qty: 30 TABLET | Refills: 1 | Status: ON HOLD | OUTPATIENT
Start: 2021-10-25 | End: 2021-11-22

## 2021-10-25 NOTE — PATIENT INSTRUCTIONS
Trial of nitroglycerin for when you get the chest pain. Try 1-2 tablets.     Switch diltiazem to metoprolol ER 25mg daily starting 10/26/21  After 1 week, try stopping the atorvastatin and see if you feel better  See Cardiologist.  EKG normal.  Get pulmona

## 2021-10-25 NOTE — PROGRESS NOTES
Subjective:   Patient ID: Naseem Locke is a 77year old female. Intermittent chest pain, heart palpitations x 2-3 months. Chest pain lasting up to 40 mins. SOB, fatigue. Had gone to ED 8/31/21 for these symptoms.   Had negative CXR, EKG, troponin daily x 2 weeks, then every other day x 1 week) 60 tablet 0   • QUEtiapine Fumarate 100 MG Oral Tab TAKE 1 TABLET BY MOUTH AT NIGHT. PATIENT MAY TAKE AN ADDITIONAL TABLET IF UNABLE TO SLEEP UP TO 3 TIMES A WEEK 90 tablet 0   • betamethasone dipropionate 0. improve in case she is having side effects from statin but not able to be specific about it.       Orders Placed This Encounter      Pre-Procedure Covid-19 Testing by DRU Chowdhury)      Meds This Visit:  Requested Prescriptions     Signed Prescriptions Disp

## 2021-10-27 ENCOUNTER — MOBILE ENCOUNTER (OUTPATIENT)
Dept: FAMILY MEDICINE CLINIC | Facility: CLINIC | Age: 76
End: 2021-10-27

## 2021-10-27 RX ORDER — CEPHALEXIN 500 MG/1
500 CAPSULE ORAL 2 TIMES DAILY
Qty: 14 CAPSULE | Refills: 0 | Status: SHIPPED | OUTPATIENT
Start: 2021-10-27 | End: 2021-11-22

## 2021-10-31 PROCEDURE — 99490 CHRNC CARE MGMT STAFF 1ST 20: CPT

## 2021-11-08 ENCOUNTER — OFFICE VISIT (OUTPATIENT)
Dept: FAMILY MEDICINE CLINIC | Facility: CLINIC | Age: 76
End: 2021-11-08
Payer: MEDICARE

## 2021-11-08 VITALS
SYSTOLIC BLOOD PRESSURE: 108 MMHG | HEART RATE: 82 BPM | WEIGHT: 197 LBS | DIASTOLIC BLOOD PRESSURE: 70 MMHG | BODY MASS INDEX: 33.63 KG/M2 | OXYGEN SATURATION: 97 % | HEIGHT: 64 IN

## 2021-11-08 DIAGNOSIS — I20.8 ANGINA AT REST (HCC): Primary | ICD-10-CM

## 2021-11-08 DIAGNOSIS — R53.83 FATIGUE, UNSPECIFIED TYPE: ICD-10-CM

## 2021-11-08 PROCEDURE — 99214 OFFICE O/P EST MOD 30 MIN: CPT | Performed by: FAMILY MEDICINE

## 2021-11-08 NOTE — PROGRESS NOTES
Subjective:   Patient ID: Humberto Hollins is a 77year old female. Angina, fatigue, palpitations, SOB intermittently x 2-3 months. When she went to ED for these visits her cardiac testing was fine.   Recent echo showing low EF but no change when start Oral Tab Take 1 tablet by mouth nightly  90 tablet 0   • Omeprazole 20 MG Oral Tab EC 1 tab twice daily x 2 weeks, then once daily x 2 weeks, then every other day x 1 week (Patient taking differently: 1 tab twice daily x 2 weeks, then once daily x 2 weeks, or rales. Musculoskeletal:      Right lower leg: No edema. Left lower leg: No edema.          Assessment & Plan:   Angina at rest [facility])  (primary encounter diagnosis)  Fatigue, unspecified type  2 month follow up with with hematology  Her pain and sy

## 2021-11-08 NOTE — PATIENT INSTRUCTIONS
Restart the atorvastatin. If your chest pain doesn't get resolved with the nitro then please go to ED. Stay hydrated.

## 2021-11-11 NOTE — H&P
Seen and examined. Agree with exam and assessment, no interval change. Former Dr Roberta Nolasco patient with 100% RCA  and moderate LAD disease by cath 1/2019, medical therapy recommended, with consideration of  intervention if new symptoms.     Now with

## 2021-11-13 ENCOUNTER — LAB ENCOUNTER (OUTPATIENT)
Dept: LAB | Facility: HOSPITAL | Age: 76
DRG: 233 | End: 2021-11-13
Attending: INTERNAL MEDICINE
Payer: MEDICARE

## 2021-11-13 DIAGNOSIS — R07.9 CHEST PAIN: ICD-10-CM

## 2021-11-13 PROCEDURE — 80048 BASIC METABOLIC PNL TOTAL CA: CPT

## 2021-11-13 PROCEDURE — 85025 COMPLETE CBC W/AUTO DIFF WBC: CPT

## 2021-11-13 PROCEDURE — 36415 COLL VENOUS BLD VENIPUNCTURE: CPT

## 2021-11-15 ENCOUNTER — PATIENT OUTREACH (OUTPATIENT)
Dept: CASE MANAGEMENT | Age: 76
End: 2021-11-15

## 2021-11-15 DIAGNOSIS — E78.2 HYPERLIPIDEMIA, MIXED: ICD-10-CM

## 2021-11-15 DIAGNOSIS — F32.A DEPRESSIVE DISORDER: ICD-10-CM

## 2021-11-15 DIAGNOSIS — M51.37 DDD (DEGENERATIVE DISC DISEASE), LUMBOSACRAL: ICD-10-CM

## 2021-11-15 DIAGNOSIS — F41.9 ANXIETY: ICD-10-CM

## 2021-11-15 DIAGNOSIS — E11.9 TYPE 2 DIABETES MELLITUS WITHOUT COMPLICATION, WITHOUT LONG-TERM CURRENT USE OF INSULIN (HCC): ICD-10-CM

## 2021-11-15 DIAGNOSIS — F33.2 MDD (MAJOR DEPRESSIVE DISORDER), RECURRENT SEVERE, WITHOUT PSYCHOSIS (HCC): ICD-10-CM

## 2021-11-15 DIAGNOSIS — I10 ESSENTIAL HYPERTENSION: ICD-10-CM

## 2021-11-15 DIAGNOSIS — F41.1 GAD (GENERALIZED ANXIETY DISORDER): ICD-10-CM

## 2021-11-15 NOTE — PROGRESS NOTES
11/15/2021  Spoke to Rhett for CCM.       Updates to patient care team/ comments: No changes per patient  Patient reported changes in medications: No changes per paitent  Med Adherence  Comment: Taking as prescribed    Health Maintenance:   Diabetes Care [facility] [place] 11/22/2021  2:00 PM MICKI Osorio [facility]   11/24/2021  9:30 AM Suzanna MCKEON [facility] hospitals - [facility]   3/2/2022  9:30 AM Janet Thompson MD [facility] [place]             Patient agrees to goal action plan.   Self-Management Abilit

## 2021-11-16 ENCOUNTER — ANESTHESIA EVENT (OUTPATIENT)
Dept: CARDIAC SURGERY | Facility: HOSPITAL | Age: 76
DRG: 233 | End: 2021-11-16
Payer: MEDICARE

## 2021-11-16 ENCOUNTER — APPOINTMENT (OUTPATIENT)
Dept: GENERAL RADIOLOGY | Facility: HOSPITAL | Age: 76
DRG: 233 | End: 2021-11-16
Attending: PHYSICIAN ASSISTANT
Payer: MEDICARE

## 2021-11-16 ENCOUNTER — HOSPITAL ENCOUNTER (INPATIENT)
Dept: INTERVENTIONAL RADIOLOGY/VASCULAR | Facility: HOSPITAL | Age: 76
LOS: 6 days | Discharge: HOME HEALTH | DRG: 233 | End: 2021-11-22
Attending: INTERNAL MEDICINE | Admitting: THORACIC SURGERY (CARDIOTHORACIC VASCULAR SURGERY)
Payer: MEDICARE

## 2021-11-16 DIAGNOSIS — J90 PLEURAL EFFUSION: ICD-10-CM

## 2021-11-16 DIAGNOSIS — I50.42 CHRONIC COMBINED SYSTOLIC AND DIASTOLIC CONGESTIVE HEART FAILURE (HCC): ICD-10-CM

## 2021-11-16 DIAGNOSIS — R07.9 CHEST PAIN: Primary | ICD-10-CM

## 2021-11-16 DIAGNOSIS — R93.1 ABNORMAL ECHOCARDIOGRAM: ICD-10-CM

## 2021-11-16 PROBLEM — I25.10 CAD (CORONARY ARTERY DISEASE), NATIVE CORONARY ARTERY: Status: ACTIVE | Noted: 2021-11-16

## 2021-11-16 PROCEDURE — 71045 X-RAY EXAM CHEST 1 VIEW: CPT | Performed by: PHYSICIAN ASSISTANT

## 2021-11-16 PROCEDURE — 99223 1ST HOSP IP/OBS HIGH 75: CPT | Performed by: INTERNAL MEDICINE

## 2021-11-16 PROCEDURE — B211YZZ FLUOROSCOPY OF MULTIPLE CORONARY ARTERIES USING OTHER CONTRAST: ICD-10-PCS | Performed by: INTERNAL MEDICINE

## 2021-11-16 PROCEDURE — B215YZZ FLUOROSCOPY OF LEFT HEART USING OTHER CONTRAST: ICD-10-PCS | Performed by: INTERNAL MEDICINE

## 2021-11-16 PROCEDURE — 4A023N7 MEASUREMENT OF CARDIAC SAMPLING AND PRESSURE, LEFT HEART, PERCUTANEOUS APPROACH: ICD-10-PCS | Performed by: INTERNAL MEDICINE

## 2021-11-16 RX ORDER — DEXTROSE MONOHYDRATE 25 G/50ML
50 INJECTION, SOLUTION INTRAVENOUS
Status: DISCONTINUED | OUTPATIENT
Start: 2021-11-16 | End: 2021-11-17

## 2021-11-16 RX ORDER — HEPARIN SODIUM AND DEXTROSE 10000; 5 [USP'U]/100ML; G/100ML
INJECTION INTRAVENOUS CONTINUOUS
Status: DISCONTINUED | OUTPATIENT
Start: 2021-11-16 | End: 2021-11-17

## 2021-11-16 RX ORDER — MIDAZOLAM HYDROCHLORIDE 1 MG/ML
INJECTION INTRAMUSCULAR; INTRAVENOUS
Status: COMPLETED
Start: 2021-11-16 | End: 2021-11-16

## 2021-11-16 RX ORDER — NITROGLYCERIN 20 MG/100ML
INJECTION INTRAVENOUS
Status: COMPLETED
Start: 2021-11-16 | End: 2021-11-16

## 2021-11-16 RX ORDER — SODIUM CHLORIDE 9 MG/ML
INJECTION, SOLUTION INTRAVENOUS
Status: COMPLETED | OUTPATIENT
Start: 2021-11-17 | End: 2021-11-16

## 2021-11-16 RX ORDER — DULOXETIN HYDROCHLORIDE 30 MG/1
90 CAPSULE, DELAYED RELEASE ORAL DAILY
Status: DISCONTINUED | OUTPATIENT
Start: 2021-11-17 | End: 2021-11-22

## 2021-11-16 RX ORDER — LIDOCAINE HYDROCHLORIDE 10 MG/ML
INJECTION, SOLUTION EPIDURAL; INFILTRATION; INTRACAUDAL; PERINEURAL
Status: COMPLETED
Start: 2021-11-16 | End: 2021-11-16

## 2021-11-16 RX ORDER — HYDRALAZINE HYDROCHLORIDE 20 MG/ML
10 INJECTION INTRAMUSCULAR; INTRAVENOUS EVERY 6 HOURS PRN
Status: DISCONTINUED | OUTPATIENT
Start: 2021-11-16 | End: 2021-11-17

## 2021-11-16 RX ORDER — METOPROLOL TARTRATE 5 MG/5ML
2.5 INJECTION INTRAVENOUS EVERY 4 HOURS PRN
Status: DISCONTINUED | OUTPATIENT
Start: 2021-11-16 | End: 2021-11-17

## 2021-11-16 RX ORDER — DILTIAZEM HYDROCHLORIDE 120 MG/1
120 CAPSULE, EXTENDED RELEASE ORAL DAILY
Status: DISCONTINUED | OUTPATIENT
Start: 2021-11-16 | End: 2021-11-17

## 2021-11-16 RX ORDER — NITROGLYCERIN 20 MG/100ML
INJECTION INTRAVENOUS CONTINUOUS
Status: DISCONTINUED | OUTPATIENT
Start: 2021-11-16 | End: 2021-11-17

## 2021-11-16 RX ORDER — PANTOPRAZOLE SODIUM 20 MG/1
20 TABLET, DELAYED RELEASE ORAL
Refills: 0 | Status: DISCONTINUED | OUTPATIENT
Start: 2021-11-17 | End: 2021-11-17

## 2021-11-16 RX ORDER — ASPIRIN 81 MG/1
324 TABLET, CHEWABLE ORAL ONCE
Status: DISCONTINUED | OUTPATIENT
Start: 2021-11-16 | End: 2021-11-16 | Stop reason: HOSPADM

## 2021-11-16 RX ORDER — ATORVASTATIN CALCIUM 40 MG/1
40 TABLET, FILM COATED ORAL NIGHTLY
Status: DISCONTINUED | OUTPATIENT
Start: 2021-11-16 | End: 2021-11-22

## 2021-11-16 RX ORDER — SODIUM CHLORIDE 9 MG/ML
INJECTION, SOLUTION INTRAVENOUS
Status: COMPLETED | OUTPATIENT
Start: 2021-11-17 | End: 2021-11-17

## 2021-11-16 RX ORDER — HEPARIN SODIUM AND DEXTROSE 10000; 5 [USP'U]/100ML; G/100ML
1000 INJECTION INTRAVENOUS ONCE
Status: COMPLETED | OUTPATIENT
Start: 2021-11-16 | End: 2021-11-16

## 2021-11-16 RX ORDER — HEPARIN SODIUM 5000 [USP'U]/ML
INJECTION, SOLUTION INTRAVENOUS; SUBCUTANEOUS
Status: COMPLETED
Start: 2021-11-16 | End: 2021-11-16

## 2021-11-16 RX ORDER — QUETIAPINE 100 MG/1
100 TABLET, FILM COATED ORAL NIGHTLY
Status: DISCONTINUED | OUTPATIENT
Start: 2021-11-16 | End: 2021-11-22

## 2021-11-16 RX ORDER — CLONAZEPAM 0.5 MG/1
1 TABLET ORAL NIGHTLY PRN
Status: DISCONTINUED | OUTPATIENT
Start: 2021-11-16 | End: 2021-11-17

## 2021-11-16 RX ORDER — HEPARIN SODIUM AND DEXTROSE 10000; 5 [USP'U]/100ML; G/100ML
INJECTION INTRAVENOUS
Status: DISCONTINUED
Start: 2021-11-16 | End: 2021-11-22

## 2021-11-16 RX ADMIN — NITROGLYCERIN 20 MCG/MIN: 20 INJECTION INTRAVENOUS at 16:45:00

## 2021-11-16 RX ADMIN — HEPARIN SODIUM AND DEXTROSE 1000 UNITS/HR: 10000; 5 INJECTION INTRAVENOUS at 16:00:00

## 2021-11-16 RX ADMIN — ATORVASTATIN CALCIUM 40 MG: 40 TABLET, FILM COATED ORAL at 20:41:00

## 2021-11-16 RX ADMIN — CLONAZEPAM 1 MG: 0.5 TABLET ORAL at 20:41:00

## 2021-11-16 RX ADMIN — QUETIAPINE 100 MG: 100 TABLET, FILM COATED ORAL at 20:41:00

## 2021-11-16 RX ADMIN — HEPARIN SODIUM AND DEXTROSE 1000 UNITS/HR: 10000; 5 INJECTION INTRAVENOUS at 23:01:00

## 2021-11-16 RX ADMIN — NITROGLYCERIN 15 MCG/MIN: 20 INJECTION INTRAVENOUS at 21:11:00

## 2021-11-16 RX ADMIN — SODIUM CHLORIDE: 9 INJECTION, SOLUTION INTRAVENOUS at 15:39:00
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
## 2021-11-16 NOTE — PROGRESS NOTES
[facility] [place]  [facility]  Cath Note    Nichellema Mata Location: Cath lab     MRN [id number]   Admission Date 11/16/2021 Operation Date 11/16/2021   Attending Physician Jailyn Barnett MD Operating Physician Angella Layne MD     Pre-Cath Diagnosis

## 2021-11-17 ENCOUNTER — APPOINTMENT (OUTPATIENT)
Dept: GENERAL RADIOLOGY | Facility: HOSPITAL | Age: 76
DRG: 233 | End: 2021-11-17
Attending: PHYSICIAN ASSISTANT
Payer: MEDICARE

## 2021-11-17 ENCOUNTER — ANESTHESIA (OUTPATIENT)
Dept: CARDIAC SURGERY | Facility: HOSPITAL | Age: 76
DRG: 233 | End: 2021-11-17
Payer: MEDICARE

## 2021-11-17 PROCEDURE — 02100Z9 BYPASS CORONARY ARTERY, ONE ARTERY FROM LEFT INTERNAL MAMMARY, OPEN APPROACH: ICD-10-PCS | Performed by: THORACIC SURGERY (CARDIOTHORACIC VASCULAR SURGERY)

## 2021-11-17 PROCEDURE — 71045 X-RAY EXAM CHEST 1 VIEW: CPT | Performed by: PHYSICIAN ASSISTANT

## 2021-11-17 PROCEDURE — 99232 SBSQ HOSP IP/OBS MODERATE 35: CPT | Performed by: HOSPITALIST

## 2021-11-17 PROCEDURE — 93312 ECHO TRANSESOPHAGEAL: CPT | Performed by: ANESTHESIOLOGY

## 2021-11-17 PROCEDURE — 76942 ECHO GUIDE FOR BIOPSY: CPT | Performed by: ANESTHESIOLOGY

## 2021-11-17 PROCEDURE — 5A1221Z PERFORMANCE OF CARDIAC OUTPUT, CONTINUOUS: ICD-10-PCS | Performed by: THORACIC SURGERY (CARDIOTHORACIC VASCULAR SURGERY)

## 2021-11-17 PROCEDURE — 5A1223Z PERFORMANCE OF CARDIAC PACING, CONTINUOUS: ICD-10-PCS | Performed by: THORACIC SURGERY (CARDIOTHORACIC VASCULAR SURGERY)

## 2021-11-17 PROCEDURE — 06BQ4ZZ EXCISION OF LEFT SAPHENOUS VEIN, PERCUTANEOUS ENDOSCOPIC APPROACH: ICD-10-PCS | Performed by: THORACIC SURGERY (CARDIOTHORACIC VASCULAR SURGERY)

## 2021-11-17 PROCEDURE — S0028 INJECTION, FAMOTIDINE, 20 MG: HCPCS | Performed by: ANESTHESIOLOGY

## 2021-11-17 PROCEDURE — 021009W BYPASS CORONARY ARTERY, ONE ARTERY FROM AORTA WITH AUTOLOGOUS VENOUS TISSUE, OPEN APPROACH: ICD-10-PCS | Performed by: THORACIC SURGERY (CARDIOTHORACIC VASCULAR SURGERY)

## 2021-11-17 RX ORDER — ROCURONIUM BROMIDE 10 MG/ML
INJECTION, SOLUTION INTRAVENOUS AS NEEDED
Status: DISCONTINUED | OUTPATIENT
Start: 2021-11-17 | End: 2021-11-17 | Stop reason: SURG

## 2021-11-17 RX ORDER — DEXTROSE MONOHYDRATE 25 G/50ML
50 INJECTION, SOLUTION INTRAVENOUS
Status: DISCONTINUED | OUTPATIENT
Start: 2021-11-17 | End: 2021-11-22

## 2021-11-17 RX ORDER — IPRATROPIUM BROMIDE AND ALBUTEROL SULFATE 2.5; .5 MG/3ML; MG/3ML
3 SOLUTION RESPIRATORY (INHALATION) EVERY 4 HOURS PRN
Status: DISCONTINUED | OUTPATIENT
Start: 2021-11-17 | End: 2021-11-22

## 2021-11-17 RX ORDER — ASPIRIN 300 MG
300 SUPPOSITORY, RECTAL RECTAL DAILY
Status: DISCONTINUED | OUTPATIENT
Start: 2021-11-18 | End: 2021-11-22

## 2021-11-17 RX ORDER — FAMOTIDINE 10 MG/ML
INJECTION, SOLUTION INTRAVENOUS AS NEEDED
Status: DISCONTINUED | OUTPATIENT
Start: 2021-11-17 | End: 2021-11-17 | Stop reason: SURG

## 2021-11-17 RX ORDER — DOBUTAMINE HYDROCHLORIDE 200 MG/100ML
INJECTION INTRAVENOUS CONTINUOUS PRN
Status: DISCONTINUED | OUTPATIENT
Start: 2021-11-17 | End: 2021-11-19

## 2021-11-17 RX ORDER — ALBUMIN, HUMAN INJ 5% 5 %
250 SOLUTION INTRAVENOUS ONCE AS NEEDED
Status: ACTIVE | OUTPATIENT
Start: 2021-11-17 | End: 2021-11-17

## 2021-11-17 RX ORDER — MAGNESIUM SULFATE HEPTAHYDRATE 40 MG/ML
2 INJECTION, SOLUTION INTRAVENOUS AS NEEDED
Status: DISCONTINUED | OUTPATIENT
Start: 2021-11-17 | End: 2021-11-22

## 2021-11-17 RX ORDER — HEPARIN SODIUM 1000 [USP'U]/ML
INJECTION, SOLUTION INTRAVENOUS; SUBCUTANEOUS AS NEEDED
Status: DISCONTINUED | OUTPATIENT
Start: 2021-11-17 | End: 2021-11-17

## 2021-11-17 RX ORDER — DOBUTAMINE HYDROCHLORIDE 200 MG/100ML
INJECTION INTRAVENOUS CONTINUOUS PRN
Status: DISCONTINUED | OUTPATIENT
Start: 2021-11-17 | End: 2021-11-17 | Stop reason: SURG

## 2021-11-17 RX ORDER — DEXMEDETOMIDINE HYDROCHLORIDE 4 UG/ML
INJECTION, SOLUTION INTRAVENOUS CONTINUOUS
Status: DISCONTINUED | OUTPATIENT
Start: 2021-11-17 | End: 2021-11-18

## 2021-11-17 RX ORDER — DIPHENHYDRAMINE HYDROCHLORIDE 50 MG/ML
INJECTION INTRAMUSCULAR; INTRAVENOUS AS NEEDED
Status: DISCONTINUED | OUTPATIENT
Start: 2021-11-17 | End: 2021-11-17 | Stop reason: SURG

## 2021-11-17 RX ORDER — ACETAMINOPHEN 10 MG/ML
1000 INJECTION, SOLUTION INTRAVENOUS EVERY 6 HOURS PRN
Status: DISPENSED | OUTPATIENT
Start: 2021-11-17 | End: 2021-11-18

## 2021-11-17 RX ORDER — METHYLPREDNISOLONE SODIUM SUCCINATE 500 MG/1
INJECTION, POWDER, FOR SOLUTION INTRAMUSCULAR; INTRAVENOUS AS NEEDED
Status: DISCONTINUED | OUTPATIENT
Start: 2021-11-17 | End: 2021-11-17 | Stop reason: SURG

## 2021-11-17 RX ORDER — ALBUMIN, HUMAN INJ 5% 5 %
250 SOLUTION INTRAVENOUS ONCE
Status: DISCONTINUED | OUTPATIENT
Start: 2021-11-17 | End: 2021-11-17

## 2021-11-17 RX ORDER — SODIUM CHLORIDE 9 MG/ML
INJECTION, SOLUTION INTRAVENOUS CONTINUOUS PRN
Status: DISCONTINUED | OUTPATIENT
Start: 2021-11-17 | End: 2021-11-17 | Stop reason: SURG

## 2021-11-17 RX ORDER — HEPARIN SODIUM 1000 [USP'U]/ML
INJECTION, SOLUTION INTRAVENOUS; SUBCUTANEOUS AS NEEDED
Status: DISCONTINUED | OUTPATIENT
Start: 2021-11-17 | End: 2021-11-17 | Stop reason: SURG

## 2021-11-17 RX ORDER — MORPHINE SULFATE 4 MG/ML
4 INJECTION, SOLUTION INTRAMUSCULAR; INTRAVENOUS EVERY 2 HOUR PRN
Status: DISCONTINUED | OUTPATIENT
Start: 2021-11-17 | End: 2021-11-19

## 2021-11-17 RX ORDER — DEXTROSE AND SODIUM CHLORIDE 5; .45 G/100ML; G/100ML
INJECTION, SOLUTION INTRAVENOUS CONTINUOUS
Status: DISCONTINUED | OUTPATIENT
Start: 2021-11-17 | End: 2021-11-19

## 2021-11-17 RX ORDER — SODIUM CHLORIDE 9 MG/ML
INJECTION, SOLUTION INTRAVENOUS CONTINUOUS
Status: DISCONTINUED | OUTPATIENT
Start: 2021-11-17 | End: 2021-11-19

## 2021-11-17 RX ORDER — MAGNESIUM SULFATE 1 G/100ML
1 INJECTION INTRAVENOUS AS NEEDED
Status: DISCONTINUED | OUTPATIENT
Start: 2021-11-17 | End: 2021-11-22

## 2021-11-17 RX ORDER — POLYETHYLENE GLYCOL 3350 17 G/17G
17 POWDER, FOR SOLUTION ORAL DAILY PRN
Status: DISCONTINUED | OUTPATIENT
Start: 2021-11-17 | End: 2021-11-22

## 2021-11-17 RX ORDER — DEXMEDETOMIDINE HYDROCHLORIDE 4 UG/ML
INJECTION, SOLUTION INTRAVENOUS CONTINUOUS PRN
Status: DISCONTINUED | OUTPATIENT
Start: 2021-11-17 | End: 2021-11-17 | Stop reason: SURG

## 2021-11-17 RX ORDER — VANCOMYCIN HYDROCHLORIDE 1 G/20ML
INJECTION, POWDER, LYOPHILIZED, FOR SOLUTION INTRAVENOUS AS NEEDED
Status: DISCONTINUED | OUTPATIENT
Start: 2021-11-17 | End: 2021-11-17 | Stop reason: SURG

## 2021-11-17 RX ORDER — CEFAZOLIN SODIUM/WATER 2 G/20 ML
2 SYRINGE (ML) INTRAVENOUS EVERY 8 HOURS
Status: COMPLETED | OUTPATIENT
Start: 2021-11-17 | End: 2021-11-19

## 2021-11-17 RX ORDER — MORPHINE SULFATE 2 MG/ML
2 INJECTION, SOLUTION INTRAMUSCULAR; INTRAVENOUS EVERY 2 HOUR PRN
Status: DISCONTINUED | OUTPATIENT
Start: 2021-11-17 | End: 2021-11-19

## 2021-11-17 RX ORDER — POTASSIUM CHLORIDE 29.8 MG/ML
40 INJECTION INTRAVENOUS AS NEEDED
Status: DISCONTINUED | OUTPATIENT
Start: 2021-11-17 | End: 2021-11-19

## 2021-11-17 RX ORDER — PHYTONADIONE 10 MG/ML
INJECTION, EMULSION INTRAMUSCULAR; INTRAVENOUS; SUBCUTANEOUS AS NEEDED
Status: DISCONTINUED | OUTPATIENT
Start: 2021-11-17 | End: 2021-11-17 | Stop reason: SURG

## 2021-11-17 RX ORDER — PHENYLEPHRINE HCL 10 MG/ML
VIAL (ML) INJECTION AS NEEDED
Status: DISCONTINUED | OUTPATIENT
Start: 2021-11-17 | End: 2021-11-17 | Stop reason: SURG

## 2021-11-17 RX ORDER — MIDAZOLAM HYDROCHLORIDE 1 MG/ML
1 INJECTION INTRAMUSCULAR; INTRAVENOUS EVERY 30 MIN PRN
Status: DISCONTINUED | OUTPATIENT
Start: 2021-11-17 | End: 2021-11-19

## 2021-11-17 RX ORDER — ASPIRIN 325 MG
325 TABLET, DELAYED RELEASE (ENTERIC COATED) ORAL DAILY
Status: DISCONTINUED | OUTPATIENT
Start: 2021-11-18 | End: 2021-11-22

## 2021-11-17 RX ORDER — BISACODYL 10 MG
10 SUPPOSITORY, RECTAL RECTAL
Status: DISCONTINUED | OUTPATIENT
Start: 2021-11-17 | End: 2021-11-22

## 2021-11-17 RX ORDER — SODIUM PHOSPHATE, DIBASIC AND SODIUM PHOSPHATE, MONOBASIC 7; 19 G/133ML; G/133ML
1 ENEMA RECTAL ONCE AS NEEDED
Status: DISCONTINUED | OUTPATIENT
Start: 2021-11-17 | End: 2021-11-22

## 2021-11-17 RX ORDER — MIDAZOLAM HYDROCHLORIDE 1 MG/ML
INJECTION INTRAMUSCULAR; INTRAVENOUS AS NEEDED
Status: DISCONTINUED | OUTPATIENT
Start: 2021-11-17 | End: 2021-11-17 | Stop reason: SURG

## 2021-11-17 RX ORDER — PANTOPRAZOLE SODIUM 40 MG/1
40 TABLET, DELAYED RELEASE ORAL
Status: DISCONTINUED | OUTPATIENT
Start: 2021-11-18 | End: 2021-11-22

## 2021-11-17 RX ORDER — PROTAMINE SULFATE 10 MG/ML
INJECTION, SOLUTION INTRAVENOUS AS NEEDED
Status: DISCONTINUED | OUTPATIENT
Start: 2021-11-17 | End: 2021-11-17 | Stop reason: SURG

## 2021-11-17 RX ORDER — ALBUMIN, HUMAN INJ 5% 5 %
250 SOLUTION INTRAVENOUS ONCE AS NEEDED
Status: COMPLETED | OUTPATIENT
Start: 2021-11-17 | End: 2021-11-17

## 2021-11-17 RX ORDER — CEFAZOLIN SODIUM 1 G/3ML
INJECTION, POWDER, FOR SOLUTION INTRAMUSCULAR; INTRAVENOUS AS NEEDED
Status: DISCONTINUED | OUTPATIENT
Start: 2021-11-17 | End: 2021-11-17 | Stop reason: SURG

## 2021-11-17 RX ORDER — DOCUSATE SODIUM 100 MG/1
100 CAPSULE, LIQUID FILLED ORAL 2 TIMES DAILY
Status: DISCONTINUED | OUTPATIENT
Start: 2021-11-17 | End: 2021-11-22

## 2021-11-17 RX ORDER — ONDANSETRON 2 MG/ML
4 INJECTION INTRAMUSCULAR; INTRAVENOUS EVERY 6 HOURS PRN
Status: DISCONTINUED | OUTPATIENT
Start: 2021-11-17 | End: 2021-11-22

## 2021-11-17 RX ORDER — NITROGLYCERIN 20 MG/100ML
INJECTION INTRAVENOUS CONTINUOUS PRN
Status: DISCONTINUED | OUTPATIENT
Start: 2021-11-17 | End: 2021-11-19

## 2021-11-17 RX ORDER — CHLORHEXIDINE GLUCONATE 0.12 MG/ML
15 RINSE ORAL
Status: DISCONTINUED | OUTPATIENT
Start: 2021-11-17 | End: 2021-11-18

## 2021-11-17 RX ORDER — MORPHINE SULFATE 4 MG/ML
6 INJECTION, SOLUTION INTRAMUSCULAR; INTRAVENOUS EVERY 2 HOUR PRN
Status: DISCONTINUED | OUTPATIENT
Start: 2021-11-17 | End: 2021-11-19

## 2021-11-17 RX ORDER — POTASSIUM CHLORIDE 14.9 MG/ML
20 INJECTION INTRAVENOUS AS NEEDED
Status: DISCONTINUED | OUTPATIENT
Start: 2021-11-17 | End: 2021-11-19

## 2021-11-17 RX ADMIN — DOBUTAMINE HYDROCHLORIDE 3 MCG/KG/MIN: 200 INJECTION INTRAVENOUS at 15:11:00

## 2021-11-17 RX ADMIN — PHENYLEPHRINE HCL 100 MCG: 10 MG/ML VIAL (ML) INJECTION at 11:18:00

## 2021-11-17 RX ADMIN — SODIUM CHLORIDE: 9 INJECTION, SOLUTION INTRAVENOUS at 14:51:00

## 2021-11-17 RX ADMIN — CEFAZOLIN SODIUM 2 G: 1 INJECTION, POWDER, FOR SOLUTION INTRAMUSCULAR; INTRAVENOUS at 11:00:00

## 2021-11-17 RX ADMIN — MORPHINE SULFATE 2 MG: 2 INJECTION, SOLUTION INTRAMUSCULAR; INTRAVENOUS at 22:37:00

## 2021-11-17 RX ADMIN — NITROGLYCERIN 10 MCG/MIN: 20 INJECTION INTRAVENOUS at 11:59:00

## 2021-11-17 RX ADMIN — METHYLPREDNISOLONE SODIUM SUCCINATE 500 MG: 500 INJECTION, POWDER, FOR SOLUTION INTRAMUSCULAR; INTRAVENOUS at 11:00:00

## 2021-11-17 RX ADMIN — VANCOMYCIN HYDROCHLORIDE 1000 MG: 1 INJECTION, POWDER, LYOPHILIZED, FOR SOLUTION INTRAVENOUS at 11:00:00

## 2021-11-17 RX ADMIN — DEXMEDETOMIDINE HYDROCHLORIDE 0.5 MCG/KG/HR: 4 INJECTION, SOLUTION INTRAVENOUS at 17:58:00

## 2021-11-17 RX ADMIN — DOBUTAMINE HYDROCHLORIDE 5 MCG/KG/MIN: 200 INJECTION INTRAVENOUS at 13:30:00

## 2021-11-17 RX ADMIN — FAMOTIDINE 20 MG: 10 INJECTION, SOLUTION INTRAVENOUS at 10:29:00

## 2021-11-17 RX ADMIN — DIPHENHYDRAMINE HYDROCHLORIDE 50 MG: 50 INJECTION INTRAMUSCULAR; INTRAVENOUS at 10:29:00

## 2021-11-17 RX ADMIN — DOBUTAMINE HYDROCHLORIDE 3 MCG/KG/MIN: 200 INJECTION INTRAVENOUS at 13:58:00

## 2021-11-17 RX ADMIN — PROTAMINE SULFATE 50 MG: 10 INJECTION, SOLUTION INTRAVENOUS at 13:39:00

## 2021-11-17 RX ADMIN — NITROGLYCERIN 30 MCG/MIN: 20 INJECTION INTRAVENOUS at 09:00:00

## 2021-11-17 RX ADMIN — MIDAZOLAM HYDROCHLORIDE 2 MG: 1 INJECTION INTRAMUSCULAR; INTRAVENOUS at 10:37:00

## 2021-11-17 RX ADMIN — PROTAMINE SULFATE 50 MG: 10 INJECTION, SOLUTION INTRAVENOUS at 13:48:00

## 2021-11-17 RX ADMIN — NITROGLYCERIN 35 MCG/MIN: 20 INJECTION INTRAVENOUS at 13:44:00

## 2021-11-17 RX ADMIN — DOCUSATE SODIUM 100 MG: 100 CAPSULE, LIQUID FILLED ORAL at 22:33:00

## 2021-11-17 RX ADMIN — PROTAMINE SULFATE 50 MG: 10 INJECTION, SOLUTION INTRAVENOUS at 13:41:00

## 2021-11-17 RX ADMIN — NITROGLYCERIN 30 MCG/MIN: 20 INJECTION INTRAVENOUS at 14:07:00

## 2021-11-17 RX ADMIN — Medication 25 ML: at 14:10:00

## 2021-11-17 RX ADMIN — NITROGLYCERIN 10 MCG/MIN: 20 INJECTION INTRAVENOUS at 13:34:00

## 2021-11-17 RX ADMIN — PANTOPRAZOLE SODIUM 20 MG: 20 TABLET, DELAYED RELEASE ORAL at 06:32:00

## 2021-11-17 RX ADMIN — NITROGLYCERIN 10 MCG/MIN: 20 INJECTION INTRAVENOUS at 05:45:00

## 2021-11-17 RX ADMIN — SODIUM CHLORIDE: 9 INJECTION, SOLUTION INTRAVENOUS at 00:05:00

## 2021-11-17 RX ADMIN — PHENYLEPHRINE HCL 200 MCG: 10 MG/ML VIAL (ML) INJECTION at 12:28:00

## 2021-11-17 RX ADMIN — SODIUM CHLORIDE: 9 INJECTION, SOLUTION INTRAVENOUS at 15:06:00

## 2021-11-17 RX ADMIN — NITROGLYCERIN 50 MCG/MIN: 20 INJECTION INTRAVENOUS at 13:52:00

## 2021-11-17 RX ADMIN — NITROGLYCERIN 10 MCG/MIN: 20 INJECTION INTRAVENOUS at 11:12:00

## 2021-11-17 RX ADMIN — NITROGLYCERIN 25 MCG/MIN: 20 INJECTION INTRAVENOUS at 11:47:00

## 2021-11-17 RX ADMIN — HEPARIN SODIUM 5000 UNITS: 1000 INJECTION, SOLUTION INTRAVENOUS; SUBCUTANEOUS at 12:15:00

## 2021-11-17 RX ADMIN — PROTAMINE SULFATE 50 MG: 10 INJECTION, SOLUTION INTRAVENOUS at 13:43:00

## 2021-11-17 RX ADMIN — DEXTROSE AND SODIUM CHLORIDE 30 ML/HR: 5; .45 INJECTION, SOLUTION INTRAVENOUS at 23:00:00

## 2021-11-17 RX ADMIN — DOBUTAMINE HYDROCHLORIDE 3 MCG/KG/MIN: 200 INJECTION INTRAVENOUS at 21:40:00

## 2021-11-17 RX ADMIN — ACETAMINOPHEN 1000 MG: 10 INJECTION, SOLUTION INTRAVENOUS at 18:13:00

## 2021-11-17 RX ADMIN — MIDAZOLAM HYDROCHLORIDE 2 MG: 1 INJECTION INTRAMUSCULAR; INTRAVENOUS at 10:29:00

## 2021-11-17 RX ADMIN — PROTAMINE SULFATE 50 MG: 10 INJECTION, SOLUTION INTRAVENOUS at 13:42:00

## 2021-11-17 RX ADMIN — NITROGLYCERIN 10 MCG/MIN: 20 INJECTION INTRAVENOUS at 11:36:00

## 2021-11-17 RX ADMIN — ATORVASTATIN CALCIUM 40 MG: 40 TABLET, FILM COATED ORAL at 22:34:00

## 2021-11-17 RX ADMIN — ROCURONIUM BROMIDE 100 MG: 10 INJECTION, SOLUTION INTRAVENOUS at 12:28:00

## 2021-11-17 RX ADMIN — PHYTONADIONE 5 MG: 10 INJECTION, EMULSION INTRAMUSCULAR; INTRAVENOUS; SUBCUTANEOUS at 13:43:00

## 2021-11-17 RX ADMIN — NITROGLYCERIN 5 MCG/MIN: 20 INJECTION INTRAVENOUS at 01:08:00

## 2021-11-17 RX ADMIN — PROTAMINE SULFATE 50 MG: 10 INJECTION, SOLUTION INTRAVENOUS at 13:45:00

## 2021-11-17 RX ADMIN — ALBUMIN, HUMAN INJ 5% 250 ML: 5 SOLUTION INTRAVENOUS at 21:02:00

## 2021-11-17 RX ADMIN — NITROGLYCERIN 10 MCG/MIN: 20 INJECTION INTRAVENOUS at 00:02:00

## 2021-11-17 RX ADMIN — DULOXETIN HYDROCHLORIDE 90 MG: 30 CAPSULE, DELAYED RELEASE ORAL at 09:04:00

## 2021-11-17 RX ADMIN — PROTAMINE SULFATE 50 MG: 10 INJECTION, SOLUTION INTRAVENOUS at 13:40:00

## 2021-11-17 RX ADMIN — PHENYLEPHRINE HCL 100 MCG: 10 MG/ML VIAL (ML) INJECTION at 11:22:00

## 2021-11-17 RX ADMIN — ROCURONIUM BROMIDE 50 MG: 10 INJECTION, SOLUTION INTRAVENOUS at 13:47:00

## 2021-11-17 RX ADMIN — CEFAZOLIN SODIUM 2 G: 1 INJECTION, POWDER, FOR SOLUTION INTRAMUSCULAR; INTRAVENOUS at 14:25:00

## 2021-11-17 RX ADMIN — DOBUTAMINE HYDROCHLORIDE 5 MCG/KG/MIN: 200 INJECTION INTRAVENOUS at 21:37:00

## 2021-11-17 RX ADMIN — MIDAZOLAM HYDROCHLORIDE 4 MG: 1 INJECTION INTRAMUSCULAR; INTRAVENOUS at 12:28:00

## 2021-11-17 RX ADMIN — DEXMEDETOMIDINE HYDROCHLORIDE 0.5 MCG/KG/HR: 4 INJECTION, SOLUTION INTRAVENOUS at 15:10:00

## 2021-11-17 RX ADMIN — CEFAZOLIN SODIUM/WATER 2 G: 2 G/20 ML SYRINGE (ML) INTRAVENOUS at 22:41:00

## 2021-11-17 RX ADMIN — SODIUM CHLORIDE: 9 INJECTION, SOLUTION INTRAVENOUS at 10:28:00

## 2021-11-17 RX ADMIN — PROTAMINE SULFATE 50 MG: 10 INJECTION, SOLUTION INTRAVENOUS at 13:38:00

## 2021-11-17 RX ADMIN — Medication 25 ML: at 14:09:00

## 2021-11-17 RX ADMIN — NITROGLYCERIN 20 MCG/MIN: 20 INJECTION INTRAVENOUS at 14:10:00

## 2021-11-17 RX ADMIN — NITROGLYCERIN 20 MCG/MIN: 20 INJECTION INTRAVENOUS at 06:48:00

## 2021-11-17 RX ADMIN — DILTIAZEM HYDROCHLORIDE 120 MG: 120 CAPSULE, EXTENDED RELEASE ORAL at 09:04:00

## 2021-11-17 RX ADMIN — DEXMEDETOMIDINE HYDROCHLORIDE 0.5 MCG/KG/HR: 4 INJECTION, SOLUTION INTRAVENOUS at 11:03:00

## 2021-11-17 RX ADMIN — PHENYLEPHRINE HCL 100 MCG: 10 MG/ML VIAL (ML) INJECTION at 12:17:00

## 2021-11-17 RX ADMIN — NITROGLYCERIN 15 MCG/MIN: 20 INJECTION INTRAVENOUS at 06:25:00

## 2021-11-17 RX ADMIN — NITROGLYCERIN 25 MCG/MIN: 20 INJECTION INTRAVENOUS at 13:36:00

## 2021-11-17 RX ADMIN — MORPHINE SULFATE 4 MG: 4 INJECTION, SOLUTION INTRAMUSCULAR; INTRAVENOUS at 18:37:00

## 2021-11-17 RX ADMIN — DEXTROSE AND SODIUM CHLORIDE 70 ML/HR: 5; .45 INJECTION, SOLUTION INTRAVENOUS at 15:07:00

## 2021-11-17 RX ADMIN — HEPARIN SODIUM 22000 UNITS: 1000 INJECTION, SOLUTION INTRAVENOUS; SUBCUTANEOUS at 12:23:00

## 2021-11-17 RX ADMIN — PHYTONADIONE 5 MG: 10 INJECTION, EMULSION INTRAMUSCULAR; INTRAVENOUS; SUBCUTANEOUS at 13:40:00

## 2021-11-17 RX ADMIN — ROCURONIUM BROMIDE 100 MG: 10 INJECTION, SOLUTION INTRAVENOUS at 10:37:00

## 2021-11-17 NOTE — CONSULTS
[facility] H[facility]PITAL[facility]  CONSULT     [facility] [place] [facility] Patient Status:  Inpatient    10/12/1945 MRN [id number]   [facility] 6NE-A Attending Mina Hong MD   Hosp Day # 0 PCP Celia Busch DO     Reason for consult: Medical management, DM I • Sleep apnea    • Stroke [facility])     per patient she was unaware but it was noted on a MRI-no effects   • Torn meniscus     right knee   • Type II or unspecified type diabetes mellitus without mention of complication, not stated as uncontrolled    • Uncom Place 1 tablet (0.4 mg total) under the tongue every 5 (five) minutes as needed for Chest pain (max 3). , Disp: 12 tablet, Rfl: 0  metoprolol succinate 25 MG Oral Tablet 24 Hr, Take 1 tablet (25 mg total) by mouth daily. , Disp: 30 tablet, Rfl: 1  DULOXETINE 2,000 Units by mouth daily. , Disp: , Rfl:   Fluticasone Propionate 50 MCG/ACT Nasal Suspension, 2 sprays by Each Nare route daily.  (Patient taking differently: 2 sprays by Each Nare route daily as needed.), Disp: 1 Bottle, Rfl: 1  Acetaminophen (Eulas Evens K 4.0 3.6   CL 96* 106   CO2 29.0 28.0     No results for input(s): PTP, INR in the last 168 hours. No results for input(s): TROP, CK in the last 168 hours. Imaging: Imaging data reviewed in Epic. ASSESSMENT / PLAN:     1. Severe CAD on cath  1.

## 2021-11-17 NOTE — PROCEDURES
[address]    PATIENT'S NAME: Jenny Rubi   ATTENDING PHYSICIAN: Maikol Mancini M.D. OPERATING PHYSICIAN: Maikol Mancini M.D.    PATIENT ACCOUNT#:   [de-identified]    LOCATION:  [address] [place] [address]  MEDICAL RECORD #:   [id number]       DATE OF BIRTH: No gradient is noted across the aortic valve on pullback. LEFT VENTRICULOGRAPHY:  Mild ectopy is noted during the contrast injection. Left ventricular size appears normal.  Left ventricular systolic function appears mildly reduced.   The visually estima known  of the RCA. RECOMMENDATIONS:  Films were reviewed with Interventional Cardiology (Dr. Kathy Nieto) as well as CV Surgery (Dr. Stephany Chávez) in the catheterization lab.   It was the unanimous decision of the 3 of us that the patient's anatomy appears best se

## 2021-11-17 NOTE — PROGRESS NOTES
Progress Note  Paola August Patient Status:  Inpatient    10/12/1945 MRN [id number]   [facility] 6NE-A Attending Jailyn Barnett MD   Hosp Day # 1 PCP Catrachito Haynes DO     Subjective:  Seen immediately post op. Intubated sedated. docusate sodium  100 mg Oral BID   • vancomycin  15 mg/kg (Adjusted) Intravenous Q12H   • [START ON 11/18/2021] aspirin  325 mg Oral Daily    Or   • [START ON 11/18/2021] aspirin  300 mg Rectal Daily   • metoprolol tartrate  25 mg Oral 2x Daily(Beta Blocke

## 2021-11-17 NOTE — CONSULTS
Pharmacy consulted to dose post-op antibiotics. SCr 0.97 mg/dL; estimated CrCl 43.3 mL/min. Ok for Ancef 2 grams IV every 8 hours x5 doses as ordered. Will adjust vancomycin to 1 gram IV every 12 hours x2 doses.     Regina Messina, PharmD  11/17/21 4:27 PM

## 2021-11-17 NOTE — ANESTHESIA PROCEDURE NOTES
Procedure Performed: SILVER     Start Time:  11/17/2021 11:00 AM       End Time:      Preanesthesia Checklist:  Patient identified, IV assessed, risks and benefits discussed, monitors and equipment assessed, procedure being performed at surgeon's request and normal.          Aorta    Ascending Aorta:  Size normal.  Dissection not present. Plaque thickness less than 3 mm. Mobile plaque not present. Aortic Arch:  Size normal.  Dissection not present. Plaque thickness less than 3 mm.   Mobile plaque not pres

## 2021-11-17 NOTE — PLAN OF CARE
Received pt approx 1930 on heparin and nitroglycerin gtts, w/ sheath in R groin, pressure tubing in place. NSR on tele, spo2>94 on RA. Titrated nitroglycerin gtt to maintain SBP<160.  Spoke w/ Dr. Kalin Jane via telephone this evening, updated him on pt status,

## 2021-11-17 NOTE — BH PROGRESS NOTE
Talked with the patients nurse and she said, Thor Goode is down for surgery. The follow up with the phq4 results will need to be completed another day.

## 2021-11-17 NOTE — OPERATIVE REPORT
Operative Note    Patient Name: Debi Corea    Preoperative Diagnosis: *CAD    Postoperative Diagnosis: same    Primary Surgeon: Amisha Hernandez MD    Assistant: Kerri Strong PA-C    Procedures: CABG x 2; LIMA to LAD, SVG to RPDA    Anesthesia: Cardiac

## 2021-11-17 NOTE — PLAN OF CARE
Assumed care of pt this am, she is alert and oriented, neurologically intact. She has no c/o pain or discomfort at this time. Right groin has angio sheath to pressure bag. She has been NPO since midnight and is awaiting surgery.  All consents have been sign

## 2021-11-17 NOTE — DIETARY NOTE
Clinical Nutrition    Dietitian consult received per cardiac rehab standing order. Pt to be educated by cardiac rehab staff and encouraged to attend outpatient classes taught by RD. LAITH available PRN.     Nathaniel Vang MS, RD, LDN  Clinical Dietitian  Page

## 2021-11-17 NOTE — PROGRESS NOTES
11/17/21 1011   Clinical Encounter Type   Visited With Patient and family together   Surgical Visit Pre-op   Patient's Supportive Strategies/Resources Amish.   Patient Spiritual Encounters   Spiritual Interventions  provided pre-surgical sup

## 2021-11-17 NOTE — ANESTHESIA PROCEDURE NOTES
Airway  Date/Time: 11/17/2021 10:38 AM  Urgency: elective      General Information and Staff    Patient location during procedure: OR  Anesthesiologist: Livan Godoy MD  Performed: anesthesiologist     Indications and Patient Condition  Indicati

## 2021-11-17 NOTE — PROGRESS NOTES
Received pt from cath lab s/p cath with Dr Tyra Krabbe. Plan for CABG tomorrow, 2nd case with Dr Andrea Hong. He will speak with patient tomorrow morning and sign consents afterwards.  Right Groin site is C/D/I soft, no hematoma with 6 fr sheath and pressure bag in place

## 2021-11-17 NOTE — ANESTHESIA PROCEDURE NOTES
Arterial Line  Performed by: Love Frederick MD  Authorized by: Love Frederick MD     General Information and Staff    Procedure Start:  11/17/2021 10:39 AM  Procedure End:  11/17/2021 10:42 AM  Anesthesiologist:  Love Frederick,

## 2021-11-17 NOTE — ANESTHESIA POSTPROCEDURE EVALUATION
[address] Patient Status:  Inpatient   Age/Gender 77year old female MRN [id number]   Location [address] [facility] Attending Trung Stroud, [address] [facility] Day # 1 PCP Ray Chowdhury,        Anesthesia Post-op Note

## 2021-11-17 NOTE — PROGRESS NOTES
[facility]     Hospitalist Progress Note     Anamariajustin Schlatter Patient Status:  Inpatient    10/12/1945 MRN [id number]   [facility] 6NE-A Attending Yoni Del Rio MD   Hosp Day # 1 PCP Arletta Officer, DO     Chief Complaint: medical man hours. Cardiac  No results for input(s): TROP, PBNP in the last 168 hours. Creatinine Kinase  No results for input(s): CK in the last 168 hours.     Inflammatory Markers  Recent Labs   Lab 11/16/21  1739   ROMULO 33.6       Imaging: Imaging data reviewed CABG.    Tamanna Phan MD  [facility]

## 2021-11-18 ENCOUNTER — APPOINTMENT (OUTPATIENT)
Dept: GENERAL RADIOLOGY | Facility: HOSPITAL | Age: 76
DRG: 233 | End: 2021-11-18
Attending: THORACIC SURGERY (CARDIOTHORACIC VASCULAR SURGERY)
Payer: MEDICARE

## 2021-11-18 ENCOUNTER — APPOINTMENT (OUTPATIENT)
Dept: GENERAL RADIOLOGY | Facility: HOSPITAL | Age: 76
DRG: 233 | End: 2021-11-18
Attending: PHYSICIAN ASSISTANT
Payer: MEDICARE

## 2021-11-18 PROBLEM — Z95.1 STATUS POST CORONARY ARTERY BYPASS GRAFT: Status: ACTIVE | Noted: 2021-11-18

## 2021-11-18 PROBLEM — I25.110 CORONARY ARTERY DISEASE INVOLVING NATIVE CORONARY ARTERY OF NATIVE HEART WITH UNSTABLE ANGINA PECTORIS (HCC): Status: ACTIVE | Noted: 2021-11-16

## 2021-11-18 PROCEDURE — 71045 X-RAY EXAM CHEST 1 VIEW: CPT | Performed by: THORACIC SURGERY (CARDIOTHORACIC VASCULAR SURGERY)

## 2021-11-18 PROCEDURE — 71045 X-RAY EXAM CHEST 1 VIEW: CPT | Performed by: PHYSICIAN ASSISTANT

## 2021-11-18 PROCEDURE — 99232 SBSQ HOSP IP/OBS MODERATE 35: CPT | Performed by: HOSPITALIST

## 2021-11-18 RX ORDER — POTASSIUM CHLORIDE 29.8 MG/ML
40 INJECTION INTRAVENOUS ONCE
Status: DISCONTINUED | OUTPATIENT
Start: 2021-11-18 | End: 2021-11-22

## 2021-11-18 RX ADMIN — Medication 25 MG: at 05:20:00

## 2021-11-18 RX ADMIN — DOCUSATE SODIUM 100 MG: 100 CAPSULE, LIQUID FILLED ORAL at 08:54:00

## 2021-11-18 RX ADMIN — ACETAMINOPHEN 1000 MG: 10 INJECTION, SOLUTION INTRAVENOUS at 17:57:00

## 2021-11-18 RX ADMIN — QUETIAPINE 100 MG: 100 TABLET, FILM COATED ORAL at 21:02:00

## 2021-11-18 RX ADMIN — ASPIRIN 325 MG: 325 MG TABLET, DELAYED RELEASE (ENTERIC COATED) ORAL at 08:55:00

## 2021-11-18 RX ADMIN — Medication 25 MG: at 17:57:00

## 2021-11-18 RX ADMIN — CEFAZOLIN SODIUM/WATER 2 G: 2 G/20 ML SYRINGE (ML) INTRAVENOUS at 21:05:00

## 2021-11-18 RX ADMIN — DULOXETIN HYDROCHLORIDE 90 MG: 30 CAPSULE, DELAYED RELEASE ORAL at 08:55:00

## 2021-11-18 RX ADMIN — MORPHINE SULFATE 2 MG: 2 INJECTION, SOLUTION INTRAMUSCULAR; INTRAVENOUS at 02:44:00

## 2021-11-18 RX ADMIN — POTASSIUM CHLORIDE 20 MEQ: 14.9 INJECTION INTRAVENOUS at 05:20:00

## 2021-11-18 RX ADMIN — ONDANSETRON 4 MG: 2 INJECTION INTRAMUSCULAR; INTRAVENOUS at 03:08:00

## 2021-11-18 RX ADMIN — ATORVASTATIN CALCIUM 40 MG: 40 TABLET, FILM COATED ORAL at 21:02:00

## 2021-11-18 RX ADMIN — DOCUSATE SODIUM 100 MG: 100 CAPSULE, LIQUID FILLED ORAL at 21:02:00

## 2021-11-18 RX ADMIN — CEFAZOLIN SODIUM/WATER 2 G: 2 G/20 ML SYRINGE (ML) INTRAVENOUS at 14:56:00

## 2021-11-18 RX ADMIN — ACETAMINOPHEN 1000 MG: 10 INJECTION, SOLUTION INTRAVENOUS at 00:23:00

## 2021-11-18 RX ADMIN — DOBUTAMINE HYDROCHLORIDE 1 MCG/KG/MIN: 200 INJECTION INTRAVENOUS at 05:04:00

## 2021-11-18 RX ADMIN — ACETAMINOPHEN 1000 MG: 10 INJECTION, SOLUTION INTRAVENOUS at 06:53:00

## 2021-11-18 RX ADMIN — MORPHINE SULFATE 2 MG: 2 INJECTION, SOLUTION INTRAMUSCULAR; INTRAVENOUS at 04:58:00

## 2021-11-18 RX ADMIN — DOBUTAMINE HYDROCHLORIDE 2 MCG/KG/MIN: 200 INJECTION INTRAVENOUS at 04:18:00

## 2021-11-18 RX ADMIN — CEFAZOLIN SODIUM/WATER 2 G: 2 G/20 ML SYRINGE (ML) INTRAVENOUS at 06:57:00

## 2021-11-18 NOTE — PLAN OF CARE
Assumed care of pt from CVOR, sedated, vented with usual lines, on NTG, dobs, insulin, precedex. Chest tubes with sanguinous drainage to 20cm suction, farooq catheter draining yellow urine. Patients daughter Chinedu Perez updated with patients condition.

## 2021-11-18 NOTE — PROGRESS NOTES
[facility]  Progress Note    Candy De Paz Patient Status:  Inpatient    10/12/1945 MRN [id number]   [facility] 6NE-A Attending Yonathan Santana MD   [facility] Day # 2 PCP Allyn Butterfield DO     Subjective:  Seen and examined by Dr. Sandra Hatfield.  Zahraa Rutledge PPX: MIGUE/SCD/PPI   - Pain control/IS/ambulation - avoid narcs   - CCU monitoring today      Jason Knowles  11/18/2021  9:07 AM

## 2021-11-18 NOTE — PROGRESS NOTES
[facility]     Hospitalist Progress Note     Gurjit Lewis Patient Status:  Inpatient    10/12/1945 MRN [id number]   [facility] 6NE-A Attending Author MD Rigo   Hosp Day # 2 PCP Suzette Alberts DO     Chief Complaint: medical man PTP 14.3 17.7* 16.7*   INR 1.09 1.42* 1.32*            COVID-19 Lab Results    COVID-19  Lab Results   Component Value Date    COVID19 Not Detected 11/13/2021    COVID19 Not Detected 04/13/2021    COVID19 Not Detected 02/17/2021       Pro-Calcitonin  No Prophylaxis:  SCD's  · CODE status:  Full   · Moore: yes   · Central line:    Yes   · If COVID testing is negative, may discontinue isolation:  Yes     Will the patient be referred to TCC on discharge?:  TBD  Estimated date of discharge:  4 days  Discharge

## 2021-11-18 NOTE — CM/SW NOTE
gennaro reviewed chart. Pt admitted from home alone. Pt had CABGx2 yesterday. Therapy consults pending. Will need full assessment when appropriate. Julia Celeste orders obtained and Tiffanie Silva referral started in anticipation that pt will need HHC at minimum. sw following.

## 2021-11-18 NOTE — PLAN OF CARE
Assumed care this morning. Pt up in chair, waiting on breakfast.  Confused to situation, does not remember any of surgery day, thinking surgery still needs to happen.  Oriented to person, time and family, even talking about characters (by name) of the show physical deficits and behaviors that affect risk of falls.   - [place] fall precautions as indicated by assessment.  - Educate pt/family on patient safety including physical limitations  - Instruct pt to call for assistance with activity based on assessme edema, trend weights  Outcome: Progressing  Goal: Absence of cardiac arrhythmias or at baseline  Description: INTERVENTIONS:  - Continuous cardiac monitoring, monitor vital signs, obtain 12 lead EKG if indicated  - Evaluate effectiveness of antiarrhythmic

## 2021-11-18 NOTE — PHYSICAL THERAPY NOTE
PHYSICAL THERAPY EVALUATION - INPATIENT     Room Number: 2978/3579-J  Evaluation Date: 11/18/2021  Type of Evaluation: Initial  Physician Order: PT Eval and Treat    Presenting Problem: s/p cabg x 2 11/17/21  Co-Morbidities : DM2, CVA with no deficit on a MRI-no effects   • Torn meniscus     right knee   • Type II or unspecified type diabetes mellitus without mention of complication, not stated as uncontrolled    • Uncomfortable fullness after meals    • Visual impairment     glasses   • Wears glasses repetition  · Safety Judgement:  decreased awareness of need for assistance and decreased awareness of need for safety  · Awareness of Errors:  assistance required to identify errors made, assistance required to correct errors made and decreased awareness task and walker mgmt)    Skilled Therapy Provided: Pt presents supine in bed, agreeable to PT. RN approval for session noted. Pt educated on sternal precautions and log roll technique. Supine to sit with min assist with log roll technique.   Sits EOB with with HHPT, currently recommending 24 hour supervision upon dc. .    Based on this evaluation, patient's clinical presentation is stable and overall the evaluation complexity is considered low.   These impairments and comorbidities manifest themselves as func

## 2021-11-18 NOTE — PLAN OF CARE
Assumed care pt approx 1930. Pt vented, w/ usual lines, on precedex, insulin, and dobutamine gtts. Pt following commands and fairly alert. Notified RT to begin CPAP trial, updated Dr. Ramone Jeffries after CPAP trial, ok to extubate.  Pt now on NC at 4L and jie

## 2021-11-18 NOTE — OCCUPATIONAL THERAPY NOTE
OCCUPATIONAL THERAPY EVALUATION - INPATIENT     Room Number: 9914/4966-G  Evaluation Date: 11/18/2021  Type of Evaluation: Initial  Presenting Problem: s/p CABG X2 11/17/21    Physician Order: IP Consult to Occupational Therapy  Reason for Therapy: ADL/IAD per patient she was unaware but it was noted on a MRI-no effects   • Torn meniscus     right knee   • Type II or unspecified type diabetes mellitus without mention of complication, not stated as uncontrolled    • Uncomfortable fullness after meals    • Status:  WFL - within functional limits  Attention Span:  attends with cues to redirect and difficulty attending to directions  Orientation Level:  oriented to place, oriented to time and oriented to person  Memory:  decreased short term memory  Safety [place] not remember going down for procedure and thought the procedure hadn't happened yet. Pt required max verbal and tactile cues throghout session to redirect.  Pt completed bed mobility with MIN A and max verbal and tactile cues for proper roll technique and h safely to her prior level of function.     Patient Complexity  Occupational Profile/Medical History MODERATE - Expanded review of history including review of medical or therapy record   Specific performance deficits impacting engagement in ADL/IADL MODERATE supervision    Writer wore PPE of gloves, goggles, and mask throughout session. Pt wore mask while in ambulating hallway, Pt daughter wore mask while present for session.

## 2021-11-18 NOTE — PROGRESS NOTES
[facility] Cardiology  Progress Note    Medhat Jean Baptiste Patient Status:  Inpatient    10/12/1945 MRN [id number]   [facility] 6NE-A Attending Sujata Johnson MD   [address] Day # 2 PCP Razia Arzate DO     Subjective:  POD #1 CABG. Extubated.   Anahy graft  Active Problems:    Borderline personality disorder (HCC)    Depressive disorder    Type 2 diabetes mellitus without complication (HCC)    Coronary artery disease involving native coronary artery of native heart with unstable angina pectoris ([address]

## 2021-11-19 PROCEDURE — 99232 SBSQ HOSP IP/OBS MODERATE 35: CPT | Performed by: HOSPITALIST

## 2021-11-19 RX ORDER — FUROSEMIDE 10 MG/ML
40 INJECTION INTRAMUSCULAR; INTRAVENOUS DAILY
Status: DISCONTINUED | OUTPATIENT
Start: 2021-11-19 | End: 2021-11-22

## 2021-11-19 RX ORDER — LOSARTAN POTASSIUM 25 MG/1
25 TABLET ORAL DAILY
Status: DISCONTINUED | OUTPATIENT
Start: 2021-11-19 | End: 2021-11-22

## 2021-11-19 RX ADMIN — Medication 25 MG: at 06:08:00

## 2021-11-19 RX ADMIN — PANTOPRAZOLE SODIUM 40 MG: 40 TABLET, DELAYED RELEASE ORAL at 06:08:00

## 2021-11-19 RX ADMIN — QUETIAPINE 100 MG: 100 TABLET, FILM COATED ORAL at 20:39:00

## 2021-11-19 RX ADMIN — DOCUSATE SODIUM 100 MG: 100 CAPSULE, LIQUID FILLED ORAL at 08:59:00

## 2021-11-19 RX ADMIN — Medication 25 MG: at 18:46:00

## 2021-11-19 RX ADMIN — DOCUSATE SODIUM 100 MG: 100 CAPSULE, LIQUID FILLED ORAL at 20:39:00

## 2021-11-19 RX ADMIN — ATORVASTATIN CALCIUM 40 MG: 40 TABLET, FILM COATED ORAL at 20:39:00

## 2021-11-19 RX ADMIN — LOSARTAN POTASSIUM 25 MG: 25 TABLET ORAL at 18:46:00

## 2021-11-19 RX ADMIN — POLYETHYLENE GLYCOL 3350 17 G: 17 POWDER, FOR SOLUTION ORAL at 18:42:00

## 2021-11-19 RX ADMIN — FUROSEMIDE 40 MG: 10 INJECTION INTRAMUSCULAR; INTRAVENOUS at 09:03:00

## 2021-11-19 RX ADMIN — ASPIRIN 325 MG: 325 MG TABLET, DELAYED RELEASE (ENTERIC COATED) ORAL at 08:59:00

## 2021-11-19 RX ADMIN — CEFAZOLIN SODIUM/WATER 2 G: 2 G/20 ML SYRINGE (ML) INTRAVENOUS at 06:08:00

## 2021-11-19 RX ADMIN — DULOXETIN HYDROCHLORIDE 90 MG: 30 CAPSULE, DELAYED RELEASE ORAL at 09:00:00

## 2021-11-19 NOTE — PHYSICAL THERAPY NOTE
PHYSICAL THERAPY TREATMENT NOTE - INPATIENT    Room Number: 9914/0173-K     Session: 1     Number of Visits to Meet Established Goals: 4    Presenting Problem: s/p cabg x 2 11/17/21  Co-Morbidities : DM2, CVA with no deficits, depression, HTN  ASSESSMEN RESTRICTION  Weight Bearing Restriction: None                PAIN ASSESSMENT   Ratin  Location: incision  Management Techniques: Body mechanics;Breathing techniques;Repositioning; Activity promotion    BALANCE assist without assistive device. Cues for deep breaths and paced respiration. Pt needed three standing rest breaks to recover from minimal sob. Patient End of Session: Up in chair;Needs met;Call light within reach;RN aware of session/findings; Alarm

## 2021-11-19 NOTE — PLAN OF CARE
Assumed care of pt at [address]. Pt alert and oriented x3 but is very forgetful with flight of ideas. Pt also knows she is in hospital but then is surprised that the blood pressure cuff is taking her pressure. Pt denies pain at this time.  She is voiding freely

## 2021-11-19 NOTE — HOME CARE LIAISON
Received referral via [employer] for Home Health services. Spoke w/ patient and [facility] with [facility], patient choice is [address].  Agency reserved in [address] [place] [address] Road and contact information placed on AVS.Financial interest disclosu

## 2021-11-19 NOTE — PROGRESS NOTES
[facility]     CV Surgery Progress Note    Mariely Parsons Patient Status:  Inpatient    10/12/1945 MRN [id number]   [facility] 6NE-A Attending Iva Kaiser MD   [facility] Day # 3 PCP George Moy,      Subjective:  Patient sitting i ok, lasix today- monitor   -Bowel regimen   -Pain management, prn tylenol, avoid narcotics   -Chest tubes removed  -Keep PW for now   -GI PPX: protonix   -DVT PPX: TEDs/SCDs  -Encourage IS/ambulation   -PT/OT/Cardiac rehab   -Ok to transfer to CTU     -D/W

## 2021-11-19 NOTE — PLAN OF CARE
Received patient at 730. Alert and oriented x 3 tele rhythm NSR.  02 saturation 95% on room air breath sounds diminished. Bed is locked and in low position. Call light and personal item within reach. No complaint of chest pain or shortness of breath. Problem: SAFETY ADULT - FALL  Goal: Free from fall injury  Description: INTERVENTIONS:  - Assess pt frequently for physical needs  - Identify cognitive and physical deficits and behaviors that affect risk of falls.   - [place] fall precautions as indica quality of pulses, skin color and temperature  - Assess for signs of decreased coronary artery perfusion - ex.  Angina  - Evaluate fluid balance, assess for edema, trend weights  Outcome: Progressing  Goal: Absence of cardiac arrhythmias or at baseline  Jaleel

## 2021-11-19 NOTE — PROGRESS NOTES
[facility] [facility] Cardiology Progress Note    Leydi Williamson Patient Status:  Inpatient    10/12/1945 MRN [id number]   [facility] 8NE-A Attending Maki Singh MD   [facility] Day # 3 PCP Rehana Dear, DO     Subjective:  POD #2 two-vessel interval not displayed. No results for input(s): TROP, CK in the last 168 hours. Diagnostics:             Physical Exam:       Constitutional: She is oriented to person, place, and time and obese. She appears well-developed. No distress.    Yvette Folds Discussed with patient in detail.     Carmelita Jerez MD

## 2021-11-19 NOTE — CARDIAC REHAB
CABG education completed with patient.   Appointment for outpatient cardiac rehab on 1/13/22 at 2:30pm

## 2021-11-19 NOTE — OPERATIVE REPORT
[facility]    PATIENT'S NAME: Bard Zamora   ATTENDING PHYSICIAN: Aditya Nelson M.D.    OPERATING PHYSICIAN: Gilberto Ratliff MD   PATIENT ACCOUNT#:   [id number]    LOCATION:  [address] [place] [address]  MEDICAL RECORD #:   [id number]       DATE OF BIRTH:  [birth year] right coronary artery was inspected. The main right was quite calcified, not suitable for bypass. The right posterior descending, though smallish, was not calcified. This was a 1 to 1.5 mm artery. A segment of saphenous graft was placed here.   Cardiopl

## 2021-11-20 PROCEDURE — 99232 SBSQ HOSP IP/OBS MODERATE 35: CPT | Performed by: HOSPITALIST

## 2021-11-20 RX ORDER — ACETAMINOPHEN 325 MG/1
650 TABLET ORAL EVERY 6 HOURS PRN
Status: DISCONTINUED | OUTPATIENT
Start: 2021-11-20 | End: 2021-11-22

## 2021-11-20 RX ADMIN — DOCUSATE SODIUM 100 MG: 100 CAPSULE, LIQUID FILLED ORAL at 21:25:00

## 2021-11-20 RX ADMIN — LOSARTAN POTASSIUM 25 MG: 25 TABLET ORAL at 09:00:00

## 2021-11-20 RX ADMIN — FUROSEMIDE 40 MG: 10 INJECTION INTRAMUSCULAR; INTRAVENOUS at 09:00:00

## 2021-11-20 RX ADMIN — PANTOPRAZOLE SODIUM 40 MG: 40 TABLET, DELAYED RELEASE ORAL at 05:13:00

## 2021-11-20 RX ADMIN — ASPIRIN 325 MG: 325 MG TABLET, DELAYED RELEASE (ENTERIC COATED) ORAL at 09:00:00

## 2021-11-20 RX ADMIN — Medication 25 MG: at 05:13:00

## 2021-11-20 RX ADMIN — ATORVASTATIN CALCIUM 40 MG: 40 TABLET, FILM COATED ORAL at 21:25:00

## 2021-11-20 RX ADMIN — ACETAMINOPHEN 650 MG: 325 TABLET ORAL at 11:33:00

## 2021-11-20 RX ADMIN — ACETAMINOPHEN 650 MG: 325 TABLET ORAL at 21:27:00

## 2021-11-20 RX ADMIN — QUETIAPINE 100 MG: 100 TABLET, FILM COATED ORAL at 21:25:00

## 2021-11-20 RX ADMIN — DOCUSATE SODIUM 100 MG: 100 CAPSULE, LIQUID FILLED ORAL at 09:01:00

## 2021-11-20 RX ADMIN — Medication 25 MG: at 18:12:00

## 2021-11-20 RX ADMIN — DULOXETIN HYDROCHLORIDE 90 MG: 30 CAPSULE, DELAYED RELEASE ORAL at 09:00:00

## 2021-11-20 NOTE — PLAN OF CARE
Assumed care of patient at 1. Alert and oriented x3--forgetful and confused at times, HX: Borderline personality disorder, No C/O chest pain or SOB, SPO2 on RA 94%, Heart rate SR 80s. Breath sounds clear.  Midsternal with steri strips-- C/D/I, painted wi Provide assistive devices as appropriate  - Consider OT/PT consult to assist with strengthening/mobility  - Encourage toileting schedule  Outcome: Progressing     Problem: DISCHARGE PLANNING  Goal: Discharge to home or other facility with appropriate resou emergency measures for life threatening arrhythmias  - Monitor electrolytes and administer replacement therapy as ordered  Outcome: Progressing     Problem: Integumentary status not within defined limits  Goal: Pt's integumentary status will be adequate fo

## 2021-11-20 NOTE — PROGRESS NOTES
[facility]     Hospitalist Progress Note     Doretta Fleischer Patient Status:  Inpatient    10/12/1945 MRN [id number]   [facility] 6NE-A Attending Danita Ko MD   Hosp Day # 4 PCP Nghia Umana DO     Chief Complaint: medical man Detected 11/13/2021    COVID19 Not Detected 04/13/2021    COVID19 Not Detected 02/17/2021       Pro-Calcitonin  No results for input(s): PCT in the last 168 hours. Cardiac  No results for input(s): TROP, PBNP in the last 168 hours.     Creatinine Kinase testing is negative, may discontinue isolation:  Yes     Will the patient be referred to TCC on discharge?:  TBD  Estimated date of discharge:  1-2 days  Discharge is dependent on: post op course  At this point Ms. Luis Fernando Bunch is expected to be discharge to:

## 2021-11-20 NOTE — PROGRESS NOTES
Progress Note  Colby Ramos Patient Status:  Inpatient    10/12/1945 MRN [id number]   [facility] 8NE-A Attending Rae Odonnell MD   Hosp Day # 4 PCP Selina Yancey, DO     Subjective:  Feeling much more like herself today.  Complains potassium chloride  40 mEq Intravenous Once   • Insulin Aspart Pen  1-50 Units Subcutaneous TID CC and HS   • docusate sodium  100 mg Oral BID   • aspirin  325 mg Oral Daily    Or   • aspirin  300 mg Rectal Daily   • metoprolol tartrate  25 mg Oral 2x Trini

## 2021-11-20 NOTE — PROGRESS NOTES
[facility]   [facility] Progress Note    Yanelismarcin JonesFleischer Patient Status:  Inpatient    10/12/1945 MRN [id number]   [facility] 8NE-A Attending Danita Ko MD   [facility] Day # 4 PCP Nghia Umana DO     Subjective: Patient seen and examined Enema (FLEET) 7-19 GM/118ML enema 133 mL, 1 enema, Rectal, Once PRN  ondansetron (ZOFRAN) injection 4 mg, 4 mg, Intravenous, Q6H PRN  aspirin EC tab 325 mg, 325 mg, Oral, Daily   Or  aspirin 300 MG rectal suppository 300 mg, 300 mg, Rectal, Daily  metoprol whether generalized or localized, lower leg     Osteopenia     Borderline personality disorder ([facility] [address]     Major depressive disorder, recurrent episode, moderate (HCC)     Major depressive disorder, recurrent ([facility] [address]     Depressive disorder     Fracture of met anemia: monitor  -Volume overload: diuresing with lasix 40mg daily  -Discontinue pacer wires this am  -Pain management: tylenol PRN for pain  -Encourage IS/ambulation  -Discharge planning: anticipate home Monday or Tuesday      D/W Dr. Mychal Gray, RN  1

## 2021-11-20 NOTE — PLAN OF CARE
Assumed patient care at 0700. Aox4. Forgetful. History of boderline personality disorder. Calm, cooperative. Up with SBA. NSR on cardiac monitor. Adequate saturation on RA. Lung sounds clear. Denies SOB, chest discomfort, N/V. Report 7/10 mid-sternal pain. preferences of patient/family/discharge partner  - Complete POLST form as appropriate  - Assess patient's ability to be responsible for managing their own health  - Refer to Case Management Department for coordinating discharge planning if the patient need Provide Smoking Cessation handout, if applicable  - Encourage broncho-pulmonary hygiene including cough, deep breathe, Incentive Spirometry  - Assess the need for suctioning and perform as needed  - Assess and instruct to report SOB or any respiratory diff

## 2021-11-21 PROCEDURE — 99232 SBSQ HOSP IP/OBS MODERATE 35: CPT | Performed by: HOSPITALIST

## 2021-11-21 RX ADMIN — Medication 25 MG: at 05:19:00

## 2021-11-21 RX ADMIN — FUROSEMIDE 40 MG: 10 INJECTION INTRAMUSCULAR; INTRAVENOUS at 08:36:00

## 2021-11-21 RX ADMIN — DOCUSATE SODIUM 100 MG: 100 CAPSULE, LIQUID FILLED ORAL at 21:02:00

## 2021-11-21 RX ADMIN — DULOXETIN HYDROCHLORIDE 90 MG: 30 CAPSULE, DELAYED RELEASE ORAL at 08:35:00

## 2021-11-21 RX ADMIN — ASPIRIN 325 MG: 325 MG TABLET, DELAYED RELEASE (ENTERIC COATED) ORAL at 08:36:00

## 2021-11-21 RX ADMIN — ACETAMINOPHEN 650 MG: 325 TABLET ORAL at 06:55:00

## 2021-11-21 RX ADMIN — Medication 25 MG: at 18:52:00

## 2021-11-21 RX ADMIN — DOCUSATE SODIUM 100 MG: 100 CAPSULE, LIQUID FILLED ORAL at 08:36:00

## 2021-11-21 RX ADMIN — ACETAMINOPHEN 650 MG: 325 TABLET ORAL at 18:52:00

## 2021-11-21 RX ADMIN — ATORVASTATIN CALCIUM 40 MG: 40 TABLET, FILM COATED ORAL at 21:02:00

## 2021-11-21 RX ADMIN — QUETIAPINE 100 MG: 100 TABLET, FILM COATED ORAL at 21:02:00

## 2021-11-21 RX ADMIN — ACETAMINOPHEN 650 MG: 325 TABLET ORAL at 12:22:00

## 2021-11-21 RX ADMIN — PANTOPRAZOLE SODIUM 40 MG: 40 TABLET, DELAYED RELEASE ORAL at 05:19:00

## 2021-11-21 RX ADMIN — LOSARTAN POTASSIUM 25 MG: 25 TABLET ORAL at 08:35:00

## 2021-11-21 NOTE — PROGRESS NOTES
[facility]   [facility] Progress Note    Cassidy Ryder Patient Status:  Inpatient    10/12/1945 MRN [id number]   [facility] 8NE-A Attending Lloyd Roberts MD   [address] Day # 5 PCP Cely Christina DO     Subjective: Patient seen and examined Or  aspirin 300 MG rectal suppository 300 mg, 300 mg, Rectal, Daily  metoprolol tartrate (LOPRESSOR) tab 25 mg, 25 mg, Oral, 2x Daily(Beta Blocker)  Magnesium Sulfate in D5W IVPB premix 1 g, 1 g, Intravenous, PRN  Magnesium Sulfate IVPB premix SOLN 2 g, 2 ([address]     Enthesopathy of hip region left     Osteoarthrosis, unspecified whether generalized or localized, lower leg     Osteopenia     Borderline personality disorder ([address]     Major depressive disorder, recurrent episode, moderate ([address]     Major depres -HD stable  -Leukocytosis: stable, afebrile  -Acute post op anemia: stable  -Volume overload: lasix 40mg daily  -CT/PW removed  -Pain management  -Encourage IS/ambulation  -Discharge planning: likely home tomorrow      D/W Dr. Aramis Pacheco, RN  11/21/20

## 2021-11-21 NOTE — PHYSICAL THERAPY NOTE
PHYSICAL THERAPY TREATMENT NOTE - INPATIENT    Room Number: 8854/9273-Q     Session: 2  Number of Visits to Meet Established Goals: 4    Presenting Problem: s/p cabg x 2 11/17/21  Co-Morbidities : DM2, CVA with no deficits, depression, HTN    History rela not stated as uncontrolled    • Uncomfortable fullness after meals    • Visual impairment     glasses   • Wears glasses    • Weight gain        Past Surgical History  Past Surgical History:   Procedure Laterality Date   • APPENDECTOMY     • BACK SURGERY  0 Score:  Raw Score: 21   Approx Degree of Impairment: 28.97%   Standardized Score (AM-PAC Scale): 50.25   CMS Modifier (G-Code): CJ    FUNCTIONAL ABILITY STATUS  Functional Mobility/Gait Assessment  Gait Assistance: Supervision (SBA-supervision)  Distance ( EOB @ level: supervision with log roll technique-met.       Goal #2 Patient is able to demonstrate transfers EOB to/from Chair/Wheelchair at assistance level: supervision-met      Goal #3 Patient is able to ambulate 300 feet with assist device: least restr

## 2021-11-21 NOTE — PLAN OF CARE
POD # CABG  A&O X 4. Forgetful. VSS. NSR on cardiac monitor. . Adequate saturation on RA. Lungs clear. IS encouraged. Achieving 1000ml. Denied SOB. Midsternal incision: no redness, no drainage. steri strips well approximated. Betadine applied.  Left reports new pain  - Anticipate increased pain with activity and pre-medicate as appropriate  Outcome: Progressing     Problem: RISK FOR INFECTION - ADULT  Goal: Absence of fever/infection during anticipated neutropenic period  Description: INTERVENTIONS  - optimal cardiac output and hemodynamic stability  Description: INTERVENTIONS:  - Monitor vital signs, rhythm, and trends  - Monitor for bleeding, hypotension and signs of decreased cardiac output  - Evaluate effectiveness of vasoactive medications to optim

## 2021-11-21 NOTE — PROGRESS NOTES
[facility]     Hospitalist Progress Note     Bouchra Soto Patient Status:  Inpatient    10/12/1945 MRN [id number]   [facility] 6NE-A Attending Trung Stroud MD   Hosp Day # 5 PCP Ray Chowdhury DO     Chief Complaint: medical man Results    COVID-19  Lab Results   Component Value Date    COVID19 Not Detected 11/13/2021    COVID19 Not Detected 04/13/2021    COVID19 Not Detected 02/17/2021       Pro-Calcitonin  No results for input(s): PCT in the last 168 hours.     Cardiac  No result TIESHA  Estimated date of discharge: Tomorrow  Discharge is dependent on: post op course  At this point Ms. Yonathan Allen is expected to be discharge to:  tbd     Connor Argueta MD  [facility]

## 2021-11-21 NOTE — PROGRESS NOTES
Progress Note  Debi Corea Patient Status:  Inpatient    10/12/1945 MRN [id number]   [facility] 8NE-A Attending William Coburn MD   Hosp Day # 5 PCP Janis Zaidi, DO     Subjective:  Still with pain when pulling herself up from bed Oral Daily    Or   • aspirin  300 mg Rectal Daily   • metoprolol tartrate  25 mg Oral 2x Daily(Beta Blocker)   • mupirocin  1 Application Nasal BID   • pantoprazole  40 mg Oral QAM AC   • atorvastatin  40 mg Oral Nightly   • DULoxetine  90 mg Oral Daily

## 2021-11-21 NOTE — PROGRESS NOTES
11/21/21 4464   Over the last 2 weeks, how often have you been bothered by any of the following problems?    Little interest or pleasure in doing things 1   Feeling down, depressed, or hopeless 1   Trouble falling or staying asleep, or sleeping too much

## 2021-11-21 NOTE — PLAN OF CARE
A&o x3, forgetful requires frequent reminders. Room air, o2 sats in 90s. Incentive spirometer encouraged. NSR on tele, Teds/Scds.  Midsternal incision painted with betadine, incision clean/dry; x2 donor sites to left leg, staples clean/dry, mild redness, si

## 2021-11-22 VITALS
TEMPERATURE: 98 F | OXYGEN SATURATION: 98 % | WEIGHT: 202.81 LBS | SYSTOLIC BLOOD PRESSURE: 119 MMHG | RESPIRATION RATE: 20 BRPM | HEIGHT: 64 IN | DIASTOLIC BLOOD PRESSURE: 78 MMHG | HEART RATE: 111 BPM | BODY MASS INDEX: 34.62 KG/M2

## 2021-11-22 PROCEDURE — 99232 SBSQ HOSP IP/OBS MODERATE 35: CPT | Performed by: HOSPITALIST

## 2021-11-22 RX ORDER — MAGNESIUM CARB/ALUMINUM HYDROX 105-160MG
296 TABLET,CHEWABLE ORAL ONCE
Status: COMPLETED | OUTPATIENT
Start: 2021-11-22 | End: 2021-11-22

## 2021-11-22 RX ORDER — METOPROLOL SUCCINATE 25 MG/1
25 TABLET, EXTENDED RELEASE ORAL 2 TIMES DAILY
Qty: 60 TABLET | Refills: 3 | Status: SHIPPED | OUTPATIENT
Start: 2021-11-22

## 2021-11-22 RX ORDER — LOSARTAN POTASSIUM 25 MG/1
25 TABLET ORAL DAILY
Qty: 30 TABLET | Refills: 3 | Status: SHIPPED | OUTPATIENT
Start: 2021-11-23 | End: 2021-12-06 | Stop reason: ALTCHOICE

## 2021-11-22 RX ADMIN — MAGNESIUM CARB/ALUMINUM HYDROX 296 ML: 105-160MG TABLET,CHEWABLE ORAL at 10:56:00

## 2021-11-22 RX ADMIN — Medication 25 MG: at 05:40:00

## 2021-11-22 RX ADMIN — DULOXETIN HYDROCHLORIDE 90 MG: 30 CAPSULE, DELAYED RELEASE ORAL at 08:53:00

## 2021-11-22 RX ADMIN — DOCUSATE SODIUM 100 MG: 100 CAPSULE, LIQUID FILLED ORAL at 08:54:00

## 2021-11-22 RX ADMIN — ASPIRIN 325 MG: 325 MG TABLET, DELAYED RELEASE (ENTERIC COATED) ORAL at 08:54:00

## 2021-11-22 RX ADMIN — ACETAMINOPHEN 650 MG: 325 TABLET ORAL at 11:53:00

## 2021-11-22 RX ADMIN — ACETAMINOPHEN 650 MG: 325 TABLET ORAL at 05:39:00

## 2021-11-22 RX ADMIN — LOSARTAN POTASSIUM 25 MG: 25 TABLET ORAL at 08:54:00

## 2021-11-22 RX ADMIN — FUROSEMIDE 40 MG: 10 INJECTION INTRAMUSCULAR; INTRAVENOUS at 08:54:00

## 2021-11-22 RX ADMIN — PANTOPRAZOLE SODIUM 40 MG: 40 TABLET, DELAYED RELEASE ORAL at 05:40:00

## 2021-11-22 NOTE — PROGRESS NOTES
[facility]     Hospitalist Progress Note     Codie Nam Patient Status:  Inpatient    10/12/1945 MRN [id number]   [facility] 6NE-A Attending Michele Pedraza MD   [facility] Day # 6 PCP Lisandro Servin DO     Chief Complaint: medical man mg/dL).     Recent Labs   Lab 11/16/21  1729 11/17/21  1400 11/17/21  1452   PTP 14.3 17.7* 16.7*   INR 1.09 1.42* 1.32*            COVID-19 Lab Results    COVID-19  Lab Results   Component Value Date    COVID19 Not Detected 11/13/2021    COVID19 Not Detect examined. Agree w/ plan as above. #severe CAD  #DM2  #HTN  -sp CABG  -dizzy today w/ standing, orthostatics neg. Maybe a little overdiuresed.  PT  -laxatives for constipation  -ok to DC home    /78 (BP Location: Right arm)   Pulse 111   Temp 97.8 0 = independent

## 2021-11-22 NOTE — PLAN OF CARE
Assumed care at [address] pt on bed, om room air  Denies pain at this time.   Labs in am  Plan:d/c home  Problem: Patient/Family Goals  Goal: Patient/Family Long Term Goal  Description: Patient's Long Term Goal: Feel better    Interventions:  - Increase ac ADULT  Goal: Absence of fever/infection during anticipated neutropenic period  Description: INTERVENTIONS  - Monitor WBC  - Administer growth factors as ordered  - Implement neutropenic guidelines  11/22/2021 0213 by Yonathan Mejía RN  Outcome: Progress hemodynamic stability  Description: INTERVENTIONS:  - Monitor vital signs, rhythm, and trends  - Monitor for bleeding, hypotension and signs of decreased cardiac output  - Evaluate effectiveness of vasoactive medications to optimize hemodynamic stability retention  11/22/2021 0213 by Corrine Zambrano, RN  Outcome: Progressing

## 2021-11-22 NOTE — PROGRESS NOTES
Progress Note  Jocelynn Garcia Patient Status:  Inpatient    10/12/1945 MRN [id number]   [facility] 8NE-A Attending Liset Landis MD   [address] Day # 6 PCP Deepa Lilly DO     Subjective:  No overnight events. Awake, alert, oriented.   Michi Turnre Subcutaneous TID CC and HS   • docusate sodium  100 mg Oral BID   • aspirin  325 mg Oral Daily    Or   • aspirin  300 mg Rectal Daily   • metoprolol tartrate  25 mg Oral 2x Daily(Beta Blocker)   • pantoprazole  40 mg Oral QAM AC   • atorvastatin  40 mg Ora

## 2021-11-22 NOTE — PROGRESS NOTES
11/22/21 1241 11/22/21 1242 11/22/21 1244   Vital Signs   Pulse 86 89 96   /74 116/75 119/78   BP Location Right arm Right arm Right arm   BP Method Automatic Automatic Automatic   Patient Position Lying Sitting Standing

## 2021-11-22 NOTE — PLAN OF CARE
Pt discharged to home. Being driven home by daughter. Reviewed all medications with patient. Instructed to follow up with all providers as directed. Post cabg discharge education given. States understanding of all discharge teaching. PIV removed.  Off floor

## 2021-11-22 NOTE — PROGRESS NOTES
[facility]     CV Surgery Progress Note    Bouchra Soto Patient Status:  Inpatient    10/12/1945 MRN [id number]   [facility] 6NE-A Attending Trung Stroud MD   [address] Day [address] 6 PCP Ray Chowdhury DO     Subjective:  Patient reports f monitor   -Vol OL, agree with outpatient torsemide per cardiology   -Renal function intact, good UOP- monitor   -Bowel regimen   -Pain management, prn tylenol, avoid narcotics   -Chest tubes and PW removed  -GI PPX: protonix   -DVT PPX: TEDs/SCDs  -Wei Church

## 2021-11-22 NOTE — OCCUPATIONAL THERAPY NOTE
OCCUPATIONAL THERAPY TREATMENT NOTE - INPATIENT     Room Number: 6185/8940-R  Session: 1  Number of Visits to Meet Established Goals: 3    History: Patient is 77year old female admitted on 11/16/2021 from home with history of CAD, currently s/p CABG X2 11 transfer and car transfer with supervision. Pt verbalized understanding and asked clarification questions as appropriate. Pt reminded to refer to binder with questions or to contact OT with additional questions prior to discharge.      Verbalized understand

## 2021-11-22 NOTE — PHYSICAL THERAPY NOTE
PHYSICAL THERAPY TREATMENT NOTE - INPATIENT    Room Number: 1300/9637-P     Session: 3   Number of Visits to Meet Established Goals: 4    Presenting Problem: s/p cabg x 2 11/17/21  Co-Morbidities : DM2, CVA with no deficits, depression, HTN    History rel complication, not stated as uncontrolled    • Uncomfortable fullness after meals    • Visual impairment     glasses   • Wears glasses    • Weight gain        Past Surgical History  Past Surgical History:   Procedure Laterality Date   • APPENDECTOMY     • B another person does the patient currently need. ..    Help from Another: Moving to and from a bed to a chair (including a wheelchair)?: A Little   Help from Another: Need to walk in hospital room?: A Little   Help from Another: Climbing 3-5 steps with a rail Pt demonstrates understanding of educational material provided. DISCHARGE RECOMMENDATIONS  PT Discharge Recommendations: Home with home health PT     PLAN  PT Treatment Plan: Bed mobility; Patient education;Gait training;Range of motion;Strengthening;St

## 2021-11-23 ENCOUNTER — TELEPHONE (OUTPATIENT)
Dept: FAMILY MEDICINE CLINIC | Facility: CLINIC | Age: 76
End: 2021-11-23

## 2021-11-23 ENCOUNTER — PATIENT OUTREACH (OUTPATIENT)
Dept: CASE MANAGEMENT | Age: 76
End: 2021-11-23

## 2021-11-23 DIAGNOSIS — I25.110 CORONARY ARTERY DISEASE INVOLVING NATIVE CORONARY ARTERY OF NATIVE HEART WITH UNSTABLE ANGINA PECTORIS (HCC): ICD-10-CM

## 2021-11-23 DIAGNOSIS — Z02.9 ENCOUNTERS FOR UNSPECIFIED ADMINISTRATIVE PURPOSE: ICD-10-CM

## 2021-11-23 PROCEDURE — 1111F DSCHRG MED/CURRENT MED MERGE: CPT

## 2021-11-23 NOTE — PROGRESS NOTES
Initial Post Discharge Follow Up   Discharge Date: 11/22/21  Contact Date: 11/23/2021    Consent Verification:  Assessment Completed With: Patient  HIPAA Verified?   Yes    Discharge Dx:  · Severe CAD found on left heart cath status post CABG 11/17 CABG patient given a different diet per AVS? no    Medications: Reviewed medication list.  Medications are up to date. Current Outpatient Medications   Medication Sig Dispense Refill   • losartan 25 MG Oral Tab Take 1 tablet (25 mg total) by mouth daily.  3 (VITAMIN D) 50 MCG (2000 UT) Oral Cap Take 2,000 Units by mouth daily. • Acetaminophen (ACETAMINOPHEN EXTRA STRENGTH) 500 MG Oral Cap Take 500-1,000 mg by mouth every 6 (six) hours as needed.        • Magnesium Hydroxide (MILK OF MAGNESIA OR) Take 15 prepared  Appointments Scheduled:    PCP in one week:   no   Cardiologist 2 weeks yes    Chest Xray 7 days post d/c yes      Needs post D/C:   Now that you are home, are there any needs or concerns you need addressed before your next visit with your PCP? Dress comfortably                                                                                                                                              Mar 02, 2022  9:30 AM CST Established Patient with MD Whitney Barcenas, appt. Patient denies any questions or concerns at this time.       CCM referral placed:  No, currently enrolled    [x]  Discharge Summary, Discharge medications reviewed/discussed/and reconciled against outpatient medications with patient,  and orders revie

## 2021-11-23 NOTE — DISCHARGE SUMMARY
[facility]PITAL[facility]  DISCHARGE SUMMARY     Konstantin Saldana Patient Status:  Inpatient    10/12/1945 MRN [id number]   [facility] 8NE-A Attending No att. providers found   [facility] Day # 6 PCP Kevin Shaikh DO     Date of Admission: 2021 Hemoglobin on day of discharge 8.3. Her white count is 14. 8 she is afebrile history elevated white count follows with Dr. Michaela Kellogg. Electrolytes have been followed and replaced as needed renal function has been stable.   She was treated with insulin sliding November 23, 2021      Take 1 tablet (25 mg total) by mouth daily.    Quantity: 30 tablet  Refills: 3        CHANGE how you take these medications      Instructions Prescription details   aspirin 325 MG Tbec  Start taking on: November 23, 2021  What changed CYMBALTA      TAKE THREE CAPSULES BY MOUTH DAILY   Quantity: 90 capsule  Refills: 0     ketoconazole 2 % Crea  Commonly known as: NIZORAL      Apply 1 Application topically daily.    Quantity: 1 each  Refills: 1     metFORMIN 850 MG Tabs  Commonly known as: Pushpa Ulrich DO  [address] [facility]    Schedule an appointment as soon as possible for a visit in 1 week      [address] [facility].  [place] [address] [address] [address]  6

## 2021-11-23 NOTE — TELEPHONE ENCOUNTER
Pt discharged 11-22-21, chest pain, s/p CABG x2.  Pt does not have HFU appt scheduled at this time. NCM tried to schedule however no openings in PCP schedule. She states she prefers appts on Wednesday or Fridays after 1:30 pm.    TCM/HFU appt recommended by 11-29-21 as pt is a high risk for readmission. Please discuss with PCP and contact pt accordingly. Thank you!     BOOK BY DATE (last date for TCM): 12-6-21

## 2021-11-24 ENCOUNTER — TELEPHONE (OUTPATIENT)
Dept: CARDIOLOGY UNIT | Facility: HOSPITAL | Age: 76
End: 2021-11-24

## 2021-11-24 NOTE — PROGRESS NOTES
Follow Up Phone Call    1. How are you doing now that you are home? Well    2. Have there been any changes in your wound/incision since going home? None    3. Is your pain manageable at home? Yes    4. Are you following the walking routine given to you in the hospital? Yes    5. Are you continuing to use your incentive spirometer? IS 1000    6. Do you have your appointments for     Chest Xray? 11/30                                                                Primary MD?  Blanca Norman -1 week-will call back                                                                Cardiologist? 11/30 Genna Rocha at 0900 am                                                                 Dr. Amandeep Juárez? 12/14 Ming Escobar at 10 am                                                                Cardiac Rehab?  1/4 cardiac rehab    7. Do you have any other questions or concerns today? \"Im tired\". She made dumplings for her family for Thanksgiving.       Shun Gracia RN  11/24/2021  4:03 PM

## 2021-11-30 ENCOUNTER — PATIENT OUTREACH (OUTPATIENT)
Dept: CASE MANAGEMENT | Age: 76
End: 2021-11-30

## 2021-11-30 ENCOUNTER — HOSPITAL ENCOUNTER (OUTPATIENT)
Dept: GENERAL RADIOLOGY | Facility: HOSPITAL | Age: 76
Discharge: HOME | End: 2021-11-30
Attending: THORACIC SURGERY (CARDIOTHORACIC VASCULAR SURGERY)
Payer: MEDICARE

## 2021-11-30 ENCOUNTER — HOSPITAL ENCOUNTER (OUTPATIENT)
Dept: LAB | Facility: HOSPITAL | Age: 76
Discharge: HOME | End: 2021-11-30
Attending: NURSE PRACTITIONER
Payer: MEDICARE

## 2021-11-30 DIAGNOSIS — J90 PLEURAL EFFUSION: ICD-10-CM

## 2021-11-30 PROCEDURE — 71048 X-RAY EXAM CHEST 4+ VIEWS: CPT | Performed by: THORACIC SURGERY (CARDIOTHORACIC VASCULAR SURGERY)

## 2021-11-30 PROCEDURE — 36415 COLL VENOUS BLD VENIPUNCTURE: CPT | Performed by: NURSE PRACTITIONER

## 2021-11-30 PROCEDURE — 80048 BASIC METABOLIC PNL TOTAL CA: CPT | Performed by: NURSE PRACTITIONER

## 2021-11-30 PROCEDURE — 99490 CHRNC CARE MGMT STAFF 1ST 20: CPT

## 2021-11-30 NOTE — CONSULTS
REPORT OF CONSULTATION    RE:     Laura Garcia  :  10/12/1945     Ms. Ariel Stuart is a super nice 67-year-old female patient who was having clear cut chest pain. She had been seen by several other doctors before she arrived on your doorstep.   You Lebo

## 2021-12-01 ENCOUNTER — PATIENT OUTREACH (OUTPATIENT)
Dept: CASE MANAGEMENT | Age: 76
End: 2021-12-01

## 2021-12-01 DIAGNOSIS — F60.3 BORDERLINE PERSONALITY DISORDER (HCC): ICD-10-CM

## 2021-12-01 DIAGNOSIS — E66.9 DIABETES MELLITUS TYPE 2 IN OBESE (HCC): ICD-10-CM

## 2021-12-01 DIAGNOSIS — I25.10 CORONARY ARTERY DISEASE INVOLVING NATIVE CORONARY ARTERY OF NATIVE HEART WITHOUT ANGINA PECTORIS: ICD-10-CM

## 2021-12-01 DIAGNOSIS — F41.9 ANXIETY: ICD-10-CM

## 2021-12-01 DIAGNOSIS — F32.A DEPRESSIVE DISORDER: ICD-10-CM

## 2021-12-01 DIAGNOSIS — E78.2 HYPERLIPIDEMIA, MIXED: ICD-10-CM

## 2021-12-01 DIAGNOSIS — F41.1 GAD (GENERALIZED ANXIETY DISORDER): ICD-10-CM

## 2021-12-01 DIAGNOSIS — I10 ESSENTIAL HYPERTENSION: ICD-10-CM

## 2021-12-01 DIAGNOSIS — E11.69 DIABETES MELLITUS TYPE 2 IN OBESE (HCC): ICD-10-CM

## 2021-12-01 DIAGNOSIS — M51.37 DDD (DEGENERATIVE DISC DISEASE), LUMBOSACRAL: ICD-10-CM

## 2021-12-01 NOTE — PROGRESS NOTES
12/1/2021  Spoke to Marc Bolaños for CCM.       Updates to patient care team/ comments: No changes per patient  Patient reported changes in medications: No changes per patient  Med Adherence  Comment: Taking as prescribed    Health Maintenance: Pt aware  Diabetes persist she will discuss with her pcp at her hospital follow up.     Pt aware  Future Appointments   Date Time Provider Jitendra Cantrell   12/13/2021 11:30 AM Dave Alberts DO [facility] [address] [facility] [address]   12/14/2021 10:00 AM ANNA MARIE Montaño CSA [facility]SA   1/

## 2021-12-06 ENCOUNTER — OFFICE VISIT (OUTPATIENT)
Dept: FAMILY MEDICINE CLINIC | Facility: CLINIC | Age: 76
End: 2021-12-06
Payer: MEDICARE

## 2021-12-06 ENCOUNTER — TELEPHONE (OUTPATIENT)
Dept: FAMILY MEDICINE CLINIC | Facility: CLINIC | Age: 76
End: 2021-12-06

## 2021-12-06 ENCOUNTER — HOSPITAL ENCOUNTER (OUTPATIENT)
Dept: ULTRASOUND IMAGING | Facility: HOSPITAL | Age: 76
Discharge: HOME | End: 2021-12-06
Attending: FAMILY MEDICINE
Payer: MEDICARE

## 2021-12-06 VITALS
WEIGHT: 181 LBS | HEART RATE: 93 BPM | RESPIRATION RATE: 16 BRPM | OXYGEN SATURATION: 98 % | DIASTOLIC BLOOD PRESSURE: 68 MMHG | BODY MASS INDEX: 30.9 KG/M2 | HEIGHT: 64 IN | SYSTOLIC BLOOD PRESSURE: 100 MMHG

## 2021-12-06 DIAGNOSIS — M79.89 LEFT LEG SWELLING: Primary | ICD-10-CM

## 2021-12-06 DIAGNOSIS — M79.89 LEFT LEG SWELLING: ICD-10-CM

## 2021-12-06 DIAGNOSIS — M72.2 PLANTAR FASCIITIS OF LEFT FOOT: ICD-10-CM

## 2021-12-06 PROBLEM — I82.409 DVT (DEEP VENOUS THROMBOSIS) (HCC): Status: ACTIVE | Noted: 2021-12-06

## 2021-12-06 PROBLEM — Z47.89 ORTHOPEDIC AFTERCARE: Status: RESOLVED | Noted: 2019-01-25 | Resolved: 2021-12-06

## 2021-12-06 PROBLEM — R55 SYNCOPE, NEAR: Status: RESOLVED | Noted: 2019-09-18 | Resolved: 2021-12-06

## 2021-12-06 PROCEDURE — 99214 OFFICE O/P EST MOD 30 MIN: CPT | Performed by: FAMILY MEDICINE

## 2021-12-06 PROCEDURE — 93971 EXTREMITY STUDY: CPT | Performed by: FAMILY MEDICINE

## 2021-12-06 RX ORDER — ATORVASTATIN CALCIUM 40 MG/1
TABLET, FILM COATED ORAL
Qty: 90 TABLET | Refills: 0 | Status: SHIPPED | OUTPATIENT
Start: 2021-12-06

## 2021-12-06 RX ORDER — NAPROXEN 500 MG/1
500 TABLET ORAL 2 TIMES DAILY
Qty: 20 TABLET | Refills: 0 | Status: SHIPPED | OUTPATIENT
Start: 2021-12-06 | End: 2021-12-16

## 2021-12-06 NOTE — PROGRESS NOTES
Here with 4 days of pain in the left foot and ankle. It is on the plantar aspect and then radiates up the medial ankle into the lower calf.   She is 2 and half weeks status post double bypass surgery after failing a stress test on November 16 she stayed in History:   Procedure Laterality Date   • APPENDECTOMY     • BACK SURGERY  07/17/2017    L4-S1 Decomp Instru Fusion    • CATARACT     • COLONOSCOPY     • COLONOSCOPY     • KNEE REPLACEMENT SURGERY     • LAPAROSCOPY,DIAGNOSTIC     • OTHER Left     foot neuro Apply topically 2 (two) times daily as needed. 60 g 0   • Cholecalciferol (VITAMIN D) 50 MCG (2000 UT) Oral Cap Take 2,000 Units by mouth daily. • Fluticasone Propionate 50 MCG/ACT Nasal Suspension 2 sprays by Each Nare route daily.  (Patient taking d Evaluation and Management Codes effective January 1, 2021, the time includes reviewing the chart before entering the exam room, the time spent with the patient in face to face discussion and examination, and the time documenting the visit.   It may also inc

## 2021-12-06 NOTE — PROGRESS NOTES
STAT Doppler Appointment made for Patient at the hospital for 2:30 today 12/6/2021. Notified patient to arrive 15 minutes early.

## 2021-12-06 NOTE — TELEPHONE ENCOUNTER
Radiologist called with positive DVT Dr. Feliberto Arguelles informed Rx xarelto 15mg BID x 3weeks then 20mg daily. Pt and daughter informed. Rx sent to pharmacy.

## 2021-12-07 ENCOUNTER — TELEPHONE (OUTPATIENT)
Dept: FAMILY MEDICINE CLINIC | Facility: CLINIC | Age: 76
End: 2021-12-07

## 2021-12-07 NOTE — TELEPHONE ENCOUNTER
Relayed RC message from ultrasound result,     Wants to know where clot is? Ankle/calf? Also can pt take tylenol or advil for pain?

## 2021-12-07 NOTE — TELEPHONE ENCOUNTER
I left a message on her cell phone number. She never answered the phone. Her clot is in the calf. She has a follow-up with Dr. Sylvie Ozuna next week. She should be on anticoagulant for 3 months.   She can take Tylenol for pain but not the naproxen I prescribe

## 2021-12-07 NOTE — TELEPHONE ENCOUNTER
CZ called her yesterday while she was in the car. She could not here everything he was saying to her. She heard something about $700. Please call her back so she knows what he said to her.

## 2021-12-08 ENCOUNTER — TELEPHONE (OUTPATIENT)
Dept: FAMILY MEDICINE CLINIC | Facility: CLINIC | Age: 76
End: 2021-12-08

## 2021-12-08 NOTE — TELEPHONE ENCOUNTER
The said the xeralto will cost her $700 - she wants to know if we have samples (pt has blood clot in her leg)

## 2021-12-08 NOTE — TELEPHONE ENCOUNTER
OK per  to provide patient with Xarelto 15 mg tabs sample. We have only #35 available rather than the #42 physician has ordered via [address] [place] [address]. Patient has been instructed to take two times daily for 21 days. Both Judy Hoffmann and daughter verbalize

## 2021-12-13 ENCOUNTER — OFFICE VISIT (OUTPATIENT)
Dept: FAMILY MEDICINE CLINIC | Facility: CLINIC | Age: 76
End: 2021-12-13
Payer: MEDICARE

## 2021-12-13 VITALS
SYSTOLIC BLOOD PRESSURE: 110 MMHG | BODY MASS INDEX: 31.99 KG/M2 | RESPIRATION RATE: 20 BRPM | HEIGHT: 64 IN | DIASTOLIC BLOOD PRESSURE: 68 MMHG | HEART RATE: 104 BPM | TEMPERATURE: 98 F | WEIGHT: 187.38 LBS | OXYGEN SATURATION: 95 %

## 2021-12-13 DIAGNOSIS — M46.1 SACROILIITIS (HCC): ICD-10-CM

## 2021-12-13 DIAGNOSIS — Z95.1 S/P CABG (CORONARY ARTERY BYPASS GRAFT): Primary | ICD-10-CM

## 2021-12-13 DIAGNOSIS — I50.42 CHRONIC COMBINED SYSTOLIC AND DIASTOLIC CONGESTIVE HEART FAILURE (HCC): ICD-10-CM

## 2021-12-13 DIAGNOSIS — I25.118 ATHEROSCLEROTIC HEART DISEASE OF NATIVE CORONARY ARTERY WITH OTHER FORMS OF ANGINA PECTORIS (HCC): ICD-10-CM

## 2021-12-13 DIAGNOSIS — E11.69 DIABETES MELLITUS TYPE 2 IN OBESE (HCC): ICD-10-CM

## 2021-12-13 DIAGNOSIS — I25.10 CORONARY ARTERY DISEASE INVOLVING NATIVE CORONARY ARTERY OF NATIVE HEART WITHOUT ANGINA PECTORIS: ICD-10-CM

## 2021-12-13 DIAGNOSIS — E66.9 DIABETES MELLITUS TYPE 2 IN OBESE (HCC): ICD-10-CM

## 2021-12-13 DIAGNOSIS — F33.1 MAJOR DEPRESSIVE DISORDER, RECURRENT EPISODE, MODERATE (HCC): ICD-10-CM

## 2021-12-13 DIAGNOSIS — I82.4Y2 ACUTE DEEP VEIN THROMBOSIS (DVT) OF PROXIMAL VEIN OF LEFT LOWER EXTREMITY (HCC): ICD-10-CM

## 2021-12-13 PROCEDURE — 99214 OFFICE O/P EST MOD 30 MIN: CPT | Performed by: FAMILY MEDICINE

## 2021-12-13 PROCEDURE — 1111F DSCHRG MED/CURRENT MED MERGE: CPT | Performed by: FAMILY MEDICINE

## 2021-12-13 RX ORDER — MECLIZINE HYDROCHLORIDE 25 MG/1
25 TABLET ORAL 2 TIMES DAILY PRN
Qty: 30 TABLET | Refills: 0 | Status: SHIPPED | OUTPATIENT
Start: 2021-12-13

## 2021-12-13 NOTE — PATIENT INSTRUCTIONS
Starting on 12/6/21 you were supposed to be on xarelto 15mg twice daily, then xarelto 20mg daily x 3-6 months. Lets do 3 months and then recheck ultrasound. Hold aspirin while on xarelto. Once finished xarelto then restart aspirin.

## 2021-12-14 ENCOUNTER — TELEPHONE (OUTPATIENT)
Dept: CARDIAC SURGERY | Facility: HOSPITAL | Age: 76
End: 2021-12-14

## 2021-12-31 PROCEDURE — 99490 CHRNC CARE MGMT STAFF 1ST 20: CPT

## 2022-01-02 PROBLEM — I25.118 ATHEROSCLEROTIC HEART DISEASE OF NATIVE CORONARY ARTERY WITH OTHER FORMS OF ANGINA PECTORIS (HCC): Status: ACTIVE | Noted: 2022-01-02

## 2022-01-02 PROBLEM — I25.118 ATHEROSCLEROTIC HEART DISEASE OF NATIVE CORONARY ARTERY WITH OTHER FORMS OF ANGINA PECTORIS: Status: ACTIVE | Noted: 2022-01-02

## 2022-01-02 PROBLEM — I50.42 CHRONIC COMBINED SYSTOLIC AND DIASTOLIC CONGESTIVE HEART FAILURE (HCC): Status: ACTIVE | Noted: 2022-01-02

## 2022-01-02 NOTE — PROGRESS NOTES
Subjective:   Patient ID: Pasquale Daley is a 77year old female. Pt was hospitalized due to chronic chest pain that turned about to be severe blockage. She underwent CABG. Healing well now. She did develop DVT in left leg afterwards.   No longer t differently: Take 850 mg by mouth 2 (two) times daily with meals. ) 180 tablet 0   • betamethasone dipropionate 0.05 % External Cream Apply 1 Application topically 2 (two) times daily. Use for up to 1 week as needed, then 1 week break before restarting.  39

## 2022-01-03 ENCOUNTER — PATIENT OUTREACH (OUTPATIENT)
Dept: CASE MANAGEMENT | Age: 77
End: 2022-01-03

## 2022-01-03 DIAGNOSIS — F41.1 GAD (GENERALIZED ANXIETY DISORDER): ICD-10-CM

## 2022-01-03 DIAGNOSIS — I25.110 CORONARY ARTERY DISEASE INVOLVING NATIVE CORONARY ARTERY OF NATIVE HEART WITH UNSTABLE ANGINA PECTORIS (HCC): ICD-10-CM

## 2022-01-03 DIAGNOSIS — F41.9 ANXIETY: ICD-10-CM

## 2022-01-03 DIAGNOSIS — I10 ESSENTIAL HYPERTENSION: ICD-10-CM

## 2022-01-03 DIAGNOSIS — F33.1 MAJOR DEPRESSIVE DISORDER, RECURRENT EPISODE, MODERATE (HCC): ICD-10-CM

## 2022-01-03 DIAGNOSIS — E78.2 HYPERLIPIDEMIA, MIXED: ICD-10-CM

## 2022-01-03 DIAGNOSIS — E11.9 TYPE 2 DIABETES MELLITUS WITHOUT COMPLICATION, WITHOUT LONG-TERM CURRENT USE OF INSULIN (HCC): ICD-10-CM

## 2022-01-03 DIAGNOSIS — M51.37 DDD (DEGENERATIVE DISC DISEASE), LUMBOSACRAL: ICD-10-CM

## 2022-01-03 DIAGNOSIS — I25.118 ATHEROSCLEROTIC HEART DISEASE OF NATIVE CORONARY ARTERY WITH OTHER FORMS OF ANGINA PECTORIS (HCC): ICD-10-CM

## 2022-01-03 NOTE — PROGRESS NOTES
Called patient unable to talk at the moment requested a call back in about an hour. Reviewed patient chart.  Will call back as requested      Time Spent This Encounter Total: 5  min medical record review  Monthly Minute Total including today: 5 minutes

## 2022-01-03 NOTE — PROGRESS NOTES
1/3/2022  Spoke to Martina Ac for CCM.       Updates to patient care team/ comments: No changes per patient  Patient reported changes in medications: No changes per patient  Med Adherence  Comment: Taking as prescribed     Health Maintenance:   Diabetes Care Dil recover from her coronary bypass procedure. •                    - Plan for overcoming all barriers: Patient will be starting her cardiac rehab therapy next week on 1/13/2022.     Pt aware  Future Appointments   Date Time Provider Jitendra Cantrell   1/13/

## 2022-01-06 ENCOUNTER — ORDER TRANSCRIPTION (OUTPATIENT)
Dept: CARDIAC REHAB | Facility: HOSPITAL | Age: 77
End: 2022-01-06

## 2022-01-06 DIAGNOSIS — Z95.1 S/P CABG (CORONARY ARTERY BYPASS GRAFT): Primary | ICD-10-CM

## 2022-01-07 ENCOUNTER — LAB ENCOUNTER (OUTPATIENT)
Dept: LAB | Facility: HOSPITAL | Age: 77
End: 2022-01-07
Attending: NURSE PRACTITIONER
Payer: MEDICARE

## 2022-01-07 DIAGNOSIS — R00.2 PALPITATIONS: ICD-10-CM

## 2022-01-07 DIAGNOSIS — I25.10 CORONARY ATHEROSCLEROSIS OF NATIVE CORONARY ARTERY: Primary | ICD-10-CM

## 2022-01-07 DIAGNOSIS — E78.2 MIXED HYPERLIPIDEMIA: ICD-10-CM

## 2022-01-07 LAB
ANION GAP SERPL CALC-SCNC: 11 MMOL/L (ref 0–18)
BUN BLD-MCNC: 13 MG/DL (ref 7–18)
CALCIUM BLD-MCNC: 9.1 MG/DL (ref 8.5–10.1)
CHLORIDE SERPL-SCNC: 104 MMOL/L (ref 98–112)
CO2 SERPL-SCNC: 24 MMOL/L (ref 21–32)
CREAT BLD-MCNC: 1.16 MG/DL
FASTING STATUS PATIENT QL REPORTED: YES
GLUCOSE BLD-MCNC: 135 MG/DL (ref 70–99)
OSMOLALITY SERPL CALC.SUM OF ELEC: 290 MOSM/KG (ref 275–295)
POTASSIUM SERPL-SCNC: 4.5 MMOL/L (ref 3.5–5.1)
SODIUM SERPL-SCNC: 139 MMOL/L (ref 136–145)

## 2022-01-07 PROCEDURE — 80048 BASIC METABOLIC PNL TOTAL CA: CPT

## 2022-01-07 PROCEDURE — 36415 COLL VENOUS BLD VENIPUNCTURE: CPT

## 2022-01-13 ENCOUNTER — CARDPULM VISIT (OUTPATIENT)
Dept: CARDIAC REHAB | Facility: HOSPITAL | Age: 77
End: 2022-01-13
Attending: INTERNAL MEDICINE
Payer: MEDICARE

## 2022-01-17 ENCOUNTER — TELEPHONE (OUTPATIENT)
Dept: FAMILY MEDICINE CLINIC | Facility: CLINIC | Age: 77
End: 2022-01-17

## 2022-01-17 NOTE — TELEPHONE ENCOUNTER
Please advise, pt to continue xarelto 20mg daily? Pt would like to know how long she will be taking it.

## 2022-01-17 NOTE — TELEPHONE ENCOUNTER
PT CALLING ABOUT Kandice Ramirez. IT IS ALMOST FINISHED AND SHE NEEDS TO KNOW IF SHE NEEDS TO CONTINUE IT. PLS CALL TO ADVISE. SAW DR BARROSO AND WAS TOLD TO CONTACT US TO SEE IF SHE NEEDS TO CONTINUE THIS MED.

## 2022-01-21 NOTE — TELEPHONE ENCOUNTER
Dr. Olmos [place],  Patient can't afford another 3 months. I did give her samples. She said Dr. Lyn Chow examined her leg said everything is good. Does she have to be on for a full 6 months?

## 2022-01-21 NOTE — TELEPHONE ENCOUNTER
Pt informed and states she cannot afford  Xarelto for 6 months. Pt states she is going to contact Dr Amairani Ayon office and see if she can stop med. I did instruct pt on importance of Xarelto  and importance of using to prevent blood clots.

## 2022-01-21 NOTE — TELEPHONE ENCOUNTER
Now the 20mg xarelto once daily. Total time on xarelto 6 months. I had previously already told her. Check to see when she started xarelto 15mg and 6 months from then. Now the 20mg xarelto once daily.

## 2022-01-25 ENCOUNTER — TELEPHONE (OUTPATIENT)
Dept: FAMILY MEDICINE CLINIC | Facility: CLINIC | Age: 77
End: 2022-01-25

## 2022-01-25 ENCOUNTER — TELEMEDICINE (OUTPATIENT)
Dept: FAMILY MEDICINE CLINIC | Facility: CLINIC | Age: 77
End: 2022-01-25
Payer: MEDICARE

## 2022-01-25 DIAGNOSIS — I82.5Y2 CHRONIC DEEP VEIN THROMBOSIS (DVT) OF PROXIMAL VEIN OF LEFT LOWER EXTREMITY (HCC): Primary | ICD-10-CM

## 2022-01-25 PROCEDURE — 99213 OFFICE O/P EST LOW 20 MIN: CPT | Performed by: FAMILY MEDICINE

## 2022-01-25 PROCEDURE — 99998 NO SHOW: CPT | Performed by: FAMILY MEDICINE

## 2022-01-29 NOTE — PROGRESS NOTES
Subjective:   Patient ID: Kanchan Nielson is a 77year old female.    + DVT. On xarelto. Worried about cost of medication. Wondering if it needs to take an additional 3 months from now. No bleeding, side effects, no CP/SOB.     History/Other:   Review 500 MG Oral Cap Take 500-1,000 mg by mouth every 6 (six) hours as needed.          Allergies:No Known Allergies    Objective:   Physical Exam  Video visit    Assessment & Plan:   Chronic deep vein thrombosis (DVT) of proximal vein of left lower extremity (H

## 2022-01-31 PROCEDURE — 99490 CHRNC CARE MGMT STAFF 1ST 20: CPT

## 2022-02-01 ENCOUNTER — PATIENT OUTREACH (OUTPATIENT)
Dept: CASE MANAGEMENT | Age: 77
End: 2022-02-01

## 2022-02-01 RX ORDER — OMEPRAZOLE 40 MG/1
CAPSULE, DELAYED RELEASE ORAL
COMMUNITY
Start: 2022-01-12

## 2022-02-01 RX ORDER — NITROGLYCERIN 0.4 MG/1
1 TABLET SUBLINGUAL DAILY
COMMUNITY
Start: 2022-01-12

## 2022-02-01 RX ORDER — POLYETHYLENE GLYCOL 3350 17 G/17G
POWDER, FOR SOLUTION ORAL
COMMUNITY
Start: 2022-01-12

## 2022-02-01 RX ORDER — FLUTICASONE PROPIONATE 50 MCG
SPRAY, SUSPENSION (ML) NASAL
COMMUNITY
Start: 2022-01-12

## 2022-02-01 RX ORDER — MELOXICAM 15 MG/1
1 TABLET ORAL DAILY
COMMUNITY
Start: 2022-01-12

## 2022-02-02 ENCOUNTER — APPOINTMENT (OUTPATIENT)
Dept: CARDIAC REHAB | Facility: HOSPITAL | Age: 77
End: 2022-02-02
Attending: INTERNAL MEDICINE
Payer: MEDICARE

## 2022-02-07 ENCOUNTER — CARDPULM VISIT (OUTPATIENT)
Dept: CARDIAC REHAB | Facility: HOSPITAL | Age: 77
End: 2022-02-07
Attending: INTERNAL MEDICINE
Payer: MEDICARE

## 2022-02-07 PROCEDURE — 93798 PHYS/QHP OP CAR RHAB W/ECG: CPT

## 2022-02-09 ENCOUNTER — CARDPULM VISIT (OUTPATIENT)
Dept: CARDIAC REHAB | Facility: HOSPITAL | Age: 77
End: 2022-02-09
Attending: INTERNAL MEDICINE
Payer: MEDICARE

## 2022-02-09 LAB — GLUCOSE BLD-MCNC: 103 MG/DL (ref 70–99)

## 2022-02-09 PROCEDURE — 93798 PHYS/QHP OP CAR RHAB W/ECG: CPT

## 2022-02-09 PROCEDURE — 82962 GLUCOSE BLOOD TEST: CPT

## 2022-02-10 ENCOUNTER — CARDPULM VISIT (OUTPATIENT)
Dept: CARDIAC REHAB | Facility: HOSPITAL | Age: 77
End: 2022-02-10
Attending: INTERNAL MEDICINE
Payer: MEDICARE

## 2022-02-10 PROCEDURE — 93798 PHYS/QHP OP CAR RHAB W/ECG: CPT

## 2022-02-14 ENCOUNTER — CARDPULM VISIT (OUTPATIENT)
Dept: CARDIAC REHAB | Facility: HOSPITAL | Age: 77
End: 2022-02-14
Attending: INTERNAL MEDICINE
Payer: MEDICARE

## 2022-02-14 PROCEDURE — 93798 PHYS/QHP OP CAR RHAB W/ECG: CPT

## 2022-02-16 ENCOUNTER — CARDPULM VISIT (OUTPATIENT)
Dept: CARDIAC REHAB | Facility: HOSPITAL | Age: 77
End: 2022-02-16
Attending: INTERNAL MEDICINE
Payer: MEDICARE

## 2022-02-16 PROCEDURE — 93798 PHYS/QHP OP CAR RHAB W/ECG: CPT

## 2022-02-17 ENCOUNTER — CARDPULM VISIT (OUTPATIENT)
Dept: CARDIAC REHAB | Facility: HOSPITAL | Age: 77
End: 2022-02-17
Attending: INTERNAL MEDICINE
Payer: MEDICARE

## 2022-02-17 PROCEDURE — 93798 PHYS/QHP OP CAR RHAB W/ECG: CPT

## 2022-02-21 ENCOUNTER — APPOINTMENT (OUTPATIENT)
Dept: CARDIAC REHAB | Facility: HOSPITAL | Age: 77
End: 2022-02-21
Attending: INTERNAL MEDICINE
Payer: MEDICARE

## 2022-02-23 ENCOUNTER — CARDPULM VISIT (OUTPATIENT)
Dept: CARDIAC REHAB | Facility: HOSPITAL | Age: 77
End: 2022-02-23
Attending: INTERNAL MEDICINE
Payer: MEDICARE

## 2022-02-23 PROCEDURE — 93798 PHYS/QHP OP CAR RHAB W/ECG: CPT

## 2022-02-24 ENCOUNTER — CARDPULM VISIT (OUTPATIENT)
Dept: CARDIAC REHAB | Facility: HOSPITAL | Age: 77
End: 2022-02-24
Attending: INTERNAL MEDICINE
Payer: MEDICARE

## 2022-02-24 PROCEDURE — 93798 PHYS/QHP OP CAR RHAB W/ECG: CPT

## 2022-02-28 ENCOUNTER — CARDPULM VISIT (OUTPATIENT)
Dept: CARDIAC REHAB | Facility: HOSPITAL | Age: 77
End: 2022-02-28
Attending: INTERNAL MEDICINE
Payer: MEDICARE

## 2022-02-28 PROCEDURE — 99490 CHRNC CARE MGMT STAFF 1ST 20: CPT

## 2022-02-28 PROCEDURE — 93798 PHYS/QHP OP CAR RHAB W/ECG: CPT

## 2022-03-01 ENCOUNTER — PATIENT OUTREACH (OUTPATIENT)
Dept: CASE MANAGEMENT | Age: 77
End: 2022-03-01

## 2022-03-02 ENCOUNTER — CARDPULM VISIT (OUTPATIENT)
Dept: CARDIAC REHAB | Facility: HOSPITAL | Age: 77
End: 2022-03-02
Attending: INTERNAL MEDICINE
Payer: MEDICARE

## 2022-03-02 PROCEDURE — 93798 PHYS/QHP OP CAR RHAB W/ECG: CPT

## 2022-03-03 ENCOUNTER — CARDPULM VISIT (OUTPATIENT)
Dept: CARDIAC REHAB | Facility: HOSPITAL | Age: 77
End: 2022-03-03
Attending: INTERNAL MEDICINE
Payer: MEDICARE

## 2022-03-03 PROCEDURE — 93798 PHYS/QHP OP CAR RHAB W/ECG: CPT

## 2022-03-04 PROBLEM — M70.51 PES ANSERINUS BURSITIS OF RIGHT KNEE: Status: ACTIVE | Noted: 2022-03-04

## 2022-03-07 ENCOUNTER — APPOINTMENT (OUTPATIENT)
Dept: CARDIAC REHAB | Facility: HOSPITAL | Age: 77
End: 2022-03-07
Attending: INTERNAL MEDICINE
Payer: MEDICARE

## 2022-03-07 RX ORDER — ATORVASTATIN CALCIUM 40 MG/1
TABLET, FILM COATED ORAL
Qty: 90 TABLET | Refills: 0 | Status: SHIPPED | OUTPATIENT
Start: 2022-03-07 | End: 2022-06-03

## 2022-03-09 ENCOUNTER — CARDPULM VISIT (OUTPATIENT)
Dept: CARDIAC REHAB | Facility: HOSPITAL | Age: 77
End: 2022-03-09
Attending: INTERNAL MEDICINE
Payer: MEDICARE

## 2022-03-09 PROCEDURE — 93798 PHYS/QHP OP CAR RHAB W/ECG: CPT

## 2022-03-10 ENCOUNTER — CARDPULM VISIT (OUTPATIENT)
Dept: CARDIAC REHAB | Facility: HOSPITAL | Age: 77
End: 2022-03-10
Attending: INTERNAL MEDICINE
Payer: MEDICARE

## 2022-03-10 PROCEDURE — 93798 PHYS/QHP OP CAR RHAB W/ECG: CPT

## 2022-03-14 ENCOUNTER — CARDPULM VISIT (OUTPATIENT)
Dept: CARDIAC REHAB | Facility: HOSPITAL | Age: 77
End: 2022-03-14
Attending: INTERNAL MEDICINE
Payer: MEDICARE

## 2022-03-14 PROCEDURE — 93798 PHYS/QHP OP CAR RHAB W/ECG: CPT

## 2022-03-16 ENCOUNTER — APPOINTMENT (OUTPATIENT)
Dept: CARDIAC REHAB | Facility: HOSPITAL | Age: 77
End: 2022-03-16
Attending: INTERNAL MEDICINE
Payer: MEDICARE

## 2022-03-17 ENCOUNTER — CARDPULM VISIT (OUTPATIENT)
Dept: CARDIAC REHAB | Facility: HOSPITAL | Age: 77
End: 2022-03-17
Attending: INTERNAL MEDICINE
Payer: MEDICARE

## 2022-03-17 PROCEDURE — 93798 PHYS/QHP OP CAR RHAB W/ECG: CPT

## 2022-03-18 ENCOUNTER — TELEPHONE (OUTPATIENT)
Dept: FAMILY MEDICINE CLINIC | Facility: CLINIC | Age: 77
End: 2022-03-18

## 2022-03-18 NOTE — TELEPHONE ENCOUNTER
Pt had on Wed and Thurs; nothing yet today. Describes like \"lightning around outside of eyes\", was on both eyes, yesterday just R eye. Does affect her vision when happening. Took aspirin after this happened and it goes away. Pt said she had this a couple of years ago; no tx, went away on own. Reports last eye exam approx 6 mo ago at [facility]. Advised pt that I will schedule her here, but I want her to call [facility] also. Pt agrees. To  as FYI for upcoming OV.

## 2022-03-18 NOTE — TELEPHONE ENCOUNTER
LMTCB  Is pt still having sx? May need to put in with different provider to be seen on Monday. No availability today. If issue recurrs, she should go to ER.

## 2022-03-18 NOTE — TELEPHONE ENCOUNTER
Agree with seeing [facility], as I likely will want her to see them prior to restarting at cardiac rehab, if story continues to sound similar.  Can wait and see on actual visit    Ever Little MD

## 2022-03-18 NOTE — TELEPHONE ENCOUNTER
Patient is wanting to see Dr. Liseth Doherty. She was at cardiac rehab yesterday and they sent her home and told her that she needs to see PCP because she was having flashing lights around her eyes and they will not continue cardiac rehab till she gets ok for doctor. She wanted to come in today or Monday. Please advise.

## 2022-03-21 ENCOUNTER — OFFICE VISIT (OUTPATIENT)
Dept: FAMILY MEDICINE CLINIC | Facility: CLINIC | Age: 77
End: 2022-03-21
Payer: MEDICARE

## 2022-03-21 ENCOUNTER — APPOINTMENT (OUTPATIENT)
Dept: CARDIAC REHAB | Facility: HOSPITAL | Age: 77
End: 2022-03-21
Attending: INTERNAL MEDICINE
Payer: MEDICARE

## 2022-03-21 VITALS
HEART RATE: 72 BPM | BODY MASS INDEX: 32.1 KG/M2 | HEIGHT: 64 IN | SYSTOLIC BLOOD PRESSURE: 110 MMHG | OXYGEN SATURATION: 98 % | WEIGHT: 188 LBS | RESPIRATION RATE: 18 BRPM | DIASTOLIC BLOOD PRESSURE: 68 MMHG

## 2022-03-21 DIAGNOSIS — H53.19 PHOTOPSIA: Primary | ICD-10-CM

## 2022-03-21 PROCEDURE — 99213 OFFICE O/P EST LOW 20 MIN: CPT | Performed by: FAMILY MEDICINE

## 2022-03-23 ENCOUNTER — APPOINTMENT (OUTPATIENT)
Dept: CARDIAC REHAB | Facility: HOSPITAL | Age: 77
End: 2022-03-23
Attending: INTERNAL MEDICINE
Payer: MEDICARE

## 2022-03-24 ENCOUNTER — LAB ENCOUNTER (OUTPATIENT)
Dept: LAB | Facility: HOSPITAL | Age: 77
End: 2022-03-24
Attending: INTERNAL MEDICINE
Payer: MEDICARE

## 2022-03-24 ENCOUNTER — APPOINTMENT (OUTPATIENT)
Dept: CARDIAC REHAB | Facility: HOSPITAL | Age: 77
End: 2022-03-24
Attending: INTERNAL MEDICINE
Payer: MEDICARE

## 2022-03-24 DIAGNOSIS — E11.9 CONTROLLED TYPE 2 DIABETES MELLITUS WITHOUT COMPLICATION, WITHOUT LONG-TERM CURRENT USE OF INSULIN (HCC): ICD-10-CM

## 2022-03-24 DIAGNOSIS — D72.828 CHRONIC NEUTROPHILIA: ICD-10-CM

## 2022-03-24 DIAGNOSIS — D64.9 NORMOCYTIC ANEMIA: ICD-10-CM

## 2022-03-24 DIAGNOSIS — E78.5 HYPERLIPIDEMIA, UNSPECIFIED HYPERLIPIDEMIA TYPE: ICD-10-CM

## 2022-03-24 LAB
ALBUMIN SERPL-MCNC: 3.1 G/DL (ref 3.4–5)
ALBUMIN/GLOB SERPL: 1 {RATIO} (ref 1–2)
ALP LIVER SERPL-CCNC: 97 U/L
ALT SERPL-CCNC: 16 U/L
ANION GAP SERPL CALC-SCNC: 7 MMOL/L (ref 0–18)
AST SERPL-CCNC: 12 U/L (ref 15–37)
BASOPHILS # BLD AUTO: 0.06 X10(3) UL (ref 0–0.2)
BASOPHILS NFR BLD AUTO: 0.6 %
BILIRUB SERPL-MCNC: 0.3 MG/DL (ref 0.1–2)
BUN BLD-MCNC: 12 MG/DL (ref 7–18)
CHLORIDE SERPL-SCNC: 108 MMOL/L (ref 98–112)
CHOLEST SERPL-MCNC: 165 MG/DL (ref ?–200)
CO2 SERPL-SCNC: 25 MMOL/L (ref 21–32)
CREAT BLD-MCNC: 1.27 MG/DL
EOSINOPHIL # BLD AUTO: 0.53 X10(3) UL (ref 0–0.7)
EOSINOPHIL NFR BLD AUTO: 5.2 %
ERYTHROCYTE [DISTWIDTH] IN BLOOD BY AUTOMATED COUNT: 17.3 %
EST. AVERAGE GLUCOSE BLD GHB EST-MCNC: 174 MG/DL (ref 68–126)
FASTING PATIENT LIPID ANSWER: YES
FASTING STATUS PATIENT QL REPORTED: YES
GLOBULIN PLAS-MCNC: 3.2 G/DL (ref 2.8–4.4)
GLUCOSE BLD-MCNC: 151 MG/DL (ref 70–99)
HDLC SERPL-MCNC: 46 MG/DL (ref 40–59)
HGB BLD-MCNC: 8.6 G/DL
IMM GRANULOCYTES # BLD AUTO: 0.05 X10(3) UL (ref 0–1)
IMM GRANULOCYTES NFR BLD: 0.5 %
LDH SERPL L TO P-CCNC: 161 U/L
LDLC SERPL CALC-MCNC: 90 MG/DL (ref ?–100)
LYMPHOCYTES # BLD AUTO: 3.68 X10(3) UL (ref 1–4)
LYMPHOCYTES NFR BLD AUTO: 36.2 %
MCH RBC QN AUTO: 22.2 PG (ref 26–34)
MCHC RBC AUTO-ENTMCNC: 29.3 G/DL (ref 31–37)
MCV RBC AUTO: 76 FL
MONOCYTES # BLD AUTO: 0.59 X10(3) UL (ref 0.1–1)
MONOCYTES NFR BLD AUTO: 5.8 %
NEUTROPHILS # BLD AUTO: 5.26 X10 (3) UL (ref 1.5–7.7)
NEUTROPHILS # BLD AUTO: 5.26 X10(3) UL (ref 1.5–7.7)
NEUTROPHILS NFR BLD AUTO: 51.7 %
NONHDLC SERPL-MCNC: 119 MG/DL (ref ?–130)
OSMOLALITY SERPL CALC.SUM OF ELEC: 293 MOSM/KG (ref 275–295)
PLATELET # BLD AUTO: 492 10(3)UL (ref 150–450)
POTASSIUM SERPL-SCNC: 4.5 MMOL/L (ref 3.5–5.1)
PROT SERPL-MCNC: 6.3 G/DL (ref 6.4–8.2)
RBC # BLD AUTO: 3.87 X10(6)UL
SODIUM SERPL-SCNC: 140 MMOL/L (ref 136–145)
TRIGL SERPL-MCNC: 169 MG/DL (ref 30–149)
VLDLC SERPL CALC-MCNC: 27 MG/DL (ref 0–30)
WBC # BLD AUTO: 10.2 X10(3) UL (ref 4–11)

## 2022-03-24 PROCEDURE — 83615 LACTATE (LD) (LDH) ENZYME: CPT

## 2022-03-24 PROCEDURE — 83036 HEMOGLOBIN GLYCOSYLATED A1C: CPT

## 2022-03-24 PROCEDURE — 85025 COMPLETE CBC W/AUTO DIFF WBC: CPT

## 2022-03-24 PROCEDURE — 36415 COLL VENOUS BLD VENIPUNCTURE: CPT

## 2022-03-24 PROCEDURE — 80061 LIPID PANEL: CPT

## 2022-03-24 PROCEDURE — 80053 COMPREHEN METABOLIC PANEL: CPT

## 2022-03-28 ENCOUNTER — CARDPULM VISIT (OUTPATIENT)
Dept: CARDIAC REHAB | Facility: HOSPITAL | Age: 77
End: 2022-03-28
Attending: INTERNAL MEDICINE
Payer: MEDICARE

## 2022-03-28 LAB — GLUCOSE BLD-MCNC: 103 MG/DL (ref 70–99)

## 2022-03-28 PROCEDURE — 82962 GLUCOSE BLOOD TEST: CPT

## 2022-03-30 ENCOUNTER — APPOINTMENT (OUTPATIENT)
Dept: CARDIAC REHAB | Facility: HOSPITAL | Age: 77
End: 2022-03-30
Attending: INTERNAL MEDICINE
Payer: MEDICARE

## 2022-03-31 ENCOUNTER — CARDPULM VISIT (OUTPATIENT)
Dept: CARDIAC REHAB | Facility: HOSPITAL | Age: 77
End: 2022-03-31
Attending: INTERNAL MEDICINE
Payer: MEDICARE

## 2022-03-31 PROCEDURE — 99490 CHRNC CARE MGMT STAFF 1ST 20: CPT

## 2022-04-04 ENCOUNTER — CARDPULM VISIT (OUTPATIENT)
Dept: CARDIAC REHAB | Facility: HOSPITAL | Age: 77
End: 2022-04-04
Attending: INTERNAL MEDICINE
Payer: MEDICARE

## 2022-04-04 ENCOUNTER — PATIENT OUTREACH (OUTPATIENT)
Dept: CASE MANAGEMENT | Age: 77
End: 2022-04-04

## 2022-04-06 ENCOUNTER — APPOINTMENT (OUTPATIENT)
Dept: CARDIAC REHAB | Facility: HOSPITAL | Age: 77
End: 2022-04-06
Attending: INTERNAL MEDICINE
Payer: MEDICARE

## 2022-04-07 ENCOUNTER — CARDPULM VISIT (OUTPATIENT)
Dept: CARDIAC REHAB | Facility: HOSPITAL | Age: 77
End: 2022-04-07
Attending: INTERNAL MEDICINE
Payer: MEDICARE

## 2022-04-07 PROCEDURE — 93798 PHYS/QHP OP CAR RHAB W/ECG: CPT

## 2022-04-11 ENCOUNTER — CARDPULM VISIT (OUTPATIENT)
Dept: CARDIAC REHAB | Facility: HOSPITAL | Age: 77
End: 2022-04-11
Attending: INTERNAL MEDICINE
Payer: MEDICARE

## 2022-04-13 ENCOUNTER — APPOINTMENT (OUTPATIENT)
Dept: CARDIAC REHAB | Facility: HOSPITAL | Age: 77
End: 2022-04-13
Attending: INTERNAL MEDICINE
Payer: MEDICARE

## 2022-04-14 ENCOUNTER — CARDPULM VISIT (OUTPATIENT)
Dept: CARDIAC REHAB | Facility: HOSPITAL | Age: 77
End: 2022-04-14
Attending: INTERNAL MEDICINE
Payer: MEDICARE

## 2022-04-18 ENCOUNTER — CARDPULM VISIT (OUTPATIENT)
Dept: CARDIAC REHAB | Facility: HOSPITAL | Age: 77
End: 2022-04-18
Attending: INTERNAL MEDICINE
Payer: MEDICARE

## 2022-04-20 ENCOUNTER — CARDPULM VISIT (OUTPATIENT)
Dept: CARDIAC REHAB | Facility: HOSPITAL | Age: 77
End: 2022-04-20
Attending: INTERNAL MEDICINE
Payer: MEDICARE

## 2022-04-21 ENCOUNTER — CARDPULM VISIT (OUTPATIENT)
Dept: CARDIAC REHAB | Facility: HOSPITAL | Age: 77
End: 2022-04-21
Attending: INTERNAL MEDICINE
Payer: MEDICARE

## 2022-04-25 ENCOUNTER — APPOINTMENT (OUTPATIENT)
Dept: CARDIAC REHAB | Facility: HOSPITAL | Age: 77
End: 2022-04-25
Attending: INTERNAL MEDICINE
Payer: MEDICARE

## 2022-04-28 ENCOUNTER — CARDPULM VISIT (OUTPATIENT)
Dept: CARDIAC REHAB | Facility: HOSPITAL | Age: 77
End: 2022-04-28
Attending: INTERNAL MEDICINE
Payer: MEDICARE

## 2022-05-02 ENCOUNTER — CARDPULM VISIT (OUTPATIENT)
Dept: CARDIAC REHAB | Facility: HOSPITAL | Age: 77
End: 2022-05-02
Attending: INTERNAL MEDICINE
Payer: MEDICARE

## 2022-05-02 ENCOUNTER — PATIENT OUTREACH (OUTPATIENT)
Dept: CASE MANAGEMENT | Age: 77
End: 2022-05-02

## 2022-05-02 NOTE — PROGRESS NOTES
Called patient and left a message for patient to call back when they can. Reviewed patient chart.  Left contact my contact number [phone]        Time Spent This Encounter Total: 5  min medical record review  Monthly Minute Total including today: 5 minutes

## 2022-05-04 ENCOUNTER — CARDPULM VISIT (OUTPATIENT)
Dept: CARDIAC REHAB | Facility: HOSPITAL | Age: 77
End: 2022-05-04
Attending: INTERNAL MEDICINE
Payer: MEDICARE

## 2022-05-05 ENCOUNTER — CARDPULM VISIT (OUTPATIENT)
Dept: CARDIAC REHAB | Facility: HOSPITAL | Age: 77
End: 2022-05-05
Attending: INTERNAL MEDICINE
Payer: MEDICARE

## 2022-05-09 ENCOUNTER — APPOINTMENT (OUTPATIENT)
Dept: CARDIAC REHAB | Facility: HOSPITAL | Age: 77
End: 2022-05-09
Attending: INTERNAL MEDICINE
Payer: MEDICARE

## 2022-05-11 ENCOUNTER — APPOINTMENT (OUTPATIENT)
Dept: CARDIAC REHAB | Facility: HOSPITAL | Age: 77
End: 2022-05-11
Attending: INTERNAL MEDICINE
Payer: MEDICARE

## 2022-05-12 ENCOUNTER — LAB ENCOUNTER (OUTPATIENT)
Dept: LAB | Facility: HOSPITAL | Age: 77
End: 2022-05-12
Attending: FAMILY MEDICINE
Payer: MEDICARE

## 2022-05-12 ENCOUNTER — TELEPHONE (OUTPATIENT)
Dept: FAMILY MEDICINE CLINIC | Facility: CLINIC | Age: 77
End: 2022-05-12

## 2022-05-12 ENCOUNTER — APPOINTMENT (OUTPATIENT)
Dept: CARDIAC REHAB | Facility: HOSPITAL | Age: 77
End: 2022-05-12
Attending: INTERNAL MEDICINE
Payer: MEDICARE

## 2022-05-12 DIAGNOSIS — R05.9 COUGH: ICD-10-CM

## 2022-05-12 DIAGNOSIS — R53.83 OTHER FATIGUE: ICD-10-CM

## 2022-05-12 DIAGNOSIS — J02.9 SORE THROAT: ICD-10-CM

## 2022-05-12 NOTE — TELEPHONE ENCOUNTER
Patient states she has headache , fatigue, and sore throat since yesterday. Denies shortness of breath. She was with her daughter on Mother's Day and today they both have same symptoms. Order placed in [employer] system for AddFleet testing.  Instructed Ria Lewis to call or go to immediate care if worsening symptoms

## 2022-05-13 LAB — SARS-COV-2 RNA RESP QL NAA+PROBE: DETECTED

## 2022-05-16 ENCOUNTER — APPOINTMENT (OUTPATIENT)
Dept: CARDIAC REHAB | Facility: HOSPITAL | Age: 77
End: 2022-05-16
Attending: INTERNAL MEDICINE
Payer: MEDICARE

## 2022-05-18 ENCOUNTER — APPOINTMENT (OUTPATIENT)
Dept: CARDIAC REHAB | Facility: HOSPITAL | Age: 77
End: 2022-05-18
Attending: INTERNAL MEDICINE
Payer: MEDICARE

## 2022-05-19 ENCOUNTER — APPOINTMENT (OUTPATIENT)
Dept: CARDIAC REHAB | Facility: HOSPITAL | Age: 77
End: 2022-05-19
Attending: INTERNAL MEDICINE
Payer: MEDICARE

## 2022-05-23 ENCOUNTER — APPOINTMENT (OUTPATIENT)
Dept: CARDIAC REHAB | Facility: HOSPITAL | Age: 77
End: 2022-05-23
Payer: MEDICARE

## 2022-05-24 ENCOUNTER — PATIENT OUTREACH (OUTPATIENT)
Dept: CASE MANAGEMENT | Age: 77
End: 2022-05-24

## 2022-05-24 DIAGNOSIS — I25.118 ATHEROSCLEROTIC HEART DISEASE OF NATIVE CORONARY ARTERY WITH OTHER FORMS OF ANGINA PECTORIS (HCC): ICD-10-CM

## 2022-05-24 DIAGNOSIS — F33.2 MDD (MAJOR DEPRESSIVE DISORDER), RECURRENT SEVERE, WITHOUT PSYCHOSIS (HCC): ICD-10-CM

## 2022-05-24 DIAGNOSIS — E11.69 DIABETES MELLITUS TYPE 2 IN OBESE (HCC): ICD-10-CM

## 2022-05-24 DIAGNOSIS — E66.9 DIABETES MELLITUS TYPE 2 IN OBESE (HCC): ICD-10-CM

## 2022-05-24 DIAGNOSIS — F41.1 GAD (GENERALIZED ANXIETY DISORDER): ICD-10-CM

## 2022-05-24 DIAGNOSIS — I10 ESSENTIAL HYPERTENSION: ICD-10-CM

## 2022-05-24 DIAGNOSIS — F60.3 BORDERLINE PERSONALITY DISORDER (HCC): ICD-10-CM

## 2022-05-24 DIAGNOSIS — F41.9 ANXIETY: ICD-10-CM

## 2022-05-24 DIAGNOSIS — E78.2 HYPERLIPIDEMIA, MIXED: ICD-10-CM

## 2022-05-25 ENCOUNTER — APPOINTMENT (OUTPATIENT)
Dept: CARDIAC REHAB | Facility: HOSPITAL | Age: 77
End: 2022-05-25
Payer: MEDICARE

## 2022-05-27 ENCOUNTER — APPOINTMENT (OUTPATIENT)
Dept: CARDIAC REHAB | Facility: HOSPITAL | Age: 77
End: 2022-05-27
Payer: MEDICARE

## 2022-05-30 ENCOUNTER — APPOINTMENT (OUTPATIENT)
Dept: CARDIAC REHAB | Facility: HOSPITAL | Age: 77
End: 2022-05-30
Payer: MEDICARE

## 2022-06-01 ENCOUNTER — APPOINTMENT (OUTPATIENT)
Dept: CARDIAC REHAB | Facility: HOSPITAL | Age: 77
End: 2022-06-01
Payer: MEDICARE

## 2022-06-03 ENCOUNTER — APPOINTMENT (OUTPATIENT)
Dept: CARDIAC REHAB | Facility: HOSPITAL | Age: 77
End: 2022-06-03
Payer: MEDICARE

## 2022-06-03 RX ORDER — ATORVASTATIN CALCIUM 40 MG/1
TABLET, FILM COATED ORAL
Qty: 90 TABLET | Refills: 0 | Status: SHIPPED | OUTPATIENT
Start: 2022-06-03

## 2022-06-06 ENCOUNTER — APPOINTMENT (OUTPATIENT)
Dept: CARDIAC REHAB | Facility: HOSPITAL | Age: 77
End: 2022-06-06
Payer: MEDICARE

## 2022-06-07 ENCOUNTER — TELEMEDICINE (OUTPATIENT)
Dept: FAMILY MEDICINE CLINIC | Facility: CLINIC | Age: 77
End: 2022-06-07
Payer: MEDICARE

## 2022-06-07 DIAGNOSIS — R53.82 POST-COVID CHRONIC FATIGUE: Primary | ICD-10-CM

## 2022-06-07 DIAGNOSIS — M79.89 PAIN AND SWELLING OF LEFT LOWER LEG: ICD-10-CM

## 2022-06-07 DIAGNOSIS — E66.9 DIABETES MELLITUS TYPE 2 IN OBESE (HCC): ICD-10-CM

## 2022-06-07 DIAGNOSIS — E11.69 DIABETES MELLITUS TYPE 2 IN OBESE (HCC): ICD-10-CM

## 2022-06-07 DIAGNOSIS — U09.9 POST-COVID CHRONIC COUGH: ICD-10-CM

## 2022-06-07 DIAGNOSIS — R05.3 POST-COVID CHRONIC COUGH: ICD-10-CM

## 2022-06-07 DIAGNOSIS — U09.9 POST-COVID CHRONIC FATIGUE: Primary | ICD-10-CM

## 2022-06-07 DIAGNOSIS — M79.662 PAIN AND SWELLING OF LEFT LOWER LEG: ICD-10-CM

## 2022-06-07 PROCEDURE — 99213 OFFICE O/P EST LOW 20 MIN: CPT | Performed by: FAMILY MEDICINE

## 2022-06-07 RX ORDER — GLIMEPIRIDE 1 MG/1
TABLET ORAL
Qty: 20 TABLET | Refills: 0 | Status: SHIPPED | OUTPATIENT
Start: 2022-06-07

## 2022-06-07 RX ORDER — BENZONATATE 200 MG/1
200 CAPSULE ORAL EVERY 8 HOURS PRN
Qty: 30 CAPSULE | Refills: 0 | Status: SHIPPED | OUTPATIENT
Start: 2022-06-07 | End: 2022-06-13 | Stop reason: ALTCHOICE

## 2022-06-07 RX ORDER — PREDNISONE 20 MG/1
TABLET ORAL
Qty: 13 TABLET | Refills: 0 | Status: SHIPPED | OUTPATIENT
Start: 2022-06-07 | End: 2022-06-13 | Stop reason: ALTCHOICE

## 2022-06-07 RX ORDER — ALBUTEROL SULFATE 90 UG/1
2 AEROSOL, METERED RESPIRATORY (INHALATION) EVERY 4 HOURS PRN
Qty: 1 EACH | Refills: 1 | COMMUNITY
Start: 2022-06-07

## 2022-06-08 ENCOUNTER — APPOINTMENT (OUTPATIENT)
Dept: CARDIAC REHAB | Facility: HOSPITAL | Age: 77
End: 2022-06-08
Payer: MEDICARE

## 2022-06-10 ENCOUNTER — APPOINTMENT (OUTPATIENT)
Dept: CARDIAC REHAB | Facility: HOSPITAL | Age: 77
End: 2022-06-10
Payer: MEDICARE

## 2022-06-13 ENCOUNTER — OFFICE VISIT (OUTPATIENT)
Dept: FAMILY MEDICINE CLINIC | Facility: CLINIC | Age: 77
End: 2022-06-13
Payer: MEDICARE

## 2022-06-13 VITALS
TEMPERATURE: 99 F | WEIGHT: 189 LBS | HEIGHT: 64 IN | RESPIRATION RATE: 20 BRPM | BODY MASS INDEX: 32.27 KG/M2 | DIASTOLIC BLOOD PRESSURE: 70 MMHG | OXYGEN SATURATION: 97 % | SYSTOLIC BLOOD PRESSURE: 108 MMHG | HEART RATE: 79 BPM

## 2022-06-13 DIAGNOSIS — R53.82 POST-COVID CHRONIC FATIGUE: Primary | ICD-10-CM

## 2022-06-13 DIAGNOSIS — U09.9 POST-COVID CHRONIC FATIGUE: Primary | ICD-10-CM

## 2022-06-13 PROBLEM — D50.8 IRON DEFICIENCY ANEMIA SECONDARY TO INADEQUATE DIETARY IRON INTAKE: Status: ACTIVE | Noted: 2021-01-01

## 2022-06-13 PROBLEM — Z91.81 HISTORY OF FALLING, PRESENTING HAZARDS TO HEALTH: Status: ACTIVE | Noted: 2021-11-17

## 2022-06-13 PROCEDURE — 99213 OFFICE O/P EST LOW 20 MIN: CPT | Performed by: FAMILY MEDICINE

## 2022-06-13 NOTE — PATIENT INSTRUCTIONS
Cut Turosemide (waterpill) in 1/5. Get US done for making sure you dont have DVT, do it right away.  [phone]

## 2022-07-07 ENCOUNTER — HOSPITAL ENCOUNTER (OUTPATIENT)
Dept: ULTRASOUND IMAGING | Facility: HOSPITAL | Age: 77
Discharge: HOME | End: 2022-07-07
Attending: FAMILY MEDICINE
Payer: MEDICARE

## 2022-07-07 DIAGNOSIS — M79.89 PAIN AND SWELLING OF LEFT LOWER LEG: ICD-10-CM

## 2022-07-07 DIAGNOSIS — M79.662 PAIN AND SWELLING OF LEFT LOWER LEG: ICD-10-CM

## 2022-07-07 PROCEDURE — 93971 EXTREMITY STUDY: CPT | Performed by: FAMILY MEDICINE

## 2022-07-14 ENCOUNTER — OFFICE VISIT (OUTPATIENT)
Dept: FAMILY MEDICINE CLINIC | Facility: CLINIC | Age: 77
End: 2022-07-14
Payer: MEDICARE

## 2022-07-14 ENCOUNTER — LAB ENCOUNTER (OUTPATIENT)
Dept: LAB | Age: 77
End: 2022-07-14
Attending: FAMILY MEDICINE
Payer: MEDICARE

## 2022-07-14 VITALS
WEIGHT: 193.81 LBS | HEIGHT: 64 IN | RESPIRATION RATE: 18 BRPM | DIASTOLIC BLOOD PRESSURE: 60 MMHG | OXYGEN SATURATION: 95 % | SYSTOLIC BLOOD PRESSURE: 102 MMHG | HEART RATE: 80 BPM | BODY MASS INDEX: 33.09 KG/M2

## 2022-07-14 DIAGNOSIS — Z12.31 ENCOUNTER FOR SCREENING MAMMOGRAM FOR MALIGNANT NEOPLASM OF BREAST: ICD-10-CM

## 2022-07-14 DIAGNOSIS — Z00.00 ENCOUNTER FOR ANNUAL HEALTH EXAMINATION: Primary | ICD-10-CM

## 2022-07-14 DIAGNOSIS — R53.82 CHRONIC FATIGUE: ICD-10-CM

## 2022-07-14 DIAGNOSIS — Z78.0 POST-MENOPAUSAL: ICD-10-CM

## 2022-07-14 DIAGNOSIS — R68.89 FORGETFULNESS: ICD-10-CM

## 2022-07-14 DIAGNOSIS — M46.1 SACROILIITIS (HCC): ICD-10-CM

## 2022-07-14 DIAGNOSIS — E66.9 DIABETES MELLITUS TYPE 2 IN OBESE (HCC): ICD-10-CM

## 2022-07-14 DIAGNOSIS — E11.69 DIABETES MELLITUS TYPE 2 IN OBESE (HCC): ICD-10-CM

## 2022-07-14 DIAGNOSIS — Z00.00 ENCOUNTER FOR ANNUAL HEALTH EXAMINATION: ICD-10-CM

## 2022-07-14 PROBLEM — F02.80 ALZHEIMER'S DISEASE, UNSPECIFIED (HCC): Status: ACTIVE | Noted: 2022-07-14

## 2022-07-14 PROBLEM — G30.9 ALZHEIMER'S DISEASE, UNSPECIFIED (HCC): Status: ACTIVE | Noted: 2022-07-14

## 2022-07-14 LAB
ALBUMIN SERPL-MCNC: 3.3 G/DL (ref 3.4–5)
ALBUMIN/GLOB SERPL: 1 {RATIO} (ref 1–2)
ALP LIVER SERPL-CCNC: 94 U/L
ALT SERPL-CCNC: 13 U/L
ANION GAP SERPL CALC-SCNC: 5 MMOL/L (ref 0–18)
AST SERPL-CCNC: 11 U/L (ref 15–37)
BASOPHILS # BLD AUTO: 0.09 X10(3) UL (ref 0–0.2)
BASOPHILS NFR BLD AUTO: 0.8 %
BILIRUB SERPL-MCNC: 0.2 MG/DL (ref 0.1–2)
BUN BLD-MCNC: 9 MG/DL (ref 7–18)
CALCIUM BLD-MCNC: 8.7 MG/DL (ref 8.5–10.1)
CHLORIDE SERPL-SCNC: 113 MMOL/L (ref 98–112)
CHOLEST SERPL-MCNC: 154 MG/DL (ref ?–200)
CO2 SERPL-SCNC: 23 MMOL/L (ref 21–32)
CREAT BLD-MCNC: 1.39 MG/DL
EOSINOPHIL # BLD AUTO: 0.61 X10(3) UL (ref 0–0.7)
EOSINOPHIL NFR BLD AUTO: 5.3 %
ERYTHROCYTE [DISTWIDTH] IN BLOOD BY AUTOMATED COUNT: 19.3 %
EST. AVERAGE GLUCOSE BLD GHB EST-MCNC: 177 MG/DL (ref 68–126)
FASTING PATIENT LIPID ANSWER: YES
FASTING STATUS PATIENT QL REPORTED: YES
GLOBULIN PLAS-MCNC: 3.3 G/DL (ref 2.8–4.4)
GLUCOSE BLD-MCNC: 144 MG/DL (ref 70–99)
HBA1C MFR BLD: 7.8 % (ref ?–5.7)
HCT VFR BLD AUTO: 30 %
HDLC SERPL-MCNC: 49 MG/DL (ref 40–59)
HGB BLD-MCNC: 8.4 G/DL
IMM GRANULOCYTES # BLD AUTO: 0.05 X10(3) UL (ref 0–1)
IMM GRANULOCYTES NFR BLD: 0.4 %
LDLC SERPL CALC-MCNC: 81 MG/DL (ref ?–100)
LYMPHOCYTES # BLD AUTO: 2.85 X10(3) UL (ref 1–4)
LYMPHOCYTES NFR BLD AUTO: 25 %
MCH RBC QN AUTO: 21.9 PG (ref 26–34)
MCHC RBC AUTO-ENTMCNC: 28 G/DL (ref 31–37)
MCV RBC AUTO: 78.3 FL
MONOCYTES # BLD AUTO: 0.46 X10(3) UL (ref 0.1–1)
MONOCYTES NFR BLD AUTO: 4 %
NEUTROPHILS # BLD AUTO: 7.35 X10 (3) UL (ref 1.5–7.7)
NEUTROPHILS # BLD AUTO: 7.35 X10(3) UL (ref 1.5–7.7)
NEUTROPHILS NFR BLD AUTO: 64.5 %
NONHDLC SERPL-MCNC: 105 MG/DL (ref ?–130)
OSMOLALITY SERPL CALC.SUM OF ELEC: 293 MOSM/KG (ref 275–295)
PLATELET # BLD AUTO: 522 10(3)UL (ref 150–450)
POTASSIUM SERPL-SCNC: 5 MMOL/L (ref 3.5–5.1)
PROT SERPL-MCNC: 6.6 G/DL (ref 6.4–8.2)
RBC # BLD AUTO: 3.83 X10(6)UL
SODIUM SERPL-SCNC: 141 MMOL/L (ref 136–145)
TRIGL SERPL-MCNC: 136 MG/DL (ref 30–149)
TSI SER-ACNC: 3.11 MIU/ML (ref 0.36–3.74)
VIT B12 SERPL-MCNC: 289 PG/ML (ref 193–986)
VIT D+METAB SERPL-MCNC: 38.2 NG/ML (ref 30–100)
VLDLC SERPL CALC-MCNC: 21 MG/DL (ref 0–30)
WBC # BLD AUTO: 11.4 X10(3) UL (ref 4–11)

## 2022-07-14 PROCEDURE — 82607 VITAMIN B-12: CPT

## 2022-07-14 PROCEDURE — G0439 PPPS, SUBSEQ VISIT: HCPCS | Performed by: FAMILY MEDICINE

## 2022-07-14 PROCEDURE — 36415 COLL VENOUS BLD VENIPUNCTURE: CPT

## 2022-07-14 PROCEDURE — 80061 LIPID PANEL: CPT

## 2022-07-14 PROCEDURE — 85025 COMPLETE CBC W/AUTO DIFF WBC: CPT

## 2022-07-14 PROCEDURE — 80053 COMPREHEN METABOLIC PANEL: CPT

## 2022-07-14 PROCEDURE — 82306 VITAMIN D 25 HYDROXY: CPT

## 2022-07-14 PROCEDURE — 1125F AMNT PAIN NOTED PAIN PRSNT: CPT | Performed by: FAMILY MEDICINE

## 2022-07-14 PROCEDURE — 83036 HEMOGLOBIN GLYCOSYLATED A1C: CPT

## 2022-07-14 PROCEDURE — 84443 ASSAY THYROID STIM HORMONE: CPT

## 2022-07-14 RX ORDER — MEMANTINE HYDROCHLORIDE 5 MG/1
5 TABLET ORAL 2 TIMES DAILY
Qty: 180 TABLET | Refills: 1 | Status: SHIPPED | OUTPATIENT
Start: 2022-07-14

## 2022-07-14 NOTE — PROGRESS NOTES
10:20am: reviewed AWV questions with YP, pt on chronic care management, sees psych, patient states she feels stable at this time and isnt concerned, she has normal affect and is answering questions appropriately.

## 2022-07-18 ENCOUNTER — PATIENT OUTREACH (OUTPATIENT)
Dept: CASE MANAGEMENT | Age: 77
End: 2022-07-18

## 2022-07-18 DIAGNOSIS — E66.9 DIABETES MELLITUS TYPE 2 IN OBESE (HCC): ICD-10-CM

## 2022-07-18 DIAGNOSIS — E11.69 DIABETES MELLITUS TYPE 2 IN OBESE (HCC): ICD-10-CM

## 2022-07-18 RX ORDER — GLIMEPIRIDE 1 MG/1
1 TABLET ORAL
Qty: 90 TABLET | Refills: 0 | Status: SHIPPED | OUTPATIENT
Start: 2022-07-18

## 2022-07-18 RX ORDER — GLIMEPIRIDE 1 MG/1
TABLET ORAL
Qty: 20 TABLET | Refills: 0 | COMMUNITY
Start: 2022-07-18 | End: 2022-07-18 | Stop reason: DRUGHIGH

## 2022-08-01 ENCOUNTER — TELEPHONE (OUTPATIENT)
Dept: FAMILY MEDICINE CLINIC | Facility: CLINIC | Age: 77
End: 2022-08-01

## 2022-08-01 ENCOUNTER — HOSPITAL ENCOUNTER (OUTPATIENT)
Dept: MAMMOGRAPHY | Age: 77
Discharge: HOME | End: 2022-08-01
Attending: FAMILY MEDICINE
Payer: MEDICARE

## 2022-08-01 ENCOUNTER — HOSPITAL ENCOUNTER (EMERGENCY)
Facility: HOSPITAL | Age: 77
Discharge: LEFT BEFORE DISCHARGE | End: 2022-08-01
Payer: MEDICARE

## 2022-08-01 VITALS
TEMPERATURE: 97 F | HEART RATE: 90 BPM | WEIGHT: 190 LBS | BODY MASS INDEX: 32.44 KG/M2 | OXYGEN SATURATION: 97 % | DIASTOLIC BLOOD PRESSURE: 89 MMHG | HEIGHT: 64 IN | SYSTOLIC BLOOD PRESSURE: 149 MMHG | RESPIRATION RATE: 18 BRPM

## 2022-08-01 DIAGNOSIS — Z12.31 ENCOUNTER FOR SCREENING MAMMOGRAM FOR MALIGNANT NEOPLASM OF BREAST: ICD-10-CM

## 2022-08-01 PROCEDURE — 77067 SCR MAMMO BI INCL CAD: CPT | Performed by: FAMILY MEDICINE

## 2022-08-01 PROCEDURE — 77063 BREAST TOMOSYNTHESIS BI: CPT | Performed by: FAMILY MEDICINE

## 2022-08-01 NOTE — TELEPHONE ENCOUNTER
Pt states she has been constipated today, used a suppository, MOM and mag citrate with no relief. Having lower abd discomfort, also stating she has not urinated since 11:30am.  Trying to urinate now and nothing coming out. Advised ER to evaluate. Pt agrees and will proceed.

## 2022-08-01 NOTE — ED INITIAL ASSESSMENT (HPI)
Patient presents to the ED with c/o urinary retention and lower abdominal pain. She also has c/o constipation and weakness.

## 2022-08-01 NOTE — ED QUICK NOTES
This RN preformed a bladder scan due to patient stating she has urinary retention. Bladder scan showed 27ml in bladder. Patient then was asked about her hydration, she states that she has not had anything to drink all day.

## 2022-08-11 ENCOUNTER — PATIENT OUTREACH (OUTPATIENT)
Dept: CASE MANAGEMENT | Age: 77
End: 2022-08-11

## 2022-08-11 NOTE — PROGRESS NOTES
Called patient and left a message for patient to call back when they can. Reviewed patient chart.  Left contact my contact number [phone]        Time Spent This Encounter Total: 5  min medical record review  Monthly Minute Total including today: 5 minutes

## 2022-08-15 ENCOUNTER — LAB ENCOUNTER (OUTPATIENT)
Dept: LAB | Facility: HOSPITAL | Age: 77
End: 2022-08-15
Attending: NURSE PRACTITIONER
Payer: MEDICARE

## 2022-08-15 DIAGNOSIS — D64.9 ANEMIA, UNSPECIFIED TYPE: ICD-10-CM

## 2022-08-15 LAB
DEPRECATED HBV CORE AB SER IA-ACNC: 6.6 NG/ML
FOLATE SERPL-MCNC: 7.1 NG/ML (ref 8.7–?)
IRON SATN MFR SERPL: 5 %
IRON SERPL-MCNC: 22 UG/DL
TIBC SERPL-MCNC: 419 UG/DL (ref 240–450)
TRANSFERRIN SERPL-MCNC: 281 MG/DL (ref 200–360)
VIT B12 SERPL-MCNC: 289 PG/ML (ref 193–986)

## 2022-08-15 PROCEDURE — 83550 IRON BINDING TEST: CPT

## 2022-08-15 PROCEDURE — 83540 ASSAY OF IRON: CPT

## 2022-08-15 PROCEDURE — 82607 VITAMIN B-12: CPT

## 2022-08-15 PROCEDURE — 82746 ASSAY OF FOLIC ACID SERUM: CPT

## 2022-08-15 PROCEDURE — 82728 ASSAY OF FERRITIN: CPT

## 2022-08-15 PROCEDURE — 36415 COLL VENOUS BLD VENIPUNCTURE: CPT

## 2022-08-22 ENCOUNTER — PATIENT OUTREACH (OUTPATIENT)
Dept: CASE MANAGEMENT | Age: 77
End: 2022-08-22

## 2022-08-22 DIAGNOSIS — F41.9 ANXIETY: ICD-10-CM

## 2022-08-22 DIAGNOSIS — F32.A DEPRESSIVE DISORDER: ICD-10-CM

## 2022-08-22 DIAGNOSIS — E11.69 DIABETES MELLITUS TYPE 2 IN OBESE (HCC): ICD-10-CM

## 2022-08-22 DIAGNOSIS — I10 ESSENTIAL HYPERTENSION: ICD-10-CM

## 2022-08-22 DIAGNOSIS — E78.2 HYPERLIPIDEMIA, MIXED: ICD-10-CM

## 2022-08-22 DIAGNOSIS — E11.9 TYPE 2 DIABETES MELLITUS WITHOUT COMPLICATION, WITHOUT LONG-TERM CURRENT USE OF INSULIN (HCC): ICD-10-CM

## 2022-08-22 DIAGNOSIS — E66.9 DIABETES MELLITUS TYPE 2 IN OBESE (HCC): ICD-10-CM

## 2022-08-22 DIAGNOSIS — F41.1 GAD (GENERALIZED ANXIETY DISORDER): ICD-10-CM

## 2022-08-29 RX ORDER — TORSEMIDE 20 MG/1
TABLET ORAL
Qty: 30 TABLET | Refills: 0 | Status: SHIPPED | OUTPATIENT
Start: 2022-08-29 | End: 2022-10-24

## 2022-08-29 RX ORDER — ATORVASTATIN CALCIUM 40 MG/1
TABLET, FILM COATED ORAL
Qty: 90 TABLET | Refills: 0 | Status: SHIPPED | OUTPATIENT
Start: 2022-08-29 | End: 2022-11-28

## 2022-09-11 ENCOUNTER — LAB ENCOUNTER (OUTPATIENT)
Dept: LAB | Facility: HOSPITAL | Age: 77
End: 2022-09-11
Attending: INTERNAL MEDICINE
Payer: MEDICARE

## 2022-09-11 DIAGNOSIS — Z01.812 ENCOUNTER FOR PREPROCEDURE SCREENING LABORATORY TESTING FOR COVID-19: ICD-10-CM

## 2022-09-11 DIAGNOSIS — Z20.822 ENCOUNTER FOR PREPROCEDURE SCREENING LABORATORY TESTING FOR COVID-19: ICD-10-CM

## 2022-09-12 LAB — SARS-COV-2 RNA RESP QL NAA+PROBE: NOT DETECTED

## 2022-09-14 ENCOUNTER — ANESTHESIA (OUTPATIENT)
Dept: ENDOSCOPY | Facility: HOSPITAL | Age: 77
End: 2022-09-14
Payer: MEDICARE

## 2022-09-14 ENCOUNTER — HOSPITAL ENCOUNTER (OUTPATIENT)
Facility: HOSPITAL | Age: 77
Setting detail: HOSPITAL OUTPATIENT SURGERY
Discharge: HOME | End: 2022-09-14
Attending: INTERNAL MEDICINE | Admitting: INTERNAL MEDICINE

## 2022-09-14 ENCOUNTER — ANESTHESIA EVENT (OUTPATIENT)
Dept: ENDOSCOPY | Facility: HOSPITAL | Age: 77
End: 2022-09-14
Payer: MEDICARE

## 2022-09-14 VITALS
OXYGEN SATURATION: 97 % | BODY MASS INDEX: 32.95 KG/M2 | HEART RATE: 91 BPM | WEIGHT: 193 LBS | RESPIRATION RATE: 16 BRPM | HEIGHT: 64 IN | DIASTOLIC BLOOD PRESSURE: 64 MMHG | TEMPERATURE: 99 F | SYSTOLIC BLOOD PRESSURE: 128 MMHG

## 2022-09-14 DIAGNOSIS — Z01.812 ENCOUNTER FOR PREPROCEDURE SCREENING LABORATORY TESTING FOR COVID-19: Primary | ICD-10-CM

## 2022-09-14 DIAGNOSIS — D50.9 IRON DEFICIENCY ANEMIA, UNSPECIFIED IRON DEFICIENCY ANEMIA TYPE: ICD-10-CM

## 2022-09-14 DIAGNOSIS — Z20.822 ENCOUNTER FOR PREPROCEDURE SCREENING LABORATORY TESTING FOR COVID-19: Primary | ICD-10-CM

## 2022-09-14 LAB — GLUCOSE BLD-MCNC: 151 MG/DL (ref 70–99)

## 2022-09-14 PROCEDURE — 88312 SPECIAL STAINS GROUP 1: CPT | Performed by: INTERNAL MEDICINE

## 2022-09-14 PROCEDURE — 0D598ZZ DESTRUCTION OF DUODENUM, VIA NATURAL OR ARTIFICIAL OPENING ENDOSCOPIC: ICD-10-PCS | Performed by: INTERNAL MEDICINE

## 2022-09-14 PROCEDURE — 82962 GLUCOSE BLOOD TEST: CPT

## 2022-09-14 PROCEDURE — 0DJD8ZZ INSPECTION OF LOWER INTESTINAL TRACT, VIA NATURAL OR ARTIFICIAL OPENING ENDOSCOPIC: ICD-10-PCS | Performed by: INTERNAL MEDICINE

## 2022-09-14 PROCEDURE — 88305 TISSUE EXAM BY PATHOLOGIST: CPT | Performed by: INTERNAL MEDICINE

## 2022-09-14 PROCEDURE — 0DB58ZX EXCISION OF ESOPHAGUS, VIA NATURAL OR ARTIFICIAL OPENING ENDOSCOPIC, DIAGNOSTIC: ICD-10-PCS | Performed by: INTERNAL MEDICINE

## 2022-09-14 RX ORDER — NICOTINE POLACRILEX 4 MG
15 LOZENGE BUCCAL
Status: DISCONTINUED | OUTPATIENT
Start: 2022-09-14 | End: 2022-09-14

## 2022-09-14 RX ORDER — NICOTINE POLACRILEX 4 MG
30 LOZENGE BUCCAL
Status: DISCONTINUED | OUTPATIENT
Start: 2022-09-14 | End: 2022-09-14

## 2022-09-14 RX ORDER — LIDOCAINE HYDROCHLORIDE 10 MG/ML
INJECTION, SOLUTION EPIDURAL; INFILTRATION; INTRACAUDAL; PERINEURAL AS NEEDED
Status: DISCONTINUED | OUTPATIENT
Start: 2022-09-14 | End: 2022-09-14 | Stop reason: SURG

## 2022-09-14 RX ORDER — NALOXONE HYDROCHLORIDE 0.4 MG/ML
80 INJECTION, SOLUTION INTRAMUSCULAR; INTRAVENOUS; SUBCUTANEOUS AS NEEDED
Status: DISCONTINUED | OUTPATIENT
Start: 2022-09-14 | End: 2022-09-14

## 2022-09-14 RX ORDER — DEXTROSE MONOHYDRATE 25 G/50ML
50 INJECTION, SOLUTION INTRAVENOUS
Status: DISCONTINUED | OUTPATIENT
Start: 2022-09-14 | End: 2022-09-14

## 2022-09-14 RX ORDER — ONDANSETRON 2 MG/ML
4 INJECTION INTRAMUSCULAR; INTRAVENOUS AS NEEDED
Status: DISCONTINUED | OUTPATIENT
Start: 2022-09-14 | End: 2022-09-14

## 2022-09-14 RX ORDER — SODIUM CHLORIDE, SODIUM LACTATE, POTASSIUM CHLORIDE, CALCIUM CHLORIDE 600; 310; 30; 20 MG/100ML; MG/100ML; MG/100ML; MG/100ML
INJECTION, SOLUTION INTRAVENOUS CONTINUOUS
Status: DISCONTINUED | OUTPATIENT
Start: 2022-09-14 | End: 2022-09-14

## 2022-09-14 RX ADMIN — LIDOCAINE HYDROCHLORIDE 30 MG: 10 INJECTION, SOLUTION EPIDURAL; INFILTRATION; INTRACAUDAL; PERINEURAL at 09:49:00

## 2022-09-14 RX ADMIN — SODIUM CHLORIDE, SODIUM LACTATE, POTASSIUM CHLORIDE, CALCIUM CHLORIDE: 600; 310; 30; 20 INJECTION, SOLUTION INTRAVENOUS at 10:17:00

## 2022-09-14 RX ADMIN — SODIUM CHLORIDE, SODIUM LACTATE, POTASSIUM CHLORIDE, CALCIUM CHLORIDE: 600; 310; 30; 20 INJECTION, SOLUTION INTRAVENOUS at 09:46:00

## 2022-09-14 NOTE — ANESTHESIA POSTPROCEDURE EVALUATION
[address] Patient Status:  Hospital Outpatient Surgery   Age/Gender 77year old female MRN [id number]   Location [address] [place] [address] Attending Iraida Rocha DO   Hosp Day # 0 PCP Osmin Onofre DO       Anesthesia Post-op Note    ESOPHAGOGASTRODUODENOSCOPY (EGD) with biopsies and ARGON PLASMA COAGULATION and colonoscopy     Procedure Summary     Date: 09/14/22 Room / Location: [facility] ENDOSCOPY 02 / [address] [facility] ENDOSCOPY    Anesthesia Start: 9023 Anesthesia Stop: 9715    Procedures:       ESOPHAGOGASTRODUODENOSCOPY (EGD) with biopsies and ARGON PLASMA COAGULATION and colonoscopy (N/A )      COLONOSCOPY (N/A ) Diagnosis:       Iron deficiency anemia, unspecified iron deficiency anemia type      (EGD: duodenal AMV, hiatal hernia COLON: hemorrhoids)    Surgeons: Iraida Rocha DO Anesthesiologist: Casie Orlando MD    Anesthesia Type: MAC ASA Status: 3          Anesthesia Type: MAC    Vitals Value Taken Time   /81 09/14/22 1017   Temp  09/14/22 1017   Pulse 90 09/14/22 1017   Resp 16 09/14/22 1017   SpO2 96 % 09/14/22 1017   Vitals shown include unvalidated device data.     Patient Location: Endoscopy    Anesthesia Type: MAC    Airway Patency: patent    Postop Pain Control: adequate    Mental Status: mildly sedated but able to meaningfully participate in the post-anesthesia evaluation    Nausea/Vomiting: none    Cardiopulmonary/Hydration status: stable euvolemic    Complications: no apparent anesthesia related complications    Postop vital signs: stable    Dental Exam: Unchanged from Postop

## 2022-09-21 ENCOUNTER — PATIENT OUTREACH (OUTPATIENT)
Dept: CASE MANAGEMENT | Age: 77
End: 2022-09-21

## 2022-09-21 ENCOUNTER — TELEPHONE (OUTPATIENT)
Dept: HEMATOLOGY/ONCOLOGY | Facility: HOSPITAL | Age: 77
End: 2022-09-21

## 2022-09-21 PROBLEM — K21.9 GASTRO-ESOPHAGEAL REFLUX DISEASE WITHOUT ESOPHAGITIS: Status: ACTIVE | Noted: 2021-01-01

## 2022-09-21 PROBLEM — M06.9 RHEUMATOID ARTHRITIS, UNSPECIFIED (HCC): Status: ACTIVE | Noted: 2021-01-01

## 2022-09-21 PROBLEM — Z95.1 PRESENCE OF AORTOCORONARY BYPASS GRAFT: Status: ACTIVE | Noted: 2021-11-17

## 2022-09-21 PROBLEM — G47.33 OBSTRUCTIVE SLEEP APNEA (ADULT) (PEDIATRIC): Status: ACTIVE | Noted: 2021-01-01

## 2022-09-21 PROBLEM — Z96.651 PRESENCE OF RIGHT ARTIFICIAL KNEE JOINT: Status: ACTIVE | Noted: 2021-11-17

## 2022-09-21 PROBLEM — E11.51 TYPE 2 DIABETES MELLITUS WITH DIABETIC PERIPHERAL ANGIOPATHY WITHOUT GANGRENE (HCC): Status: ACTIVE | Noted: 2021-01-01

## 2022-09-21 PROBLEM — Z79.82 LONG TERM (CURRENT) USE OF ASPIRIN: Status: ACTIVE | Noted: 2021-11-17

## 2022-09-21 PROBLEM — Z79.84 LONG TERM (CURRENT) USE OF ORAL HYPOGLYCEMIC DRUGS: Status: ACTIVE | Noted: 2021-11-17

## 2022-09-21 PROBLEM — E78.00 PURE HYPERCHOLESTEROLEMIA, UNSPECIFIED: Status: ACTIVE | Noted: 2021-01-01

## 2022-09-21 NOTE — TELEPHONE ENCOUNTER
Patient is calling to make an appointment with Dr. Lauren Lozoya as a follow Up for Iron deficiency anemia, unspecified iron deficiency anemia type [D50.9], Fatigue, unspecified type [R53.83], Spoke to Iraida Batista regarding this matter. Last seen 9/2021.

## 2022-09-21 NOTE — PROGRESS NOTES
Called patient and left a message for patient to call back when they can. Reviewed patient chart.  Left contact my contact number [phone]        Time Spent This Encounter Total: 5  min medical record review  Monthly Minute Total including today: 5 minutes

## 2022-09-22 ENCOUNTER — PATIENT OUTREACH (OUTPATIENT)
Dept: CASE MANAGEMENT | Age: 77
End: 2022-09-22

## 2022-09-22 DIAGNOSIS — F32.A DEPRESSIVE DISORDER: ICD-10-CM

## 2022-09-22 DIAGNOSIS — G30.9 ALZHEIMER'S DISEASE, UNSPECIFIED (HCC): ICD-10-CM

## 2022-09-22 DIAGNOSIS — I10 ESSENTIAL HYPERTENSION: ICD-10-CM

## 2022-09-22 DIAGNOSIS — E78.2 HYPERLIPIDEMIA, MIXED: ICD-10-CM

## 2022-09-22 DIAGNOSIS — F02.80 ALZHEIMER'S DISEASE, UNSPECIFIED (HCC): ICD-10-CM

## 2022-09-22 DIAGNOSIS — F41.9 ANXIETY: ICD-10-CM

## 2022-09-22 DIAGNOSIS — I25.118 ATHEROSCLEROTIC HEART DISEASE OF NATIVE CORONARY ARTERY WITH OTHER FORMS OF ANGINA PECTORIS (HCC): ICD-10-CM

## 2022-09-22 DIAGNOSIS — F41.1 GAD (GENERALIZED ANXIETY DISORDER): ICD-10-CM

## 2022-09-22 DIAGNOSIS — E11.9 TYPE 2 DIABETES MELLITUS WITHOUT COMPLICATION, WITHOUT LONG-TERM CURRENT USE OF INSULIN (HCC): ICD-10-CM

## 2022-09-22 DIAGNOSIS — I50.42 CHRONIC COMBINED SYSTOLIC AND DIASTOLIC CONGESTIVE HEART FAILURE (HCC): ICD-10-CM

## 2022-09-22 NOTE — TELEPHONE ENCOUNTER
I called patient and explained that Dr. Artemio Regan would like for her to have an iron infusion after her appointment to see the doctor since her ferritin was low. Explained the process for this infusion and that it will not cause constipation as she has such a problem with this already. The  will call her to arrange an appointment for next week.

## 2022-09-22 NOTE — TELEPHONE ENCOUNTER
Her ferritin was low in August. Will schedule her for an appointment with Dr. Juliet Thrasher and see if he would like the iron infusion given the same day. I will have someone call her with an appointment day and time.

## 2022-09-29 ENCOUNTER — OFFICE VISIT (OUTPATIENT)
Dept: HEMATOLOGY/ONCOLOGY | Facility: HOSPITAL | Age: 77
End: 2022-09-29
Attending: INTERNAL MEDICINE

## 2022-09-29 VITALS
HEART RATE: 84 BPM | DIASTOLIC BLOOD PRESSURE: 69 MMHG | OXYGEN SATURATION: 98 % | RESPIRATION RATE: 18 BRPM | SYSTOLIC BLOOD PRESSURE: 125 MMHG

## 2022-09-29 VITALS
SYSTOLIC BLOOD PRESSURE: 132 MMHG | DIASTOLIC BLOOD PRESSURE: 82 MMHG | BODY MASS INDEX: 34 KG/M2 | HEART RATE: 88 BPM | RESPIRATION RATE: 20 BRPM | WEIGHT: 199.13 LBS | TEMPERATURE: 97 F | OXYGEN SATURATION: 98 %

## 2022-09-29 DIAGNOSIS — K59.01 SLOW TRANSIT CONSTIPATION: ICD-10-CM

## 2022-09-29 DIAGNOSIS — D50.0 IRON DEFICIENCY ANEMIA SECONDARY TO BLOOD LOSS (CHRONIC): Primary | ICD-10-CM

## 2022-09-29 PROCEDURE — 99214 OFFICE O/P EST MOD 30 MIN: CPT | Performed by: INTERNAL MEDICINE

## 2022-09-29 PROCEDURE — 96376 TX/PRO/DX INJ SAME DRUG ADON: CPT

## 2022-09-29 PROCEDURE — 96365 THER/PROPH/DIAG IV INF INIT: CPT

## 2022-09-29 NOTE — PROGRESS NOTES
Patient is here for follow up for iron deficiency anemia. She has been having significant trouble with constipation. She has seen GI and is taking Linzess at this time. She is still deciding if this is helping. She had bypass surgery last year and then during recovery she got covid. She has never really recovered her energy after that. She does have shortness of breath with exertion.    She is scheduled for Infed infusion today after her exam.     Education Record    Learner:  Patient    Disease / Diagnosis: iron deficiency anemia    Barriers / Limitations:  None   Comments:    Method:  Discussion   Comments:    General Topics:  Side effects and symptom management   Comments:    Outcome:  Shows understanding   Comments:

## 2022-09-29 NOTE — PROGRESS NOTES
Education Record    Learner:  Patient    Disease / Diagnosis:iron deficiency    Barriers / Limitations:  None   Comments:    Method:  Discussion   Comments:    General Topics:  Medication and Plan of care reviewed   Comments:    Outcome:  Shows understanding   Comments: Iv infed test dose and infed ivpb given and tolerated without incident. Patient observed for 30 minutes post infusion and vitals recorded. Discharged to home in stable condition.

## 2022-10-14 ENCOUNTER — OFFICE VISIT (OUTPATIENT)
Dept: FAMILY MEDICINE CLINIC | Facility: CLINIC | Age: 77
End: 2022-10-14
Payer: MEDICARE

## 2022-10-14 ENCOUNTER — LAB ENCOUNTER (OUTPATIENT)
Dept: LAB | Age: 77
End: 2022-10-14
Attending: FAMILY MEDICINE
Payer: MEDICARE

## 2022-10-14 VITALS
HEIGHT: 64 IN | BODY MASS INDEX: 33.63 KG/M2 | OXYGEN SATURATION: 98 % | SYSTOLIC BLOOD PRESSURE: 116 MMHG | HEART RATE: 84 BPM | RESPIRATION RATE: 16 BRPM | WEIGHT: 197 LBS | DIASTOLIC BLOOD PRESSURE: 82 MMHG

## 2022-10-14 DIAGNOSIS — R60.0 SWELLING OF BOTH PAROTID GLANDS: Primary | ICD-10-CM

## 2022-10-14 DIAGNOSIS — R60.0 LOCALIZED EDEMA: Primary | ICD-10-CM

## 2022-10-14 DIAGNOSIS — R60.0 SWELLING OF BOTH PAROTID GLANDS: ICD-10-CM

## 2022-10-14 LAB
BASOPHILS # BLD AUTO: 0.1 X10(3) UL (ref 0–0.2)
BASOPHILS NFR BLD AUTO: 1.2 %
CRP SERPL-MCNC: 0.7 MG/DL (ref ?–0.3)
EOSINOPHIL # BLD AUTO: 0.42 X10(3) UL (ref 0–0.7)
EOSINOPHIL NFR BLD AUTO: 5 %
ERYTHROCYTE [DISTWIDTH] IN BLOOD BY AUTOMATED COUNT: 26.5 %
HCT VFR BLD AUTO: 34.1 %
HGB BLD-MCNC: 10 G/DL
IMM GRANULOCYTES # BLD AUTO: 0.02 X10(3) UL (ref 0–1)
IMM GRANULOCYTES NFR BLD: 0.2 %
LYMPHOCYTES # BLD AUTO: 1.91 X10(3) UL (ref 1–4)
LYMPHOCYTES NFR BLD AUTO: 23 %
MCH RBC QN AUTO: 23.9 PG (ref 26–34)
MCHC RBC AUTO-ENTMCNC: 29.3 G/DL (ref 31–37)
MCV RBC AUTO: 81.6 FL
MONOCYTES # BLD AUTO: 0.5 X10(3) UL (ref 0.1–1)
MONOCYTES NFR BLD AUTO: 6 %
NEUTROPHILS # BLD AUTO: 5.37 X10 (3) UL (ref 1.5–7.7)
NEUTROPHILS # BLD AUTO: 5.37 X10(3) UL (ref 1.5–7.7)
NEUTROPHILS NFR BLD AUTO: 64.6 %
PLATELET # BLD AUTO: 429 10(3)UL (ref 150–450)
PLATELET MORPHOLOGY: NORMAL
RBC # BLD AUTO: 4.18 X10(6)UL
WBC # BLD AUTO: 8.3 X10(3) UL (ref 4–11)

## 2022-10-14 PROCEDURE — 86735 MUMPS ANTIBODY: CPT

## 2022-10-14 PROCEDURE — 86140 C-REACTIVE PROTEIN: CPT

## 2022-10-14 PROCEDURE — 99213 OFFICE O/P EST LOW 20 MIN: CPT | Performed by: FAMILY MEDICINE

## 2022-10-14 PROCEDURE — 85025 COMPLETE CBC W/AUTO DIFF WBC: CPT

## 2022-10-14 PROCEDURE — 36415 COLL VENOUS BLD VENIPUNCTURE: CPT

## 2022-10-17 ENCOUNTER — TELEPHONE (OUTPATIENT)
Dept: HEMATOLOGY/ONCOLOGY | Facility: HOSPITAL | Age: 77
End: 2022-10-17

## 2022-10-17 ENCOUNTER — TELEPHONE (OUTPATIENT)
Dept: FAMILY MEDICINE CLINIC | Facility: CLINIC | Age: 77
End: 2022-10-17

## 2022-10-17 LAB — MUV IGG SER IA-ACNC: 173 AU/ML (ref 11–?)

## 2022-10-17 NOTE — TELEPHONE ENCOUNTER
Patient is calling because her neck has been swollen for 1 week and she had blood work done Friday and she states the results in system. She states she had an iron infusion on 9-29-22 and she is wondering if maybe it could be a reaction to that. Her primary care told her to call here. She states she isn't experiencing any other symptoms.

## 2022-10-17 NOTE — TELEPHONE ENCOUNTER
Toxicities: Iron Dextran infusion 9/29/2022    Swollen Glands: Ok Mroe reports that last Wednesday she noticed swelling of her glands by her ears and under her jaw on both sides of her face. She saw her PCP last Friday. He ordered labs. She said her PCP said it may be a salivary gland infection. Chasraymon Lopezty reports her symptoms have not improved. She also reports she still feels tired even though she had an iron infusion. She denies chills, shakes, fever, other cold/flu symptoms. No known sick contacts or Covid 19+ contacts.)    I told González Matos that I don't believe her symptoms are related to her iron dextran infusion. I will update Dr Domingo Fu and his RN. González Matos said she did call her PCP's office this morning and is awaiting a call back.

## 2022-10-17 NOTE — TELEPHONE ENCOUNTER
Called patient and explained that symptoms would not be from her iron infusion and she will follow up with her PCP.

## 2022-10-18 LAB — MUMPS VIRUS AB, IGM: 0.27 IV

## 2022-10-19 ENCOUNTER — PATIENT OUTREACH (OUTPATIENT)
Dept: CASE MANAGEMENT | Age: 77
End: 2022-10-19

## 2022-10-19 DIAGNOSIS — G30.9 ALZHEIMER'S DISEASE, UNSPECIFIED (HCC): ICD-10-CM

## 2022-10-19 DIAGNOSIS — E11.9 TYPE 2 DIABETES MELLITUS WITHOUT COMPLICATION, WITHOUT LONG-TERM CURRENT USE OF INSULIN (HCC): ICD-10-CM

## 2022-10-19 DIAGNOSIS — I25.118 ATHEROSCLEROTIC HEART DISEASE OF NATIVE CORONARY ARTERY WITH OTHER FORMS OF ANGINA PECTORIS (HCC): ICD-10-CM

## 2022-10-19 DIAGNOSIS — F41.1 GAD (GENERALIZED ANXIETY DISORDER): ICD-10-CM

## 2022-10-19 DIAGNOSIS — F41.9 ANXIETY: ICD-10-CM

## 2022-10-19 DIAGNOSIS — E78.2 HYPERLIPIDEMIA, MIXED: ICD-10-CM

## 2022-10-19 DIAGNOSIS — F33.2 MDD (MAJOR DEPRESSIVE DISORDER), RECURRENT SEVERE, WITHOUT PSYCHOSIS (HCC): ICD-10-CM

## 2022-10-19 DIAGNOSIS — K21.9 GASTRO-ESOPHAGEAL REFLUX DISEASE WITHOUT ESOPHAGITIS: ICD-10-CM

## 2022-10-19 DIAGNOSIS — F02.80 ALZHEIMER'S DISEASE, UNSPECIFIED (HCC): ICD-10-CM

## 2022-10-19 NOTE — TELEPHONE ENCOUNTER
Dr Mamie Phelps, have you had opportunity to review recent lab report? Patient is inquiring. She was in to see you on 10/14/22  Thank you.

## 2022-10-19 NOTE — TELEPHONE ENCOUNTER
The labs showed no mumps, CRP was mildly elevated, and blood counts were stable, to actually improved from previous check. The elevated CRP could support my thoughts on this being viral. However that would mean it should be resolving soon.  If her symptoms are ongoing, may need to consider imaging to see if it is parotid gland swelling like I suspect    Saúl Haynes MD

## 2022-10-19 NOTE — TELEPHONE ENCOUNTER
Reviewed all physician comments/ recommendations with patient. She states swelling has actually gone down a little since OV with Dr Almanzar a few days ago. She will call us back with update on Friday in case imaging will be necessary. Agreed to note.

## 2022-10-24 RX ORDER — TORSEMIDE 20 MG/1
TABLET ORAL
Qty: 30 TABLET | Refills: 0 | Status: SHIPPED | OUTPATIENT
Start: 2022-10-24

## 2022-10-24 RX ORDER — GLIMEPIRIDE 1 MG/1
TABLET ORAL
Qty: 90 TABLET | Refills: 0 | Status: SHIPPED | OUTPATIENT
Start: 2022-10-24

## 2022-10-25 ENCOUNTER — LAB ENCOUNTER (OUTPATIENT)
Dept: LAB | Facility: HOSPITAL | Age: 77
End: 2022-10-25
Attending: INTERNAL MEDICINE
Payer: MEDICARE

## 2022-10-25 DIAGNOSIS — E78.2 MIXED HYPERLIPIDEMIA: Primary | ICD-10-CM

## 2022-10-25 LAB
ALBUMIN SERPL-MCNC: 3.4 G/DL (ref 3.4–5)
ALBUMIN/GLOB SERPL: 1 {RATIO} (ref 1–2)
ALP LIVER SERPL-CCNC: 139 U/L
ALT SERPL-CCNC: 72 U/L
ANION GAP SERPL CALC-SCNC: 6 MMOL/L (ref 0–18)
AST SERPL-CCNC: 37 U/L (ref 15–37)
BASOPHILS # BLD AUTO: 0.08 X10(3) UL (ref 0–0.2)
BASOPHILS NFR BLD AUTO: 1 %
BILIRUB SERPL-MCNC: 0.4 MG/DL (ref 0.1–2)
BUN BLD-MCNC: 12 MG/DL (ref 7–18)
CALCIUM BLD-MCNC: 9.1 MG/DL (ref 8.5–10.1)
CHLORIDE SERPL-SCNC: 110 MMOL/L (ref 98–112)
CHOLEST SERPL-MCNC: 166 MG/DL (ref ?–200)
CO2 SERPL-SCNC: 25 MMOL/L (ref 21–32)
CREAT BLD-MCNC: 1.35 MG/DL
EOSINOPHIL # BLD AUTO: 0.39 X10(3) UL (ref 0–0.7)
EOSINOPHIL NFR BLD AUTO: 4.8 %
ERYTHROCYTE [DISTWIDTH] IN BLOOD BY AUTOMATED COUNT: 27.3 %
EST. AVERAGE GLUCOSE BLD GHB EST-MCNC: 131 MG/DL (ref 68–126)
FASTING PATIENT LIPID ANSWER: YES
FASTING STATUS PATIENT QL REPORTED: YES
GFR SERPLBLD BASED ON 1.73 SQ M-ARVRAT: 41 ML/MIN/1.73M2 (ref 60–?)
GLOBULIN PLAS-MCNC: 3.4 G/DL (ref 2.8–4.4)
GLUCOSE BLD-MCNC: 134 MG/DL (ref 70–99)
HBA1C MFR BLD: 6.2 % (ref ?–5.7)
HCT VFR BLD AUTO: 35 %
HDLC SERPL-MCNC: 41 MG/DL (ref 40–59)
HGB BLD-MCNC: 11.1 G/DL
IMM GRANULOCYTES # BLD AUTO: 0.02 X10(3) UL (ref 0–1)
IMM GRANULOCYTES NFR BLD: 0.2 %
LDLC SERPL CALC-MCNC: 88 MG/DL (ref ?–100)
LYMPHOCYTES # BLD AUTO: 2.48 X10(3) UL (ref 1–4)
LYMPHOCYTES NFR BLD AUTO: 30.7 %
MCH RBC QN AUTO: 26 PG (ref 26–34)
MCHC RBC AUTO-ENTMCNC: 31.7 G/DL (ref 31–37)
MCV RBC AUTO: 82 FL
MONOCYTES # BLD AUTO: 0.39 X10(3) UL (ref 0.1–1)
MONOCYTES NFR BLD AUTO: 4.8 %
NEUTROPHILS # BLD AUTO: 4.71 X10 (3) UL (ref 1.5–7.7)
NEUTROPHILS # BLD AUTO: 4.71 X10(3) UL (ref 1.5–7.7)
NEUTROPHILS NFR BLD AUTO: 58.5 %
NONHDLC SERPL-MCNC: 125 MG/DL (ref ?–130)
OSMOLALITY SERPL CALC.SUM OF ELEC: 294 MOSM/KG (ref 275–295)
PLATELET # BLD AUTO: 377 10(3)UL (ref 150–450)
PLATELET MORPHOLOGY: NORMAL
POTASSIUM SERPL-SCNC: 4.2 MMOL/L (ref 3.5–5.1)
PROT SERPL-MCNC: 6.8 G/DL (ref 6.4–8.2)
RBC # BLD AUTO: 4.27 X10(6)UL
SODIUM SERPL-SCNC: 141 MMOL/L (ref 136–145)
TRIGL SERPL-MCNC: 221 MG/DL (ref 30–149)
VLDLC SERPL CALC-MCNC: 36 MG/DL (ref 0–30)
WBC # BLD AUTO: 8.1 X10(3) UL (ref 4–11)

## 2022-10-25 PROCEDURE — 36415 COLL VENOUS BLD VENIPUNCTURE: CPT

## 2022-10-25 PROCEDURE — 80061 LIPID PANEL: CPT

## 2022-10-25 PROCEDURE — 80053 COMPREHEN METABOLIC PANEL: CPT

## 2022-10-25 PROCEDURE — 85025 COMPLETE CBC W/AUTO DIFF WBC: CPT

## 2022-10-25 PROCEDURE — 83036 HEMOGLOBIN GLYCOSYLATED A1C: CPT

## 2022-11-10 ENCOUNTER — PATIENT OUTREACH (OUTPATIENT)
Dept: CASE MANAGEMENT | Age: 77
End: 2022-11-10

## 2022-11-10 DIAGNOSIS — K21.9 GASTRO-ESOPHAGEAL REFLUX DISEASE WITHOUT ESOPHAGITIS: ICD-10-CM

## 2022-11-10 DIAGNOSIS — F02.80 ALZHEIMER'S DISEASE, UNSPECIFIED (HCC): ICD-10-CM

## 2022-11-10 DIAGNOSIS — G30.9 ALZHEIMER'S DISEASE, UNSPECIFIED (HCC): ICD-10-CM

## 2022-11-10 DIAGNOSIS — E66.9 DIABETES MELLITUS TYPE 2 IN OBESE (HCC): ICD-10-CM

## 2022-11-10 DIAGNOSIS — E11.69 DIABETES MELLITUS TYPE 2 IN OBESE (HCC): ICD-10-CM

## 2022-11-10 DIAGNOSIS — F60.3 BORDERLINE PERSONALITY DISORDER (HCC): ICD-10-CM

## 2022-11-10 DIAGNOSIS — I10 ESSENTIAL HYPERTENSION: ICD-10-CM

## 2022-11-10 DIAGNOSIS — E11.9 TYPE 2 DIABETES MELLITUS WITHOUT COMPLICATION, WITHOUT LONG-TERM CURRENT USE OF INSULIN (HCC): ICD-10-CM

## 2022-11-10 DIAGNOSIS — E78.2 HYPERLIPIDEMIA, MIXED: ICD-10-CM

## 2022-11-10 DIAGNOSIS — F32.A DEPRESSIVE DISORDER: ICD-10-CM

## 2022-11-10 DIAGNOSIS — F41.9 ANXIETY: ICD-10-CM

## 2022-11-10 RX ORDER — FLUTICASONE PROPIONATE 50 MCG
SPRAY, SUSPENSION (ML) NASAL
COMMUNITY
Start: 2022-01-12

## 2022-12-08 ENCOUNTER — PATIENT OUTREACH (OUTPATIENT)
Dept: CASE MANAGEMENT | Age: 77
End: 2022-12-08

## 2022-12-08 DIAGNOSIS — E78.00 PURE HYPERCHOLESTEROLEMIA, UNSPECIFIED: ICD-10-CM

## 2022-12-08 DIAGNOSIS — F32.A DEPRESSIVE DISORDER: ICD-10-CM

## 2022-12-08 DIAGNOSIS — E11.69 DIABETES MELLITUS TYPE 2 IN OBESE (HCC): ICD-10-CM

## 2022-12-08 DIAGNOSIS — G30.9 ALZHEIMER'S DISEASE, UNSPECIFIED (HCC): ICD-10-CM

## 2022-12-08 DIAGNOSIS — F60.3 BORDERLINE PERSONALITY DISORDER (HCC): ICD-10-CM

## 2022-12-08 DIAGNOSIS — E11.9 TYPE 2 DIABETES MELLITUS WITHOUT COMPLICATION, WITHOUT LONG-TERM CURRENT USE OF INSULIN (HCC): ICD-10-CM

## 2022-12-08 DIAGNOSIS — E66.9 DIABETES MELLITUS TYPE 2 IN OBESE (HCC): ICD-10-CM

## 2022-12-08 DIAGNOSIS — F41.9 ANXIETY: ICD-10-CM

## 2022-12-08 DIAGNOSIS — I10 ESSENTIAL HYPERTENSION: ICD-10-CM

## 2022-12-08 DIAGNOSIS — F02.80 ALZHEIMER'S DISEASE, UNSPECIFIED (HCC): ICD-10-CM

## 2022-12-08 DIAGNOSIS — I25.118 ATHEROSCLEROTIC HEART DISEASE OF NATIVE CORONARY ARTERY WITH OTHER FORMS OF ANGINA PECTORIS (HCC): ICD-10-CM

## 2022-12-08 DIAGNOSIS — F41.1 GAD (GENERALIZED ANXIETY DISORDER): ICD-10-CM

## 2022-12-08 DIAGNOSIS — G47.33 OBSTRUCTIVE SLEEP APNEA (ADULT) (PEDIATRIC): ICD-10-CM

## 2022-12-28 ENCOUNTER — OFFICE VISIT (OUTPATIENT)
Dept: HEMATOLOGY/ONCOLOGY | Facility: HOSPITAL | Age: 77
End: 2022-12-28
Attending: INTERNAL MEDICINE
Payer: MEDICARE

## 2022-12-28 VITALS
OXYGEN SATURATION: 98 % | RESPIRATION RATE: 18 BRPM | SYSTOLIC BLOOD PRESSURE: 133 MMHG | WEIGHT: 188.63 LBS | TEMPERATURE: 98 F | HEART RATE: 76 BPM | DIASTOLIC BLOOD PRESSURE: 81 MMHG | BODY MASS INDEX: 32 KG/M2

## 2022-12-28 DIAGNOSIS — D50.0 IRON DEFICIENCY ANEMIA SECONDARY TO BLOOD LOSS (CHRONIC): ICD-10-CM

## 2022-12-28 LAB
BASOPHILS # BLD AUTO: 0.09 X10(3) UL (ref 0–0.2)
BASOPHILS NFR BLD AUTO: 0.7 %
DEPRECATED HBV CORE AB SER IA-ACNC: 120.7 NG/ML
EOSINOPHIL # BLD AUTO: 0.22 X10(3) UL (ref 0–0.7)
EOSINOPHIL NFR BLD AUTO: 1.7 %
ERYTHROCYTE [DISTWIDTH] IN BLOOD BY AUTOMATED COUNT: 20.7 %
HCT VFR BLD AUTO: 41.1 %
HGB BLD-MCNC: 13.4 G/DL
IMM GRANULOCYTES # BLD AUTO: 0.03 X10(3) UL (ref 0–1)
IMM GRANULOCYTES NFR BLD: 0.2 %
LYMPHOCYTES # BLD AUTO: 3.01 X10(3) UL (ref 1–4)
LYMPHOCYTES NFR BLD AUTO: 23 %
MCH RBC QN AUTO: 28.5 PG (ref 26–34)
MCHC RBC AUTO-ENTMCNC: 32.6 G/DL (ref 31–37)
MCV RBC AUTO: 87.3 FL
MONOCYTES # BLD AUTO: 0.62 X10(3) UL (ref 0.1–1)
MONOCYTES NFR BLD AUTO: 4.7 %
NEUTROPHILS # BLD AUTO: 9.09 X10 (3) UL (ref 1.5–7.7)
NEUTROPHILS # BLD AUTO: 9.09 X10(3) UL (ref 1.5–7.7)
NEUTROPHILS NFR BLD AUTO: 69.7 %
PLATELET # BLD AUTO: 373 10(3)UL (ref 150–450)
RBC # BLD AUTO: 4.71 X10(6)UL
WBC # BLD AUTO: 13.1 X10(3) UL (ref 4–11)

## 2022-12-28 PROCEDURE — 99211 OFF/OP EST MAY X REQ PHY/QHP: CPT

## 2022-12-28 PROCEDURE — 85025 COMPLETE CBC W/AUTO DIFF WBC: CPT | Performed by: INTERNAL MEDICINE

## 2022-12-28 PROCEDURE — 36415 COLL VENOUS BLD VENIPUNCTURE: CPT

## 2022-12-28 PROCEDURE — 82728 ASSAY OF FERRITIN: CPT | Performed by: INTERNAL MEDICINE

## 2022-12-28 NOTE — PROGRESS NOTES
Patient is here for follow up iron deficiency anemia. She had an iron infusion in September. She continues to feel very tired and could not really tell a difference in how she felt after the infusion. She has some shortness of breath only with exertion. She is able to be active and do the things that she needs to do around her home. She has been seeing GI doctor for constipation and has been prescribed Linzess but this causes her diarrhea. She has to really plan ahead when she must take this.     Education Record    Learner:  Patient    Disease / Diagnosis: iron deficiency anemia    Barriers / Limitations:  None   Comments:    Method:  Discussion   Comments:    General Topics:  Side effects and symptom management   Comments:    Outcome:  Shows understanding   Comments:

## 2023-01-10 ENCOUNTER — PATIENT OUTREACH (OUTPATIENT)
Dept: CASE MANAGEMENT | Age: 78
End: 2023-01-10

## 2023-01-10 DIAGNOSIS — I10 ESSENTIAL HYPERTENSION: ICD-10-CM

## 2023-01-10 DIAGNOSIS — F60.3 BORDERLINE PERSONALITY DISORDER (HCC): ICD-10-CM

## 2023-01-10 DIAGNOSIS — F32.A DEPRESSIVE DISORDER: ICD-10-CM

## 2023-01-10 DIAGNOSIS — F02.80 ALZHEIMER'S DISEASE, UNSPECIFIED (HCC): ICD-10-CM

## 2023-01-10 DIAGNOSIS — G47.33 OBSTRUCTIVE SLEEP APNEA (ADULT) (PEDIATRIC): ICD-10-CM

## 2023-01-10 DIAGNOSIS — E11.9 TYPE 2 DIABETES MELLITUS WITHOUT COMPLICATION, WITHOUT LONG-TERM CURRENT USE OF INSULIN (HCC): ICD-10-CM

## 2023-01-10 DIAGNOSIS — K21.9 GASTRO-ESOPHAGEAL REFLUX DISEASE WITHOUT ESOPHAGITIS: ICD-10-CM

## 2023-01-10 DIAGNOSIS — E78.2 HYPERLIPIDEMIA, MIXED: ICD-10-CM

## 2023-01-10 DIAGNOSIS — F41.1 GAD (GENERALIZED ANXIETY DISORDER): ICD-10-CM

## 2023-01-10 DIAGNOSIS — E66.9 DIABETES MELLITUS TYPE 2 IN OBESE (HCC): ICD-10-CM

## 2023-01-10 DIAGNOSIS — E78.00 PURE HYPERCHOLESTEROLEMIA, UNSPECIFIED: ICD-10-CM

## 2023-01-10 DIAGNOSIS — E11.69 DIABETES MELLITUS TYPE 2 IN OBESE (HCC): ICD-10-CM

## 2023-01-10 DIAGNOSIS — G30.9 ALZHEIMER'S DISEASE, UNSPECIFIED (HCC): ICD-10-CM

## 2023-01-16 DIAGNOSIS — R68.89 FORGETFULNESS: ICD-10-CM

## 2023-01-18 RX ORDER — MEMANTINE HYDROCHLORIDE 5 MG/1
TABLET ORAL
Qty: 180 TABLET | Refills: 0 | Status: SHIPPED | OUTPATIENT
Start: 2023-01-18

## 2023-01-30 RX ORDER — GLIMEPIRIDE 1 MG/1
TABLET ORAL
Qty: 90 TABLET | Refills: 0 | Status: SHIPPED | OUTPATIENT
Start: 2023-01-30

## 2023-02-03 ENCOUNTER — PATIENT OUTREACH (OUTPATIENT)
Dept: CASE MANAGEMENT | Age: 78
End: 2023-02-03

## 2023-02-03 DIAGNOSIS — E78.2 HYPERLIPIDEMIA, MIXED: ICD-10-CM

## 2023-02-03 DIAGNOSIS — E11.9 TYPE 2 DIABETES MELLITUS WITHOUT COMPLICATION, WITHOUT LONG-TERM CURRENT USE OF INSULIN (HCC): ICD-10-CM

## 2023-02-03 DIAGNOSIS — G30.9 ALZHEIMER'S DISEASE, UNSPECIFIED (HCC): ICD-10-CM

## 2023-02-03 DIAGNOSIS — G47.33 OBSTRUCTIVE SLEEP APNEA (ADULT) (PEDIATRIC): ICD-10-CM

## 2023-02-03 DIAGNOSIS — E11.69 DIABETES MELLITUS TYPE 2 IN OBESE (HCC): ICD-10-CM

## 2023-02-03 DIAGNOSIS — F41.1 GAD (GENERALIZED ANXIETY DISORDER): ICD-10-CM

## 2023-02-03 DIAGNOSIS — F02.80 ALZHEIMER'S DISEASE, UNSPECIFIED (HCC): ICD-10-CM

## 2023-02-03 DIAGNOSIS — E66.9 DIABETES MELLITUS TYPE 2 IN OBESE (HCC): ICD-10-CM

## 2023-02-03 DIAGNOSIS — F41.9 ANXIETY: ICD-10-CM

## 2023-02-03 DIAGNOSIS — I10 ESSENTIAL HYPERTENSION: ICD-10-CM

## 2023-02-27 RX ORDER — ATORVASTATIN CALCIUM 40 MG/1
TABLET, FILM COATED ORAL
Qty: 90 TABLET | Refills: 0 | Status: SHIPPED | OUTPATIENT
Start: 2023-02-27

## 2023-02-28 ENCOUNTER — TELEPHONE (OUTPATIENT)
Dept: FAMILY MEDICINE CLINIC | Facility: CLINIC | Age: 78
End: 2023-02-28

## 2023-02-28 DIAGNOSIS — E78.2 HYPERLIPIDEMIA, MIXED: ICD-10-CM

## 2023-02-28 DIAGNOSIS — E11.69 DIABETES MELLITUS TYPE 2 IN OBESE (HCC): Primary | ICD-10-CM

## 2023-02-28 DIAGNOSIS — E66.9 DIABETES MELLITUS TYPE 2 IN OBESE (HCC): Primary | ICD-10-CM

## 2023-02-28 NOTE — TELEPHONE ENCOUNTER
Pateint has upcoming appointment with Dr. Noé Ashton and is inquiring if he would like her to complete any labs prior to the visit to avoid making multiple trips as patient is not feeling well. Patient would like to be contacted for further advice. Patient can be reached at [phone].  Please advise    Future Appointments   Date Time Provider Jitendra Cantrell   3/3/2023  8:00 AM Lucía Guerrero DO [facility] [address]   3/6/2023  2:00 PM [facility]   6/28/2023  2:30 PM Manda Howard MD [address] [facility] HEM [address]   8/28/2023 10:15 AM Silas Rees MD [facility]

## 2023-03-02 ENCOUNTER — LAB ENCOUNTER (OUTPATIENT)
Dept: LAB | Age: 78
End: 2023-03-02
Attending: FAMILY MEDICINE
Payer: MEDICARE

## 2023-03-02 DIAGNOSIS — E66.9 DIABETES MELLITUS TYPE 2 IN OBESE (HCC): ICD-10-CM

## 2023-03-02 DIAGNOSIS — E11.69 DIABETES MELLITUS TYPE 2 IN OBESE (HCC): ICD-10-CM

## 2023-03-02 DIAGNOSIS — E78.2 HYPERLIPIDEMIA, MIXED: ICD-10-CM

## 2023-03-02 LAB
ALBUMIN SERPL-MCNC: 3.5 G/DL (ref 3.4–5)
ALBUMIN/GLOB SERPL: 1 {RATIO} (ref 1–2)
ALP LIVER SERPL-CCNC: 134 U/L
ALT SERPL-CCNC: 26 U/L
ANION GAP SERPL CALC-SCNC: 3 MMOL/L (ref 0–18)
AST SERPL-CCNC: 22 U/L (ref 15–37)
BILIRUB SERPL-MCNC: 0.6 MG/DL (ref 0.1–2)
BUN BLD-MCNC: 12 MG/DL (ref 7–18)
CALCIUM BLD-MCNC: 9.4 MG/DL (ref 8.5–10.1)
CHLORIDE SERPL-SCNC: 108 MMOL/L (ref 98–112)
CHOLEST SERPL-MCNC: 202 MG/DL (ref ?–200)
CO2 SERPL-SCNC: 27 MMOL/L (ref 21–32)
CREAT BLD-MCNC: 1.43 MG/DL
CREAT UR-SCNC: 198 MG/DL
EST. AVERAGE GLUCOSE BLD GHB EST-MCNC: 151 MG/DL (ref 68–126)
FASTING PATIENT LIPID ANSWER: YES
FASTING STATUS PATIENT QL REPORTED: YES
GFR SERPLBLD BASED ON 1.73 SQ M-ARVRAT: 38 ML/MIN/1.73M2 (ref 60–?)
GLOBULIN PLAS-MCNC: 3.5 G/DL (ref 2.8–4.4)
GLUCOSE BLD-MCNC: 130 MG/DL (ref 70–99)
HBA1C MFR BLD: 6.9 % (ref ?–5.7)
HDLC SERPL-MCNC: 46 MG/DL (ref 40–59)
LDLC SERPL CALC-MCNC: 111 MG/DL (ref ?–100)
MICROALBUMIN UR-MCNC: 0.55 MG/DL
MICROALBUMIN/CREAT 24H UR-RTO: 2.8 UG/MG (ref ?–30)
NONHDLC SERPL-MCNC: 156 MG/DL (ref ?–130)
OSMOLALITY SERPL CALC.SUM OF ELEC: 288 MOSM/KG (ref 275–295)
POTASSIUM SERPL-SCNC: 4.8 MMOL/L (ref 3.5–5.1)
PROT SERPL-MCNC: 7 G/DL (ref 6.4–8.2)
SODIUM SERPL-SCNC: 138 MMOL/L (ref 136–145)
TRIGL SERPL-MCNC: 263 MG/DL (ref 30–149)
VLDLC SERPL CALC-MCNC: 46 MG/DL (ref 0–30)

## 2023-03-02 PROCEDURE — 80061 LIPID PANEL: CPT

## 2023-03-02 PROCEDURE — 82043 UR ALBUMIN QUANTITATIVE: CPT

## 2023-03-02 PROCEDURE — 82570 ASSAY OF URINE CREATININE: CPT

## 2023-03-02 PROCEDURE — 80053 COMPREHEN METABOLIC PANEL: CPT

## 2023-03-02 PROCEDURE — 83036 HEMOGLOBIN GLYCOSYLATED A1C: CPT

## 2023-03-02 PROCEDURE — 36415 COLL VENOUS BLD VENIPUNCTURE: CPT

## 2023-03-03 ENCOUNTER — OFFICE VISIT (OUTPATIENT)
Dept: FAMILY MEDICINE CLINIC | Facility: CLINIC | Age: 78
End: 2023-03-03
Payer: MEDICARE

## 2023-03-03 ENCOUNTER — LAB ENCOUNTER (OUTPATIENT)
Dept: LAB | Age: 78
End: 2023-03-03
Attending: FAMILY MEDICINE
Payer: MEDICARE

## 2023-03-03 VITALS
HEIGHT: 64 IN | RESPIRATION RATE: 20 BRPM | DIASTOLIC BLOOD PRESSURE: 60 MMHG | SYSTOLIC BLOOD PRESSURE: 110 MMHG | BODY MASS INDEX: 32.61 KG/M2 | HEART RATE: 79 BPM | OXYGEN SATURATION: 98 % | WEIGHT: 191 LBS

## 2023-03-03 DIAGNOSIS — E11.69 DIABETES MELLITUS TYPE 2 IN OBESE (HCC): Primary | ICD-10-CM

## 2023-03-03 DIAGNOSIS — R05.3 CHRONIC COUGH: ICD-10-CM

## 2023-03-03 DIAGNOSIS — K58.1 IRRITABLE BOWEL SYNDROME WITH CONSTIPATION: ICD-10-CM

## 2023-03-03 DIAGNOSIS — E61.1 IRON DEFICIENCY: ICD-10-CM

## 2023-03-03 DIAGNOSIS — I25.118 ATHEROSCLEROTIC HEART DISEASE OF NATIVE CORONARY ARTERY WITH OTHER FORMS OF ANGINA PECTORIS (HCC): ICD-10-CM

## 2023-03-03 DIAGNOSIS — R42 DIZZY: ICD-10-CM

## 2023-03-03 DIAGNOSIS — I50.42 CHRONIC COMBINED SYSTOLIC (CONGESTIVE) AND DIASTOLIC (CONGESTIVE) HEART FAILURE (HCC): ICD-10-CM

## 2023-03-03 DIAGNOSIS — E66.9 DIABETES MELLITUS TYPE 2 IN OBESE (HCC): Primary | ICD-10-CM

## 2023-03-03 DIAGNOSIS — E78.2 ELEVATED TRIGLYCERIDES WITH HIGH CHOLESTEROL: ICD-10-CM

## 2023-03-03 DIAGNOSIS — Z23 NEED FOR SHINGLES VACCINE: ICD-10-CM

## 2023-03-03 LAB
BASOPHILS # BLD AUTO: 0.07 X10(3) UL (ref 0–0.2)
BASOPHILS NFR BLD AUTO: 0.6 %
DEPRECATED HBV CORE AB SER IA-ACNC: 171.2 NG/ML
EOSINOPHIL # BLD AUTO: 0.35 X10(3) UL (ref 0–0.7)
EOSINOPHIL NFR BLD AUTO: 3 %
ERYTHROCYTE [DISTWIDTH] IN BLOOD BY AUTOMATED COUNT: 14.1 %
HCT VFR BLD AUTO: 41.5 %
HGB BLD-MCNC: 12.9 G/DL
IMM GRANULOCYTES # BLD AUTO: 0.05 X10(3) UL (ref 0–1)
IMM GRANULOCYTES NFR BLD: 0.4 %
LYMPHOCYTES # BLD AUTO: 2.29 X10(3) UL (ref 1–4)
LYMPHOCYTES NFR BLD AUTO: 19.6 %
MCH RBC QN AUTO: 29.5 PG (ref 26–34)
MCHC RBC AUTO-ENTMCNC: 31.1 G/DL (ref 31–37)
MCV RBC AUTO: 95 FL
MONOCYTES # BLD AUTO: 0.43 X10(3) UL (ref 0.1–1)
MONOCYTES NFR BLD AUTO: 3.7 %
NEUTROPHILS # BLD AUTO: 8.49 X10 (3) UL (ref 1.5–7.7)
NEUTROPHILS # BLD AUTO: 8.49 X10(3) UL (ref 1.5–7.7)
NEUTROPHILS NFR BLD AUTO: 72.7 %
PLATELET # BLD AUTO: 420 10(3)UL (ref 150–450)
RBC # BLD AUTO: 4.37 X10(6)UL
WBC # BLD AUTO: 11.7 X10(3) UL (ref 4–11)

## 2023-03-03 PROCEDURE — 99214 OFFICE O/P EST MOD 30 MIN: CPT | Performed by: FAMILY MEDICINE

## 2023-03-03 PROCEDURE — 85025 COMPLETE CBC W/AUTO DIFF WBC: CPT

## 2023-03-03 PROCEDURE — 82728 ASSAY OF FERRITIN: CPT

## 2023-03-03 PROCEDURE — 36415 COLL VENOUS BLD VENIPUNCTURE: CPT

## 2023-03-03 RX ORDER — MECLIZINE HYDROCHLORIDE 25 MG/1
25 TABLET ORAL 3 TIMES DAILY PRN
Qty: 30 TABLET | Refills: 0 | Status: SHIPPED | OUTPATIENT
Start: 2023-03-03 | End: 2023-03-13

## 2023-03-03 RX ORDER — ZOSTER VACCINE RECOMBINANT, ADJUVANTED 50 MCG/0.5
1 KIT INTRAMUSCULAR ONCE
Qty: 1 EACH | Refills: 1 | Status: SHIPPED | OUTPATIENT
Start: 2023-03-03 | End: 2023-03-03

## 2023-03-03 RX ORDER — CALCIUM CARBONATE/VITAMIN D3 600 MG-10
1 TABLET ORAL
Qty: 90 CAPSULE | Refills: 5 | Status: SHIPPED | OUTPATIENT
Start: 2023-03-03

## 2023-03-03 NOTE — PATIENT INSTRUCTIONS
Omeprazole 40mg daily before breakfast x 2 weeks, then over the counter omeprazole 20mg daily x 2 week. Send me message with your medications and doses of what you are taking regularly. Send me message if the meclazine is temporarily helping with your dizzyness. Get the lower dose Linzess.     Go for diabetic eye exam and have them send us the results    Go for Shingles vaccine at [facility]

## 2023-03-06 ENCOUNTER — PATIENT OUTREACH (OUTPATIENT)
Dept: CASE MANAGEMENT | Age: 78
End: 2023-03-06

## 2023-03-06 DIAGNOSIS — E11.9 TYPE 2 DIABETES MELLITUS WITHOUT COMPLICATION, WITHOUT LONG-TERM CURRENT USE OF INSULIN (HCC): ICD-10-CM

## 2023-03-06 DIAGNOSIS — G30.9 ALZHEIMER'S DISEASE, UNSPECIFIED (HCC): ICD-10-CM

## 2023-03-06 DIAGNOSIS — I25.118 ATHEROSCLEROTIC HEART DISEASE OF NATIVE CORONARY ARTERY WITH OTHER FORMS OF ANGINA PECTORIS (HCC): ICD-10-CM

## 2023-03-06 DIAGNOSIS — F33.2 MDD (MAJOR DEPRESSIVE DISORDER), RECURRENT SEVERE, WITHOUT PSYCHOSIS (HCC): ICD-10-CM

## 2023-03-06 DIAGNOSIS — F02.80 ALZHEIMER'S DISEASE, UNSPECIFIED (HCC): ICD-10-CM

## 2023-03-06 DIAGNOSIS — F41.1 GAD (GENERALIZED ANXIETY DISORDER): ICD-10-CM

## 2023-03-06 DIAGNOSIS — I10 ESSENTIAL HYPERTENSION: ICD-10-CM

## 2023-03-06 DIAGNOSIS — E78.00 PURE HYPERCHOLESTEROLEMIA, UNSPECIFIED: ICD-10-CM

## 2023-03-06 DIAGNOSIS — F32.A DEPRESSIVE DISORDER: ICD-10-CM

## 2023-03-06 DIAGNOSIS — F41.9 ANXIETY: ICD-10-CM

## 2023-03-06 DIAGNOSIS — E78.2 HYPERLIPIDEMIA, MIXED: ICD-10-CM

## 2023-03-11 DIAGNOSIS — R42 DIZZY: ICD-10-CM

## 2023-03-13 DIAGNOSIS — R42 DIZZY: ICD-10-CM

## 2023-03-13 RX ORDER — MECLIZINE HYDROCHLORIDE 25 MG/1
TABLET ORAL
Qty: 30 TABLET | Refills: 0 | Status: SHIPPED | OUTPATIENT
Start: 2023-03-13

## 2023-03-13 RX ORDER — MECLIZINE HYDROCHLORIDE 25 MG/1
TABLET ORAL
Qty: 30 TABLET | Refills: 0 | OUTPATIENT
Start: 2023-03-13

## 2023-03-23 ENCOUNTER — HOSPITAL ENCOUNTER (OUTPATIENT)
Dept: GENERAL RADIOLOGY | Age: 78
Discharge: HOME | End: 2023-03-23
Attending: FAMILY MEDICINE
Payer: MEDICARE

## 2023-03-23 DIAGNOSIS — R05.3 CHRONIC COUGH: ICD-10-CM

## 2023-03-23 PROCEDURE — 71046 X-RAY EXAM CHEST 2 VIEWS: CPT | Performed by: FAMILY MEDICINE

## 2023-03-27 ENCOUNTER — OFFICE VISIT (OUTPATIENT)
Dept: FAMILY MEDICINE CLINIC | Facility: CLINIC | Age: 78
End: 2023-03-27
Payer: MEDICARE

## 2023-03-27 VITALS
SYSTOLIC BLOOD PRESSURE: 110 MMHG | OXYGEN SATURATION: 97 % | RESPIRATION RATE: 16 BRPM | DIASTOLIC BLOOD PRESSURE: 68 MMHG | WEIGHT: 194 LBS | HEIGHT: 64 IN | BODY MASS INDEX: 33.12 KG/M2 | HEART RATE: 64 BPM

## 2023-03-27 DIAGNOSIS — R42 DIZZY: ICD-10-CM

## 2023-03-27 PROCEDURE — 99213 OFFICE O/P EST LOW 20 MIN: CPT | Performed by: FAMILY MEDICINE

## 2023-03-27 RX ORDER — MECLIZINE HYDROCHLORIDE 25 MG/1
25 TABLET ORAL 3 TIMES DAILY PRN
Qty: 30 TABLET | Refills: 0 | Status: SHIPPED | OUTPATIENT
Start: 2023-03-27

## 2023-03-27 RX ORDER — MELATONIN
1000 DAILY
COMMUNITY

## 2023-04-05 ENCOUNTER — PATIENT OUTREACH (OUTPATIENT)
Dept: CASE MANAGEMENT | Age: 78
End: 2023-04-05

## 2023-04-05 NOTE — PROGRESS NOTES
Called patient and left a message for patient to call back when they can. Reviewed patient chart.  Left contact my contact number [phone]        Time Spent This Encounter Total: 5  min medical record review  Monthly Minute Total including today: 5 minutes

## 2023-04-10 DIAGNOSIS — R68.89 FORGETFULNESS: ICD-10-CM

## 2023-04-10 RX ORDER — MEMANTINE HYDROCHLORIDE 5 MG/1
TABLET ORAL
Qty: 180 TABLET | Refills: 0 | Status: SHIPPED | OUTPATIENT
Start: 2023-04-10

## 2023-04-25 ENCOUNTER — OFFICE VISIT (OUTPATIENT)
Dept: FAMILY MEDICINE CLINIC | Facility: CLINIC | Age: 78
End: 2023-04-25
Payer: MEDICARE

## 2023-04-25 VITALS
BODY MASS INDEX: 33.46 KG/M2 | OXYGEN SATURATION: 95 % | HEIGHT: 64 IN | RESPIRATION RATE: 16 BRPM | WEIGHT: 196 LBS | SYSTOLIC BLOOD PRESSURE: 110 MMHG | HEART RATE: 92 BPM | DIASTOLIC BLOOD PRESSURE: 70 MMHG

## 2023-04-25 DIAGNOSIS — E11.51 TYPE 2 DIABETES MELLITUS WITH DIABETIC PERIPHERAL ANGIOPATHY WITHOUT GANGRENE, WITHOUT LONG-TERM CURRENT USE OF INSULIN (HCC): Primary | ICD-10-CM

## 2023-05-01 RX ORDER — GLIMEPIRIDE 1 MG/1
TABLET ORAL
Qty: 90 TABLET | Refills: 0 | Status: SHIPPED | OUTPATIENT
Start: 2023-05-01

## 2023-05-03 ENCOUNTER — PATIENT OUTREACH (OUTPATIENT)
Dept: CASE MANAGEMENT | Age: 78
End: 2023-05-03

## 2023-05-03 DIAGNOSIS — G30.9 ALZHEIMER'S DISEASE, UNSPECIFIED (HCC): ICD-10-CM

## 2023-05-03 DIAGNOSIS — E66.9 DIABETES MELLITUS TYPE 2 IN OBESE (HCC): ICD-10-CM

## 2023-05-03 DIAGNOSIS — F32.A DEPRESSION, UNSPECIFIED DEPRESSION TYPE: ICD-10-CM

## 2023-05-03 DIAGNOSIS — E11.9 TYPE 2 DIABETES MELLITUS WITHOUT COMPLICATION, WITHOUT LONG-TERM CURRENT USE OF INSULIN (HCC): ICD-10-CM

## 2023-05-03 DIAGNOSIS — F02.80 ALZHEIMER'S DISEASE, UNSPECIFIED (HCC): ICD-10-CM

## 2023-05-03 DIAGNOSIS — I25.118 ATHEROSCLEROTIC HEART DISEASE OF NATIVE CORONARY ARTERY WITH OTHER FORMS OF ANGINA PECTORIS (HCC): ICD-10-CM

## 2023-05-03 DIAGNOSIS — F33.2 MDD (MAJOR DEPRESSIVE DISORDER), RECURRENT SEVERE, WITHOUT PSYCHOSIS (HCC): ICD-10-CM

## 2023-05-03 DIAGNOSIS — E11.69 DIABETES MELLITUS TYPE 2 IN OBESE (HCC): ICD-10-CM

## 2023-05-03 DIAGNOSIS — F41.9 ANXIETY: ICD-10-CM

## 2023-06-02 ENCOUNTER — PATIENT OUTREACH (OUTPATIENT)
Dept: CASE MANAGEMENT | Age: 78
End: 2023-06-02

## 2023-06-02 NOTE — PROGRESS NOTES
Called patient and left a message for patient to call back when they can. Reviewed patient chart.  Left contact my contact number [phone]        Time Spent This Encounter Total: 5  min medical record review  Monthly Minute Total including today: 5 minutes

## 2023-06-13 ENCOUNTER — PATIENT OUTREACH (OUTPATIENT)
Dept: CASE MANAGEMENT | Age: 78
End: 2023-06-13

## 2023-06-13 DIAGNOSIS — E11.9 TYPE 2 DIABETES MELLITUS WITHOUT COMPLICATION, WITHOUT LONG-TERM CURRENT USE OF INSULIN (HCC): ICD-10-CM

## 2023-06-13 DIAGNOSIS — I10 ESSENTIAL HYPERTENSION: ICD-10-CM

## 2023-06-13 DIAGNOSIS — F60.3 BORDERLINE PERSONALITY DISORDER (HCC): ICD-10-CM

## 2023-06-13 DIAGNOSIS — F41.1 GAD (GENERALIZED ANXIETY DISORDER): ICD-10-CM

## 2023-06-13 DIAGNOSIS — F41.9 ANXIETY: ICD-10-CM

## 2023-06-13 DIAGNOSIS — F32.A DEPRESSIVE DISORDER: ICD-10-CM

## 2023-06-28 ENCOUNTER — OFFICE VISIT (OUTPATIENT)
Dept: HEMATOLOGY/ONCOLOGY | Facility: HOSPITAL | Age: 78
End: 2023-06-28
Attending: INTERNAL MEDICINE
Payer: MEDICARE

## 2023-06-28 VITALS
SYSTOLIC BLOOD PRESSURE: 124 MMHG | DIASTOLIC BLOOD PRESSURE: 92 MMHG | RESPIRATION RATE: 16 BRPM | HEART RATE: 87 BPM | OXYGEN SATURATION: 95 % | BODY MASS INDEX: 34 KG/M2 | TEMPERATURE: 97 F | WEIGHT: 198.38 LBS

## 2023-06-28 DIAGNOSIS — D50.0 IRON DEFICIENCY ANEMIA SECONDARY TO BLOOD LOSS (CHRONIC): ICD-10-CM

## 2023-06-28 LAB
BASOPHILS # BLD AUTO: 0.09 X10(3) UL (ref 0–0.2)
BASOPHILS NFR BLD AUTO: 0.7 %
DEPRECATED HBV CORE AB SER IA-ACNC: 98.9 NG/ML
EOSINOPHIL # BLD AUTO: 0.34 X10(3) UL (ref 0–0.7)
EOSINOPHIL NFR BLD AUTO: 2.7 %
ERYTHROCYTE [DISTWIDTH] IN BLOOD BY AUTOMATED COUNT: 13.6 %
HCT VFR BLD AUTO: 39.4 %
HGB BLD-MCNC: 12.4 G/DL
IMM GRANULOCYTES # BLD AUTO: 0.18 X10(3) UL (ref 0–1)
IMM GRANULOCYTES NFR BLD: 1.4 %
LYMPHOCYTES # BLD AUTO: 3.21 X10(3) UL (ref 1–4)
LYMPHOCYTES NFR BLD AUTO: 25.2 %
MCH RBC QN AUTO: 28.8 PG (ref 26–34)
MCHC RBC AUTO-ENTMCNC: 31.5 G/DL (ref 31–37)
MCV RBC AUTO: 91.4 FL
MONOCYTES # BLD AUTO: 0.62 X10(3) UL (ref 0.1–1)
MONOCYTES NFR BLD AUTO: 4.9 %
NEUTROPHILS # BLD AUTO: 8.32 X10 (3) UL (ref 1.5–7.7)
NEUTROPHILS # BLD AUTO: 8.32 X10(3) UL (ref 1.5–7.7)
NEUTROPHILS NFR BLD AUTO: 65.1 %
PLATELET # BLD AUTO: 390 10(3)UL (ref 150–450)
RBC # BLD AUTO: 4.31 X10(6)UL
WBC # BLD AUTO: 12.8 X10(3) UL (ref 4–11)

## 2023-06-28 PROCEDURE — 99213 OFFICE O/P EST LOW 20 MIN: CPT | Performed by: INTERNAL MEDICINE

## 2023-07-10 ENCOUNTER — OFFICE VISIT (OUTPATIENT)
Dept: FAMILY MEDICINE CLINIC | Facility: CLINIC | Age: 78
End: 2023-07-10
Payer: MEDICARE

## 2023-07-10 VITALS
HEART RATE: 78 BPM | OXYGEN SATURATION: 96 % | BODY MASS INDEX: 33.97 KG/M2 | SYSTOLIC BLOOD PRESSURE: 112 MMHG | DIASTOLIC BLOOD PRESSURE: 70 MMHG | WEIGHT: 199 LBS | RESPIRATION RATE: 16 BRPM | HEIGHT: 64 IN

## 2023-07-10 DIAGNOSIS — M46.1 SACROILIITIS (HCC): ICD-10-CM

## 2023-07-10 DIAGNOSIS — H91.93 DECREASED HEARING OF BOTH EARS: ICD-10-CM

## 2023-07-10 DIAGNOSIS — Z85.828 HISTORY OF SKIN CANCER: ICD-10-CM

## 2023-07-10 DIAGNOSIS — Z00.00 ENCOUNTER FOR ANNUAL HEALTH EXAMINATION: Primary | ICD-10-CM

## 2023-07-10 DIAGNOSIS — G47.33 OSA (OBSTRUCTIVE SLEEP APNEA): ICD-10-CM

## 2023-07-10 NOTE — PATIENT INSTRUCTIONS
See Dermatolgist  See ENT for hearing test  Get blood test done fasting. Increase exercise. If not eating or skipping meals then dont take the glimeperide. When tired or sleepy check your sugar, your blood pressure, and your pulse  Go to sleep doctor. Rossana Conklin's SCREENING SCHEDULE   Tests on this list are recommended by your physician but may not be covered, or covered at this frequency, by your insurer. Please check with your insurance carrier before scheduling to verify coverage.    PREVENTATIVE SERVICES FREQUENCY &  COVERAGE DETAILS LAST COMPLETION DATE   Diabetes Screening    Fasting Blood Sugar /  Glucose    One screening every 12 months if never tested or if previously tested but not diagnosed with pre-diabetes   One screening every 6 months if diagnosed with pre-diabetes Lab Results   Component Value Date     (H) 03/02/2023        Cardiovascular Disease Screening    Lipid Panel  Cholesterol  Lipoprotein (HDL)  Triglycerides Covered every 5 years for all Medicare beneficiaries without apparent signs or symptoms of cardiovascular disease Lab Results   Component Value Date    CHOLEST 202 (H) 03/02/2023    HDL 46 03/02/2023     (H) 03/02/2023    TRIG 263 (H) 03/02/2023         Electrocardiogram (EKG)   Covered if needed at Welcome to Medicare, and non-screening if indicated for medical reasons 11/18/2021      Ultrasound Screening for Abdominal Aortic Aneurysm (AAA) Covered once in a lifetime for one of the following risk factors    Men who are 73-68 years old and have ever smoked    Anyone with a family history -     Colorectal Cancer Screening  Covered for ages 52-80; only need ONE of the following:    Colonoscopy   Covered every 10 years    Covered every 2 years if patient is at high risk or previous colonoscopy was abnormal 09/14/2022    Colorectal Cancer Screening Discontinued    Flexible Sigmoidoscopy   Covered every 4 years -    Fecal Occult Blood Test Covered annually - Bone Density Screening    Bone density screening    Covered every 2 years after age 72 if diagnosed with risk of osteoporosis or estrogen deficiency. Covered yearly for long-term glucocorticoid medication use (Steroids) Last Dexa Scan:    XR DEXA BONE DENSITOMETRY (CPT=77080) 07/29/2019      No recommendations at this time   Pap and Pelvic    Pap   Covered every 2 years for women at normal risk; Annually if at high risk -  No recommendations at this time    Chlamydia Annually if high risk -  No recommendations at this time   Screening Mammogram    Mammogram     Recommend annually for all female patients aged 36 and older    One baseline mammogram covered for patients aged 32-38 08/01/2022    Mammogram Discontinued    Immunizations    Influenza Covered once per flu season  Please get every year -  No recommendations at this time    Pneumococcal Each vaccine ([id number] & [id number]) covered once after 72 Prevnar 13: 02/05/2018    Edjxfeknz89: 10/11/2019     No recommendations at this time    Hepatitis B One screening covered for patients with certain risk factors   -  No recommendations at this time    Tetanus Toxoid Not covered by Medicare Part B unless medically necessary (cut with metal); may be covered with your pharmacy prescription benefits -    Tetanus, Diptheria and Pertusis TD and TDaP Not covered by Medicare Part B -  No recommendations at this time    Zoster Not covered by Medicare Part B; may be covered with your pharmacy  prescription benefits -  Zoster Vaccines(2 of 2) due on 09/10/2021     Diabetes      Hemoglobin A1C Annually; if last result is elevated, may be asked to retest more frequently.     Medicare covers every 3 months Lab Results   Component Value Date     (H) 03/02/2023    A1C 6.9 (H) 03/02/2023       No recommendations at this time    Creat/alb ratio Annually Lab Results   Component Value Date    MICROALBCREA 2.8 03/02/2023       LDL Annually Lab Results   Component Value Date  (H) 03/02/2023       Dilated Eye Exam Annually 09/01/2018     Annual Monitoring of Persistent Medications (ACE/ARB, digoxin diuretics, anticonvulsants)    Potassium Annually Lab Results   Component Value Date    K 4.8 03/02/2023         Creatinine   Annually Lab Results   Component Value Date    CREATSERUM 1.43 (H) 03/02/2023         BUN Annually Lab Results   Component Value Date    BUN 12 03/02/2023       Drug Serum Conc Annually No results found for: DIGOXIN, DIG, VALP

## 2023-07-11 ENCOUNTER — LAB ENCOUNTER (OUTPATIENT)
Dept: LAB | Age: 78
End: 2023-07-11
Attending: FAMILY MEDICINE
Payer: MEDICARE

## 2023-07-11 DIAGNOSIS — Z00.00 ENCOUNTER FOR ANNUAL HEALTH EXAMINATION: ICD-10-CM

## 2023-07-11 LAB
ALBUMIN SERPL-MCNC: 3.3 G/DL (ref 3.4–5)
ALBUMIN/GLOB SERPL: 1 {RATIO} (ref 1–2)
ALP LIVER SERPL-CCNC: 113 U/L
ALT SERPL-CCNC: 16 U/L
ANION GAP SERPL CALC-SCNC: 7 MMOL/L (ref 0–18)
AST SERPL-CCNC: 16 U/L (ref 15–37)
BILIRUB SERPL-MCNC: 0.5 MG/DL (ref 0.1–2)
BUN BLD-MCNC: 11 MG/DL (ref 7–18)
CALCIUM BLD-MCNC: 8.7 MG/DL (ref 8.5–10.1)
CHLORIDE SERPL-SCNC: 109 MMOL/L (ref 98–112)
CHOLEST SERPL-MCNC: 166 MG/DL (ref ?–200)
CO2 SERPL-SCNC: 25 MMOL/L (ref 21–32)
CREAT BLD-MCNC: 1.2 MG/DL
EST. AVERAGE GLUCOSE BLD GHB EST-MCNC: 154 MG/DL (ref 68–126)
FASTING PATIENT LIPID ANSWER: YES
FASTING STATUS PATIENT QL REPORTED: YES
GFR SERPLBLD BASED ON 1.73 SQ M-ARVRAT: 47 ML/MIN/1.73M2 (ref 60–?)
GLOBULIN PLAS-MCNC: 3.4 G/DL (ref 2.8–4.4)
GLUCOSE BLD-MCNC: 113 MG/DL (ref 70–99)
HBA1C MFR BLD: 7 % (ref ?–5.7)
HDLC SERPL-MCNC: 47 MG/DL (ref 40–59)
LDLC SERPL CALC-MCNC: 89 MG/DL (ref ?–100)
NONHDLC SERPL-MCNC: 119 MG/DL (ref ?–130)
OSMOLALITY SERPL CALC.SUM OF ELEC: 292 MOSM/KG (ref 275–295)
POTASSIUM SERPL-SCNC: 4.1 MMOL/L (ref 3.5–5.1)
PROT SERPL-MCNC: 6.7 G/DL (ref 6.4–8.2)
SODIUM SERPL-SCNC: 141 MMOL/L (ref 136–145)
TRIGL SERPL-MCNC: 174 MG/DL (ref 30–149)
TSI SER-ACNC: 2.67 MIU/ML (ref 0.36–3.74)
VLDLC SERPL CALC-MCNC: 28 MG/DL (ref 0–30)

## 2023-07-11 PROCEDURE — 84443 ASSAY THYROID STIM HORMONE: CPT

## 2023-07-11 PROCEDURE — 83036 HEMOGLOBIN GLYCOSYLATED A1C: CPT

## 2023-07-11 PROCEDURE — 36415 COLL VENOUS BLD VENIPUNCTURE: CPT

## 2023-07-11 PROCEDURE — 80061 LIPID PANEL: CPT

## 2023-07-11 PROCEDURE — 80053 COMPREHEN METABOLIC PANEL: CPT

## 2023-07-12 ENCOUNTER — PATIENT OUTREACH (OUTPATIENT)
Dept: CASE MANAGEMENT | Age: 78
End: 2023-07-12

## 2023-07-12 DIAGNOSIS — F41.9 ANXIETY: ICD-10-CM

## 2023-07-12 DIAGNOSIS — E11.9 TYPE 2 DIABETES MELLITUS WITHOUT COMPLICATION, WITHOUT LONG-TERM CURRENT USE OF INSULIN (HCC): ICD-10-CM

## 2023-07-12 DIAGNOSIS — E78.2 HYPERLIPIDEMIA, MIXED: Primary | ICD-10-CM

## 2023-07-12 NOTE — PROGRESS NOTES
Spoke to Rhett for CCM. Updates to patient care team/comments: No changes per patient  Patient reported changes in medications: No changes per patient  Med Adherence  Comment: Taking as presrcribed    Health Maintenance: Diabetes Care Dilated Eye Exam due on 09/01/2019  COVID-19 Vaccine(3 - Banegas Peter series) due on 06/02/2021  Zoster Vaccines(2 of 2) due on 09/10/2021  Diabetes Care Foot Exam (Annual) due on 01/01/2023  Influenza Vaccine(1) due on 10/01/2023  Diabetes Care A1C due on 01/11/2024  Diabetes Care: Microalb/Creat Ratio due on 03/02/2024  Annual Physical due on 07/10/2024  Diabetes Care: GFR due on 07/11/2024  DEXA Scan Completed  Annual Depression Screening Completed  Fall Risk Screening (Annual) Completed  Pneumococcal Vaccine: 65+ Years Completed  Colorectal Cancer Screening Discontinued  Mammogram Discontinued    Patient updates/concerns:  Patient stated she is in the middle of looking for senior apartments due to her current one rasing the rent to much and making it unaffordable for her. Patient stated so far she has found a few places that she will be going to look at. Patient stated this has been causing some stress for her. Patient stated other than that she has been doing ok. Patient stated she no other concerns. Goals/Action Plan:    Therapy: Patient stated she continues to go to her biweekly therapy sessions at [facility]. Patient  states it the only time she can really talk to someone. Encouraged patient to continue to attend sessions. Active goal from previous outreach:     Continue to monitor her diet and staying active  Patient reported progress towards goals: Per patient she continues to monitor her daily diet and trying to incorporate walking as much as she can. Patient Reported Barriers and Concerns: None at this time per patient. - Plan for overcoming barriers: Patient to continue to follow up with care team as scheduled or as needed.     Care Managers Interventions: No interventions needed at this time. Continue independent living, healthy diet and exercise routine. Continue to provide encouragement, support, and education for healthy coping and diagnosis. Future Appointments:   Future Appointments   Date Time Provider Jitendra Cantrell   7/24/2023  3:00 PM MICKI [facility]   8/28/2023 10:15 AM Maddy Rodriguez MD [facility] [address] [place] [address] Follow Up Date: 1 month follow up     Reason For Follow Up: review progress and or barriers towards patient's goals. Time Spent This Encounter Total:  25 min medical record review, telephone communication, care plan updates where needed, education, goals, and action plan recreation/update. Provided acknowledgment and validation to patient's concerns.    Monthly Minute Total including today: 25  Physical assessment, complete health history, and need for CCM established by Varsha Ma DO.

## 2023-07-24 ENCOUNTER — TELEPHONE (OUTPATIENT)
Dept: FAMILY MEDICINE CLINIC | Facility: CLINIC | Age: 78
End: 2023-07-24

## 2023-07-24 DIAGNOSIS — Z12.31 ENCOUNTER FOR SCREENING MAMMOGRAM FOR MALIGNANT NEOPLASM OF BREAST: Primary | ICD-10-CM

## 2023-08-01 RX ORDER — GLIMEPIRIDE 1 MG/1
1 TABLET ORAL
Qty: 90 TABLET | Refills: 0 | Status: SHIPPED | OUTPATIENT
Start: 2023-08-01

## 2023-08-02 ENCOUNTER — HOSPITAL ENCOUNTER (OUTPATIENT)
Dept: MAMMOGRAPHY | Age: 78
Discharge: HOME | End: 2023-08-02
Attending: FAMILY MEDICINE
Payer: MEDICARE

## 2023-08-02 DIAGNOSIS — Z12.31 ENCOUNTER FOR SCREENING MAMMOGRAM FOR MALIGNANT NEOPLASM OF BREAST: ICD-10-CM

## 2023-08-02 PROCEDURE — 77067 SCR MAMMO BI INCL CAD: CPT | Performed by: FAMILY MEDICINE

## 2023-08-02 PROCEDURE — 77063 BREAST TOMOSYNTHESIS BI: CPT | Performed by: FAMILY MEDICINE

## 2023-08-09 ENCOUNTER — PATIENT OUTREACH (OUTPATIENT)
Dept: CASE MANAGEMENT | Age: 78
End: 2023-08-09

## 2023-08-09 DIAGNOSIS — F33.1 MAJOR DEPRESSIVE DISORDER, RECURRENT EPISODE, MODERATE (HCC): ICD-10-CM

## 2023-08-09 DIAGNOSIS — E78.2 HYPERLIPIDEMIA, MIXED: Primary | ICD-10-CM

## 2023-08-09 DIAGNOSIS — F60.3 BORDERLINE PERSONALITY DISORDER (HCC): ICD-10-CM

## 2023-08-09 DIAGNOSIS — E11.9 TYPE 2 DIABETES MELLITUS WITHOUT COMPLICATION, WITHOUT LONG-TERM CURRENT USE OF INSULIN (HCC): ICD-10-CM

## 2023-08-09 NOTE — PROGRESS NOTES
Spoke to Rhett for CCM. Updates to patient care team/comments: No changes  Patient reported changes in medications: No changes  Med Adherence  Comment: Taking as prescribed    Health Maintenance: Discussed with patient importance of completing. Diabetes Care Dilated Eye Exam due on 09/01/2019 Completed 6/5/2023 per pt  COVID-19 Vaccine(3 - Banegas Peter series) due on 06/02/2021  Zoster Vaccines(2 of 2) due on 09/10/2021  Diabetes Care Foot Exam (Annual) due on 01/01/2023 Completed 7/10/2023 per pt  Influenza Vaccine(1) due on 10/01/2023  Diabetes Care A1C due on 01/11/2024  Diabetes Care: Microalb/Creat Ratio due on 03/02/2024  Annual Physical due on 07/10/2024  Diabetes Care: GFR due on 07/11/2024  DEXA Scan Completed  Annual Depression Screening Completed  Fall Risk Screening (Annual) Completed  Pneumococcal Vaccine: 65+ Years Completed  Colorectal Cancer Screening Discontinued  Mammogram Discontinued    Patient updates/concerns:  Patient stated she has been doing ok. Patient stated she is a little overwhelmed with all the medications and supplements she is on and hopes she can get off some of them soon. Patient stated she does take all her prescribed medications daily and supplements without complications or barriers. Listened to patient and gave support as she expressed feeling a bit down due to some disagreements with her children. Patient states she did discuss what she was going through with her doctor at her last visit, Patient stated she did complete her DM eye exam at [facility] back in June. Patient aware I will request progress notes from her visit to be faxed to Dr. Nate Byers office to bring her HM up to date. Goals/Action Plan:    Call made to [facility] [phone] requested notes from 6/5/2023 exam be faxed to [phone] Dr. Nate Byers for review.      Active goal from previous outreach:     Monitor daily diet and reduce stress per patient  Patient reported progress towards goals: - What: Patient states she continues to work on making sure she is getting 3 balanced meals a day as she can sometimes struggle to improve her appetite. Patient stated when her appetite is not to great she tries to have smaller frequent meals throughout the day. Patient encouraged to have healthy fruits on hand for in between meals to have as a healthy snack.           - When: Patient stated she has been staying motivated to improve healthy eating habits. Patient Reported Barriers and Concerns: Praised patient to continue to stay motivated in order her to reach her goal with healthy eating.                    - Plan for overcoming barriers: Patient to continue to follow up with care team as scheduled or as needed. Care Managers Interventions: Call made to [facility] on patient behalf to request notes to be faxed to Dr. Teddy Varma office for DM eye exam. Patient aware she may contact me if further assistance is needed. Continue independent living, healthy diet and exercise routine. Continue to provide encouragement, support, and education for healthy coping and diagnosis. Future Appointments: Reviewed with patient  Future Appointments   Date Time Provider Jitendra Cantrell   8/28/2023 10:15 AM Alma Cueva MD [facility]   9/1/2023 10:00 AM George La DO [facility] [address] [facility] [address]     Next Care Manager Follow Up Date: 1 month follow up or sooner if needed    Reason For Follow Up: review progress and or barriers towards patient's goals. Time Spent This Encounter Total: 30 min medical record review, telephone communication, care plan updates where needed, education, goals, and action plan recreation/update. Provided acknowledgment and validation to patient's concerns.    Monthly Minute Total including today: 30  Physical assessment, complete health history, and need for CCM established by Yung Becerril DO.

## 2023-08-09 NOTE — PROGRESS NOTES
Called patient and left a message for patient to call back when they can. Reviewed patient chart.  Left contact my contact number [phone]        Time Spent This Encounter Total: 5  min medical record review  Monthly Minute Total including today: 5 minutes

## 2023-09-01 ENCOUNTER — OFFICE VISIT (OUTPATIENT)
Dept: FAMILY MEDICINE CLINIC | Facility: CLINIC | Age: 78
End: 2023-09-01
Payer: MEDICARE

## 2023-09-01 VITALS
DIASTOLIC BLOOD PRESSURE: 76 MMHG | RESPIRATION RATE: 16 BRPM | OXYGEN SATURATION: 99 % | HEIGHT: 64 IN | BODY MASS INDEX: 33.46 KG/M2 | SYSTOLIC BLOOD PRESSURE: 124 MMHG | WEIGHT: 196 LBS | HEART RATE: 72 BPM

## 2023-09-01 DIAGNOSIS — E11.51 TYPE 2 DIABETES MELLITUS WITH DIABETIC PERIPHERAL ANGIOPATHY WITHOUT GANGRENE, WITHOUT LONG-TERM CURRENT USE OF INSULIN (HCC): ICD-10-CM

## 2023-09-01 DIAGNOSIS — I10 PRIMARY HYPERTENSION: Primary | ICD-10-CM

## 2023-09-01 PROCEDURE — 99213 OFFICE O/P EST LOW 20 MIN: CPT | Performed by: FAMILY MEDICINE

## 2023-09-04 ENCOUNTER — APPOINTMENT (OUTPATIENT)
Dept: GENERAL RADIOLOGY | Facility: HOSPITAL | Age: 78
End: 2023-09-04
Attending: EMERGENCY MEDICINE
Payer: MEDICARE

## 2023-09-04 ENCOUNTER — HOSPITAL ENCOUNTER (INPATIENT)
Facility: HOSPITAL | Age: 78
LOS: 3 days | Discharge: NURSING, REHAB OR LONG-TERM CARE | End: 2023-09-07
Attending: EMERGENCY MEDICINE | Admitting: HOSPITALIST
Payer: MEDICARE

## 2023-09-04 ENCOUNTER — APPOINTMENT (OUTPATIENT)
Dept: CT IMAGING | Facility: HOSPITAL | Age: 78
End: 2023-09-04
Attending: ORTHOPAEDIC SURGERY
Payer: MEDICARE

## 2023-09-04 DIAGNOSIS — S82.142A CLOSED FRACTURE OF LEFT TIBIAL PLATEAU, INITIAL ENCOUNTER: ICD-10-CM

## 2023-09-04 DIAGNOSIS — R26.2 INABILITY TO AMBULATE DUE TO KNEE: ICD-10-CM

## 2023-09-04 DIAGNOSIS — M25.562 ACUTE PAIN OF LEFT KNEE: ICD-10-CM
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
## 2023-09-04 LAB
ANION GAP SERPL CALC-SCNC: 6 MMOL/L (ref 0–18)
BASOPHILS # BLD AUTO: 0.09 X10(3) UL (ref 0–0.2)
BASOPHILS NFR BLD AUTO: 0.6 %
BUN BLD-MCNC: 11 MG/DL (ref 7–18)
CALCIUM BLD-MCNC: 8.5 MG/DL (ref 8.5–10.1)
CHLORIDE SERPL-SCNC: 110 MMOL/L (ref 98–112)
CO2 SERPL-SCNC: 25 MMOL/L (ref 21–32)
CREAT BLD-MCNC: 1.28 MG/DL
EGFRCR SERPLBLD CKD-EPI 2021: 43 ML/MIN/1.73M2 (ref 60–?)
EOSINOPHIL # BLD AUTO: 0.18 X10(3) UL (ref 0–0.7)
EOSINOPHIL NFR BLD AUTO: 1.2 %
ERYTHROCYTE [DISTWIDTH] IN BLOOD BY AUTOMATED COUNT: 14.6 %
GLUCOSE BLD-MCNC: 105 MG/DL (ref 70–99)
GLUCOSE BLD-MCNC: 109 MG/DL (ref 70–99)
GLUCOSE BLD-MCNC: 127 MG/DL (ref 70–99)
GLUCOSE BLD-MCNC: 154 MG/DL (ref 70–99)
HCT VFR BLD AUTO: 34.4 %
HGB BLD-MCNC: 11.1 G/DL
IMM GRANULOCYTES # BLD AUTO: 0.09 X10(3) UL (ref 0–1)
IMM GRANULOCYTES NFR BLD: 0.6 %
LYMPHOCYTES # BLD AUTO: 3.96 X10(3) UL (ref 1–4)
LYMPHOCYTES NFR BLD AUTO: 25.8 %
MCH RBC QN AUTO: 29.1 PG (ref 26–34)
MCHC RBC AUTO-ENTMCNC: 32.3 G/DL (ref 31–37)
MCV RBC AUTO: 90.3 FL
MONOCYTES # BLD AUTO: 0.66 X10(3) UL (ref 0.1–1)
MONOCYTES NFR BLD AUTO: 4.3 %
NEUTROPHILS # BLD AUTO: 10.38 X10 (3) UL (ref 1.5–7.7)
NEUTROPHILS # BLD AUTO: 10.38 X10(3) UL (ref 1.5–7.7)
NEUTROPHILS NFR BLD AUTO: 67.5 %
OSMOLALITY SERPL CALC.SUM OF ELEC: 292 MOSM/KG (ref 275–295)
PLATELET # BLD AUTO: 314 10(3)UL (ref 150–450)
POTASSIUM SERPL-SCNC: 4.6 MMOL/L (ref 3.5–5.1)
RBC # BLD AUTO: 3.81 X10(6)UL
SODIUM SERPL-SCNC: 141 MMOL/L (ref 136–145)
WBC # BLD AUTO: 15.4 X10(3) UL (ref 4–11)

## 2023-09-04 PROCEDURE — 99223 1ST HOSP IP/OBS HIGH 75: CPT | Performed by: HOSPITALIST

## 2023-09-04 PROCEDURE — 90792 PSYCH DIAG EVAL W/MED SRVCS: CPT | Performed by: OTHER

## 2023-09-04 PROCEDURE — 73560 X-RAY EXAM OF KNEE 1 OR 2: CPT | Performed by: EMERGENCY MEDICINE

## 2023-09-04 PROCEDURE — 73700 CT LOWER EXTREMITY W/O DYE: CPT | Performed by: ORTHOPAEDIC SURGERY

## 2023-09-04 RX ORDER — HYDROCODONE BITARTRATE AND ACETAMINOPHEN 5; 325 MG/1; MG/1
2 TABLET ORAL EVERY 4 HOURS PRN
Status: DISCONTINUED | OUTPATIENT
Start: 2023-09-04 | End: 2023-09-07

## 2023-09-04 RX ORDER — DEXTROSE MONOHYDRATE 25 G/50ML
50 INJECTION, SOLUTION INTRAVENOUS
Status: DISCONTINUED | OUTPATIENT
Start: 2023-09-04 | End: 2023-09-07

## 2023-09-04 RX ORDER — QUETIAPINE FUMARATE 100 MG/1
100 TABLET, FILM COATED ORAL NIGHTLY PRN
Status: DISCONTINUED | OUTPATIENT
Start: 2023-09-04 | End: 2023-09-07

## 2023-09-04 RX ORDER — MORPHINE SULFATE 2 MG/ML
1 INJECTION, SOLUTION INTRAMUSCULAR; INTRAVENOUS EVERY 2 HOUR PRN
Status: DISCONTINUED | OUTPATIENT
Start: 2023-09-04 | End: 2023-09-07

## 2023-09-04 RX ORDER — MORPHINE SULFATE 4 MG/ML
4 INJECTION, SOLUTION INTRAMUSCULAR; INTRAVENOUS EVERY 2 HOUR PRN
Status: DISCONTINUED | OUTPATIENT
Start: 2023-09-04 | End: 2023-09-07

## 2023-09-04 RX ORDER — MORPHINE SULFATE 2 MG/ML
2 INJECTION, SOLUTION INTRAMUSCULAR; INTRAVENOUS EVERY 2 HOUR PRN
Status: DISCONTINUED | OUTPATIENT
Start: 2023-09-04 | End: 2023-09-07

## 2023-09-04 RX ORDER — QUETIAPINE FUMARATE 100 MG/1
100 TABLET, FILM COATED ORAL NIGHTLY
Status: DISCONTINUED | OUTPATIENT
Start: 2023-09-04 | End: 2023-09-07

## 2023-09-04 RX ORDER — IBUPROFEN 600 MG/1
600 TABLET ORAL ONCE
Status: COMPLETED | OUTPATIENT
Start: 2023-09-04 | End: 2023-09-04

## 2023-09-04 RX ORDER — ATORVASTATIN CALCIUM 40 MG/1
40 TABLET, FILM COATED ORAL NIGHTLY
Status: DISCONTINUED | OUTPATIENT
Start: 2023-09-04 | End: 2023-09-07

## 2023-09-04 RX ORDER — HYDROCODONE BITARTRATE AND ACETAMINOPHEN 5; 325 MG/1; MG/1
1 TABLET ORAL EVERY 4 HOURS PRN
Status: DISCONTINUED | OUTPATIENT
Start: 2023-09-04 | End: 2023-09-07

## 2023-09-04 RX ORDER — TORSEMIDE 20 MG/1
20 TABLET ORAL DAILY
Status: DISCONTINUED | OUTPATIENT
Start: 2023-09-04 | End: 2023-09-07

## 2023-09-04 RX ORDER — PANTOPRAZOLE SODIUM 40 MG/1
40 TABLET, DELAYED RELEASE ORAL
Status: DISCONTINUED | OUTPATIENT
Start: 2023-09-04 | End: 2023-09-07

## 2023-09-04 RX ORDER — CLONAZEPAM 1 MG/1
1 TABLET ORAL 2 TIMES DAILY PRN
Status: DISCONTINUED | OUTPATIENT
Start: 2023-09-04 | End: 2023-09-07

## 2023-09-04 RX ORDER — MORPHINE SULFATE 4 MG/ML
4 INJECTION, SOLUTION INTRAMUSCULAR; INTRAVENOUS ONCE
Status: COMPLETED | OUTPATIENT
Start: 2023-09-04 | End: 2023-09-04

## 2023-09-04 RX ORDER — MEMANTINE HYDROCHLORIDE 5 MG/1
5 TABLET ORAL 2 TIMES DAILY
Status: DISCONTINUED | OUTPATIENT
Start: 2023-09-04 | End: 2023-09-07

## 2023-09-04 RX ORDER — FOLIC ACID 1 MG/1
1 TABLET ORAL DAILY
Status: DISCONTINUED | OUTPATIENT
Start: 2023-09-04 | End: 2023-09-07

## 2023-09-04 RX ORDER — DULOXETIN HYDROCHLORIDE 30 MG/1
90 CAPSULE, DELAYED RELEASE ORAL DAILY
Status: DISCONTINUED | OUTPATIENT
Start: 2023-09-04 | End: 2023-09-07

## 2023-09-04 RX ORDER — NICOTINE POLACRILEX 4 MG
30 LOZENGE BUCCAL
Status: DISCONTINUED | OUTPATIENT
Start: 2023-09-04 | End: 2023-09-07

## 2023-09-04 RX ORDER — ONDANSETRON 2 MG/ML
4 INJECTION INTRAMUSCULAR; INTRAVENOUS EVERY 6 HOURS PRN
Status: DISCONTINUED | OUTPATIENT
Start: 2023-09-04 | End: 2023-09-07

## 2023-09-04 RX ORDER — HYDROCODONE BITARTRATE AND ACETAMINOPHEN 5; 325 MG/1; MG/1
1 TABLET ORAL EVERY 6 HOURS PRN
Qty: 20 TABLET | Refills: 0 | Status: SHIPPED | OUTPATIENT
Start: 2023-09-04 | End: 2023-09-07

## 2023-09-04 RX ORDER — NICOTINE POLACRILEX 4 MG
15 LOZENGE BUCCAL
Status: DISCONTINUED | OUTPATIENT
Start: 2023-09-04 | End: 2023-09-07

## 2023-09-04 RX ORDER — METOPROLOL SUCCINATE 25 MG/1
25 TABLET, EXTENDED RELEASE ORAL
Status: DISCONTINUED | OUTPATIENT
Start: 2023-09-04 | End: 2023-09-07

## 2023-09-04 RX ORDER — ENOXAPARIN SODIUM 100 MG/ML
40 INJECTION SUBCUTANEOUS DAILY
Status: DISCONTINUED | OUTPATIENT
Start: 2023-09-04 | End: 2023-09-06

## 2023-09-04 RX ORDER — ACETAMINOPHEN 325 MG/1
650 TABLET ORAL EVERY 4 HOURS PRN
Status: DISCONTINUED | OUTPATIENT
Start: 2023-09-04 | End: 2023-09-07

## 2023-09-04 RX ORDER — IBUPROFEN 600 MG/1
600 TABLET ORAL EVERY 8 HOURS PRN
Qty: 30 TABLET | Refills: 0 | Status: SHIPPED | OUTPATIENT
Start: 2023-09-04 | End: 2023-09-07

## 2023-09-04 RX ORDER — METOCLOPRAMIDE HYDROCHLORIDE 5 MG/ML
10 INJECTION INTRAMUSCULAR; INTRAVENOUS EVERY 8 HOURS PRN
Status: DISCONTINUED | OUTPATIENT
Start: 2023-09-04 | End: 2023-09-04

## 2023-09-04 RX ORDER — ONDANSETRON 2 MG/ML
4 INJECTION INTRAMUSCULAR; INTRAVENOUS ONCE
Status: COMPLETED | OUTPATIENT
Start: 2023-09-04 | End: 2023-09-04

## 2023-09-04 NOTE — ED QUICK NOTES
Contacted Case Management regarding care and help for patient after discharge, awaiting return call with options.

## 2023-09-04 NOTE — ED INITIAL ASSESSMENT (HPI)
Pt states she tripped over her fan and landed on her left knee. Pt denies hitting head.  No other injury reported

## 2023-09-04 NOTE — PLAN OF CARE
Patient awaiting orthopedics and psychiatry consult. Vitals stable, pain  controlled. No suicidal ideation. Precautions maintained.

## 2023-09-04 NOTE — PROGRESS NOTES
PSYCH CONSULT    Date of Admission: 9/4/23  Date of Consult: 9/4/23  Reason for Consultation: Suicidal ideation     Impression:  Primary Psychiatric Diagnosis:  Borderline personality disorder with chronic passive suicidal ideation. She has NO intention or plan of hurting herself. She cut her left arm with a razor about 3 wks ago; not to kill herself, but to cope with stress. Her daughter and  moved out of state on 9/1/23. She feels abandoned and lonely. Major depressive disorder, recurrent, moderate. Generalized anxiety disorder. Pertinent Medical Diagnoses:  Lt tibial fracture due to a fall. DM2. HTN. CAD. HLD. Hx CVA. Recommendations:  1) DC suicide precautions and DC 1:1 sitter. 2) Continue Cymbalta 90mg daily for anxiety/depression. 3) Continue Seroquel 100mg nightly to help with sleep and mood lability. Start Seroquel 100mg nightly PRN insomnia or mood lability/irritability. She takes the up to 200mg nightly on the days she can't sleep as advised by her psych APN. 4) Continue Klonipin 1mg twice a day PRN anxiety. 5) Don't give Seroquel and Klonipin at the same time as the opioids prn to decrease delirium and fall risk. 6) She can go to [facility] from the psych perspective. Full note to follow.     Dr. Emely Pearl The patient is a 89y Male complaining of dizziness.

## 2023-09-04 NOTE — PHYSICAL THERAPY NOTE
PHYSICAL THERAPY EVALUATION - INPATIENT     Room Number: 365/365-A  Evaluation Date: 9/4/2023  Type of Evaluation: Initial  Physician Order: PT Eval and Treat    Presenting Problem: s/p fall resulting in L tibial plateau fracture  Co-Morbidities : DVT, CAD, depression, anxiety, DM, RA, osteopenia, dementia, borderline personality disorder  Reason for Therapy: Mobility Dysfunction and Discharge Planning    History related to current admission: Patient is a 69year old female admitted on 9/4/2023 from home for s/p fall. Pt diagnosed with L tibial plateau fracture. X-ray L LE 9/3/23:  CONCLUSION:  Possible tibial plateau fracture. Small to moderate joint effusion. ASSESSMENT   PT orders received, chart reviewed, and RN approved PT session. Per RN, MD states pt is NWB L LE with posterior splint extending from mid thigh to tip of toes. In this PT evaluation, the patient presents with the following impairments dec strength, dec balance, dec endurance, pain, and dec overall functional mobility compared to baseline function. These impairments and comorbidities manifest themselves as functional limitations in independent bed mobility, transfers, and gait. The patient is below baseline and would benefit from skilled inpatient PT to address the above deficits to assist patient in returning to prior to level of function. Functional outcome measures completed include [facility]. The AM-PAC '6-Clicks' Inpatient Basic Mobility Short Form was completed and this patient is demonstrating a Approx Degree of Impairment: 57.7%  degree of impairment in mobility. Research supports that patients with this level of impairment may benefit from DC recommendation to [facility]. DISCHARGE RECOMMENDATIONS  PT Discharge Recommendations: Sub-acute rehabilitation    PLAN  PT Treatment Plan: Bed mobility; Body mechanics; Endurance; Energy conservation;Patient education;Gait training;Strengthening;Balance training;Transfer training  Rehab Potential : Good  Frequency (Obs): 3-5x/week  Number of Visits to Meet Established Goals: 5      CURRENT GOALS    Goal #1 Patient is able to demonstrate supine - sit EOB @ level: modified independent     Goal #2 Patient is able to demonstrate transfers Sit to/from Stand at assistance level: supervision     Goal #3 Patient is able to ambulate 15 feet with assist device: walker - rolling at assistance level: supervision     Goal #4    Goal #5    Goal #6    Goal Comments: Goals established on 9/4/2023    HOME SITUATION  Type of Home: [address]: One level;Ramped entrance                Lives With: Alone  Drives: Yes     Patient Regularly Uses: Glasses    Prior Level of [place]: PTA, pt independent with ADL, IADL, ambulation, and drives. Pt lives alone in her condo, and there is a ramped entrance. Pt taught tennis for 35 years. SUBJECTIVE  Pt is pleasant and cooperative. OBJECTIVE  Precautions: Bed/chair alarm (posterior splint up to thigh)  Fall Risk: High fall risk    WEIGHT BEARING RESTRICTION  Weight Bearing Restriction: L lower extremity           L Lower Extremity: Non-Weight Bearing    PAIN ASSESSMENT  Rating: 10  Location: L LE  Management Techniques: Relaxation;Repositioning    COGNITION  Arousal/Alertness:  appropriate responses to stimuli  Orientation Level:  oriented x4  Following Commands:  follows one step commands with increased time and follows multistep commands with increased time  Safety Judgement:  decreased awareness of need for safety  Problem Solving:  able to problem solve independently    RANGE OF MOTION AND STRENGTH ASSESSMENT  Upper extremity ROM and strength are within functional limits See OT evaluation.     Lower extremity ROM is within functional limits  R LE WNL, L LE NT    Lower extremity strength is within functional limits  R LE WNL, L LE NT      BALANCE  Static Sitting: Fair  Dynamic Sitting: Fair -  Static Standing: Poor  Dynamic Standing: Poor -    ADDITIONAL TESTS                                    ACTIVITY TOLERANCE                         O2 WALK  Oxygen Therapy  SPO2% on Room Air at Rest: 95    NEUROLOGICAL FINDINGS                        AM-PAC '6-Clicks' INPATIENT SHORT FORM - BASIC MOBILITY  How much difficulty does the patient currently have. .. Patient Difficulty: Turning over in bed (including adjusting bedclothes, sheets and blankets)?: A Little   Patient Difficulty: Sitting down on and standing up from a chair with arms (e.g., wheelchair, bedside commode, etc.): A Little   Patient Difficulty: Moving from lying on back to sitting on the side of the bed?: A Little   How much help from another person does the patient currently need. .. Help from Another: Moving to and from a bed to a chair (including a wheelchair)?: A Little   Help from Another: Need to walk in hospital room?: A Lot   Help from Another: Climbing 3-5 steps with a railing?: Total       AM-PAC Score:  Raw Score: 15   Approx Degree of Impairment: 57.7%   Standardized Score (AM-PAC Scale): 39.45   CMS Modifier (G-Code): CK    FUNCTIONAL ABILITY STATUS  Gait Assessment   Functional Mobility/Gait Assessment  Gait Assistance: Minimum assistance  Distance (ft): 8  Assistive Device: Rolling walker  Pattern:  (hop to gait, NWB L LE)    Skilled Therapy Provided     Bed Mobility:  Rolling: NT  Supine to sit: HOB elevated, CGA   Sit to supine: NT     Transfer Mobility:  Sit to stand: Min A   Stand to sit: Min A  Gait = Min A    Therapist's Comments: pt lying in bed and agreed to PT.pt instructed and educated in the importance of NWB L LE with all functional activities. Pt assisted with L LE management with bed mobility. Pt then instructed in proper hand placement, R LE alignment  in sitting and integration of RW for sit<>stand. Pt with Min A for sit<>Stand, cued for upright posture and to keep L LE off the floor. Pt instructed in hop to gait and able to perform in room.  Pt fatigued quickly and instructed in proper technique for stand to sit. Pt left up in chair with all needs in reach. RN notified of session and pain level of 14/10. Inc time spent on education and discussion with pt regarding the importance of safety awareness, fall precautions. Pt in understanding. Also discussed DC recommendation to [facility] in length. Pt in understanding and agreement as long as finances are covered. Will contact SW for this. Pt instructed to keep L LE elevated . Exercise/Education Provided: Body mechanics  Energy conservation  Functional activity tolerated  Gait training  Transfer training    Patient End of Session: Up in chair;Needs met;Call light within reach;RN aware of session/findings; All patient questions and concerns addressed; Alarm set; Discussed recommendations with /      Patient Evaluation Complexity Level:  History High - 3 or more personal factors and/or co-morbidities   Examination of body systems Moderate - addressing a total of 3 or more elements   Clinical Presentation Low - Stable   Clinical Decision Making Low - Stable       PT Session Time: 40 minutes  Gait Training: 10 minutes  Therapeutic Activity: 10 minutes  Neuromuscular Re-education: 0 minutes  Therapeutic Exercise: 0 minutes

## 2023-09-04 NOTE — ED QUICK NOTES
Orders for admission, patient is aware of plan and ready to go upstairs. Any questions, please call ED RN stephania at extension [phone].      Patient Covid vaccination status: Fully vaccinated     COVID Test Ordered in ED: None    COVID Suspicion at Admission: N/A    Running Infusions:  None    Mental Status/LOC at time of transport: a/ox4    Other pertinent information:   CIWA score: N/A   NIH score:  N/A

## 2023-09-04 NOTE — CM/SW NOTE
Received a call from [facility] requesting assistance for patient's safe discharge planning. EDCM spoke to patient who lives alone and has no family nor friends that can help her with ADLs. Patient fell at home, brought to ED in a lot of pain and xray showed closed fracture of left tibial plateau. Left leg has a splint and immobilizer. With patient's limited mobility and inability to do ADLs, patient admitted she can't be home alone. EDCM discussed respite/caregiver but she's not willing to pay out of pocket. Also discussed home health RN/PCT of which they visit for a couple of hours only. Pt agreeable of admission to assist with rehab/SNF placement. She'll be seen by ortho MD.ER MD and RN aware of the above. SW consult placed. PT/OT order after seen by Ortho MD placed. PASSR done and jenelle for review. Ramon initiated.

## 2023-09-04 NOTE — PLAN OF CARE
Pt A&Ox4 vital signs stable on RA. Pt admitted from ED with Tibial plateau fx. LLE placed in splint. Voiding via purewick. Ortho consulted. PT/OT eval pending. SI precautions in place (MD, Regi Sheets aware), waiting for sitter to arrive. POC discussed, pt verbalized understanding. No further questions at this time. Call light within reach.

## 2023-09-04 NOTE — CM/SW NOTE
Ellen  from [facility] called and will be at the hospital within the hour to do the PASRR level II.

## 2023-09-05 ENCOUNTER — APPOINTMENT (OUTPATIENT)
Dept: GENERAL RADIOLOGY | Facility: HOSPITAL | Age: 78
End: 2023-09-05
Attending: HOSPITALIST
Payer: MEDICARE

## 2023-09-05 LAB
D DIMER PPP FEU-MCNC: 1.61 UG/ML FEU (ref ?–0.76)
ERYTHROCYTE [DISTWIDTH] IN BLOOD BY AUTOMATED COUNT: 14.6 %
GLUCOSE BLD-MCNC: 128 MG/DL (ref 70–99)
GLUCOSE BLD-MCNC: 137 MG/DL (ref 70–99)
GLUCOSE BLD-MCNC: 139 MG/DL (ref 70–99)
GLUCOSE BLD-MCNC: 205 MG/DL (ref 70–99)
HCT VFR BLD AUTO: 36.6 %
HGB BLD-MCNC: 11.5 G/DL
MCH RBC QN AUTO: 29.2 PG (ref 26–34)
MCHC RBC AUTO-ENTMCNC: 31.4 G/DL (ref 31–37)
MCV RBC AUTO: 92.9 FL
PLATELET # BLD AUTO: 310 10(3)UL (ref 150–450)
RBC # BLD AUTO: 3.94 X10(6)UL
WBC # BLD AUTO: 13.6 X10(3) UL (ref 4–11)

## 2023-09-05 PROCEDURE — 71045 X-RAY EXAM CHEST 1 VIEW: CPT | Performed by: HOSPITALIST

## 2023-09-05 PROCEDURE — 99232 SBSQ HOSP IP/OBS MODERATE 35: CPT | Performed by: HOSPITALIST

## 2023-09-05 NOTE — CM/SW NOTE
Pt admitted after sustaining a non-surgical left tibial plateau fracture after falling at home. PT/OT recommending [facility] for discharge. Discussed during interdisciplinary rounds and pt lives at home alone. Message sent to hospitalist to further discuss discharge needs. [facility] referral pending in [place]. PASRR added to referral.    Reviewed Medicare coverage for [facility] including need for medically necessary 3 midnight inpatient hospital stay. Pt was made inpatient status on 9/4 and would need to have a medical need to remain in the hospital until 9/7 in order to have Medicare coverage for [facility]. If pt does not meet this criteria, pt could elect to pay privately for NH. CM/SW to remain available to assist with discharge planning. Addendum: CM met with pt at bedside to discuss the above. She is agreeable to [facility], but does not have a preferred facility. Provided with [facility] Choice list for her to review this evening. Pt states that she lives alone and does not have anyone nearby to assist her. Her adult children moved out of state this past Friday. Pt reports that she is independent and still drives. She does have a RW at home, but does not use it. Pt does not know how she will discharge home after her recent injury. Informed pt of Medicare's criteria for [facility]. Pt reports that she cannot afford respite rate at a SNF. Per PT eval:  HOME SITUATION  Type of Home: Apartment   Home Layout: One level;Ramped entrance  Lives With: Alone  Drives: Yes  Patient Regularly Uses: Glasses  Prior Level of [place]: PTA, pt independent with ADL, IADL, ambulation, and drives. Pt lives alone in her condo, and there is a ramped entrance. Pt taught tennis for 35 years.     ZENON LeighN, RN-BC    [id number]

## 2023-09-05 NOTE — PLAN OF CARE
Pt Aox4, somewhat forgetful at times. RA, . Lovenox for VTE. SCD applied to RLE. LLE in KI, NWB. Voiding per jayne. Medicated with Norco for pain with moderate relief. IV SL. Plan for [facility] at DC when ready.

## 2023-09-05 NOTE — CONSULTS
CT L knee reviewed: Left knee tibial plateau fracture is noted. Nondisplaced. 77F with a nondisplaced L tibial plateau fracture  Non-surgical management:   NWB LLE. Knee immobilizer. PT/OT to work on ambulation. Nayeli Dorantes MD  [facility]  [facility] [address], [address] [facility], [address] [place] [address] org  [email or web address]". org  t: [phone]  f: [phone]

## 2023-09-05 NOTE — PLAN OF CARE
A&Ox 4. VSS. On 2L O2. . Tolerating diet. Last BM 9/3. Voiding with purewick. Pain controlled with PO meds. Immobilizer placed on LLE. Safety precautions maintained. Call light within reach.

## 2023-09-06 ENCOUNTER — APPOINTMENT (OUTPATIENT)
Dept: ULTRASOUND IMAGING | Facility: HOSPITAL | Age: 78
End: 2023-09-06
Attending: INTERNAL MEDICINE
Payer: MEDICARE

## 2023-09-06 ENCOUNTER — APPOINTMENT (OUTPATIENT)
Dept: NUCLEAR MEDICINE | Facility: HOSPITAL | Age: 78
End: 2023-09-06
Attending: INTERNAL MEDICINE
Payer: MEDICARE

## 2023-09-06 PROBLEM — R09.02 HYPOXIA: Status: ACTIVE | Noted: 2023-09-06

## 2023-09-06 PROBLEM — J98.11 ATELECTASIS: Status: ACTIVE | Noted: 2023-09-06

## 2023-09-06 LAB
ANION GAP SERPL CALC-SCNC: 5 MMOL/L (ref 0–18)
BUN BLD-MCNC: 21 MG/DL (ref 7–18)
CALCIUM BLD-MCNC: 8.4 MG/DL (ref 8.5–10.1)
CHLORIDE SERPL-SCNC: 104 MMOL/L (ref 98–112)
CO2 SERPL-SCNC: 30 MMOL/L (ref 21–32)
CREAT BLD-MCNC: 1.57 MG/DL
EGFRCR SERPLBLD CKD-EPI 2021: 34 ML/MIN/1.73M2 (ref 60–?)
ERYTHROCYTE [DISTWIDTH] IN BLOOD BY AUTOMATED COUNT: 14.5 %
GLUCOSE BLD-MCNC: 112 MG/DL (ref 70–99)
GLUCOSE BLD-MCNC: 121 MG/DL (ref 70–99)
GLUCOSE BLD-MCNC: 126 MG/DL (ref 70–99)
GLUCOSE BLD-MCNC: 134 MG/DL (ref 70–99)
GLUCOSE BLD-MCNC: 142 MG/DL (ref 70–99)
GLUCOSE BLD-MCNC: 154 MG/DL (ref 70–99)
HCT VFR BLD AUTO: 35.4 %
HGB BLD-MCNC: 11.1 G/DL
MAGNESIUM SERPL-MCNC: 2 MG/DL (ref 1.6–2.6)
MCH RBC QN AUTO: 29.2 PG (ref 26–34)
MCHC RBC AUTO-ENTMCNC: 31.4 G/DL (ref 31–37)
MCV RBC AUTO: 93.2 FL
OSMOLALITY SERPL CALC.SUM OF ELEC: 293 MOSM/KG (ref 275–295)
PHOSPHATE SERPL-MCNC: 4.1 MG/DL (ref 2.5–4.9)
PLATELET # BLD AUTO: 307 10(3)UL (ref 150–450)
POTASSIUM SERPL-SCNC: 4.1 MMOL/L (ref 3.5–5.1)
RBC # BLD AUTO: 3.8 X10(6)UL
SODIUM SERPL-SCNC: 139 MMOL/L (ref 136–145)
WBC # BLD AUTO: 12 X10(3) UL (ref 4–11)

## 2023-09-06 PROCEDURE — 78582 LUNG VENTILAT&PERFUS IMAGING: CPT | Performed by: INTERNAL MEDICINE

## 2023-09-06 PROCEDURE — 99232 SBSQ HOSP IP/OBS MODERATE 35: CPT | Performed by: INTERNAL MEDICINE

## 2023-09-06 PROCEDURE — 93970 EXTREMITY STUDY: CPT | Performed by: INTERNAL MEDICINE

## 2023-09-06 RX ORDER — ENOXAPARIN SODIUM 100 MG/ML
30 INJECTION SUBCUTANEOUS DAILY
Status: DISCONTINUED | OUTPATIENT
Start: 2023-09-07 | End: 2023-09-07

## 2023-09-06 NOTE — OCCUPATIONAL THERAPY NOTE
OT will hold for therapy session today, pt is VAUGHN for VQ scan to R/O PE and with US to R/O DVT pending.

## 2023-09-06 NOTE — PLAN OF CARE
Assumed care at [address] pt on room air  Left leg pain, Morphine/Norco given. Immobilizer remains in place. Labs in am  Discharge planning  Problem: Patient/Family Goals  Goal: Patient/Family Long Term Goal  Description: Patient's Long Term Goal: Be able to walk without pain/assistive device    Interventions:  - Participate in PT/OT, Pain medication as needed, Followups as directed  - See additional Care Plan goals for specific interventions  Outcome: Progressing  Goal: Patient/Family Short Term Goal  Description: Patient's Short Term Goal: Pain mangement    Interventions:   - Pain mediation as needed, non pharmacologic interventions, Maintain NWB status. KI on at all times.    - See additional Care Plan goals for specific interventions  Outcome: Progressing     Problem: Diabetes/Glucose Control  Goal: Glucose maintained within prescribed range  Description: INTERVENTIONS:  - Monitor Blood Glucose as ordered  - Assess for signs and symptoms of hyperglycemia and hypoglycemia  - Administer ordered medications to maintain glucose within target range  - Assess barriers to adequate nutritional intake and initiate nutrition consult as needed  - Instruct patient on self management of diabetes  Outcome: Progressing     Problem: PAIN - ADULT  Goal: Verbalizes/displays adequate comfort level or patient's stated pain goal  Description: INTERVENTIONS:  - Encourage pt to monitor pain and request assistance  - Assess pain using appropriate pain scale  - Administer analgesics based on type and severity of pain and evaluate response  - Implement non-pharmacological measures as appropriate and evaluate response  - Consider cultural and social influences on pain and pain management  - Manage/alleviate anxiety  - Utilize distraction and/or relaxation techniques  - Monitor for opioid side effects  - Notify MD/LIP if interventions unsuccessful or patient reports new pain  - Anticipate increased pain with activity and pre-medicate as appropriate  Outcome: Progressing     Problem: RISK FOR INFECTION - ADULT  Goal: Absence of fever/infection during anticipated neutropenic period  Description: INTERVENTIONS  - Monitor WBC  - Administer growth factors as ordered  - Implement neutropenic guidelines  Outcome: Progressing     Problem: SAFETY ADULT - FALL  Goal: Free from fall injury  Description: INTERVENTIONS:  - Assess pt frequently for physical needs  - Identify cognitive and physical deficits and behaviors that affect risk of falls.   - [place] fall precautions as indicated by assessment.  - Educate pt/family on patient safety including physical limitations  - Instruct pt to call for assistance with activity based on assessment  - Modify environment to reduce risk of injury  - Provide assistive devices as appropriate  - Consider OT/PT consult to assist with strengthening/mobility  - Encourage toileting schedule  Outcome: Progressing     Problem: DISCHARGE PLANNING  Goal: Discharge to home or other facility with appropriate resources  Description: INTERVENTIONS:  - Identify barriers to discharge w/pt and caregiver  - Include patient/family/discharge partner in discharge planning  - Arrange for needed discharge resources and transportation as appropriate  - Identify discharge learning needs (meds, wound care, etc)  - Arrange for interpreters to assist at discharge as needed  - Consider post-discharge preferences of patient/family/discharge partner  - Complete POLST form as appropriate  - Assess patient's ability to be responsible for managing their own health  - Refer to Case Management Department for coordinating discharge planning if the patient needs post-hospital services based on physician/LIP order or complex needs related to functional status, cognitive ability or social support system  Outcome: Progressing

## 2023-09-06 NOTE — PHYSICAL THERAPY NOTE
IP PT on hold. D/w RN, pt is VAUGHN for VQ scan and also pending US. Will f/u as medically appropriate.

## 2023-09-06 NOTE — PROGRESS NOTES
[facility] Note:  Renal Dose Adjustment for enoxaparin (LOVENOX)    Andres Olson has been prescribed enoxaparin 40 mg subcutaneously every 24 hours. Estimated Creatinine Clearance: 26.3 mL/min (A) (based on SCr of 1.57 mg/dL (H)). Calculated CrCl 20 to 30 mL/min so the dose of Enoxaparin (LOVENOX) has been changed to enoxaparin 30 mg every 24 hours per P&T approved protocol. Pharmacy will continue to follow, and make additional adjustments if needed.       Thank you,  Herb Bernstein, PharmD  9/6/2023 5:31 PM

## 2023-09-06 NOTE — CM/SW NOTE
Xi met with pt this am and then again this afternoon. She reviewed [facility] list and decided upon [facility] [place]. Reserved in [facility]. Await clearance for dc.     Venetta Litten, LCSW  /Discharge Planner

## 2023-09-07 VITALS
BODY MASS INDEX: 33.29 KG/M2 | OXYGEN SATURATION: 93 % | HEIGHT: 64 IN | TEMPERATURE: 98 F | SYSTOLIC BLOOD PRESSURE: 114 MMHG | RESPIRATION RATE: 14 BRPM | WEIGHT: 195 LBS | HEART RATE: 75 BPM | DIASTOLIC BLOOD PRESSURE: 67 MMHG

## 2023-09-07 LAB — GLUCOSE BLD-MCNC: 114 MG/DL (ref 70–99)

## 2023-09-07 PROCEDURE — 99239 HOSP IP/OBS DSCHRG MGMT >30: CPT | Performed by: INTERNAL MEDICINE

## 2023-09-07 RX ORDER — PSEUDOEPHEDRINE HCL 30 MG
100 TABLET ORAL 2 TIMES DAILY
Qty: 20 CAPSULE | Refills: 0 | Status: SHIPPED | OUTPATIENT
Start: 2023-09-07

## 2023-09-07 RX ORDER — SENNOSIDES 8.6 MG
8.6 TABLET ORAL 2 TIMES DAILY
Status: DISCONTINUED | OUTPATIENT
Start: 2023-09-07 | End: 2023-09-07

## 2023-09-07 RX ORDER — BISACODYL 10 MG
10 SUPPOSITORY, RECTAL RECTAL
Status: DISCONTINUED | OUTPATIENT
Start: 2023-09-07 | End: 2023-09-07

## 2023-09-07 RX ORDER — BISACODYL 10 MG
10 SUPPOSITORY, RECTAL RECTAL
Qty: 1 SUPPOSITORY | Refills: 0 | Status: SHIPPED | OUTPATIENT
Start: 2023-09-07

## 2023-09-07 RX ORDER — HYDROCODONE BITARTRATE AND ACETAMINOPHEN 5; 325 MG/1; MG/1
1 TABLET ORAL EVERY 8 HOURS PRN
Qty: 15 TABLET | Refills: 0 | Status: SHIPPED | OUTPATIENT
Start: 2023-09-07

## 2023-09-07 RX ORDER — ASPIRIN 81 MG/1
81 TABLET ORAL 2 TIMES DAILY
Qty: 60 TABLET | Refills: 0 | Status: SHIPPED | OUTPATIENT
Start: 2023-09-07 | End: 2023-10-07

## 2023-09-07 RX ORDER — POLYETHYLENE GLYCOL 3350 17 G/17G
17 POWDER, FOR SOLUTION ORAL DAILY
Status: DISCONTINUED | OUTPATIENT
Start: 2023-09-07 | End: 2023-09-07

## 2023-09-07 RX ORDER — DOCUSATE SODIUM 100 MG/1
100 CAPSULE, LIQUID FILLED ORAL 2 TIMES DAILY
Status: DISCONTINUED | OUTPATIENT
Start: 2023-09-07 | End: 2023-09-07

## 2023-09-07 NOTE — PLAN OF CARE
NURSING DISCHARGE NOTE    Discharged Nursing home via Ambulance. Accompanied by Support staff  Belongings Taken by patient/family. Report given to RN at [place].

## 2023-09-07 NOTE — CM/SW NOTE
09/07/23 1014   Discharge disposition   Expected discharge disposition subacute   Post Acute Care Provider Shreyas Mckeon   Discharge transportation [facility]       Pt cleared for dc to [facility] today. [facility] arranged for 1230pm.   RN to call report to [phone].   SW left message for daughter    Emile No, LCSW  /Discharge Planner

## 2023-09-07 NOTE — PLAN OF CARE
Patient A&O X4, forgetful at times, on 2L O2- hx ALISIA no CPAP. VSS, /IS/telemetry- NSR. SCDs, Lovenox. Voiding freely via [address], LBM 9/3. Admitted with left tibial plateau fx. Denies n/t. Pain controlled with PO/IV medication. TTWB w/ KI. PT/OT. Up mod x2 w/ walker. Reminded to use call light. Plan to dc to [facility] at [place].

## 2023-09-08 ENCOUNTER — EXTERNAL FACILITY (OUTPATIENT)
Dept: FAMILY MEDICINE CLINIC | Facility: CLINIC | Age: 78
End: 2023-09-08

## 2023-09-08 ENCOUNTER — PATIENT OUTREACH (OUTPATIENT)
Dept: CASE MANAGEMENT | Age: 78
End: 2023-09-08

## 2023-09-08 DIAGNOSIS — K21.9 GASTRO-ESOPHAGEAL REFLUX DISEASE WITHOUT ESOPHAGITIS: ICD-10-CM

## 2023-09-08 DIAGNOSIS — I50.42 CHRONIC COMBINED SYSTOLIC AND DIASTOLIC CONGESTIVE HEART FAILURE (HCC): ICD-10-CM

## 2023-09-08 DIAGNOSIS — Z91.81 HISTORY OF FALLING, PRESENTING HAZARDS TO HEALTH: ICD-10-CM

## 2023-09-08 DIAGNOSIS — F02.80 ALZHEIMER'S DISEASE, UNSPECIFIED (HCC): ICD-10-CM

## 2023-09-08 DIAGNOSIS — G30.9 ALZHEIMER'S DISEASE, UNSPECIFIED (HCC): ICD-10-CM

## 2023-09-08 DIAGNOSIS — M81.0 AGE-RELATED OSTEOPOROSIS WITHOUT CURRENT PATHOLOGICAL FRACTURE: ICD-10-CM

## 2023-09-08 DIAGNOSIS — E11.69 DIABETES MELLITUS TYPE 2 IN OBESE: ICD-10-CM

## 2023-09-08 DIAGNOSIS — F60.3 BORDERLINE PERSONALITY DISORDER (HCC): ICD-10-CM

## 2023-09-08 DIAGNOSIS — Z95.1 PRESENCE OF AORTOCORONARY BYPASS GRAFT: ICD-10-CM

## 2023-09-08 DIAGNOSIS — M15.9 PRIMARY OSTEOARTHRITIS INVOLVING MULTIPLE JOINTS: ICD-10-CM

## 2023-09-08 DIAGNOSIS — Z96.651 PRESENCE OF RIGHT ARTIFICIAL KNEE JOINT: ICD-10-CM

## 2023-09-08 DIAGNOSIS — M46.1 SACROILIITIS (HCC): ICD-10-CM

## 2023-09-08 DIAGNOSIS — S82.142A CLOSED FRACTURE OF LEFT TIBIAL PLATEAU, INITIAL ENCOUNTER: ICD-10-CM

## 2023-09-08 DIAGNOSIS — F33.2 MDD (MAJOR DEPRESSIVE DISORDER), RECURRENT SEVERE, WITHOUT PSYCHOSIS (HCC): ICD-10-CM

## 2023-09-08 DIAGNOSIS — Z79.82 LONG TERM (CURRENT) USE OF ASPIRIN: ICD-10-CM

## 2023-09-08 DIAGNOSIS — D50.8 IRON DEFICIENCY ANEMIA SECONDARY TO INADEQUATE DIETARY IRON INTAKE: ICD-10-CM

## 2023-09-08 DIAGNOSIS — M48.061 SPINAL STENOSIS OF LUMBAR REGION, UNSPECIFIED WHETHER NEUROGENIC CLAUDICATION PRESENT: ICD-10-CM

## 2023-09-08 DIAGNOSIS — F33.1 MAJOR DEPRESSIVE DISORDER, RECURRENT EPISODE, MODERATE (HCC): ICD-10-CM

## 2023-09-08 DIAGNOSIS — I10 ESSENTIAL HYPERTENSION: ICD-10-CM

## 2023-09-08 DIAGNOSIS — I25.118 ATHEROSCLEROTIC HEART DISEASE OF NATIVE CORONARY ARTERY WITH OTHER FORMS OF ANGINA PECTORIS (HCC): ICD-10-CM

## 2023-09-08 DIAGNOSIS — Z95.1 STATUS POST CORONARY ARTERY BYPASS GRAFT: ICD-10-CM

## 2023-09-08 DIAGNOSIS — M06.9 RHEUMATOID ARTHRITIS, INVOLVING UNSPECIFIED SITE, UNSPECIFIED WHETHER RHEUMATOID FACTOR PRESENT (HCC): ICD-10-CM

## 2023-09-08 DIAGNOSIS — K59.01 SLOW TRANSIT CONSTIPATION: ICD-10-CM

## 2023-09-08 DIAGNOSIS — M47.817 LUMBOSACRAL SPONDYLOSIS WITHOUT MYELOPATHY: ICD-10-CM

## 2023-09-08 DIAGNOSIS — Z79.84 LONG TERM (CURRENT) USE OF ORAL HYPOGLYCEMIC DRUGS: ICD-10-CM

## 2023-09-08 DIAGNOSIS — M19.90 OSTEOARTHRITIS, UNSPECIFIED OSTEOARTHRITIS TYPE, UNSPECIFIED SITE: ICD-10-CM

## 2023-09-08 DIAGNOSIS — Z86.73 OLD CEREBROVASCULAR ACCIDENT (CVA) WITHOUT LATE EFFECT: ICD-10-CM

## 2023-09-08 DIAGNOSIS — Z98.1 S/P LUMBAR FUSION: ICD-10-CM

## 2023-09-08 DIAGNOSIS — S82.142D CLOSED DISPLACED BICONDYLAR FRACTURE OF LEFT TIBIA WITH ROUTINE HEALING, SUBSEQUENT ENCOUNTER: Primary | ICD-10-CM

## 2023-09-08 DIAGNOSIS — E66.9 DIABETES MELLITUS TYPE 2 IN OBESE: ICD-10-CM

## 2023-09-08 DIAGNOSIS — Z96.651 HISTORY OF RIGHT KNEE JOINT REPLACEMENT: ICD-10-CM

## 2023-09-08 DIAGNOSIS — R26.2 INABILITY TO AMBULATE DUE TO KNEE: ICD-10-CM

## 2023-09-09 PROBLEM — M70.50 PES ANSERINE BURSITIS: Status: RESOLVED | Noted: 2018-06-06 | Resolved: 2023-09-09

## 2023-09-09 PROBLEM — M94.251 CHONDROMALACIA OF RIGHT HIP: Status: RESOLVED | Noted: 2020-02-07 | Resolved: 2023-09-09

## 2023-09-09 PROBLEM — I25.10 CORONARY ARTERY DISEASE INVOLVING NATIVE CORONARY ARTERY OF NATIVE HEART WITHOUT ANGINA PECTORIS: Status: RESOLVED | Noted: 2019-09-17 | Resolved: 2023-09-09

## 2023-09-09 PROBLEM — E11.9 TYPE 2 DIABETES MELLITUS WITHOUT COMPLICATION (HCC): Status: RESOLVED | Noted: 2018-04-17 | Resolved: 2023-09-09

## 2023-09-09 PROBLEM — J98.11 ATELECTASIS: Status: RESOLVED | Noted: 2023-09-06 | Resolved: 2023-09-09

## 2023-09-09 PROBLEM — M25.551 PAIN OF RIGHT HIP JOINT: Status: RESOLVED | Noted: 2017-12-07 | Resolved: 2023-09-09

## 2023-09-09 PROBLEM — N95.2 POST-MENOPAUSAL ATROPHIC VAGINITIS: Status: RESOLVED | Noted: 2019-01-16 | Resolved: 2023-09-09

## 2023-09-09 PROBLEM — M89.8X6 PAIN OF RIGHT TIBIA: Status: RESOLVED | Noted: 2018-07-03 | Resolved: 2023-09-09

## 2023-09-09 PROBLEM — M70.51 PES ANSERINUS BURSITIS OF RIGHT KNEE: Status: RESOLVED | Noted: 2022-03-04 | Resolved: 2023-09-09

## 2023-09-09 PROBLEM — M76.891 TENDINITIS OF RIGHT KNEE: Status: RESOLVED | Noted: 2021-05-04 | Resolved: 2023-09-09
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
## 2023-09-09 PROBLEM — R00.2 PALPITATIONS: Status: RESOLVED | Noted: 2018-04-17 | Resolved: 2023-09-09

## 2023-09-09 PROBLEM — M25.562 ACUTE PAIN OF LEFT KNEE: Status: RESOLVED | Noted: 2023-09-04 | Resolved: 2023-09-09

## 2023-09-09 PROBLEM — M79.18 MYOFASCIAL PAIN ON RIGHT SIDE: Status: RESOLVED | Noted: 2017-12-07 | Resolved: 2023-09-09

## 2023-09-09 PROBLEM — R09.02 HYPOXIA: Status: RESOLVED | Noted: 2023-09-06 | Resolved: 2023-09-09

## 2023-09-09 PROBLEM — M54.16 LUMBAR RADICULOPATHY: Status: RESOLVED | Noted: 2017-12-07 | Resolved: 2023-09-09

## 2023-09-09 PROBLEM — M17.11 PRIMARY OSTEOARTHRITIS OF RIGHT KNEE: Status: RESOLVED | Noted: 2018-04-13 | Resolved: 2023-09-09

## 2023-09-11 ENCOUNTER — PATIENT OUTREACH (OUTPATIENT)
Dept: CASE MANAGEMENT | Age: 78
End: 2023-09-11

## 2023-09-11 ENCOUNTER — INITIAL APN SNF VISIT (OUTPATIENT)
Dept: INTERNAL MEDICINE CLINIC | Age: 78
End: 2023-09-11

## 2023-09-11 ENCOUNTER — DOCUMENTATION ONLY (OUTPATIENT)
Dept: FAMILY MEDICINE CLINIC | Facility: CLINIC | Age: 78
End: 2023-09-11

## 2023-09-11 VITALS
HEART RATE: 90 BPM | SYSTOLIC BLOOD PRESSURE: 128 MMHG | WEIGHT: 193.19 LBS | TEMPERATURE: 97 F | DIASTOLIC BLOOD PRESSURE: 74 MMHG | BODY MASS INDEX: 33 KG/M2 | OXYGEN SATURATION: 95 % | RESPIRATION RATE: 18 BRPM

## 2023-09-11 DIAGNOSIS — I25.10 CORONARY ARTERY DISEASE, UNSPECIFIED VESSEL OR LESION TYPE, UNSPECIFIED WHETHER ANGINA PRESENT, UNSPECIFIED WHETHER NATIVE OR TRANSPLANTED HEART: ICD-10-CM

## 2023-09-11 DIAGNOSIS — Z51.89 AFTERCARE: Primary | ICD-10-CM
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
## 2023-09-11 DIAGNOSIS — F03.918 DEMENTIA WITH OTHER BEHAVIORAL DISTURBANCE, UNSPECIFIED DEMENTIA SEVERITY, UNSPECIFIED DEMENTIA TYPE (HCC): ICD-10-CM

## 2023-09-11 DIAGNOSIS — S82.142D CLOSED DISPLACED BICONDYLAR FRACTURE OF LEFT TIBIA WITH ROUTINE HEALING: ICD-10-CM

## 2023-09-11 DIAGNOSIS — R45.851 SUICIDAL IDEATIONS: ICD-10-CM

## 2023-09-11 DIAGNOSIS — R42 DIZZINESS: ICD-10-CM

## 2023-09-11 DIAGNOSIS — E78.5 HYPERLIPIDEMIA, UNSPECIFIED HYPERLIPIDEMIA TYPE: ICD-10-CM

## 2023-09-11 DIAGNOSIS — K58.9 IRRITABLE BOWEL SYNDROME, UNSPECIFIED TYPE: ICD-10-CM

## 2023-09-11 DIAGNOSIS — E11.9 TYPE 2 DIABETES MELLITUS WITHOUT COMPLICATION, WITHOUT LONG-TERM CURRENT USE OF INSULIN (HCC): ICD-10-CM

## 2023-09-11 DIAGNOSIS — Z86.73 H/O: STROKE: ICD-10-CM

## 2023-09-11 DIAGNOSIS — Z95.1 HX OF CABG: ICD-10-CM

## 2023-09-11 DIAGNOSIS — F41.1 GAD (GENERALIZED ANXIETY DISORDER): ICD-10-CM

## 2023-09-11 RX ORDER — METHYL SALICYLATE MENTHOL CAPSAICIN LIDOCAINE 20; 5; .035; .5 G/100G; G/100G; G/100G; G/100G
1 PATCH TOPICAL DAILY
COMMUNITY
Start: 2023-09-11

## 2023-09-11 NOTE — PROGRESS NOTES
Called patient and left a message for patient to call back when they can. Reviewed patient chart.  Left contact my contact number [phone]        Time Spent This Encounter Total: 5  min medical record review  Monthly Minute Total including today: 5 minutes

## 2023-09-12 ENCOUNTER — APPOINTMENT (OUTPATIENT)
Dept: GENERAL RADIOLOGY | Facility: HOSPITAL | Age: 78
End: 2023-09-12
Payer: MEDICARE

## 2023-09-12 ENCOUNTER — HOSPITAL ENCOUNTER (EMERGENCY)
Facility: HOSPITAL | Age: 78
Discharge: STILL ADMITTED OR ADMINISTRATIVE | End: 2023-09-13
Payer: MEDICARE

## 2023-09-12 ENCOUNTER — HOSPITAL ENCOUNTER (EMERGENCY)
Facility: HOSPITAL | Age: 78
Discharge: HOME | End: 2023-09-12
Attending: EMERGENCY MEDICINE
Payer: MEDICARE

## 2023-09-12 ENCOUNTER — TELEPHONE (OUTPATIENT)
Dept: INTERNAL MEDICINE CLINIC | Age: 78
End: 2023-09-12

## 2023-09-12 VITALS
DIASTOLIC BLOOD PRESSURE: 71 MMHG | BODY MASS INDEX: 33.12 KG/M2 | OXYGEN SATURATION: 98 % | RESPIRATION RATE: 18 BRPM | TEMPERATURE: 98 F | HEIGHT: 64 IN | WEIGHT: 194 LBS | SYSTOLIC BLOOD PRESSURE: 126 MMHG | HEART RATE: 78 BPM

## 2023-09-12 DIAGNOSIS — S90.111A CONTUSION OF RIGHT GREAT TOE WITHOUT DAMAGE TO NAIL, INITIAL ENCOUNTER: Primary | ICD-10-CM

## 2023-09-12 DIAGNOSIS — S82.142D CLOSED FRACTURE OF LEFT TIBIAL PLATEAU WITH ROUTINE HEALING, SUBSEQUENT ENCOUNTER: ICD-10-CM

## 2023-09-12 PROCEDURE — 73630 X-RAY EXAM OF FOOT: CPT | Performed by: EMERGENCY MEDICINE

## 2023-09-12 PROCEDURE — 99283 EMERGENCY DEPT VISIT LOW MDM: CPT

## 2023-09-12 PROCEDURE — 99284 EMERGENCY DEPT VISIT MOD MDM: CPT

## 2023-09-12 PROCEDURE — 73590 X-RAY EXAM OF LOWER LEG: CPT | Performed by: EMERGENCY MEDICINE

## 2023-09-12 RX ORDER — ACETAMINOPHEN 500 MG
1000 TABLET ORAL ONCE
Status: COMPLETED | OUTPATIENT
Start: 2023-09-12 | End: 2023-09-12

## 2023-09-12 NOTE — ED INITIAL ASSESSMENT (HPI)
Pt to ED via EMS from [address] [facility] [address] s/p trip and fall. Denies LOC. + pain to left knee, left lower leg, right foot.

## 2023-09-14 ENCOUNTER — TELEPHONE (OUTPATIENT)
Dept: FAMILY MEDICINE CLINIC | Facility: CLINIC | Age: 78
End: 2023-09-14

## 2023-09-14 ENCOUNTER — PATIENT OUTREACH (OUTPATIENT)
Dept: CASE MANAGEMENT | Age: 78
End: 2023-09-14

## 2023-09-14 DIAGNOSIS — E11.9 TYPE 2 DIABETES MELLITUS WITHOUT COMPLICATION, WITHOUT LONG-TERM CURRENT USE OF INSULIN (HCC): ICD-10-CM

## 2023-09-14 DIAGNOSIS — E78.2 HYPERLIPIDEMIA, MIXED: ICD-10-CM

## 2023-09-14 DIAGNOSIS — I10 ESSENTIAL HYPERTENSION: Primary | ICD-10-CM

## 2023-09-14 NOTE — TELEPHONE ENCOUNTER
Pt not happy in her current rehab facility  Is there any where else Dr Ventura Avery can recommend for her to go. Requesting a  call from Dr Taran Martinez.

## 2023-09-15 ENCOUNTER — PATIENT OUTREACH (OUTPATIENT)
Dept: CASE MANAGEMENT | Age: 78
End: 2023-09-15

## 2023-09-15 NOTE — TELEPHONE ENCOUNTER
All rehab places are tough. Consider [facility] or [facility],[address].

## 2023-09-20 NOTE — PROGRESS NOTES
3rd attempt ED f/up apt request  No answer, LVMTCB   PCP -unable to contact pt after multiple attempts  Closing encounter

## 2023-09-21 ENCOUNTER — EXTERNAL FACILITY (OUTPATIENT)
Dept: FAMILY MEDICINE CLINIC | Facility: CLINIC | Age: 78
End: 2023-09-21

## 2023-09-21 DIAGNOSIS — E78.00 PURE HYPERCHOLESTEROLEMIA, UNSPECIFIED: ICD-10-CM

## 2023-09-21 DIAGNOSIS — Z95.1 STATUS POST CORONARY ARTERY BYPASS GRAFT: ICD-10-CM

## 2023-09-21 DIAGNOSIS — M81.0 AGE-RELATED OSTEOPOROSIS WITHOUT CURRENT PATHOLOGICAL FRACTURE: ICD-10-CM

## 2023-09-21 DIAGNOSIS — K21.9 GASTRO-ESOPHAGEAL REFLUX DISEASE WITHOUT ESOPHAGITIS: ICD-10-CM

## 2023-09-21 DIAGNOSIS — Z95.1 PRESENCE OF AORTOCORONARY BYPASS GRAFT: ICD-10-CM

## 2023-09-21 DIAGNOSIS — D50.0 IRON DEFICIENCY ANEMIA SECONDARY TO BLOOD LOSS (CHRONIC): ICD-10-CM

## 2023-09-21 DIAGNOSIS — F02.80 ALZHEIMER'S DISEASE, UNSPECIFIED (HCC): ICD-10-CM

## 2023-09-21 DIAGNOSIS — R26.2 INABILITY TO AMBULATE DUE TO KNEE: Primary | ICD-10-CM

## 2023-09-21 DIAGNOSIS — Z79.84 LONG TERM (CURRENT) USE OF ORAL HYPOGLYCEMIC DRUGS: ICD-10-CM

## 2023-09-21 DIAGNOSIS — I10 ESSENTIAL HYPERTENSION: ICD-10-CM

## 2023-09-21 DIAGNOSIS — Z86.73 OLD CEREBROVASCULAR ACCIDENT (CVA) WITHOUT LATE EFFECT: ICD-10-CM

## 2023-09-21 DIAGNOSIS — M51.37 DDD (DEGENERATIVE DISC DISEASE), LUMBOSACRAL: ICD-10-CM

## 2023-09-21 DIAGNOSIS — F33.2 MDD (MAJOR DEPRESSIVE DISORDER), RECURRENT SEVERE, WITHOUT PSYCHOSIS (HCC): ICD-10-CM

## 2023-09-21 DIAGNOSIS — I50.42 CHRONIC COMBINED SYSTOLIC AND DIASTOLIC CONGESTIVE HEART FAILURE (HCC): ICD-10-CM

## 2023-09-21 DIAGNOSIS — M06.9 RHEUMATOID ARTHRITIS, INVOLVING UNSPECIFIED SITE, UNSPECIFIED WHETHER RHEUMATOID FACTOR PRESENT (HCC): ICD-10-CM

## 2023-09-21 DIAGNOSIS — M47.817 LUMBOSACRAL SPONDYLOSIS WITHOUT MYELOPATHY: ICD-10-CM

## 2023-09-21 DIAGNOSIS — E78.2 HYPERLIPIDEMIA, MIXED: ICD-10-CM

## 2023-09-21 DIAGNOSIS — M19.90 OSTEOARTHRITIS, UNSPECIFIED OSTEOARTHRITIS TYPE, UNSPECIFIED SITE: ICD-10-CM

## 2023-09-21 DIAGNOSIS — K59.01 SLOW TRANSIT CONSTIPATION: ICD-10-CM

## 2023-09-21 DIAGNOSIS — I25.118 ATHEROSCLEROTIC HEART DISEASE OF NATIVE CORONARY ARTERY WITH OTHER FORMS OF ANGINA PECTORIS (HCC): ICD-10-CM

## 2023-09-21 DIAGNOSIS — E11.51 TYPE 2 DIABETES MELLITUS WITH DIABETIC PERIPHERAL ANGIOPATHY WITHOUT GANGRENE, WITHOUT LONG-TERM CURRENT USE OF INSULIN (HCC): ICD-10-CM

## 2023-09-21 DIAGNOSIS — I25.110 CORONARY ARTERY DISEASE INVOLVING NATIVE CORONARY ARTERY OF NATIVE HEART WITH UNSTABLE ANGINA PECTORIS (HCC): ICD-10-CM

## 2023-09-21 DIAGNOSIS — G30.9 ALZHEIMER'S DISEASE, UNSPECIFIED (HCC): ICD-10-CM

## 2023-09-22 PROBLEM — R06.02 SOB (SHORTNESS OF BREATH): Status: RESOLVED | Noted: 2019-09-17 | Resolved: 2023-09-22

## 2023-10-02 ENCOUNTER — SNF VISIT (OUTPATIENT)
Dept: INTERNAL MEDICINE CLINIC | Age: 78
End: 2023-10-02

## 2023-10-02 ENCOUNTER — TELEPHONE (OUTPATIENT)
Dept: FAMILY MEDICINE CLINIC | Facility: CLINIC | Age: 78
End: 2023-10-02

## 2023-10-02 ENCOUNTER — APPOINTMENT (OUTPATIENT)
Dept: GENERAL RADIOLOGY | Facility: HOSPITAL | Age: 78
End: 2023-10-02
Attending: EMERGENCY MEDICINE

## 2023-10-02 ENCOUNTER — PATIENT OUTREACH (OUTPATIENT)
Dept: CASE MANAGEMENT | Age: 78
End: 2023-10-02

## 2023-10-02 ENCOUNTER — HOSPITAL ENCOUNTER (EMERGENCY)
Facility: HOSPITAL | Age: 78
Discharge: NURSING, REHAB OR LONG-TERM CARE | End: 2023-10-03
Attending: EMERGENCY MEDICINE

## 2023-10-02 VITALS
SYSTOLIC BLOOD PRESSURE: 134 MMHG | OXYGEN SATURATION: 99 % | DIASTOLIC BLOOD PRESSURE: 84 MMHG | RESPIRATION RATE: 16 BRPM | HEART RATE: 77 BPM | BODY MASS INDEX: 32 KG/M2 | WEIGHT: 184.19 LBS | TEMPERATURE: 97 F

## 2023-10-02 DIAGNOSIS — E11.9 TYPE 2 DIABETES MELLITUS WITHOUT COMPLICATION, WITHOUT LONG-TERM CURRENT USE OF INSULIN (HCC): ICD-10-CM

## 2023-10-02 DIAGNOSIS — E78.2 HYPERLIPIDEMIA, MIXED: ICD-10-CM

## 2023-10-02 DIAGNOSIS — I10 ESSENTIAL HYPERTENSION: Primary | ICD-10-CM

## 2023-10-02 DIAGNOSIS — F41.1 GAD (GENERALIZED ANXIETY DISORDER): ICD-10-CM

## 2023-10-02 DIAGNOSIS — S82.142D CLOSED DISPLACED BICONDYLAR FRACTURE OF LEFT TIBIA WITH ROUTINE HEALING: ICD-10-CM

## 2023-10-02 DIAGNOSIS — Z51.89 AFTERCARE: ICD-10-CM
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
## 2023-10-02 DIAGNOSIS — R45.851 SUICIDAL IDEATION: ICD-10-CM

## 2023-10-02 DIAGNOSIS — K13.79 PAINFUL MOUTH: Primary | ICD-10-CM

## 2023-10-02 DIAGNOSIS — F32.A DEPRESSION, UNSPECIFIED DEPRESSION TYPE: Primary | ICD-10-CM

## 2023-10-02 LAB
ALBUMIN SERPL-MCNC: 3.2 G/DL (ref 3.4–5)
ALBUMIN/GLOB SERPL: 0.8 {RATIO} (ref 1–2)
ALP LIVER SERPL-CCNC: 174 U/L
ALT SERPL-CCNC: 22 U/L
AMPHET UR QL SCN: NEGATIVE
ANION GAP SERPL CALC-SCNC: 7 MMOL/L (ref 0–18)
AST SERPL-CCNC: 13 U/L (ref 15–37)
BASOPHILS # BLD AUTO: 0.07 X10(3) UL (ref 0–0.2)
BASOPHILS NFR BLD AUTO: 0.5 %
BENZODIAZ UR QL SCN: NEGATIVE
BILIRUB SERPL-MCNC: 0.3 MG/DL (ref 0.1–2)
BILIRUB UR QL STRIP.AUTO: NEGATIVE
BUN BLD-MCNC: 17 MG/DL (ref 7–18)
CALCIUM BLD-MCNC: 9.2 MG/DL (ref 8.5–10.1)
CANNABINOIDS UR QL SCN: NEGATIVE
CHLORIDE SERPL-SCNC: 103 MMOL/L (ref 98–112)
CLARITY UR REFRACT.AUTO: CLEAR
CO2 SERPL-SCNC: 26 MMOL/L (ref 21–32)
COCAINE UR QL: NEGATIVE
COLOR UR AUTO: COLORLESS
CREAT BLD-MCNC: 1.4 MG/DL
CREAT UR-SCNC: 23 MG/DL
EGFRCR SERPLBLD CKD-EPI 2021: 39 ML/MIN/1.73M2 (ref 60–?)
EOSINOPHIL # BLD AUTO: 0.37 X10(3) UL (ref 0–0.7)
EOSINOPHIL NFR BLD AUTO: 2.4 %
ERYTHROCYTE [DISTWIDTH] IN BLOOD BY AUTOMATED COUNT: 13.9 %
ETHANOL SERPL-MCNC: <3 MG/DL (ref ?–3)
GLOBULIN PLAS-MCNC: 4.2 G/DL (ref 2.8–4.4)
GLUCOSE BLD-MCNC: 106 MG/DL (ref 70–99)
GLUCOSE UR STRIP.AUTO-MCNC: NORMAL MG/DL
HCT VFR BLD AUTO: 38.7 %
HGB BLD-MCNC: 12.8 G/DL
IMM GRANULOCYTES # BLD AUTO: 0.05 X10(3) UL (ref 0–1)
IMM GRANULOCYTES NFR BLD: 0.3 %
KETONES UR STRIP.AUTO-MCNC: NEGATIVE MG/DL
LEUKOCYTE ESTERASE UR QL STRIP.AUTO: NEGATIVE
LIPASE SERPL-CCNC: 89 U/L (ref 13–75)
LYMPHOCYTES # BLD AUTO: 4.32 X10(3) UL (ref 1–4)
LYMPHOCYTES NFR BLD AUTO: 28 %
MCH RBC QN AUTO: 29.2 PG (ref 26–34)
MCHC RBC AUTO-ENTMCNC: 33.1 G/DL (ref 31–37)
MCV RBC AUTO: 88.4 FL
MDMA UR QL SCN: NEGATIVE
MONOCYTES # BLD AUTO: 0.74 X10(3) UL (ref 0.1–1)
MONOCYTES NFR BLD AUTO: 4.8 %
NEUTROPHILS # BLD AUTO: 9.86 X10 (3) UL (ref 1.5–7.7)
NEUTROPHILS # BLD AUTO: 9.86 X10(3) UL (ref 1.5–7.7)
NEUTROPHILS NFR BLD AUTO: 64 %
NITRITE UR QL STRIP.AUTO: NEGATIVE
OPIATES UR QL SCN: NEGATIVE
OSMOLALITY SERPL CALC.SUM OF ELEC: 284 MOSM/KG (ref 275–295)
OXYCODONE UR QL SCN: NEGATIVE
PH UR STRIP.AUTO: 5.5 [PH] (ref 5–8)
PLATELET # BLD AUTO: 410 10(3)UL (ref 150–450)
POTASSIUM SERPL-SCNC: 3.6 MMOL/L (ref 3.5–5.1)
PROT SERPL-MCNC: 7.4 G/DL (ref 6.4–8.2)
PROT UR STRIP.AUTO-MCNC: NEGATIVE MG/DL
RBC # BLD AUTO: 4.38 X10(6)UL
RBC UR QL AUTO: NEGATIVE
SARS-COV-2 RNA RESP QL NAA+PROBE: NOT DETECTED
SODIUM SERPL-SCNC: 136 MMOL/L (ref 136–145)
SP GR UR STRIP.AUTO: 1 (ref 1–1.03)
UROBILINOGEN UR STRIP.AUTO-MCNC: NORMAL MG/DL
WBC # BLD AUTO: 15.4 X10(3) UL (ref 4–11)

## 2023-10-02 PROCEDURE — 74018 RADEX ABDOMEN 1 VIEW: CPT | Performed by: EMERGENCY MEDICINE

## 2023-10-02 RX ORDER — HYDROCODONE BITARTRATE AND ACETAMINOPHEN 5; 325 MG/1; MG/1
1 TABLET ORAL ONCE
Status: COMPLETED | OUTPATIENT
Start: 2023-10-02 | End: 2023-10-02

## 2023-10-02 RX ORDER — IBUPROFEN 200 MG
400 TABLET ORAL ONCE
Status: COMPLETED | OUTPATIENT
Start: 2023-10-02 | End: 2023-10-02

## 2023-10-02 NOTE — ED INITIAL ASSESSMENT (HPI)
Pt states she is currently at [facility] for fx to left leg. Pt states she is unhappy there due to poor care. Pt states she made a SI comment to staff.

## 2023-10-02 NOTE — TELEPHONE ENCOUNTER
Returned call-LM to return call, retry tomorrow      Per 9/14/23- YP suggested [facility] to [facility] or [facility],[address] if pt is not satisfied with care at [facility]

## 2023-10-02 NOTE — TELEPHONE ENCOUNTER
Pt states no BM x 5 days, states MOM and 3 other stool softners given to her,  had some diarrhea last night, better today    Not eating well, because mouth feels raw and dry since last [address] she is losing wt- doctor at facility told her it may be due to her denture but pt does not agree. Pt at [facility] x 1 month, does not feel taken care of, feels her concerns are not being addressed  Notified pt, I will discuss with nurse at facility if they are able to have pt go to ER for eval.             Jany Burk of [employer]  [address], [place], [address] [facility]   [phone]  LM for nurse, Frances Chappell

## 2023-10-02 NOTE — PROGRESS NOTES
Spoke to Rhett for CCM. Updates to patient care team/comments: No changes per patient  Patient reported changes in medications: No changes per patient  Med Adherence  Comment: Taking as prescribed    Health Maintenance: Pt aware  COVID-19 Vaccine(3 - Pfizer series) due on 06/02/2021  Zoster Vaccines(2 of 2) due on 09/10/2021  Influenza Vaccine(1) due on 10/01/2023  Diabetes Care A1C due on 01/11/2024  Diabetes Care: Microalb/Creat Ratio due on 03/02/2024  Diabetes Care Dilated Eye Exam due on 06/05/2024  Annual Physical due on 07/10/2024  Diabetes Care: GFR due on 09/06/2024  DEXA Scan Completed  Annual Depression Screening Completed  Fall Risk Screening (Annual) Completed  Diabetes Care Foot Exam (Annual) Completed  Pneumococcal Vaccine: 65+ Years Completed  Colorectal Cancer Screening Discontinued  Mammogram Discontinued    Patient updates/concerns:  Patient called stating she is still at [facility] rehab facility following a fracture of her left leg per patient. Patient requesting a message to be sent to Dr Lloyd Comes office to see if he can suggest something for her mouth. Patient stated her mouth has been feeling very raw. Patient states the onsite physician suggested it may be due to mouth being dry but patient does not believe this is the case. Patient also states she has not had a bowel movement in about 5 days. Patient requesting they call on her personal cell number. Patient states she is eager to speak with him or his nurse to help herself more comfortable. Patient is not happy about her having to stay at [facility]. Goals/Action Plan: Active goal from previous outreach:     Recover from fracture of her left leg patient and regain mobility  Patient reported progress towards goals:                - What: Patient reports mouth feeling very raw and having some constipation for the last 4-5 days. - Where/When/How: Patient requested a message to be sent to pcp on her behalf.  Patient stated she would them to call her on her cell phone. Patient Reported Barriers and Concerns: Message sent to Dr. Franco Horner clinical staff patient aware they will contact her for further assistance. - Plan for overcoming barriers: TE sent to pcp on patient behalf. Patient to continue to follow up with care team as scheduled or as needed. Care Managers Interventions: Messaged Dr. Franco Horner clinical staff as requested by patient. Patient aware they will contact on her cell phone as requested. Continue independent living, healthy diet and exercise routine. Continue to provide encouragement, support, and education for healthy coping and diagnosis. Future Appointments: No future appointments. Next Care Manager Follow Up Date: 1 month follow up or sooner if needed. Reason For Follow Up: review progress and or barriers towards patient's goals. Time Spent This Encounter Total: 25 min medical record review, telephone communication, care plan updates where needed, education, goals, and action plan recreation/update. Provided acknowledgment and validation to patient's concerns.    Monthly Minute Total including today: 25  Physical assessment, complete health history, and need for CCM established by Jessica Zapien DO.

## 2023-10-02 NOTE — TELEPHONE ENCOUNTER
Patient stated her mouth has been feeling very raw. Patient states the onsite physician suggested it may be due to mouth being dry but patient does not believe this is the case. Patient also states she has not had a bowel movement in about 5 days.  Patient would like a call back as soon as possible on her cell phone [phone] Please advise

## 2023-10-03 VITALS
TEMPERATURE: 97 F | RESPIRATION RATE: 20 BRPM | SYSTOLIC BLOOD PRESSURE: 130 MMHG | HEART RATE: 72 BPM | OXYGEN SATURATION: 96 % | DIASTOLIC BLOOD PRESSURE: 80 MMHG

## 2023-10-03 PROBLEM — F33.2 MAJOR DEPRESSIVE DISORDER, RECURRENT SEVERE WITHOUT PSYCHOTIC FEATURES (HCC): Status: ACTIVE | Noted: 2023-10-03

## 2023-10-03 PROBLEM — F41.1 GENERALIZED ANXIETY DISORDER: Status: ACTIVE | Noted: 2023-10-03

## 2023-10-03 LAB
ATRIAL RATE: 69 BPM
P AXIS: 36 DEGREES
P-R INTERVAL: 156 MS
Q-T INTERVAL: 420 MS
QRS DURATION: 84 MS
QTC CALCULATION (BEZET): 450 MS
R AXIS: 11 DEGREES
T AXIS: 49 DEGREES
VENTRICULAR RATE: 69 BPM

## 2023-10-03 PROCEDURE — 90792 PSYCH DIAG EVAL W/MED SRVCS: CPT | Performed by: PHYSICIAN ASSISTANT

## 2023-10-03 RX ORDER — ACETAMINOPHEN 500 MG
1000 TABLET ORAL ONCE
Status: COMPLETED | OUTPATIENT
Start: 2023-10-03 | End: 2023-10-03

## 2023-10-03 RX ORDER — OLANZAPINE 10 MG/2ML
5 INJECTION, POWDER, FOR SOLUTION INTRAMUSCULAR ONCE
Status: COMPLETED | OUTPATIENT
Start: 2023-10-03 | End: 2023-10-03

## 2023-10-03 RX ORDER — OMEGA-3S/DHA/EPA/FISH OIL/D3 360MG-1000
1 CAPSULE ORAL
COMMUNITY

## 2023-10-03 RX ORDER — QUETIAPINE FUMARATE 25 MG/1
100 TABLET, FILM COATED ORAL NIGHTLY
Status: DISCONTINUED | OUTPATIENT
Start: 2023-10-03 | End: 2023-10-04

## 2023-10-03 RX ORDER — CLONAZEPAM 0.5 MG/1
1 TABLET ORAL 2 TIMES DAILY PRN
Status: DISCONTINUED | OUTPATIENT
Start: 2023-10-03 | End: 2023-10-04

## 2023-10-03 RX ORDER — WATER 10 ML/10ML
INJECTION INTRAMUSCULAR; INTRAVENOUS; SUBCUTANEOUS
Status: COMPLETED
Start: 2023-10-03 | End: 2023-10-03

## 2023-10-03 RX ORDER — LIDOCAINE 50 MG/G
1 PATCH TOPICAL EVERY MORNING
COMMUNITY
End: 2023-10-24

## 2023-10-03 RX ORDER — OLANZAPINE 5 MG/1
5 TABLET, ORALLY DISINTEGRATING ORAL DAILY
Status: DISCONTINUED | OUTPATIENT
Start: 2023-10-03 | End: 2023-10-03

## 2023-10-03 NOTE — ED NOTES
Spoke with Delaney Rodriguez from [facility] who confirmed they have received the packet, but have not reviewed it yet.

## 2023-10-03 NOTE — ED QUICK NOTES
Pt declined to sign \"Rights of Individuals Receiving Mental Health and Development Disability Services\" when [place] [employer], [address] asked for Pt signature. Pt still being belligerent and threatening. Pt still attempting to get out of bed. Pt calling sitter names as sitter is trying to redirect Pt \"Are you the guard dog? \"

## 2023-10-03 NOTE — ED QUICK NOTES
Rounding Completed. Plan of Care reviewed. Waiting for placement. Elimination needs assessed. Eye mask offered. Pt declined    Bed is locked and in lowest position. Call light within reach. Sitter remains at bedside.

## 2023-10-03 NOTE — ED QUICK NOTES
Spoke to [facility] from [address] at [facility] for potential transfer. Will receive call back.

## 2023-10-03 NOTE — ED NOTES
This writer contacted the following hospitals:     [facility]: Spoke with Justin Kemp, who reports they are at capacity, and she is unsure of discharges. [facility]: Spoke with Telly Leo, patient access specialist , who requested the packet be faxed. This writer did not give verbal clinical.  This writer faxed packet to [facility]: Spoke with Marek,crisis counselor, who reports at the moment they do not have beds, but that they might have discharges later and to call back.

## 2023-10-03 NOTE — ED PROVIDER NOTES
By outgoing physician pending [facility] evaluation. Patient was evaluated by [facility] crisis worker discussed with psychiatrist and recommendations were made for inpatient psychiatric admission. Patient was medically cleared previously. Petition and certificate completed. Patient became somewhat agitated after being told she would need psychiatric hospitalization. Patient was treated with Zyprexa IM with improvement. Endorsed to oncoming physician to monitor until placement before.

## 2023-10-03 NOTE — ED NOTES
This writer received a call from Dawit Barrientos from the [address] [place] [address], he reports he spoke with his attending regarding PT. The attending stated due to the PT's medical complexities she will need specialized staffing. Dawit Barrientos reports they do not have this staffing today, but to try calling back tomorrow.

## 2023-10-03 NOTE — ED QUICK NOTES
Provided new linen for pt. Provided menu for dinner tray. Pt aware of transfer to [facility] in [place]. Awaiting nurse call from [facility] for nurse to nurse.

## 2023-10-03 NOTE — ED NOTES
This writer spoke with Brandon Henley who reports PT is accepted to [facility] by , PT will be going to the senior behavioral unit. Transport time will be given once nurse to nurse is complete.

## 2023-10-03 NOTE — ED QUICK NOTES
Benny Magallanes at bedside speaking with Pt and notifying Pt that she's being admitted InPt Involuntarily.

## 2023-10-03 NOTE — ED NOTES
Patient was assessed and recommended inpatient admission. [facility] has no beds currently but they will review for bed availability later today. Patient was updated on the POC and was not in agreement. Patient declined to sign the Rights of Individuals Receiving [address] and the Application for Voluntary Admission. The petition was activated and paperwork was sent to the [place] [address]. Patient was provided copies of the paperwork. Patient stated she would not be able to hold onto her paperwork while laying on the hospital bed. This writer offered to place it on her chart for her and patient agreed.

## 2023-10-03 NOTE — BH LEVEL OF CARE ASSESSMENT
Crisis Evaluation Assessment    Leo Kellogg YOB: 1945   Age 69year old MRN [id number]   Location [facility] Attending Torito Wang, DO      Patient's legal sex: female  Patient identifies as: female  Patient's birth sex: female  Preferred pronouns:     Date of Service: 10/2/2023    Referral Source:  Referral Source  Where was crisis eval performed?: On-site  Referral Source: Treatment Center/Nursing Home  Treatment Center/Nursing Home: Nursing Home  Organization Name: [facility] [address]  Phone: [phone]     Reason for Crisis Evaluation   Jose Hinton is a 69 yr old female who arrived to the ER via ambulance from [facility] 5 d/t [place] [facility]. She states she was sent here because she is \"really sick and can't get it taken care of\" at [place]. She denies telling them she wanted to kill herself and denied looking for something sharp. She denies making any Si statements to [employer] . She states she told someone at [facility] that she was at [facility] 10 yrs ago due to being severely depressed. She states they asked about the marks on her arm and she told them that she cut herself 6 months ago. She states she is sick and they don't care. She states every night she cries herself to sleep. She states no one there helps her. She states they are against her because she complained after being there for 2 weeks. She states the only good part about that place is the physical therapy section. She states she called her son asking him to come pick her up but he said that they won't let her go. She states that she can't leave [facility] until she is released from physical therapy. She states she wants to get her leg better so she can go home. She states she \"can't handle 2 more weeks there. \"                     Collateral  Torsten Govea RN at [facility] 1808: Per RN, Jose Hinton had suicidal thoughts.  RN states she was told that Jose Hinton was trying to hurt herself and was looking for something in her room to cut herself with. The reason for this was that she wanted to be discharged home. RN states she's not physically aggressive. RN states at times she acts like she's confused. RN states she hasn't noticed her having AVH. She has been taking her medication as prescribed. Her sleep is on and off. She sometimes asks for something to help her with sleep. She has been eating well. Risk to Self or Others  Rossana Mccarthy denies current thoughts or hx of harming others. She states 5-6 yrs ago she threw juice glasses into her sink and they broke during an argument with her . Suicide Risk Assessments:    Source of information for CSSR: Patient  In what setting is the screener performed?: in person  1. Have you wished you were dead or wished you could go to sleep and not wake up? (past 30 days): Yes  2. Have you actually had any thoughts of killing yourself? (past 30 days): Yes  3. Have you been thinking about how you might kill yourself? (past 30 days): Yes  4. Have you had these thoughts and had some intention of acting on them? (past 30 days): No  5a. Have you started to work out or worked out the details of how to kill yourself? (past 30 days): No  5b. Do you intend to carry out this plan? (past 30 days): No  6. Have you ever done anything, started to do anything, or prepared to do anything to end your life? (lifetime): Yes  7. How long ago did you do any of these?: Within the last three months  Score -  OV: 7 - High Risk   Describe : Rossana Mccarthy states she feels like a prisoner at [facility] and states she can't go home unless she has a release from physical therapy. She states she had thoughts of killing herself when she gets home. She states \"I don't know if I have the nerve\" when asked about intent. She states that she asked a doctor on Friday what would happen if she took a bottle of Seroquel. She reports having SI for 6-8 months.  She states she wouldn't try to kill herself at [facility] because there isn't anything there that she could use. Is your experience of thoughts of dying by suicide: A Solution to a Problem  Protective Factors: it used to be her daughter  Past Suicidal Ideation: Ideation;Method/Plan;Intention;Rehersal/Research  Describe: She states about 2 months ago she cut deep on her left forearm. Non-Suicidal Self-Injury:   González Matos states she last self harmed by cutting about 2-6 months ago. She states she hasn't had thoughts about self harming while she's been [place] maybe when I get home. \"           Access to Means:  Access to Means  Has access to means to attempt suicide or harm others or property: No  Discussion of Removal of Access to Means: González Matos states she doesn't have access to anything to harm herself with at [facility]. She states she won't harm herself at [facility]. Access to Firearm/Weapon: No  Discussion of Removal of Firearm/Weapon: González Matos denies access to firearms  Do you have a firearm owner ID card?: No    Protective Factors:   Protective Factors: it used to be her daughter    Review of Psychiatric Systems:  She states she gets 3 hrs of sleep at night due to not getting her night meds until 10-11pm then gets woken up at 4am.     She states she hasn't really eaten anything in 3 days except for a small amount of watermelon due to pain in her mouth. She reports anxiety. She states today she had a panic attack before coming here. She states yesterday she had a crying session about wanting to get out of [facility]. She reports crying, she states she lays on the bed trying to think of happy times but she can't. She reports feelings of worthlessness, helplessness, and hopelessness. She reports hx of seeing images. She states the last time was 3-4 months ago when she saw her  in the door. She denies AH. She reports paranoia at [facility] due to [employer]. \" She states staff stood behind a curtain asking what she needs without actually coming around the curtain and looking at her. Substance Use:  Colette Sosa states she used to drink scotch 8-10 yrs ago. She states this was before her heart problem and hasn't drank since. Her BAL was 0 @ 6:36pm on 10/2/23. She denies drug use. Her drug screen was negative. Functional Achievement:   Colette Sosa currently uses a wheelchair to ambulate d/t having a broken left leg. Per Lexy Shah, she showers herself but needs someone to stand by. She dresses herself. Current Treatment and Treatment History:  Psychiatry: MICKI Garcia. Her last appt was 9/5/23. She takes her medication as prescribed. Therapist: She last saw a therapist almost a year ago. Inpatient psychiatric admissions: at [facility] in April 2021            Relevant Social History:  Colette Sosa currently resides at [facility] for re[facility]. She was living alone prior to this. She states her daughter just moved out of state right before she fell. She states she and her daughter were close. She states her son lives in town. She states that she doesn't have a Healthcare POA.            [facility] and Complex (as applicable):  Geriatric Functioning  Dementia Symptoms Observed/Expressed: No  Current Living Situation  Current Living Situation: [address] [place]  Is the patient verbal?: Yes  Vision or hearing correction?: Eye Glasses  Patient able to understand and follow directions?: Yes  Patient able to express needs?: Yes  Feeding and Swallowing  History of aspiration or choking?: No  Feeding assistance needed?: No  Patient have any chewing or swallowing problems?: No  Special Diet: No  Mobility/Activity & Assistive Devices  Current/recent injuries or surgeries that affect mobility?: Yes (Comment)  Physical Limitations Present: None  Independent in ambulation?: No  Transfer Assist: 1-2 Person assist  Assistive Device Used[de-identified] Wheelchair (due to broken leg)  History of falls?: Yes  When was the most recent fall?: 9/12/23 at [facility]  How often has the patient fallen in the last month?: She states she tripped and fell at home on 9/4/23. Grooming  Level of independence in dressing: Fully Independent Munir Tao RN states she can dress herself.)  Level of independence to shower/bathe/wash: Moderate assist Freddie Flaming states some staff help wash her. She states other staff gets her set up in the shower and she bathes herself then they rinse her off. / [facility] RN states she can shower herself but needs someone to stand by.)  Level of independence in personal grooming tasks (e.g., brushing teeth, brushing hair, applying hearing aids, shaving, etc.): Fully Independent Freddie Flaming states she rubs toothpaste on her teeth with her finger because [facility] staff don't take her to the bathroom so she can brush her teeth.)  Toileting  Patient Incontinence: None  Special Considerations  Patient has pressures sores, surgical wounds, bandages/dressings?: No  Patient uses any kind of pump?: No  Does the patient need a BiPAP or CPAP?: No  Intellectual disability reported?   Intellectual Disability?: No         EDP Assessment (as applicable):                                                                       Abuse Assessment:  Abuse Assessment  Does anyone say or do something to you that makes you feel unsafe?: No    Mental Status Exam:   General Appearance  Characteristics: Appropriate clothing  Eye Contact: Direct     Mood and Affect  Mood or Feelings: Calm  Appropriateness of Affect: Congruent to mood  Attitude toward staff: Co-operative  Speech  Rate of Speech: Appropriate  Flow of Speech: Appropriate  Intensity of Volume: Ordinary  Cognition  Insight: Fair  Judgment: Fair  Thought Patterns  Clarity/Relevance: Coherent  Content: Ordinary     Behavior  Exhibited behavior: Appropriate to situation;Participated      Disposition:    Assessment Summary:   Jayden Rice is a 69-year-old female who arrived to the ER via EMS from [place] [address] due to SI. She is at [facility] currently for rehab due to having a broken left leg. Per Aruna Ayon, today she was looking for something in her room to cut herself with. [facility] RN states she was doing this because she wanted to be discharged home. Ria Lewis denies wanting to kill herself today and denied looking for something sharp. She states she told someone at [facility] that she was admitted to [facility] 10 years ago due to being severely depressed. She reports that the person at [facility] asked about the marks on her arm and she told them that she cut herself 6 months ago. She reports that she is sick and says that they do not care. She states that no one there helps her and they are against her because she complained after being there for 2 weeks. She states on Friday she asked the doctor what would happen if she took a bottle of Seroquel. She reports having thoughts of killing herself when she gets home and states \"I don't know if I have the nerve\" when asked about intent. She states that she would not try to kill herself at [facility] because \"there isn't anything there that I could use. \"  When asked if she would try to hurt herself if there was something at [facility] that she could use, she said yes. She reports having SI for 6-8 months. When asked about a history of suicide attempts, she states that 2 months ago she cut deep on her left forearm. She reports a history of self harming and states that she last self harmed by cutting on her left forearm 6 months ago. She denied thoughts of self harming while at [facility] but states \"but maybe when I get home. \"  She denies HI/AH. She reports she cries herself to sleep. She reports laying on the bed and trying to think of happy times but states that she cannot. She reports feelings of worthlessness, helplessness, and hopelessness. She reports anxiety. She states that she has not really eaten anything in 3 days except for a small amount of watermelon due to mouth pain.   She reports sleeping for 3 hours at night due to not getting her night meds until 10 or 11 PM.  She reports a history of VH and states the last time was 3-4 months ago when she saw her  in the door. She reports paranoia at [facility] due to the staff's \"mannerisms. \"  She states 1 staff person stood behind a curtain asking her what she needs and would not step out of from behind the curtain when Rhett asked to see them. [place] RN states she has been taking her medication as prescribed. She has an outpatient psychiatric provider. She does not currently have an outpatient therapist.  She has a history of inpatient psychiatric admissions. Per chart, her last inpatient admission was at [facility] in April 2021. Risk/Protective Factors  Protective Factors: it used to be her daughter    Level of Care Recommendations  Consulted with: MICKI Chambers was paged 2x with no response. Dr. Shabbir Cortez and Dr. Deana Byrnes recommend inpatient admission.   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

## 2023-10-03 NOTE — ED QUICK NOTES
Rounding Completed    Plan of Care reviewed. Waiting for placement. Elimination needs assessed. Pt escorted to C-Pod washroom by ED PCT per wheelchair for elimination needs. Bed is locked and in lowest position. Call light within reach.

## 2023-10-03 NOTE — ED NOTES
[facility] approved external transfer. This writer contacted the following hospitals:     [address] with assessment and referrals clinician Chandan Jeff who reports they are unable to accommodate PT due to her wearing a brace. [facility]: Spoke with [facility] who reports they do not have an appropriate bed for PT at this time. [facility]: Spoke with  Jocelynn Hunter, who reports they do not have female beds today. [facility]: Spoke with [facility] who spoke with his supervisor and reports they do not have an appropriate bed for PT today. However, [facility] reports they will have discharges tomorrow and to try calling back tomorrow. [facility]: Spoke with Carolin Huertas who reached out to his charge RN. Carolin Huertas reports they would not be able to take PT due to PT being too medically complex for them. [facility]: Spoke with Ashanti Stephenson who reports PT would need a one to one and they do not have staffing across their system for a one to one. [address]: Spoke with Georgia Pichardo who reports they do not have a shubham-psych unit. [facility]: Spoke with Rebel Tolbert, crisis worker, who reports they do not have any beds nor any discharges today. [address]: Spoke with Nia Whittington who reports they are unable to accommodate PT's who do not ambulate. [facility]: Spoke with Debra , who reports they would not be able to accommodate due to PT's medical acuity. [facility]: Left a voicemail. [address] [facility] with an  who reports their shubham unit is at capacity with no anticipated discharges today. [facility]: Spoke with Laith Moraes who reports they cannot accept PT's that are unable to ambulate independently. [facility]: Spoke with Alberto Carlos who reports they do not have a shubham psych unit. [facility]: Spoke with Joe Mojica who reports to fax the packet to [phone].  This writer faxed the packet. Joaquin Olson reports he reviewed the packet, however due to PT's need for assistance with ambulating, bathing, and transferring they are unable to accept PT.     [facility]: Spoke with [facility] who reports to fax the packet to [phone]. This writer faxed the packet. [facility]: Sent page, waiting for call back. [facility]: Spoke with Christin Chacon who reports they are unable to accommodate PT that has a brace. [employer] [facility]: Spoke with Lino who reports they are unable to accommodate PT due to their medical acuity. [facility]: Spoke with Amadeo Castro who requests the packet be faxed to [phone]. This writer faxed packet. [address]: Spoke with [employer] [facility] who reports they do not take shubham psych PT's.

## 2023-10-03 NOTE — ED PROVIDER NOTES
Patient stable throughout my shift. Patient is awaiting psychiatric placement. Patient will be signed out to oncoming emergency physician awaiting final disposition.

## 2023-10-03 NOTE — ED QUICK NOTES
Rounding Completed    Plan of Care reviewed. Waiting for [facility] crisis evaluation. Elimination needs assessed. Pt escorted by ED PCT to B-Pod washroom per wheelchair since Pt had to void. Provided warm blankets. Bed is locked and in lowest position. Call light within reach.

## 2023-10-03 NOTE — CERTIFICATION
Ref: [id number] 5/3-403, 5/3-602, 5/3-607, 5/3-610    5/3-702, 5/3-813, 5/4-306, 5/4-402, 5/4-403    5/4-405, 5/4-501, 5/4-611, 4/1-700   Inpatient Certificate  Re: Gee Parker    (name)     I personally informed the above-named individual of the purpose of this examination and that he or she did not have to speak to me, and that any statements made might be related in court as to the individual's clinical condition or need for services. Additionally, if this examination was for the purpose of determining that the above-named individual is developmentally disabled and dangerous, I informed the individual of his or her right to speak with a relative, friend or  before the examination, and of his or her right to have an  appointed for him or her if he or she so desired.      Electronically signed by Fidel Osuna MD    Signature of Examiner     On October 3 , 2023 , at 10:30  [x] a.m. [] p.m.,  I personally examined the    (date)  (year)  (time)    above-named individual. The examination was conducted in person at [facility].  Or   [] [facility] (telepsychiatry)      Based on the foregoing examination, it is my opinion that he or she is:  [x]  A person with mental illness who, because of his or her illness is reasonably expected, unless treated on an inpatient basis, to engage in conduct placing such person or another in physical harm or in reasonable expectation of being physically harmed;   []  A person with mental illness who, because of his or her illness is unable to provide for his or her basic physical needs so as to guard himself or herself from serious harm, without the assistance of family or others, unless treated on an inpatient basis;   []  A person with mental illness who: refuses treatment or is not adhering adequately to prescribed treatment; because of the nature of his or her illness is unable to understand his or her need for treatment; and if not treated on an inpatient basis, is reasonably expected based on his or her behavioral history, to suffer mental or emotional deterioration and is reasonably expected, after such deterioration, to meet the criteria of either paragraph one or paragraph two above;   []  An individual who is developmentally disabled and unless treated on an in-patient basis is reasonably expected to inflict serious physical harm upon himself or herself or others in the near future, and/or   [x]  Is in need of immediate hospitalization for the prevention of such harm. I base my opinion on the following (including clinical observations, factual information):  Patient with history of depression and chronic borderline diathesis, at least 5 previous hospitalizations for similar presentation (suicidal thoughts with plan, suicidal gestures), was brought to ED from her skilled rehab center for verbalizing suicidal thoughts, looking for things she can use to hurt herself, asking the doctor there \"what would happen if I took a whole bottle of Seroquel\", and verbalizing thoughts about killing herself \"when she gets home\".   I believe that the individual is subject to: []  Involuntary inpatient admission and is not in need of immediate hospitalization   (check one) [x]  Involuntary inpatient admission and is in need of immediate hospitalization    []  Judicial inpatient admission and is not in need of immediate hospitalization    []  Judicial inpatient admission and is in need of immediate hospitalization     Date: 10/3/2023 Signature: Electronically signed by Prosper France MD   Title: MD Printed Name: Prosper France MD     [place] [id number] ([id number]) Inpatient Certificate  Printed by [address] [place] [address] Page 1 of 1

## 2023-10-03 NOTE — ED QUICK NOTES
Pt arguing with Cally Rosanna stating that she doesn't wants to go to [facility], needs all her belongings so she can \"get out of here\". Pt also wants to call her Son. Phone was provided and call was attempted to call her Son - no answer. Pt also hitting the counter ledge with her right hand and has been threatening to leave.

## 2023-10-03 NOTE — ED QUICK NOTES
Rounding Completed    Plan of Care reviewed. Waiting for [facility] crisis evaluation. Elimination needs assessed. Pt escorted to B-Pod washroom by ED PCT per wheelchair for elimination needs. Provided mouth moisturizer, quique crackers and apple sauce. Oral care instructions given, provided and encouraged BID. No evidence of learning noted. Pt needs reinforcement of instructions. Bed is locked and in lowest position. Call light within reach.

## 2023-10-03 NOTE — ED NOTES
This writer followed up with the following hospitals:     [facility]: Spoke with Cecilio Early, who reports they are unable to have physical therapy come onto the unit. [employer]: According to Elie Valdovinos, [facility] is unable to send physical therapy to the behavioral health building. [facility]: Spoke with charge RN Dagoberto Tuttle, who reports due to PT being unable to ambulate they would not be able to accommodate PT. Dagoberto Tuttle stated they can provide physical therapy to patients that can ambulate independently or with the assistance of a walker.

## 2023-10-03 NOTE — ED QUICK NOTES
Rounding Completed    Plan of Care reviewed. Waiting for [facility] crisis evaluation. Elimination needs assessed. Provided toothbrush and toothpaste per request.    Bed is locked and in lowest position. Call light within reach.

## 2023-10-03 NOTE — ED NOTES
[facility] to review bed availability later today. Per Felicia Borges [facility] nursing supervisor, they need more information on patient's physical therapy from [facility]. They need to know how often and what days she receives physical therapy to see if they can accommodate patient.

## 2023-10-03 NOTE — ED QUICK NOTES
Gricelda Ruiz Dr at bedside for crisis evaluation. Prior to wheeling Pt to M-Pod per cart, Pt was asking the reason for evaluation stating that she's here for her abdominal pain and nothing else. Explained to Pt that she's been medically cleared after XR, blood and urine tests were done. Pt agreed to crisis evaluation and was wheeled to M-Pod by ED PCT for crisis eval by [address] [place] [address].

## 2023-10-03 NOTE — ED QUICK NOTES
Rounding Completed    Plan of Care reviewed. Waiting for placement. Elimination needs assessed. Pt escorted to B-Pod washroom by ED PCT per wheelchair for elimination needs. Bed is locked and in lowest position. Call light within reach.

## 2023-10-03 NOTE — ED NOTES
This writer contacted [facility] and spoke with [address] [place] [address] who reports they only have one male bed at this time and to try calling back at [facility].

## 2023-10-04 NOTE — ED QUICK NOTES
[facility] contacted at [phone], to transport pt to [facility] in [place].  ETA ~ 70 minutes

## 2023-10-04 NOTE — TELEPHONE ENCOUNTER
Please check with  from hospital.  It seems from what I read in chart they are trying to get her inpatient psych acutely now. But after that if  or us work on her going to [address] [place] [address].

## 2023-10-04 NOTE — TELEPHONE ENCOUNTER
Dr. Jey Quintanilla,  We cannot view [facility] information or any psych info. Per notes from SNF -  to discuss placement with family.

## 2023-10-04 NOTE — ED QUICK NOTES
Patient asleep on cart, respirations easy and unlabored. Multiple calls made by [facility] to [facility], still no answer at this time, still no RN to RN report given.   Will continue to monitor

## 2023-10-04 NOTE — ED QUICK NOTES
Rounding Completed    Plan of Care reviewed. Waiting for [facility] to call for RN to RN report. .  Elimination needs assessed. Patient eating dinner at this time. Vitals reassessed. 1:1 sitter remains in place    Bed is locked and in lowest position. Call light within reach.

## 2023-10-12 ENCOUNTER — TELEPHONE (OUTPATIENT)
Dept: CASE MANAGEMENT | Facility: HOSPITAL | Age: 78
End: 2023-10-12

## 2023-10-12 NOTE — CM/SW NOTE
Patient called asking which orthopedic doctor she was to follow up with. Patient provided with Dr. Raj Foreman contact information.

## 2023-10-13 ENCOUNTER — TELEPHONE (OUTPATIENT)
Dept: FAMILY MEDICINE CLINIC | Facility: CLINIC | Age: 78
End: 2023-10-13

## 2023-10-13 DIAGNOSIS — S82.101A CLOSED FRACTURE OF PROXIMAL END OF RIGHT TIBIA, UNSPECIFIED FRACTURE MORPHOLOGY, INITIAL ENCOUNTER: Primary | ICD-10-CM

## 2023-10-13 NOTE — TELEPHONE ENCOUNTER
Patient back on 9/4 fell and broke her leg. Patient has been at [facility] since.  She states that they will not let her leave yet and that she needs to see ortho or her PCP for an x-ray.    Please advise.

## 2023-10-13 NOTE — TELEPHONE ENCOUNTER
Patient remains at [facility] facility for PT.  Status post tibial plateau fracture.  She is requesting to see ortho for evaluation and is asking for Dr Bunn to make recommendation. She needs clearance/ proof fracture is healing before she can be discharged home.  Please advise. .

## 2023-10-16 ENCOUNTER — TELEPHONE (OUTPATIENT)
Dept: FAMILY MEDICINE CLINIC | Facility: CLINIC | Age: 78
End: 2023-10-16

## 2023-10-16 ENCOUNTER — PATIENT OUTREACH (OUTPATIENT)
Dept: CASE MANAGEMENT | Age: 78
End: 2023-10-16

## 2023-10-16 ENCOUNTER — TELEPHONE (OUTPATIENT)
Dept: ORTHOPEDICS CLINIC | Facility: CLINIC | Age: 78
End: 2023-10-16

## 2023-10-16 DIAGNOSIS — M79.605 LEFT LEG PAIN: Primary | ICD-10-CM

## 2023-10-16 NOTE — TELEPHONE ENCOUNTER
Amy, care manager with [facility], said pt did not have surgery.  They are requesting ortho referral.       Please advise

## 2023-10-16 NOTE — TELEPHONE ENCOUNTER
Amy, care manager with [facility], said pt did not have surgery.  They are requesting ortho referral.

## 2023-10-16 NOTE — TELEPHONE ENCOUNTER
Imaging was completed for this patient for a mildly displaced lt tibial plateau fracture , but it needs to be reviewed to see if additional imaging is needed. If so, please enter the appropriate RX and let the patient know to come in before their appointment to complete the additional imaging. Thank you!     Future Appointments   Date Time Provider Jitendra Cantrell   10/19/2023  1:00 PM Carline Pulido MD [facility] [id number]

## 2023-10-16 NOTE — TELEPHONE ENCOUNTER
Pt in the hospital and did not have surgery. Wants Dr Mable Gil to tell her an ortho to go to. She states she needs this information before she goes home from the hospital.    Please call her back to advise.

## 2023-10-16 NOTE — PROGRESS NOTES
Patient called requesting help on getting Dr. Amarilis Dixon to refer her to a orthopedic doctor as patient is eager to be discharged home from [facility]. Patient states she has not heard back from anyone. Call made to Dr. Amarilis Dixon requesting a nurse to follow up with Dr. Amarilis Dixon on who her prefers patient to see to get her cleared if applicable to go home. LM with psr who stated she would forward the request to one of Dr. Mandie Greenfield. Contacted patient made her aware they would be calling her for an update. Patient acknowledged.       Time Spent This Encounter Total: 5  min medical record review  Monthly Minute Total including today: 30 minutes

## 2023-10-17 NOTE — TELEPHONE ENCOUNTER
XR ordered per ortho protocol. XR scheduled and patient was notified via [facility] to let them know that they should arrive 15-20 minutes early, in order for them to complete imaging.

## 2023-10-17 NOTE — TELEPHONE ENCOUNTER
LM for Amy and Arcelia  Referral placed for ortho:    Akil sosa  [address] [address] [phone]

## 2023-10-19 ENCOUNTER — HOSPITAL ENCOUNTER (OUTPATIENT)
Dept: GENERAL RADIOLOGY | Age: 78
Discharge: HOME | End: 2023-10-19
Attending: ORTHOPAEDIC SURGERY
Payer: MEDICARE

## 2023-10-19 ENCOUNTER — TELEPHONE (OUTPATIENT)
Dept: ORTHOPEDICS CLINIC | Facility: CLINIC | Age: 78
End: 2023-10-19

## 2023-10-19 ENCOUNTER — OFFICE VISIT (OUTPATIENT)
Dept: ORTHOPEDICS CLINIC | Facility: CLINIC | Age: 78
End: 2023-10-19
Payer: MEDICARE

## 2023-10-19 VITALS — WEIGHT: 194 LBS | HEIGHT: 64 IN | BODY MASS INDEX: 33.12 KG/M2

## 2023-10-19 DIAGNOSIS — M79.605 LEFT LEG PAIN: ICD-10-CM

## 2023-10-19 DIAGNOSIS — S82.145D: Primary | ICD-10-CM

## 2023-10-19 PROCEDURE — 73564 X-RAY EXAM KNEE 4 OR MORE: CPT | Performed by: ORTHOPAEDIC SURGERY

## 2023-10-19 RX ORDER — NYSTATIN 100000 [USP'U]/G
POWDER TOPICAL
COMMUNITY
Start: 2023-09-27

## 2023-10-19 NOTE — TELEPHONE ENCOUNTER
Requested per Dr. Preet Singh to fax external PT order to patient's facility, [facility]. Faxed PT order to facility and called to confirm receipt. Facility confirmed that they received the faxed PT order for the patient.

## 2023-10-20 ENCOUNTER — TELEPHONE (OUTPATIENT)
Dept: ORTHOPEDICS CLINIC | Facility: CLINIC | Age: 78
End: 2023-10-20

## 2023-10-20 NOTE — TELEPHONE ENCOUNTER
Patient called and just wanted to apologize for her actions yesterday, and also wants to thank Dr. Piedra for her new brace. Please advise.    Patient may be reached at [phone]

## 2023-10-23 ENCOUNTER — TELEPHONE (OUTPATIENT)
Dept: ORTHOPEDICS CLINIC | Facility: CLINIC | Age: 78
End: 2023-10-23

## 2023-10-23 NOTE — TELEPHONE ENCOUNTER
Pt called and LMOM over the weekend stating that her appt with Dr. Kelli Capone needs to be moved up, as it should be a 2 week f/u not 3. Per Hiro Rincon, pt is to follow up in 2-3 weeks. I was unable to [facility] informing pt her scheduled appt was okay to keep, as it rang and then hung up.      Future Appointments   Date Time Provider Jitendra Cantrell   11/10/2023  9:00 AM Tyree Bosworth, MD [facility] [id number]

## 2023-10-23 NOTE — TELEPHONE ENCOUNTER
Patient called to speak with a nurse regarding moving up her appt date \"to get out of the hospital\" I did inform patient that Dr. Felecia Jay is going to be out of office on the dates she was requesting but she stated \"that can't be right\" please advise

## 2023-10-30 ENCOUNTER — SNF VISIT (OUTPATIENT)
Dept: INTERNAL MEDICINE CLINIC | Age: 78
End: 2023-10-30

## 2023-10-30 VITALS
TEMPERATURE: 97 F | RESPIRATION RATE: 20 BRPM | HEART RATE: 78 BPM | OXYGEN SATURATION: 98 % | DIASTOLIC BLOOD PRESSURE: 82 MMHG | SYSTOLIC BLOOD PRESSURE: 120 MMHG

## 2023-10-30 DIAGNOSIS — S82.142D CLOSED DISPLACED BICONDYLAR FRACTURE OF LEFT TIBIA WITH ROUTINE HEALING: ICD-10-CM

## 2023-10-30 DIAGNOSIS — F33.9 EPISODE OF RECURRENT MAJOR DEPRESSIVE DISORDER, UNSPECIFIED DEPRESSION EPISODE SEVERITY (HCC): ICD-10-CM

## 2023-10-30 DIAGNOSIS — Z51.89 AFTERCARE: ICD-10-CM

## 2023-10-30 DIAGNOSIS — E11.9 TYPE 2 DIABETES MELLITUS WITHOUT COMPLICATION, WITHOUT LONG-TERM CURRENT USE OF INSULIN (HCC): ICD-10-CM

## 2023-10-30 DIAGNOSIS — F41.1 GAD (GENERALIZED ANXIETY DISORDER): ICD-10-CM

## 2023-10-30 DIAGNOSIS — D72.829 LEUKOCYTOSIS, UNSPECIFIED TYPE: Primary | ICD-10-CM

## 2023-11-03 ENCOUNTER — HOSPITAL ENCOUNTER (OUTPATIENT)
Dept: GENERAL RADIOLOGY | Age: 78
Discharge: HOME | End: 2023-11-03
Attending: PHYSICIAN ASSISTANT
Payer: MEDICARE

## 2023-11-03 ENCOUNTER — OFFICE VISIT (OUTPATIENT)
Dept: ORTHOPEDICS CLINIC | Facility: CLINIC | Age: 78
End: 2023-11-03
Payer: MEDICARE

## 2023-11-03 VITALS — WEIGHT: 194 LBS | HEIGHT: 64 IN | BODY MASS INDEX: 33.12 KG/M2

## 2023-11-03 DIAGNOSIS — S82.145D: ICD-10-CM

## 2023-11-03 DIAGNOSIS — S82.145D: Primary | ICD-10-CM

## 2023-11-03 PROCEDURE — 99215 OFFICE O/P EST HI 40 MIN: CPT | Performed by: PHYSICIAN ASSISTANT

## 2023-11-03 PROCEDURE — 1126F AMNT PAIN NOTED NONE PRSNT: CPT | Performed by: PHYSICIAN ASSISTANT

## 2023-11-03 PROCEDURE — 73564 X-RAY EXAM KNEE 4 OR MORE: CPT | Performed by: PHYSICIAN ASSISTANT

## 2023-11-06 PROBLEM — I25.10 CORONARY ARTERY DISEASE INVOLVING NATIVE CORONARY ARTERY OF NATIVE HEART WITHOUT ANGINA PECTORIS: Status: ACTIVE | Noted: 2021-11-16

## 2023-11-06 PROBLEM — I82.409 DVT (DEEP VENOUS THROMBOSIS) (HCC): Status: RESOLVED | Noted: 2021-12-06 | Resolved: 2023-11-06

## 2023-11-10 ENCOUNTER — TELEPHONE (OUTPATIENT)
Dept: FAMILY MEDICINE CLINIC | Facility: CLINIC | Age: 78
End: 2023-11-10

## 2023-11-14 ENCOUNTER — TELEPHONE (OUTPATIENT)
Dept: CASE MANAGEMENT | Age: 78
End: 2023-11-14

## 2023-11-14 NOTE — TELEPHONE ENCOUNTER
Pt was transferred to [facility] from [facility] for triage. Pt states has been having left lower extremity swelling for past couple days. Pt denies any redness, warm to touch, pain-only when stepping on her foot, no shortness of breath, no chest pain, no fevers, chills, nausea, vomiting or any other symptoms. Pt states feels lightheaded sometimes, she denies any dizziness. Pt also reports having tingling in her left leg. She has been following with ortho Dr. Valentino Holmes for left tibial fracture. Pt states she has Torsemide 20mg at home and will take tablet. NCM advised to hold off until PCP or ortho made recommendations. Pt also advised to call ortho office for further recommendations. Pt agreeable. Pt asking PCP if okay to take Torsemide? Pt does have upcoming appt with PCP on 11/20/23. Please advise.

## 2023-11-15 ENCOUNTER — TELEPHONE (OUTPATIENT)
Dept: ORTHOPEDICS CLINIC | Facility: CLINIC | Age: 78
End: 2023-11-15

## 2023-11-15 NOTE — TELEPHONE ENCOUNTER
Karen called and states patient is non compliant to weight restrictions. She was discharged from the LTC without walker. She now has the appropriate DME.  They will continue to work on education with the patient.       LOV:11/3/23  Reviewed  Per LOV:\"We will transition to a hinged knee brace, and she can begin partial toe touch weightbearing. Gentle physical therapy to aid in strengthening, range of motion, functional improvement, and return to baseline activity.\"    Future Appointments   Date Time Provider Department Center   11/20/2023  9:30 AM Jessica Bunn,  [facility] [address]   12/1/2023 11:40 AM Areli Clark APRN [facility]   12/18/2023 10:00 AM [facility] Montiel [id number]

## 2023-11-15 NOTE — TELEPHONE ENCOUNTER
Karen from [facility] is requesting a call back regarding patients weight bearing status. Needs updated status and to advise of patients non compliance.

## 2023-11-16 NOTE — TELEPHONE ENCOUNTER
Get stat US of the leg. If no DVT, that ok for turosemide 20mg daily x 1 week. Also does it appear infected? Follow up to evaluate.

## 2023-11-16 NOTE — TELEPHONE ENCOUNTER
Dr. Arvind Moscoso,  Patient update. She is taking Torsemide every day. Swelling is ankle and foot, not calf. She is not supposed to weight bear but she is and thinks swelling from that. Appt Monday.  Please advise

## 2023-11-20 ENCOUNTER — OFFICE VISIT (OUTPATIENT)
Dept: FAMILY MEDICINE CLINIC | Facility: CLINIC | Age: 78
End: 2023-11-20
Payer: MEDICARE

## 2023-11-20 ENCOUNTER — TELEPHONE (OUTPATIENT)
Dept: FAMILY MEDICINE CLINIC | Facility: CLINIC | Age: 78
End: 2023-11-20

## 2023-11-20 VITALS
BODY MASS INDEX: 31.41 KG/M2 | WEIGHT: 184 LBS | SYSTOLIC BLOOD PRESSURE: 108 MMHG | DIASTOLIC BLOOD PRESSURE: 60 MMHG | HEART RATE: 73 BPM | RESPIRATION RATE: 14 BRPM | HEIGHT: 64 IN | OXYGEN SATURATION: 96 %

## 2023-11-20 DIAGNOSIS — S82.142G CLOSED FRACTURE OF LEFT TIBIAL PLATEAU WITH DELAYED HEALING, SUBSEQUENT ENCOUNTER: Primary | ICD-10-CM

## 2023-11-20 DIAGNOSIS — E11.51 TYPE 2 DIABETES MELLITUS WITH DIABETIC PERIPHERAL ANGIOPATHY WITHOUT GANGRENE, WITHOUT LONG-TERM CURRENT USE OF INSULIN (HCC): ICD-10-CM

## 2023-11-20 DIAGNOSIS — M51.37 DDD (DEGENERATIVE DISC DISEASE), LUMBOSACRAL: ICD-10-CM

## 2023-11-20 DIAGNOSIS — R26.2 INABILITY TO AMBULATE DUE TO KNEE: ICD-10-CM

## 2023-11-20 DIAGNOSIS — S82.101A CLOSED FRACTURE OF PROXIMAL END OF RIGHT TIBIA, UNSPECIFIED FRACTURE MORPHOLOGY, INITIAL ENCOUNTER: Primary | ICD-10-CM

## 2023-11-20 DIAGNOSIS — I10 ESSENTIAL HYPERTENSION: ICD-10-CM

## 2023-11-20 PROCEDURE — 99214 OFFICE O/P EST MOD 30 MIN: CPT | Performed by: FAMILY MEDICINE

## 2023-11-20 PROCEDURE — 1111F DSCHRG MED/CURRENT MED MERGE: CPT | Performed by: FAMILY MEDICINE

## 2023-11-20 RX ORDER — MEMANTINE HYDROCHLORIDE 10 MG/1
TABLET ORAL
COMMUNITY
Start: 2023-11-06

## 2023-11-20 NOTE — TELEPHONE ENCOUNTER
Home health order entered with aide request, please finish note for [facility] to review for care.

## 2023-11-20 NOTE — TELEPHONE ENCOUNTER
Please have her get home health or someone to come to her home to do vaccuming ETC, as she is unable to do it right now by herself due to knee fracture. She should be able to on her own after 2-3 months.

## 2023-11-20 NOTE — PROGRESS NOTES
Subjective:   Patient ID: Pasquale Daley is a 69year old female. Left tibial plateau fracture x 2 months. Slow to heal.  Pain with walking. Using walker. No longer any falls. + swelling left leg. No calf pain. No CP/SOB. US negative for DVT 2 months ago. DM II. Chronic. Last A1c value was 7% done 7/11/2023. On metformin 500mg daily. + CKDIII    HTN. Chronic. BP is low side today. No lightheadedness. On turosemide for CHF and metoprolol. History/Other:   Review of Systems   All other systems reviewed and are negative. Current Outpatient Medications   Medication Sig Dispense Refill    memantine 10 MG Oral Tab       DULoxetine 30 MG Oral Cap DR Particles Take 3 capsules (90 mg total) by mouth daily. 90 capsule 2    QUEtiapine 100 MG Oral Tab Take 1 tablet by mouth every night, may take 1 extra tab if unable to sleep up to three times a week. 90 tablet 0    clonazePAM 1 MG Oral Tab Take 1 tablet (1 mg total) by mouth 2 (two) times daily as needed for Anxiety. 60 tablet 2    HYDROcodone-acetaminophen 5-325 MG Oral Tab Take 1 tablet by mouth every 8 (eight) hours as needed for Pain. cholecalciferol 1000 UNITS Oral Cap Take 1 capsule (1,000 Units total) by mouth daily. Fish Oil-Cholecalciferol (OMEGA-3 FISH OIL-VITAMIN D3) 8395-8101 MG-UNIT Oral Cap Take 1 capsule by mouth 3 (three) times daily with meals. bisacodyl 10 MG Rectal Suppos Place 1 suppository (10 mg total) rectally daily as needed. 1 suppository 0    docusate sodium 100 MG Oral Cap Take 100 mg by mouth 2 (two) times daily.  20 capsule 0    metFORMIN 500 MG Oral Tab Take 1 tablet (500 mg total) by mouth daily with breakfast. 90 tablet 0    FOLIC ACID 1 MG Oral Tab TAKE ONE TABLET BY MOUTH ONE TIME DAILY 90 tablet 0    GLIMEPIRIDE 1 MG Oral Tab TAKE ONE TABLET BY MOUTH DAILY WITH BREAKFAST 90 tablet 0    ATORVASTATIN 40 MG Oral Tab TAKE ONE TABLET BY MOUTH NIGHTLY 90 tablet 0    linaCLOtide 145 MCG Oral Cap Take 1 capsule by mouth daily. TORSEMIDE 20 MG Oral Tab TAKE ONE TABLET BY MOUTH ONE TIME DAILY 30 tablet 0    Omeprazole 40 MG Oral Capsule Delayed Release Take 1 capsule (40 mg total) by mouth every morning. metoprolol succinate 25 MG Oral Tablet 24 Hr Take 1 tablet (25 mg total) by mouth 2 (two) times a day. 60 tablet 3     Allergies:No Known Allergies    Objective:   Physical Exam  Vitals reviewed. Constitutional:       General: She is not in acute distress. Appearance: She is well-developed. She is not diaphoretic. Eyes:      General:         Right eye: No discharge. Left eye: No discharge. Conjunctiva/sclera: Conjunctivae normal.   Cardiovascular:      Rate and Rhythm: Normal rate and regular rhythm. Heart sounds: Normal heart sounds. No murmur heard. Pulmonary:      Effort: Pulmonary effort is normal. No respiratory distress. Breath sounds: Normal breath sounds. No wheezing or rales. Musculoskeletal:      Comments: Left leg swelling. No calf tenderness. + pain with left knee motion. Assessment & Plan:   1. Closed fracture of left tibial plateau with delayed healing, subsequent encounter    2. Type 2 diabetes mellitus with diabetic peripheral angiopathy without gangrene, without long-term current use of insulin ([facility] [address]    3. Essential hypertension      1. Closed fracture of left tibial plateau with delayed healing, subsequent encounter  Follow up orthopedics. 2. Type 2 diabetes mellitus with diabetic peripheral angiopathy without gangrene, without long-term current use of insulin ([facility])  Chronic, stable. CPM   3. Essential hypertension  Chronic, stable.  CPM       Meds This Visit:  Requested Prescriptions      No prescriptions requested or ordered in this encounter       Imaging & Referrals:  None

## 2023-11-21 ENCOUNTER — TELEPHONE (OUTPATIENT)
Dept: FAMILY MEDICINE CLINIC | Facility: CLINIC | Age: 78
End: 2023-11-21

## 2023-11-29 ENCOUNTER — TELEPHONE (OUTPATIENT)
Dept: FAMILY MEDICINE CLINIC | Facility: CLINIC | Age: 78
End: 2023-11-29

## 2023-11-29 NOTE — TELEPHONE ENCOUNTER
Rocco File from [address] called stating that patient is having depression thoughts of harming self. Patient discuss taking and abusing medication as well as refusing 911 treatment.       Please advise

## 2023-11-29 NOTE — TELEPHONE ENCOUNTER
Patient called back and states she was busy earlier and couldn't talk but explains she was in the hospital for 2 months and leg is not healed,   She is hoping she gets to be weight baring orders at her future appt in ortho. She states she does not want to hurt herself. She goes through waves of feeling sad but is doing ok all around.

## 2023-11-29 NOTE — TELEPHONE ENCOUNTER
Spoke with patient [facility] nurse, she states patient told her she took 3 norco last night. I called patient she didn't answer. She called me back immediately and states her son Tab Wagoner is with her they are decorating. She states feelings of wanting to hurt herself was 5 months prior, and not currently wanting to hurt herself. She said she had a hard time the other night but is fine now. Advised that she can call on call doctor if she is having thoughts at night or call 911. Patient agreed. YP as fyi.

## 2023-11-30 NOTE — TELEPHONE ENCOUNTER
Rasheeda Fernandes is ortho, she does not have access to psych  Spoke to pt, going to [place] tonight- feeling better today  Has  appt with psych at [address] [place]

## 2023-12-01 ENCOUNTER — PATIENT OUTREACH (OUTPATIENT)
Dept: CASE MANAGEMENT | Age: 78
End: 2023-12-01

## 2023-12-01 DIAGNOSIS — E78.2 HYPERLIPIDEMIA, MIXED: Primary | ICD-10-CM

## 2023-12-01 DIAGNOSIS — F32.A DEPRESSION, UNSPECIFIED DEPRESSION TYPE: ICD-10-CM

## 2023-12-01 DIAGNOSIS — E11.51 TYPE 2 DIABETES MELLITUS WITH DIABETIC PERIPHERAL ANGIOPATHY WITHOUT GANGRENE, WITHOUT LONG-TERM CURRENT USE OF INSULIN (HCC): ICD-10-CM

## 2023-12-01 NOTE — PROGRESS NOTES
Spoke to Rhett for CCM. Updates to patient care team/comments: No changes per patient  Patient reported changes in medications: No changes per patient  Med Adherence  Comment: Taking as prescribed    Health Maintenance: Pt aware  Health Maintenance   Topic Date Due    Zoster Vaccines (2 of 2) 09/10/2021    COVID-19 Vaccine (3 - 2023-24 season) 09/01/2023    Influenza Vaccine (1) 10/01/2023    Diabetes Care: Microalb/Creat Ratio  03/02/2024    Diabetes Care A1C  04/04/2024    Diabetes Care Dilated Eye Exam  06/05/2024    Annual Physical  07/10/2024    Diabetes Care: GFR  10/02/2024    DEXA Scan  Completed    Annual Depression Screening  Completed    Fall Risk Screening (Annual)  Completed    Diabetes Care Foot Exam (Annual)  Completed    Pneumococcal Vaccine: 65+ Years  Completed    Colorectal Cancer Screening  Discontinued    Mammogram  Discontinued     Patient updates/concerns:  Patient stated she feeling better than she had recently felt. Patient stated she was feeling a bit down due to not being able to spend thanksgiving with her family. Patient stated she is now in better spirits. Patient stated she has an appointment on the 12/18 with the orthopedic surgeon to have her left leg. Patient stated she feels it is not healing as it should she feels. Patient stated she is looking forward to seeing her daughter who will be visiting from [place] for [place]. Patient only concern is the length of time it is taking for her leg to heal.     Goals/Action Plan: Active goal from previous outreach:     Recovery of left leg and improve mobility  Patient reported progress towards goals:              - What: Patient stated she continues to have pain when she bares weight on her leg leg. - Where/When/How: Patient stated she is scheduled to see the orthopedic on 12/18 for follow up.    Patient Reported Barriers and Concerns: Patient stated she was not emotionally feeling well over thanksgiving however is is in better spirits now. - Plan for overcoming barriers: Patient to follow up with care team as scheduled or as needed. Care Managers Interventions: Discussed with patient and encouraged her to reach out to her loved ones when she is feeling down or left out. Patient stated she did speak with her daughter and did feel better afterwards. Discussed with patient proper nutrition, hydration and exercise. Patient is aware of our next outreach, has agreed to being contacted via telephone. Patient verbalized understanding. Patient aware she may contact me if further assistance is needed. Future Appointments: Pt aware  Future Appointments   Date Time Provider Jitendra Cantrell   12/13/2023  1:00 PM MICKI Underwood [facility]   12/18/2023 10:00 AM ANNA MARIE Kumar [facility] [id number]   12/27/2023  1:20 PM MICKI Underwood [address] Follow Up Date: 1 month follow up or sooner if needed    Reason For Follow Up: review progress and or barriers towards patient's goals. Time Spent This Encounter Total: 22 min medical record review, telephone communication, care plan updates where needed, education, goals, and action plan recreation/update. Provided acknowledgment and validation to patient's concerns.    Monthly Minute Total including today: 22  Physical assessment, complete health history, and need for CCM established by Yohannes Mays DO.

## 2023-12-14 ENCOUNTER — TELEPHONE (OUTPATIENT)
Dept: ORTHOPEDICS CLINIC | Facility: CLINIC | Age: 78
End: 2023-12-14

## 2023-12-14 DIAGNOSIS — S82.145D: Primary | ICD-10-CM

## 2023-12-18 ENCOUNTER — HOSPITAL ENCOUNTER (OUTPATIENT)
Dept: GENERAL RADIOLOGY | Age: 78
Discharge: HOME | End: 2023-12-18
Attending: PHYSICIAN ASSISTANT
Payer: MEDICARE

## 2023-12-18 ENCOUNTER — OFFICE VISIT (OUTPATIENT)
Dept: ORTHOPEDICS CLINIC | Facility: CLINIC | Age: 78
End: 2023-12-18
Payer: MEDICARE

## 2023-12-18 DIAGNOSIS — S82.145D: Primary | ICD-10-CM

## 2023-12-18 DIAGNOSIS — S82.145D: ICD-10-CM

## 2023-12-18 PROCEDURE — 1125F AMNT PAIN NOTED PAIN PRSNT: CPT | Performed by: PHYSICIAN ASSISTANT

## 2023-12-18 PROCEDURE — 73564 X-RAY EXAM KNEE 4 OR MORE: CPT | Performed by: PHYSICIAN ASSISTANT

## 2023-12-18 PROCEDURE — 99213 OFFICE O/P EST LOW 20 MIN: CPT | Performed by: PHYSICIAN ASSISTANT

## 2023-12-28 ENCOUNTER — TELEPHONE (OUTPATIENT)
Dept: FAMILY MEDICINE CLINIC | Facility: CLINIC | Age: 78
End: 2023-12-28

## 2023-12-28 NOTE — TELEPHONE ENCOUNTER
Silas from [facility]   Is sending back the plan of care that was sent to them today. Every page was signed except the last page

## 2023-12-29 PROBLEM — E11.51 TYPE 2 DIABETES MELLITUS WITH DIABETIC PERIPHERAL ANGIOPATHY WITHOUT GANGRENE (HCC): Status: RESOLVED | Noted: 2021-01-01 | Resolved: 2023-12-29

## 2024-01-17 RX ORDER — GLIMEPIRIDE 1 MG/1
1 TABLET ORAL
Qty: 90 TABLET | Refills: 0 | Status: SHIPPED | OUTPATIENT
Start: 2024-01-17

## 2024-02-01 ENCOUNTER — PATIENT OUTREACH (OUTPATIENT)
Dept: CASE MANAGEMENT | Age: 79
End: 2024-02-01

## 2024-02-01 DIAGNOSIS — E78.2 HYPERLIPIDEMIA, MIXED: ICD-10-CM

## 2024-02-01 DIAGNOSIS — E11.51 TYPE 2 DIABETES MELLITUS WITH DIABETIC PERIPHERAL ANGIOPATHY WITHOUT GANGRENE, WITHOUT LONG-TERM CURRENT USE OF INSULIN (HCC): ICD-10-CM

## 2024-02-01 DIAGNOSIS — I10 ESSENTIAL HYPERTENSION: Primary | ICD-10-CM

## 2024-02-01 NOTE — PROGRESS NOTES
Spoke to Arcelia for CCM.      Updates to patient care team/comments: No changes per patient  Patient reported changes in medications: No changes per patient  Med Adherence  Comment: Taking as prescribed    Health Maintenance: Pt aware  Health Maintenance   Topic Date Due    Zoster Vaccines (2 of 2) 09/10/2021    COVID-19 Vaccine (3 - 2023-24 season) 09/01/2023    Influenza Vaccine (1) 10/01/2023    Annual Depression Screening  01/01/2024    Fall Risk Screening (Annual)  01/01/2024    Diabetes Care Foot Exam (Annual)  01/01/2024    Diabetes Care: Microalb/Creat Ratio  03/02/2024    Diabetes Care A1C  04/04/2024    Diabetes Care Dilated Eye Exam  06/05/2024    Annual Physical  07/10/2024    Diabetes Care: GFR  10/02/2024    DEXA Scan  Completed    Pneumococcal Vaccine: 65+ Years  Completed    Colorectal Cancer Screening  Discontinued    Mammogram  Discontinued     Patient updates/concerns:  Patient states she continues to wear knee brace on her left leg as advised by the orthopedic doctor. Patient states she continues to do well and denies complications with sleep or appetite. Patient state she is eager to see the ortho doctor so ask if or how long she will have to continue to use the brace as at times it can be a bit uncomfortable. Patient stated she has no concerns or barriers at this time.    Goals/Action Plan:    Active goal from previous outreach:     Improve diet and mobility  Patient reported progress towards goals:              - What: Patient stated she continues to wear leg brace as advised by the orthopedic doctor.            - Where/When/How: Patient stated she is scheduled to follow up in March to see him and will find out if she can stop using the brace and also see how the knee is healing.   Patient Reported Barriers and Concerns: None at this time per patient                   - Plan for overcoming barriers: Patient to continue to follow up with care team as scheduled or as needed    Care Managers  Interventions: Discussed with patient Patient was educated on proper nutrition, hydration and exercise. Patient is aware of our next outreach. Patient aware she may contact me if further assistance is needed. Continue independent living, healthy diet and exercise routine. Continue to provide encouragement, support, and education for healthy coping and diagnosis.      Future Appointments: Pat aware  Future Appointments   Date Time Provider Department Center   2/7/2024  3:00 PM Areli Clark APRN [facility]   3/13/2024 10:00 AM Mike Montiel PA [email or web address]     Next Care Manager Follow Up Date: 1 month follow up or sooner if needed    Reason For Follow Up: review progress and or barriers towards patient's goals.     Time Spent This Encounter Total: 21 min medical record review, telephone communication, care plan updates where needed, education, goals, and action plan recreation/update. Provided acknowledgment and validation to patient's concerns.   Monthly Minute Total including today: 21  Physical assessment, complete health history, and need for CCM established by Jessica Bunn DO.

## 2024-03-08 ENCOUNTER — TELEPHONE (OUTPATIENT)
Dept: ORTHOPEDICS CLINIC | Facility: CLINIC | Age: 79
End: 2024-03-08

## 2024-03-08 DIAGNOSIS — S82.145D: Primary | ICD-10-CM

## 2024-03-13 ENCOUNTER — OFFICE VISIT (OUTPATIENT)
Dept: ORTHOPEDICS CLINIC | Facility: CLINIC | Age: 79
End: 2024-03-13
Payer: MEDICARE

## 2024-03-13 ENCOUNTER — HOSPITAL ENCOUNTER (OUTPATIENT)
Dept: GENERAL RADIOLOGY | Age: 79
Discharge: HOME | End: 2024-03-13
Attending: PHYSICIAN ASSISTANT
Payer: MEDICARE

## 2024-03-13 DIAGNOSIS — S82.145D: ICD-10-CM

## 2024-03-13 DIAGNOSIS — S82.145D: Primary | ICD-10-CM

## 2024-03-13 PROCEDURE — 99213 OFFICE O/P EST LOW 20 MIN: CPT | Performed by: PHYSICIAN ASSISTANT

## 2024-03-13 PROCEDURE — 73564 X-RAY EXAM KNEE 4 OR MORE: CPT | Performed by: PHYSICIAN ASSISTANT

## 2024-03-13 NOTE — PROGRESS NOTES
[facility] - ORTHOPEDICS  [address] [place] [id number]  [phone]       Name: Arcelia Conklin   MRN: [id number]  Date: 3/13/2024     REASON FOR VISIT: Follow up for  left nondisplaced bicondylar Schatzker type V tibial plateau fracture.      INTERVAL HISTORY:  Arcelia Conklin is a 78 year old female who returns for evaluation of  left nondisplaced bicondylar Schatzker type V tibial plateau fracture.     To summarize,  left tibial plateau fracture sustained on 09/04/2023. She was initially evaluated by Dr. Mata, discharged to subacute rehab facility. She then followed up with Dr. Piedra on 10/19/2023. She has noted some overall improvement.       ROS: ROS    PE:   There were no vitals filed for this visit.  Estimated body mass index is 31.58 kg/m² as calculated from the following:    Height as of 11/20/23: 5' 4\" (1.626 m).    Weight as of 11/20/23: 184 lb (83.5 kg).    Physical Exam  Constitutional:       Appearance: Normal appearance.   HENT:      Head: Normocephalic and atraumatic.   Eyes:      Extraocular Movements: Extraocular movements intact.   Neck:      Musculoskeletal: Normal range of motion and neck supple.   Cardiovascular:      Pulses: Normal pulses.   Pulmonary:      Effort: Pulmonary effort is normal. No respiratory distress.   Abdominal:      General: There is no distension.   Skin:     General: Skin is warm.      Capillary Refill: Capillary refill takes less than 2 seconds.      Findings: No bruising.   Neurological:      General: No focal deficit present.      Mental Status: She is alert.   Psychiatric:         Mood and Affect: Mood normal.       Examination of the left knee demonstrates:      Skin is intact, warm and dry.   Atrophy: none    Effusion: none    Joint line tenderness: none  Crepitation: none   Allyn: Negative   Patellar mobility: normal without apprehension  J-sign: none    ROM: Extension full  Flexion 120 degrees   ACL:  Negative Lachman,  Negative Pivot Shift   PCL:  Negative Posterior Drawer  Collateral Ligaments: Stable to Varus and Valgus stress at 0 and 30 degrees  Strength: mild weakness   Hip joint: normal pain-free ROM   Gait:  using assistive device   Leg length: equal and symmetric  Alignment:  neutral      No obvious peripheral edema noted.   Distal neurovascular exam demonstrates normal perfusion, intact sensation to light touch and full strength.      Examination of the contralateral knee demonstrates:  No significant atrophy, swelling or effusion. Full range of motion. Neurovascularly intact distally.    Radiographic Examination/Diagnostics:    I personally viewed, independently interpreted and radiology report was reviewed.    XR KNEE, COMPLETE (4 OR MORE VIEWS), LEFT (CPT=73564)    Result Date: 12/18/2023  PROCEDURE:  XR KNEE, COMPLETE (4 OR MORE VIEWS), LEFT (CPT=73564)  TECHNIQUE:  AP, lateral, sunrise, and tunnel views were obtained  COMPARISON:  [place], XR, XR KNEE, COMPLETE (4 OR MORE VIEWS), LEFT (CPT=73564), 11/03/2023, 11:28 AM.  [place], XR, XR KNEE, COMPLETE (4 OR MORE VIEWS), LEFT (CPT=73564), 10/19/2023, 1:11 PM.  INDICATIONS:  S82.145D Closed nondisplaced fracture of left tibial plateau with routine healing  PATIENT STATED HISTORY: (As transcribed by Technologist)  Patient here for ortho follow up. Patient stated left knee injury in Sept 2023. She complains of severe pain still    FINDINGS:  There is nondisplaced fracture of the tibial plateau with comminuted fracture of the tibial metaphysis, unchanged in alignment.  The fracture margins are less conspicuous on today's exam, which may indicate interval healing.  Degenerative changes are similar to prior.  Vascular calcifications.            CONCLUSION:  Healing tibial plateau fracture, unchanged in alignment.   LOCATION:  [facility]   Dictated by (CST): Oni Knox MD on 12/18/2023 at 9:54 AM     Finalized by (CST): Oni Knox MD on 12/18/2023 at 9:55 AM          IMPRESSION: Arcelia Conklin is a 78 year old female who presented for follow up of healing left nondisplaced bicondylar Schatzker type V tibial plateau fracture almost three months following injury.     PLAN:   We had a detailed discussion outlining the etiology, anatomy, pathophysiology, and natural history of the patient's findings.    We reviewed the treatment of this disease condition.      We recommended physical therapy to aid in strengthening, range of motion, functional improvement, and return to baseline activity.  The patient had opportunity to ask questions and all questions were answered appropriately.    FOLLOW-UP:  As needed.           Mike Montiel [facility], PA-C Orthopedic Surgery / Sports Medicine Specialist  [facility]  [address] [place] [address].org  [email or web address]  t: [phone]  o: [phone]  f: [phone]          This note was dictated using Dragon software.  While it was briefly proofread prior to completion, some grammatical, spelling, and word choice errors due to dictation may still occur.

## 2024-04-01 ENCOUNTER — PATIENT OUTREACH (OUTPATIENT)
Dept: CASE MANAGEMENT | Age: 79
End: 2024-04-01

## 2024-04-01 DIAGNOSIS — I10 ESSENTIAL HYPERTENSION: Primary | ICD-10-CM

## 2024-04-01 DIAGNOSIS — E11.51 TYPE 2 DIABETES MELLITUS WITH DIABETIC PERIPHERAL ANGIOPATHY WITHOUT GANGRENE, WITHOUT LONG-TERM CURRENT USE OF INSULIN (HCC): ICD-10-CM

## 2024-04-01 DIAGNOSIS — E78.2 HYPERLIPIDEMIA, MIXED: ICD-10-CM

## 2024-04-01 DIAGNOSIS — F33.1 MAJOR DEPRESSIVE DISORDER, RECURRENT EPISODE, MODERATE (HCC): ICD-10-CM

## 2024-04-01 NOTE — PROGRESS NOTES
Spoke to Arcelia for CCM.      Updates to patient care team/comments: No changes per patient   Patient reported changes in medications: No changes per patient  Med Adherence  Comment: Taking as prescribed    Health Maintenance: Pt aware  Health Maintenance   Topic Date Due    Zoster Vaccines (2 of 2) 09/10/2021    COVID-19 Vaccine (3 - 2023-24 season) 09/01/2023    Annual Depression Screening  01/01/2024    Diabetes Care: Microalb/Creat Ratio  03/02/2024    Diabetes Care A1C  04/04/2024    Diabetes Care Dilated Eye Exam  06/05/2024    Annual Physical  07/10/2024    Diabetes Care Foot Exam  09/01/2024    Influenza Vaccine (Season Ended) 10/01/2024    Diabetes Care: GFR  10/02/2024    DEXA Scan  Completed    Fall Risk Screening (Annual)  Completed    Pneumococcal Vaccine: 65+ Years  Completed    Colorectal Cancer Screening  Discontinued    Mammogram  Discontinued       Patient updates/concerns:   Patient stated she continues to right eye blurred vision. Patient stated she is trying to apply for the patient assistance for a bill she received from the hospital. Patient was given the number to call and inform about the program [phone] at [facility]. Patient stated she has been sleeping ok will occasionally wake up but is able to go back to sleep without issues. Patient stated she is not sure if she will be doing the physical therapy due to financial strain currently. Patient encouraged to monitor her fatigue and call to schedule an appointment with Dr. Bunn if fatigue worsens or patient can contact me for assistance. Patient agreed. No other barriers at this time.     Goals/Action Plan:    Active goal from previous outreach:     Recovery of left knee surgery and fatigue  Patient reported progress towards goals:              - What: Patient stated she continues to right eye blurred vision. Patient stated she has not scheduled an appointment with the eye doctor as she is waiting to see when a good time would be to  schedule. Patient aware she may contact me if she would like help in scheduling.  Patient stated she is not sure if she will be doing the physical therapy due to financial strain currently.            - Where/When/How: Patient encouraged to monitor her fatigue and call to schedule an appointment with Dr. Bunn if fatigue worsens or patient can contact me for assistance. Patient agreed.  Patient Reported Barriers and Concerns: Patient stated she has been feeling more fatigue and tired than usual. Patient states she will monitor symptoms for now and declined message be sent to Dr. Bunn office at this time.                    - Plan for overcoming barriers: Patient to continue to follow up with care team as scheduled or as needed.    Care Managers Interventions: Patient was educated on proper nutrition, hydration and exercise. Patient is aware she may contact me if further assistance is needed. Continue independent living, healthy diet and exercise routine. Continue to provide encouragement, support, and education for healthy coping and diagnosis.    Future Appointments: Pt aware  Future Appointments   Date Time Provider Department Center   4/4/2024  2:00 PM Gresk, Areli C, APRN [facility]     Next Care Manager Follow Up Date: 1 month follow up or sooner if needed    Reason For Follow Up: review progress and or barriers towards patient's goals.     Time Spent This Encounter Total: 30 min medical record review, telephone communication, care plan updates where needed, education, goals, and action plan recreation/update. Provided acknowledgment and validation to patient's concerns.   Monthly Minute Total including today: 35  Physical assessment, complete health history, and need for CCM established by Jessica Bunn DO.

## 2024-04-01 NOTE — PROGRESS NOTES
Called patient and left a message for patient to call back when they can. Reviewed patient chart. Left contact my contact number [phone]        Time Spent This Encounter Total: 5  min medical record review  Monthly Minute Total including today: 5 minutes

## 2024-04-04 NOTE — LETTER
[facility] [address], [address] [place] [address]    Consent for Anesthesia   1.    Adan Bright agree to be cared for by a physician anesthesiologist alone and/or with a nurse anesthetist, who is specially trained to monitor me and give m allergic reactions to medications, injury to my airway, heart, lungs, vision, nerves, or muscles and in extremely rare instances death. 5. My doctor has explained to me other choices available to me for my care (alternatives).   6. Pregnant Patients (“epid Printed: 11/16/2021 at 6:32 PM    Medical Record #: [id number]                                            Page 1 of 1 4 = No assist / stand by assistance

## 2024-04-11 ENCOUNTER — TELEPHONE (OUTPATIENT)
Dept: FAMILY MEDICINE CLINIC | Facility: CLINIC | Age: 79
End: 2024-04-11

## 2024-04-11 NOTE — TELEPHONE ENCOUNTER
Patient called stating she would like to speak with Dr. Bunn to discuss her daily medications. Patient stated she has been getting notifications that she will soon be running out. Patient stated she has been thinking about stopping all medications and would like to discuss with Dr. Bunn.  Patient can be reached at [phone] Please advise.

## 2024-04-15 NOTE — TELEPHONE ENCOUNTER
Estelle called stating she has not heard back. Please contact patient. Patient can e reached at [phone]. Please advise

## 2024-04-15 NOTE — TELEPHONE ENCOUNTER
Advised pt she needs a follow up appt.  Pt was disconnected on call please call to schedule appt.

## 2024-04-19 ENCOUNTER — PATIENT OUTREACH (OUTPATIENT)
Dept: CASE MANAGEMENT | Age: 79
End: 2024-04-19

## 2024-04-19 ENCOUNTER — OFFICE VISIT (OUTPATIENT)
Dept: FAMILY MEDICINE CLINIC | Facility: CLINIC | Age: 79
End: 2024-04-19
Payer: MEDICARE

## 2024-04-19 VITALS
RESPIRATION RATE: 22 BRPM | DIASTOLIC BLOOD PRESSURE: 64 MMHG | HEIGHT: 64 IN | WEIGHT: 194 LBS | HEART RATE: 78 BPM | BODY MASS INDEX: 33.12 KG/M2 | SYSTOLIC BLOOD PRESSURE: 110 MMHG | OXYGEN SATURATION: 97 %

## 2024-04-19 DIAGNOSIS — K58.1 IRRITABLE BOWEL SYNDROME WITH CONSTIPATION: ICD-10-CM

## 2024-04-19 DIAGNOSIS — R45.851 PASSIVE SUICIDAL IDEATIONS: ICD-10-CM

## 2024-04-19 DIAGNOSIS — G89.29 CHRONIC PAIN OF LEFT KNEE: ICD-10-CM

## 2024-04-19 DIAGNOSIS — I50.42 CHRONIC COMBINED SYSTOLIC AND DIASTOLIC CONGESTIVE HEART FAILURE (HCC): ICD-10-CM

## 2024-04-19 DIAGNOSIS — E11.21 TYPE 2 DIABETES MELLITUS WITH DIABETIC NEPHROPATHY, WITHOUT LONG-TERM CURRENT USE OF INSULIN (HCC): Primary | ICD-10-CM

## 2024-04-19 DIAGNOSIS — E53.8 B12 DEFICIENCY: ICD-10-CM

## 2024-04-19 DIAGNOSIS — E53.8 FOLIC ACID DEFICIENCY: ICD-10-CM

## 2024-04-19 DIAGNOSIS — E78.2 MIXED HYPERLIPIDEMIA: ICD-10-CM

## 2024-04-19 DIAGNOSIS — M25.562 CHRONIC PAIN OF LEFT KNEE: ICD-10-CM

## 2024-04-19 DIAGNOSIS — F33.41 RECURRENT MAJOR DEPRESSIVE DISORDER, IN PARTIAL REMISSION (HCC): ICD-10-CM

## 2024-04-19 DIAGNOSIS — I25.10 CORONARY ARTERY DISEASE INVOLVING NATIVE CORONARY ARTERY OF NATIVE HEART WITHOUT ANGINA PECTORIS: ICD-10-CM

## 2024-04-19 PROCEDURE — 99214 OFFICE O/P EST MOD 30 MIN: CPT | Performed by: FAMILY MEDICINE

## 2024-04-19 RX ORDER — LINACLOTIDE 72 UG/1
1 CAPSULE, GELATIN COATED ORAL DAILY
Qty: 90 CAPSULE | Refills: 1 | Status: SHIPPED | OUTPATIENT
Start: 2024-04-19

## 2024-04-19 NOTE — PATIENT INSTRUCTIONS
Follow up with psychiatrist.    I advise you dont stop your meds.    Decrease memantine to 1/2 tablet twice a day x 2 weeks, then stop it if you absolutely need to.    Dont stop the diabetes medications metformin and glimperide.    Decrease folic acid to every other day.    Knee brace, leg exercises, MRI knee, see orthopedics.

## 2024-04-19 NOTE — PROGRESS NOTES
Call made to patient to follow up on medications that were high cost for her. No answer LM for patient to CB at her earliest convenience. Reviewed patient chart. Left contact my contact number [phone]        Time Spent This Encounter Total: 3 min medical record review  Monthly Minute Total including today: 43 minutes

## 2024-04-22 NOTE — PROGRESS NOTES
Subjective:   Patient ID: Arcelia Conklin is a 78 year old female.    1. Type 2 diabetes mellitus with diabetic nephropathy, without long-term current use of insulin ([facility]).  Last A1c value was 7% done 7/11/2023.  Taking medication regularly.    2. B12 deficiency.  In the past.  Due for recheck.    3. Folic acid deficiency.  Chronic. Stable.    4. Mixed hyperlipidemia. Chronic, stable. No new symptoms. Taking statin.    5. Irritable bowel syndrome with constipation.  Constipation.  Linzess working but causing diarrhea.    6. Chronic combined systolic and diastolic congestive heart failure (HCC).  No SOB.  No leg swelling.  Weight stable.    7. Coronary artery disease involving native coronary artery of native heart without angina pectoris.  No CP/SOB.  Stable.    8. Recurrent major depressive disorder, in partial remission (HCC). Depressed about cost of medicine.  Denied any suicidal plans. Passive suicidal thoughts.  Agreed to continue meds until we can figure out best cost alternatives.    9. Chronic pain of left knee.  Had fracture.  Has done lots of PT.  But still giving her pain.        History/Other:   Review of Systems   All other systems reviewed and are negative.    Current Outpatient Medications   Medication Sig Dispense Refill    linaCLOtide (LINZESS) 72 MCG Oral Cap Take 1 tablet by mouth daily. 90 capsule 1    FOLIC ACID 1 MG Oral Tab TAKE ONE TABLET BY MOUTH ONE TIME DAILY 30 tablet 0    METFORMIN 500 MG Oral Tab TAKE ONE TABLET BY MOUTH EVERY MORNING WITH BREAKFAST 90 tablet 0    DULoxetine 30 MG Oral Cap DR Particles Take 3 capsules (90 mg total) by mouth daily. 90 capsule 2    clonazePAM 1 MG Oral Tab Take 1 tablet (1 mg total) by mouth 2 (two) times daily as needed for Anxiety. 60 tablet 2    QUEtiapine 100 MG Oral Tab Take 1 tablet by mouth every night, may take 1 extra tab if unable to sleep up to three times a week. 90 tablet 0    glimepiride 1 MG Oral Tab Take 1 tablet (1 mg total) by mouth  daily with breakfast. 90 tablet 0    memantine 10 MG Oral Tab       HYDROcodone-acetaminophen 5-325 MG Oral Tab Take 1 tablet by mouth every 8 (eight) hours as needed for Pain.      cholecalciferol 1000 UNITS Oral Cap Take 1 capsule (1,000 Units total) by mouth daily.      Fish Oil-Cholecalciferol (OMEGA-3 FISH OIL-VITAMIN D3) 6488-4678 MG-UNIT Oral Cap Take 1 capsule by mouth 3 (three) times daily with meals.      docusate sodium 100 MG Oral Cap Take 100 mg by mouth 2 (two) times daily. 20 capsule 0    ATORVASTATIN 40 MG Oral Tab TAKE ONE TABLET BY MOUTH NIGHTLY 90 tablet 0    TORSEMIDE 20 MG Oral Tab TAKE ONE TABLET BY MOUTH ONE TIME DAILY 30 tablet 0    Omeprazole 40 MG Oral Capsule Delayed Release Take 1 capsule (40 mg total) by mouth every morning.      metoprolol succinate 25 MG Oral Tablet 24 Hr Take 1 tablet (25 mg total) by mouth 2 (two) times a day. 60 tablet 3    bisacodyl 10 MG Rectal Suppos Place 1 suppository (10 mg total) rectally daily as needed. (Patient not taking: Reported on 4/1/2024) 1 suppository 0     Allergies:No Known Allergies    Objective:   Physical Exam  Vitals reviewed.   Constitutional:       General: She is not in acute distress.     Appearance: She is well-developed. She is not diaphoretic.   Eyes:      General: No scleral icterus.        Right eye: No discharge.         Left eye: No discharge.      Conjunctiva/sclera: Conjunctivae normal.   Cardiovascular:      Rate and Rhythm: Normal rate and regular rhythm.      Heart sounds: Normal heart sounds. No murmur heard.     No friction rub. No gallop.   Pulmonary:      Effort: Pulmonary effort is normal. No respiratory distress.      Breath sounds: Normal breath sounds. No wheezing or rales.         Assessment & Plan:   1. Type 2 diabetes mellitus with diabetic nephropathy, without long-term current use of insulin ([facility])    2. B12 deficiency    3. Folic acid deficiency    4. Mixed hyperlipidemia    5. Irritable bowel syndrome with  constipation    6. Chronic combined systolic and diastolic congestive heart failure (HCC)    7. Coronary artery disease involving native coronary artery of native heart without angina pectoris    8. Recurrent major depressive disorder, in partial remission (HCC)    9. Chronic pain of left knee    10. Passive suicidal ideations      1. Type 2 diabetes mellitus with diabetic nephropathy, without long-term current use of insulin (HCC)  - CBC With Differential With Platelet; Future  - Comp Metabolic Panel (14); Future  - Hemoglobin A1C; Future  - Microalb/Creat Ratio, Random Urine [E]; Future    2. B12 deficiency  - Vitamin B12 [E]; Future    3. Folic acid deficiency  - Folic Acid Serum [E]; Future    4. Mixed hyperlipidemia  - CBC With Differential With Platelet; Future  - Comp Metabolic Panel (14); Future  - Lipid Panel [E]; Future    5. Irritable bowel syndrome with constipation  - linaCLOtide (LINZESS) 72 MCG Oral Cap; Take 1 tablet by mouth daily.  Dispense: 90 capsule; Refill: 1    6. Chronic combined systolic and diastolic congestive heart failure (HCC)  - CARD ECHO 2D DOPPLER (CPT=93306); Future    7. Coronary artery disease involving native coronary artery of native heart without angina pectoris  - CARD ECHO 2D DOPPLER (CPT=93306); Future    8. Recurrent major depressive disorder, in partial remission (HCC)  - [facility] Network    9. Chronic pain of left knee  - Ortho Referral - In Network  - MRI KNEE, LEFT (BEH=51143); Future    10. Passive suicidal ideations  - [facility] - In Network    Orders Placed This Encounter   Procedures    CBC With Differential With Platelet    Comp Metabolic Panel (14)    Vitamin B12 [E]    Folic Acid Serum [E]    Lipid Panel [E]    Hemoglobin A1C    Microalb/Creat Ratio, Random Urine [E]       Meds This Visit:  Requested Prescriptions     Signed Prescriptions Disp Refills    linaCLOtide (LINZESS) 72 MCG Oral Cap 90 capsule 1     Sig: Take 1 tablet by mouth  daily.       Imaging & Referrals:  ORTHOPEDIC - INTERNAL  OP REFERRAL TO [facility]  CARD ECHO 2D DOPPLER (CPT=93306)  MRI KNEE, LEFT (EFC=[id number])

## 2024-04-22 NOTE — PROGRESS NOTES
Called patient to follow up on how she is doing. Patient stated she did see Dr. Bunn on Friday. Patient stated she discussed stopping her medication however she states he did voice his thoughts about not doing that. Patient stated she will continue to use her brace to see if it helps with her hip pain. Patient stated she is due to see ortho in one month follow up. Patient stated Dr. Bunn ordered lab work for her to complete first before stopping her medications. Patient agreed. At this time she continues to take her medications. Patient will follow up with Dr. Bunn in a month. No other concerns at this time. Patient grateful for th follow up call.     Future Appointments   Date Time Provider Department Center   5/2/2024  2:00 PM Areli Clark, APRN [facility]   5/16/2024  2:00 PM Areli Clark, APRN [facility]   5/17/2024 10:00 AM Jessica Bunn DO [facility] [address]   5/30/2024  2:00 PM Areli Clark APRN [facility]   6/13/2024  2:00 PM Areli Clark, APRN [facility]   6/27/2024  2:00 PM Areli Clark, APRN [facility]   7/11/2024  2:00 PM Areli Clark, APRN [facility]   7/18/2024 10:00 AM Jessica Bunn DO [facility] [address]   7/25/2024  2:00 PM Areli Clark APRN [facility]   8/8/2024  2:00 PM Areli Clark, APRN [facility]   8/22/2024  2:00 PM Areli Clark, APRN [facility]   9/12/2024  2:00 PM Areli Clark, APRN [facility]   9/26/2024  2:00 PM Areli Clark APRN [facility]   10/10/2024  2:00 PM Areli Clark, APRN [facility]   10/31/2024  2:00 PM Areli Clark APRN [facility]   11/14/2024  2:00 PM Areli Clark APRN [facility]   12/5/2024  2:00 PM Areli Clark APRN [facility]   12/19/2024  2:00 PM Areli Clark APRN [facility]       Time Spent This Encounter Total: 5  min medical record review  Monthly Minute Total including today: 48 minutes

## 2024-04-24 PROBLEM — R45.88: Chronic | Status: ACTIVE | Noted: 2023-10-04

## 2024-04-24 PROBLEM — G47.33 OBSTRUCTIVE SLEEP APNEA: Status: ACTIVE | Noted: 2021-01-01

## 2024-04-24 PROBLEM — F02.80: Status: ACTIVE | Noted: 2022-07-14

## 2024-04-25 ENCOUNTER — LAB ENCOUNTER (OUTPATIENT)
Dept: LAB | Age: 79
End: 2024-04-25
Attending: FAMILY MEDICINE
Payer: MEDICARE

## 2024-04-25 DIAGNOSIS — E78.2 MIXED HYPERLIPIDEMIA: ICD-10-CM

## 2024-04-25 DIAGNOSIS — E11.21 TYPE 2 DIABETES MELLITUS WITH DIABETIC NEPHROPATHY, WITHOUT LONG-TERM CURRENT USE OF INSULIN (HCC): ICD-10-CM

## 2024-04-25 DIAGNOSIS — E53.8 B12 DEFICIENCY: ICD-10-CM

## 2024-04-25 DIAGNOSIS — E53.8 FOLIC ACID DEFICIENCY: ICD-10-CM

## 2024-04-25 LAB
ALBUMIN SERPL-MCNC: 3.5 G/DL (ref 3.4–5)
ALBUMIN/GLOB SERPL: 1 {RATIO} (ref 1–2)
ALP LIVER SERPL-CCNC: 100 U/L
ALT SERPL-CCNC: 17 U/L
ANION GAP SERPL CALC-SCNC: 11 MMOL/L (ref 0–18)
AST SERPL-CCNC: 14 U/L (ref 15–37)
BASOPHILS # BLD AUTO: 0.05 X10(3) UL (ref 0–0.2)
BASOPHILS NFR BLD AUTO: 0.4 %
BILIRUB SERPL-MCNC: 0.6 MG/DL (ref 0.1–2)
BUN BLD-MCNC: 10 MG/DL (ref 9–23)
CALCIUM BLD-MCNC: 9.2 MG/DL (ref 8.5–10.1)
CHLORIDE SERPL-SCNC: 108 MMOL/L (ref 98–112)
CHOLEST SERPL-MCNC: 159 MG/DL (ref ?–200)
CO2 SERPL-SCNC: 24 MMOL/L (ref 21–32)
CREAT BLD-MCNC: 1.62 MG/DL
CREAT UR-SCNC: 265 MG/DL
EGFRCR SERPLBLD CKD-EPI 2021: 33 ML/MIN/1.73M2 (ref 60–?)
EOSINOPHIL # BLD AUTO: 0.26 X10(3) UL (ref 0–0.7)
EOSINOPHIL NFR BLD AUTO: 2.3 %
ERYTHROCYTE [DISTWIDTH] IN BLOOD BY AUTOMATED COUNT: 14.8 %
EST. AVERAGE GLUCOSE BLD GHB EST-MCNC: 151 MG/DL (ref 68–126)
FASTING PATIENT LIPID ANSWER: YES
FASTING STATUS PATIENT QL REPORTED: YES
FOLATE SERPL-MCNC: 56.6 NG/ML (ref 8.7–?)
GLOBULIN PLAS-MCNC: 3.6 G/DL (ref 2.8–4.4)
GLUCOSE BLD-MCNC: 131 MG/DL (ref 70–99)
HBA1C MFR BLD: 6.9 % (ref ?–5.7)
HCT VFR BLD AUTO: 38.4 %
HDLC SERPL-MCNC: 49 MG/DL (ref 40–59)
HGB BLD-MCNC: 12.3 G/DL
IMM GRANULOCYTES # BLD AUTO: 0.04 X10(3) UL (ref 0–1)
IMM GRANULOCYTES NFR BLD: 0.4 %
LDLC SERPL CALC-MCNC: 80 MG/DL (ref ?–100)
LYMPHOCYTES # BLD AUTO: 3.39 X10(3) UL (ref 1–4)
LYMPHOCYTES NFR BLD AUTO: 30.3 %
MCH RBC QN AUTO: 29.3 PG (ref 26–34)
MCHC RBC AUTO-ENTMCNC: 32 G/DL (ref 31–37)
MCV RBC AUTO: 91.4 FL
MICROALBUMIN UR-MCNC: 1.63 MG/DL
MICROALBUMIN/CREAT 24H UR-RTO: 6.2 UG/MG (ref ?–30)
MONOCYTES # BLD AUTO: 0.5 X10(3) UL (ref 0.1–1)
MONOCYTES NFR BLD AUTO: 4.5 %
NEUTROPHILS # BLD AUTO: 6.93 X10 (3) UL (ref 1.5–7.7)
NEUTROPHILS # BLD AUTO: 6.93 X10(3) UL (ref 1.5–7.7)
NEUTROPHILS NFR BLD AUTO: 62.1 %
NONHDLC SERPL-MCNC: 110 MG/DL (ref ?–130)
OSMOLALITY SERPL CALC.SUM OF ELEC: 297 MOSM/KG (ref 275–295)
PLATELET # BLD AUTO: 384 10(3)UL (ref 150–450)
POTASSIUM SERPL-SCNC: 4.2 MMOL/L (ref 3.5–5.1)
PROT SERPL-MCNC: 7.1 G/DL (ref 6.4–8.2)
RBC # BLD AUTO: 4.2 X10(6)UL
SODIUM SERPL-SCNC: 143 MMOL/L (ref 136–145)
TRIGL SERPL-MCNC: 177 MG/DL (ref 30–149)
VIT B12 SERPL-MCNC: 414 PG/ML (ref 193–986)
VLDLC SERPL CALC-MCNC: 28 MG/DL (ref 0–30)
WBC # BLD AUTO: 11.2 X10(3) UL (ref 4–11)

## 2024-04-25 PROCEDURE — 82570 ASSAY OF URINE CREATININE: CPT

## 2024-04-25 PROCEDURE — 82043 UR ALBUMIN QUANTITATIVE: CPT

## 2024-04-25 PROCEDURE — 80061 LIPID PANEL: CPT

## 2024-04-25 PROCEDURE — 82607 VITAMIN B-12: CPT

## 2024-04-25 PROCEDURE — 36415 COLL VENOUS BLD VENIPUNCTURE: CPT

## 2024-04-25 PROCEDURE — 85025 COMPLETE CBC W/AUTO DIFF WBC: CPT

## 2024-04-25 PROCEDURE — 82746 ASSAY OF FOLIC ACID SERUM: CPT

## 2024-04-25 PROCEDURE — 83036 HEMOGLOBIN GLYCOSYLATED A1C: CPT

## 2024-04-25 PROCEDURE — 80053 COMPREHEN METABOLIC PANEL: CPT

## 2024-04-30 ENCOUNTER — HOSPITAL ENCOUNTER (OUTPATIENT)
Dept: CV DIAGNOSTICS | Facility: HOSPITAL | Age: 79
Discharge: HOME | End: 2024-04-30
Attending: FAMILY MEDICINE
Payer: MEDICARE

## 2024-04-30 ENCOUNTER — HOSPITAL ENCOUNTER (OUTPATIENT)
Dept: MRI IMAGING | Facility: HOSPITAL | Age: 79
Discharge: HOME | End: 2024-04-30
Attending: FAMILY MEDICINE
Payer: MEDICARE

## 2024-04-30 DIAGNOSIS — I25.10 CORONARY ARTERY DISEASE INVOLVING NATIVE CORONARY ARTERY OF NATIVE HEART WITHOUT ANGINA PECTORIS: ICD-10-CM

## 2024-04-30 DIAGNOSIS — I50.42 CHRONIC COMBINED SYSTOLIC AND DIASTOLIC CONGESTIVE HEART FAILURE (HCC): ICD-10-CM

## 2024-04-30 DIAGNOSIS — G89.29 CHRONIC PAIN OF LEFT KNEE: ICD-10-CM

## 2024-04-30 DIAGNOSIS — M25.562 CHRONIC PAIN OF LEFT KNEE: ICD-10-CM

## 2024-04-30 PROCEDURE — 73721 MRI JNT OF LWR EXTRE W/O DYE: CPT | Performed by: FAMILY MEDICINE

## 2024-04-30 PROCEDURE — 93306 TTE W/DOPPLER COMPLETE: CPT | Performed by: FAMILY MEDICINE

## 2024-05-02 ENCOUNTER — PATIENT OUTREACH (OUTPATIENT)
Dept: CASE MANAGEMENT | Age: 79
End: 2024-05-02

## 2024-05-02 DIAGNOSIS — E78.00 PURE HYPERCHOLESTEROLEMIA, UNSPECIFIED: Primary | ICD-10-CM

## 2024-05-02 DIAGNOSIS — I10 ESSENTIAL HYPERTENSION: ICD-10-CM

## 2024-05-02 DIAGNOSIS — E78.2 HYPERLIPIDEMIA, MIXED: ICD-10-CM

## 2024-05-02 DIAGNOSIS — E11.51 TYPE 2 DIABETES MELLITUS WITH DIABETIC PERIPHERAL ANGIOPATHY WITHOUT GANGRENE, WITHOUT LONG-TERM CURRENT USE OF INSULIN (HCC): ICD-10-CM

## 2024-05-02 RX ORDER — GLIMEPIRIDE 1 MG/1
1 TABLET ORAL
Qty: 90 TABLET | Refills: 0 | Status: SHIPPED | OUTPATIENT
Start: 2024-05-02

## 2024-05-02 NOTE — PROGRESS NOTES
Spoke to Arcelia for CCM.      Updates to patient care team/comments: no changes  Patient reported changes in medications: reviewed w/patient  Med Adherence  Comment: Taking    Health Maintenance:   Health Maintenance   Topic Date Due    Zoster Vaccines (2 of 2) 09/10/2021    COVID-19 Vaccine (3 - 2023-24 season) 09/01/2023    Annual Depression Screening  01/01/2024    Diabetes Care Dilated Eye Exam  06/05/2024    Annual Physical  07/10/2024    Diabetes Care Foot Exam  09/01/2024    Influenza Vaccine (Season Ended) 10/01/2024    Diabetes Care A1C  10/25/2024    Diabetes Care: GFR  04/25/2025    Diabetes Care: Microalb/Creat Ratio  04/25/2025    DEXA Scan  Completed    Fall Risk Screening (Annual)  Completed    Pneumococcal Vaccine: 65+ Years  Completed    Colorectal Cancer Screening  Discontinued    Mammogram  Discontinued     Patient updates/concerns:  Patient called stating she is waiting to hear back from Dr. Bunn regarding her MRI of the knee results. Patient stated she received results from him on the Card Echo 2D Doppler. Patient advised to allow 24-48 hours for doctor to review allow ample amount of time to report results to her. Patient agreed. Patient stated she is also waiting to hear back about possibly decreasing the dose of Linzess patient stated she did discuss this with Dr. Bunn. Patient stated she discussed her concerns with wanting to stop all medications with Dr. Bunn and stated he recommended she stay on them. Patient stated she will be following up with him in 2 weeks.     Goals/Action Plan:    Active goal from previous outreach:     Left knee and medication regimen  Patient reported progress towards goals:              - What: Patient called stating she is waiting to hear back from Dr. Bunn regarding her MRI of the knee results. Patient stated she received results from him on the Card Echo 2D Doppler. Patient advised to allow 24-48 hours for doctor to review allow ample amount of time to  report results to her           - Where/When/How:  Patient stated she is also waiting to hear back about possibly decreasing the dose of Linzess patient stated she did discuss this with Dr. Bunn. Patient stated she discussed her concerns with wanting to stop all medications with Dr. Bunn and stated he recommended she stay on them.  Patient Reported Barriers and Concerns: Patient reports she is waiting to hear back on tst results for a MRI of the left knee.                   - Plan for overcoming barriers: Patient to continue to follow up with care team as scheduled or as needed.    Care Managers Interventions: Patient stated for now she is taking her medications but plans on discussing with Dr. Bunn at her upcoming follow up appointment later this month with Dr. Bunn. Discussed with patient  proper nutrition, hydration and exercise. Patient is aware of our next outreach, has agreed to being contacted via telephone. Patient verbalized understanding. Patient aware she may contact me if further assistance is needed.    Future Appointments: Pt aware  Future Appointments   Date Time Provider Department Center   5/16/2024 11:30 AM Kim Parra MD [facility]   5/16/2024  2:00 PM rAeli Clark, APRN [facility]   5/17/2024 10:00 AM Jessica Bunn DO [facility] [address]   5/30/2024  2:00 PM Areli Clark, APRN [facility]   6/13/2024  2:00 PM Areli Clark, APRN [facility]   6/27/2024  2:00 PM Areli Clark, APRN [facility]   7/11/2024  2:00 PM Areli Clark, APRN [facility]   7/18/2024 10:00 AM Jessica Bunn DO [facility]   7/25/2024  2:00 PM Areli Clark, APRN [facility]   8/8/2024  2:00 PM Areli Clark, APRN [facility]   8/22/2024  2:00 PM Areli Clark, APRN [facility]   9/12/2024  2:00 PM Areli Clark, APRN [facility]   9/26/2024  2:00 PM Areli Clark APRN [facility]   10/10/2024  2:00 PM Areli Clark APRN [facility]   10/31/2024  2:00 PM Areli Clark, APRN [facility]   11/14/2024  2:00 PM Areli Clark, APRN [facility]   12/5/2024  2:00 PM Areli Clark, APRN [facility]   12/19/2024  2:00 PM Areli Clark, APRN [facility]     Next Care Manager Follow Up Date: 1 month follow up or sooner if needed    Reason For Follow Up: review progress and or barriers towards patient's goals.     Time Spent This Encounter Total: 20 min medical record review, telephone communication, care plan updates where needed, education, goals, and action plan recreation/update. Provided acknowledgment and validation to patient's concerns.   Monthly Minute Total including today: 20  Physical assessment, complete health history, and need for CCM established by Jessica Bunn DO.

## 2024-05-03 DIAGNOSIS — S83.289D: ICD-10-CM

## 2024-05-03 DIAGNOSIS — S83.249A TEAR OF MEDIAL MENISCUS OF KNEE, UNSPECIFIED LATERALITY, UNSPECIFIED TEAR TYPE, UNSPECIFIED WHETHER OLD OR CURRENT TEAR, INITIAL ENCOUNTER: Primary | ICD-10-CM

## 2024-05-06 ENCOUNTER — OFFICE VISIT (OUTPATIENT)
Dept: ORTHOPEDICS CLINIC | Facility: CLINIC | Age: 79
End: 2024-05-06
Payer: MEDICARE

## 2024-05-06 DIAGNOSIS — M17.12 PRIMARY OSTEOARTHRITIS OF LEFT KNEE: ICD-10-CM

## 2024-05-06 DIAGNOSIS — S83.232A COMPLEX TEAR OF MEDIAL MENISCUS OF LEFT KNEE AS CURRENT INJURY, INITIAL ENCOUNTER: Primary | ICD-10-CM

## 2024-05-06 DIAGNOSIS — S83.272A COMPLEX TEAR OF LATERAL MENISCUS OF LEFT KNEE AS CURRENT INJURY, INITIAL ENCOUNTER: ICD-10-CM

## 2024-05-06 RX ORDER — TRIAMCINOLONE ACETONIDE 40 MG/ML
40 INJECTION, SUSPENSION INTRA-ARTICULAR; INTRAMUSCULAR ONCE
Status: COMPLETED | OUTPATIENT
Start: 2024-05-06 | End: 2024-05-06

## 2024-05-06 RX ORDER — KETOROLAC TROMETHAMINE 30 MG/ML
30 INJECTION, SOLUTION INTRAMUSCULAR; INTRAVENOUS ONCE
Status: COMPLETED | OUTPATIENT
Start: 2024-05-06 | End: 2024-05-06

## 2024-05-06 RX ADMIN — TRIAMCINOLONE ACETONIDE 40 MG: 40 INJECTION, SUSPENSION INTRA-ARTICULAR; INTRAMUSCULAR at 11:23:00

## 2024-05-06 RX ADMIN — KETOROLAC TROMETHAMINE 30 MG: 30 INJECTION, SOLUTION INTRAMUSCULAR; INTRAVENOUS at 11:23:00

## 2024-05-06 NOTE — PROCEDURES
Left Knee Intra-articular Injection    Name: Arcelia Conklin   MRN: [id number]  Date: 5/6/2024     Clinical Indications:   Meniscus Tear with symptoms refractory to conservative measures.     After informed consent, the injection site was marked, sterilized with topical chlorhexidine antiseptic, and locally anesthetized with skin refrigerant.    The patient was situation in a comfortable position. Using sterile technique: 1 mL of 30mg/mL of Ketorolac, 2 mL of 0.5% Bupivicaine, 2 mL of 1% Lidocaine, and 1 mL of 40 mg/ml Triamcinolone was injected utilizing anterolateral approach with a 22 gauge needle.  A band-aid was applied.  The patient tolerated the procedure well.        Julian Mata MD  Knee, Shoulder, & Elbow Surgery / Sports Medicine Specialist  Orthopaedic Surgery  [address] [place] [address].[email or web address]  [email or web address]  t: [phone]  o: [phone]  f: [phone]

## 2024-05-06 NOTE — PROGRESS NOTES
Orthopaedic Surgery  [address] [place] [id number]  [phone]     Name: Arcelia Conklin   MRN: [id number]  Date: 5/6/2024     REASON FOR VISIT: Follow up of left nondisplaced bicondylar Schatzker type V tibial plateau fracture.      INTERVAL HISTORY:   Arcelia Conklin is a very pleasant 78 year old female who returns today for evaluation of  left nondisplaced bicondylar Schatzker type V tibial plateau fracture.     To summarize: left tibial plateau fracture sustained on 09/04/2023. She was initially evaluated by our team, discharged to subacute rehab facility. She then followed up with Dr. Piedra on 10/19/2023. She has noted some overall improvement.     At her last visit with Mike Montiel PA, she was recommended some physical therapy.     Today, she returns with swelling and 5/10 pain. She also completed an MRI demonstrating medial and lateral meniscus tears and osteoarthritis.    PE:   There were no vitals filed for this visit.  Estimated body mass index is 33.51 kg/m² as calculated from the following:    Height as of 4/24/24: 5' 4\" (1.626 m).    Weight as of 4/24/24: 195 lb 3.2 oz.    Physical Exam  Constitutional:       Appearance: Normal appearance.   HENT:      Head: Normocephalic and atraumatic.   Eyes:      Extraocular Movements: Extraocular movements intact.   Neck:      Musculoskeletal: Normal range of motion and neck supple.   Cardiovascular:      Pulses: Normal pulses.   Pulmonary:      Effort: Pulmonary effort is normal. No respiratory distress.   Abdominal:      General: There is no distension.   Skin:     General: Skin is warm.      Capillary Refill: Capillary refill takes less than 2 seconds.      Findings: No bruising.   Neurological:      General: No focal deficit present.      Mental Status: She is alert.   Psychiatric:         Mood and Affect: Mood normal.     Examination of the left knee demonstrates:     Skin is intact, warm and dry.   Atrophy: none    Effusion: small  Joint  line tenderness: Both sides of the knee  Crepitation: none   Allyn: Negative   Patellar mobility: normal without apprehension  J-sign: none    ROM: Extension full  Flexion 120 degrees   ACL:  Negative Lachman, Negative Pivot Shift   PCL:  Negative Posterior Drawer  Collateral Ligaments: Stable to Varus and Valgus stress at 0 and 30 degrees  Strength: mild weakness   Hip joint: normal pain-free ROM   Gait:  using assistive device   Leg length: equal and symmetric  Alignment:  neutral      No obvious peripheral edema noted.   Distal neurovascular exam demonstrates normal perfusion, intact sensation to light touch and full strength.      Examination of the contralateral knee demonstrates:  No significant atrophy, swelling or effusion. Full range of motion. Neurovascularly intact distally.      Radiographic Examination/Diagnostics:  Knee MRI personally viewed, independently interpreted and radiology report was reviewed.    MRI KNEE, LEFT (VOM=26837)    Result Date: 5/1/2024  PROCEDURE:  MRI KNEE, LEFT (MDI=47658)  COMPARISON:  Jed, XR, XR KNEE, COMPLETE (4 OR MORE VIEWS), LEFT (CPT=73564), 3/13/2024, 9:43 AM.  INDICATIONS:  G89.29 Chronic pain of left knee M25.562 Chronic pain of left knee  TECHNIQUE:  Axial, coronal, and sagittal proton density with and without fat saturation images were obtained.  PATIENT STATED HISTORY: (As transcribed by Technologist)  The patient is being evaluated for chronic left knee pain after her history of fracture in September 2023.    FINDINGS:  LIGAMENTS:          The ACL, PCL, patellar retinacula, and collateral ligament complexes are intact. MENISCI:            The medial meniscus reveals a longitudinal oblique tear at the junction of the posterior horn and body extending to the inferior articular surface.  The lateral meniscus reveals a subtle longitudinal oblique tear of the posterior horn  extending to the superior articular surface.  TENDONS:            There is a mild  degree of distal quadriceps tendinosis. MUSCULATURE:        No evidence of strain, edema, or atrophy. BONY COMPARTMENTS:  No acute osseous abnormalities are noted.  There is mild tricompartmental osteoarthritis, including a 1 cm focus of full-thickness articular cartilage loss along the median ridge of the patella. SYNOVIUM:           There is a minimal joint effusion.  No intra-articular bodies are noted.  There is scant fluid within a small Baker's cyst.             CONCLUSION:    1. Mild tricompartmental osteoarthritis.   2. Medial and lateral meniscal tears as described above.   3. Mild distal quadriceps tendinosis.   4. Minimal joint effusion and tiny Baker's cyst.  No intra-articular bodies are suggested.     LOCATION:  [facility]          Dictated by (CST): Bharat Wan DO on 5/01/2024 at 9:22 AM     Finalized by (PENG): Bharat Wan DO on 5/01/2024 at 9:26 AM         IMPRESSION: Arcelia Conklin is a 78 year old female with left medial and lateral meniscus tears in the setting of mild tricompartmenal osteoarthritis ongoing since 9/4/2023.      We elected to maximize conservative management with intra-articular corticosteroid and ketorolac injection coupled with physical therapy.     PLAN:   We reviewed the treatment of this disease condition.  Fortunately, treatment is amenable to conservative treatment which we chose to optimize at today's visit.  After a discussion of a variety of conservative treatment options, I recommended intra-articular injection with corticosteroid and ketorolac coupled with physical therapy to aid in strengthening, range of motion, functional improvement, and return to baseline activity.       We elected to proceed with the injection procedure at today's visit. We discussed the risk and benefits of the procedure; including, but not limited to: infection, injury to blood vessels, nerve injury, prolonged pain, swelling, site soreness, failure to progress, and need for advanced  treatments.  The patient voiced understanding and agreed to proceed with the treatment plan.      I spent 30 minutes in preparation to see the patient, counseling/education of relevant pathology, discussing imaging results, ordering therapy  intervention, administering injection, care coordination, and documentation into the electronic medical record.      FOLLOW-UP:   Return to clinic on an as needed basis.         Julian Mata MD  Knee, Shoulder, & Elbow Surgery / Sports Medicine Specialist  Orthopaedic Surgery  [address] [place] [address].[email or web address]  [email or web address]  t: [phone]  o: [phone]  f: [phone]    This note was dictated using Dragon software.  While it was briefly proofread prior to completion, some grammatical, spelling, and word choice errors due to dictation may still occur.
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
## 2024-05-16 ENCOUNTER — OFFICE VISIT (OUTPATIENT)
Dept: OBGYN CLINIC | Facility: CLINIC | Age: 79
End: 2024-05-16

## 2024-05-16 VITALS
DIASTOLIC BLOOD PRESSURE: 70 MMHG | WEIGHT: 193.19 LBS | HEART RATE: 99 BPM | SYSTOLIC BLOOD PRESSURE: 118 MMHG | BODY MASS INDEX: 33 KG/M2

## 2024-05-16 DIAGNOSIS — N95.2 ATROPHIC VULVOVAGINITIS: Primary | ICD-10-CM

## 2024-05-16 PROCEDURE — 99202 OFFICE O/P NEW SF 15 MIN: CPT | Performed by: OBSTETRICS & GYNECOLOGY

## 2024-05-16 RX ORDER — MAGNESIUM CARB/ALUMINUM HYDROX 105-160MG
296 TABLET,CHEWABLE ORAL ONCE
COMMUNITY

## 2024-05-16 NOTE — PROGRESS NOTES
[facility]  Obstetrics and Gynecology    Subjective:     Arcelia Conklin is a 78 year old  who presents with c/o vulvovaginal mass. Felt a mass just inside left side of her vagina and had some burning associated. Has not had pelvic exam for she thinks 25 years, though when asked she thinks she had pap smears and she did have normal pap in 2018.     No LMP recorded. Patient is postmenopausal.    Period Cycle (Days): POSTMENO (2024 11:00 AM)  Period Duration (Days): N/A (2024 11:00 AM)  Period Flow: N/A (2024 11:00 AM)  Use of Birth Control (if yes, specify type): Postmenopausal (2024 11:00 AM)  Hx Prior Abnormal Pap: No (2024 11:00 AM)  Pap Date: 18 (2024 11:00 AM)  Pap Result Notes: WNL (2024 11:00 AM)       Objective:     Vitals:    24 1108   BP: 118/70   Pulse: 99   Weight: 193 lb 3.2 oz (87.6 kg)       Body mass index is 33.16 kg/m².    GEN: AAOx3, NAD, appears well, appears stated age  RESP: breathing comfortably  PELVIC:   Vulva: no lesions, atrophic with thin and small labia minora, especially on right. There is still a flap of left labia minora and patient confirms that this is what she felt.      Chaperone offered and declined.       Assessment:     Arcelia Conklin is a 78 year old  with normal appearing post-menopausal female genitalia with vulvovaginal atrophy.      Plan:     -- reassured patient on exam findings  -- VVA - Recommended moisturizing/lubricating with olive oil or coconut oil to help with burning sensation.    -- Follow up as needed    Kim Larios MD  [facility]  2024 11:21 AM

## 2024-05-17 ENCOUNTER — LAB ENCOUNTER (OUTPATIENT)
Dept: LAB | Age: 79
End: 2024-05-17
Attending: FAMILY MEDICINE

## 2024-05-17 ENCOUNTER — OFFICE VISIT (OUTPATIENT)
Dept: FAMILY MEDICINE CLINIC | Facility: CLINIC | Age: 79
End: 2024-05-17

## 2024-05-17 VITALS
OXYGEN SATURATION: 98 % | HEART RATE: 78 BPM | DIASTOLIC BLOOD PRESSURE: 80 MMHG | BODY MASS INDEX: 32.95 KG/M2 | RESPIRATION RATE: 20 BRPM | HEIGHT: 64 IN | WEIGHT: 193 LBS | SYSTOLIC BLOOD PRESSURE: 112 MMHG

## 2024-05-17 DIAGNOSIS — H54.61 DECREASED VISION OF RIGHT EYE: ICD-10-CM

## 2024-05-17 DIAGNOSIS — N18.32 STAGE 3B CHRONIC KIDNEY DISEASE (HCC): ICD-10-CM

## 2024-05-17 DIAGNOSIS — R45.851 SUICIDAL THOUGHTS: ICD-10-CM

## 2024-05-17 DIAGNOSIS — I25.10 CORONARY ARTERY DISEASE INVOLVING NATIVE CORONARY ARTERY OF NATIVE HEART WITHOUT ANGINA PECTORIS: Primary | ICD-10-CM

## 2024-05-17 DIAGNOSIS — D17.1 LIPOMA OF BACK: ICD-10-CM

## 2024-05-17 LAB
ANION GAP SERPL CALC-SCNC: 7 MMOL/L (ref 0–18)
BUN BLD-MCNC: 10 MG/DL (ref 9–23)
CALCIUM BLD-MCNC: 8.8 MG/DL (ref 8.5–10.1)
CHLORIDE SERPL-SCNC: 111 MMOL/L (ref 98–112)
CO2 SERPL-SCNC: 24 MMOL/L (ref 21–32)
CREAT BLD-MCNC: 0.9 MG/DL
EGFRCR SERPLBLD CKD-EPI 2021: 66 ML/MIN/1.73M2 (ref 60–?)
FASTING STATUS PATIENT QL REPORTED: NO
GLUCOSE BLD-MCNC: 95 MG/DL (ref 70–99)
OSMOLALITY SERPL CALC.SUM OF ELEC: 293 MOSM/KG (ref 275–295)
POTASSIUM SERPL-SCNC: 4.2 MMOL/L (ref 3.5–5.1)
SODIUM SERPL-SCNC: 142 MMOL/L (ref 136–145)

## 2024-05-17 PROCEDURE — 99214 OFFICE O/P EST MOD 30 MIN: CPT | Performed by: FAMILY MEDICINE

## 2024-05-17 PROCEDURE — 80048 BASIC METABOLIC PNL TOTAL CA: CPT

## 2024-05-17 PROCEDURE — 36415 COLL VENOUS BLD VENIPUNCTURE: CPT

## 2024-05-17 RX ORDER — METOPROLOL SUCCINATE 25 MG/1
25 TABLET, EXTENDED RELEASE ORAL DAILY
Qty: 90 TABLET | Refills: 3 | Status: SHIPPED | OUTPATIENT
Start: 2024-05-17

## 2024-05-17 NOTE — TELEPHONE ENCOUNTER
A refill request was received for:  Requested Prescriptions     Pending Prescriptions Disp Refills    METFORMIN 500 MG Oral Tab [Pharmacy Med Name: metFORMIN HCl Oral Tablet 500 MG] 90 tablet 0     Sig: TAKE ONE TABLET BY MOUTH IN THE MORNING BEFORE BREAKFAST       Last refill date:   2/19/2024    Last office visit:  4/19/2024    Follow up due:  Future Appointments   Date Time Provider Department Center   5/30/2024  2:00 PM Areli Clark, APRN [facility]   6/13/2024  2:00 PM Areli Clark, APRN [facility]   6/27/2024  2:00 PM Areli Clark, APRN [facility]   7/11/2024  2:00 PM Areli Clark, APRN [facility]   7/18/2024 10:00 AM Jessica Bunn, DO [facility] [address]   7/25/2024  2:00 PM Areli Clark APRN [facility]   8/8/2024  2:00 PM Areli Clark, APRN [facility]   8/22/2024  2:00 PM Areli Clark, APRN [facility]   9/12/2024  2:00 PM Areli Clark, APRN [facility]   9/26/2024  2:00 PM Areli Clark, APRN [facility]   10/10/2024  2:00 PM Areli Clark, APRN [facility]   10/31/2024  2:00 PM Areli Clark, APRN [facility]   11/14/2024  2:00 PM Areli Clark, APRN [facility]   12/5/2024  2:00 PM Areli Clark, APRN [facility]   12/19/2024  2:00 PM Areli Clark, APRN [facility]

## 2024-05-31 NOTE — PROGRESS NOTES
Subjective:   Patient ID: Arcelia Conklin is a 78 year old female.    1. Coronary artery disease involving native coronary artery of native heart without angina pectoris.  Chronic.  No CP/SOB.    2. Decreased vision of right eye.  New.  No pain.  No discharge.   3. Lipoma of back.  Chronic. Wants to get it evaluated.    4. Suicidal thoughts.  Chronic. No attempts.  Seeing therapist regularly.  Seeing psychiatrist.  Has been having this for > 4 yrs.            History/Other:   Review of Systems   All other systems reviewed and are negative.    Current Outpatient Medications   Medication Sig Dispense Refill    metoprolol succinate ER 25 MG Oral Tablet 24 Hr Take 1 tablet (25 mg total) by mouth daily. 90 tablet 3    magnesium citrate 1.745 GM/30ML Oral Solution Take 296 mL by mouth once.      QUEtiapine 100 MG Oral Tab TAKE 1 TABLET BY MOUTH NIGHTLY. MAY TAKE 1 EXTRA TABLET UP TO 3 TIMES WEEKLY IF UNABLE TO SLEEP. 90 tablet 0    glimepiride 1 MG Oral Tab Take 1 tablet (1 mg total) by mouth daily with breakfast. 90 tablet 0    linaCLOtide (LINZESS) 72 MCG Oral Cap Take 72 mcg by mouth daily. 90 capsule 3    linaCLOtide (LINZESS) 72 MCG Oral Cap Take 1 tablet by mouth daily. 90 capsule 1    FOLIC ACID 1 MG Oral Tab TAKE ONE TABLET BY MOUTH ONE TIME DAILY 30 tablet 0    clonazePAM 1 MG Oral Tab Take 1 tablet (1 mg total) by mouth 2 (two) times daily as needed for Anxiety. 60 tablet 2    memantine 10 MG Oral Tab       HYDROcodone-acetaminophen 5-325 MG Oral Tab Take 1 tablet by mouth every 8 (eight) hours as needed for Pain.      cholecalciferol 1000 UNITS Oral Cap Take 1 capsule (1,000 Units total) by mouth daily.      Fish Oil-Cholecalciferol (OMEGA-3 FISH OIL-VITAMIN D3) 7627-9893 MG-UNIT Oral Cap Take 1 capsule by mouth 3 (three) times daily with meals.      bisacodyl 10 MG Rectal Suppos Place 1 suppository (10 mg total) rectally daily as needed. 1 suppository 0    docusate sodium 100 MG Oral Cap Take 100 mg by mouth  2 (two) times daily. 20 capsule 0    ATORVASTATIN 40 MG Oral Tab TAKE ONE TABLET BY MOUTH NIGHTLY 90 tablet 0    TORSEMIDE 20 MG Oral Tab TAKE ONE TABLET BY MOUTH ONE TIME DAILY 30 tablet 0    Omeprazole 40 MG Oral Capsule Delayed Release Take 1 capsule (40 mg total) by mouth every morning.      metoprolol succinate 25 MG Oral Tablet 24 Hr Take 1 tablet (25 mg total) by mouth 2 (two) times a day. 60 tablet 3    METFORMIN 500 MG Oral Tab TAKE ONE TABLET BY MOUTH IN THE MORNING BEFORE BREAKFAST 90 tablet 0     Allergies:No Known Allergies    Objective:   Physical Exam  Vitals reviewed.   Constitutional:       General: She is not in acute distress.     Appearance: She is well-developed. She is not diaphoretic.   Eyes:      General: No scleral icterus.        Right eye: No discharge.         Left eye: No discharge.      Extraocular Movements: Extraocular movements intact.      Conjunctiva/sclera: Conjunctivae normal.      Pupils: Pupils are equal, round, and reactive to light.   Cardiovascular:      Rate and Rhythm: Normal rate and regular rhythm.      Heart sounds: Normal heart sounds. No murmur heard.     No friction rub. No gallop.   Pulmonary:      Effort: Pulmonary effort is normal. No respiratory distress.      Breath sounds: Normal breath sounds. No wheezing or rales.         Assessment & Plan:   1. Coronary artery disease involving native coronary artery of native heart without angina pectoris    2. Decreased vision of right eye    3. Lipoma of back    4. Suicidal thoughts      1. Coronary artery disease involving native coronary artery of native heart without angina pectoris  - metoprolol succinate ER 25 MG Oral Tablet 24 Hr; Take 1 tablet (25 mg total) by mouth daily.  Dispense: 90 tablet; Refill: 3    2. Decreased vision of right eye  - Ophthalmology Referral - In Network    3. Lipoma of back  Reassured her.  Monitor.    4. Suicidal thoughts  See psychiatrist and psychologist.      Meds This Visit:  Requested  Prescriptions     Signed Prescriptions Disp Refills    metoprolol succinate ER 25 MG Oral Tablet 24 Hr 90 tablet 3     Sig: Take 1 tablet (25 mg total) by mouth daily.       Imaging & Referrals:  OPHTHALMOLOGY - INTERNAL

## 2024-06-03 ENCOUNTER — PATIENT OUTREACH (OUTPATIENT)
Dept: CASE MANAGEMENT | Age: 79
End: 2024-06-03

## 2024-06-03 DIAGNOSIS — F33.1 MAJOR DEPRESSIVE DISORDER, RECURRENT EPISODE, MODERATE (HCC): ICD-10-CM

## 2024-06-03 DIAGNOSIS — E78.2 HYPERLIPIDEMIA, MIXED: ICD-10-CM

## 2024-06-03 DIAGNOSIS — E11.51 TYPE 2 DIABETES MELLITUS WITH DIABETIC PERIPHERAL ANGIOPATHY WITHOUT GANGRENE, WITHOUT LONG-TERM CURRENT USE OF INSULIN (HCC): Primary | ICD-10-CM

## 2024-06-03 NOTE — PROGRESS NOTES
Spoke to Arcelia for CCM.      Updates to patient care team/comments: No changes   Patient reported changes in medications: No changes  Med Adherence  Comment: Taking     Health Maintenance:   Health Maintenance   Topic Date Due    Zoster Vaccines (2 of 2) 09/10/2021    COVID-19 Vaccine (3 - 2023-24 season) 09/01/2023    Diabetes Care Dilated Eye Exam  06/05/2024    Annual Physical  07/10/2024    Diabetes Care Foot Exam  09/01/2024    Influenza Vaccine (Season Ended) 10/01/2024    Diabetes Care A1C  10/25/2024    Diabetes Care: Microalb/Creat Ratio  04/25/2025    Diabetes Care: GFR  05/17/2025    DEXA Scan  Completed    Annual Depression Screening  Completed    Fall Risk Screening (Annual)  Completed    Pneumococcal Vaccine: 65+ Years  Completed    Colorectal Cancer Screening  Discontinued    Mammogram  Discontinued       Patient updates/concerns:   Patient reports she has been doing ok for the most part. Patient stated she has scheduled her eye exam with Dr. Milla Bateman. Patient stated she has been unable to see clearly in her right eye for sometime now and Dr. Bunn referred her to be examined. Patient denies barriers with her blood pressure. Patient stated she has not checked her bp at home but states she is feeling fine. Patient continues to monitor her daily diet and tries to stay active when she can,. Patient states she has no new concerns at this time.    Goals/Action Plan:    Active goal from previous outreach:     Medication regimen and daily diet  Patient reported progress towards goals:                - What: Patient reported she is scheduled with Dr Milla Bateman on 6/24/2024 for blurred vision in her right eye. Patient stated she has been unable to see clearly for some time now and hopes it will be something they can fix.            - Where/When/How: Patient stated she has not had any symptoms of low blood pressure. Patietn stated she has not checked her readings at home. Patient reminded to try and check  it to make sure medication is working. Patient agreed.  Patient Reported Barriers and Concerns: None at this time per patient                   - Plan for overcoming barriers: Patient to continue to follow up with care team as scheduled or as needed    Care Managers Interventions: Discussed upcoming appointments with patient. Reviewed patient daily medications. Patient was educated on proper nutrition, hydration and exercise. Patient is aware of our next outreach, has agreed to being contacted via telephone. Patient verbalized understanding. Patient aware she may contact me if further assistance is needed.    Future Appointments: Pt aware  Future Appointments   Date Time Provider Department Center   6/13/2024  2:00 PM Areli Clark, APRN [facility]   6/27/2024  2:00 PM GreAreli horn, APRN [facility]   7/11/2024  2:00 PM Areli Clark, APRN [facility]   7/18/2024 10:00 AM Jessica Bunn,  [facility] [address] [address]   7/25/2024  2:00 PM Areli Clark, APRN [facility]   8/8/2024  2:00 PM Areli Clark, APRN [facility]   8/22/2024  2:00 PM Areli Clark, APRN [facility]   9/12/2024  2:00 PM Areli Clark, APRN [facility]   9/26/2024  2:00 PM GreAreli horn, APRN [facility]   10/10/2024  2:00 PM Areli Clark, APRN [facility]   10/31/2024  2:00 PM GreAreli horn, APRN [facility]   11/14/2024  2:00 PM Areli Clark, APRN [facility]   12/5/2024  2:00 PM Areli Clark, APRN [facility]   12/19/2024  2:00 PM Areli Clark, APRN [facility]     Next Care Manager Follow Up Date: 1 month follow up or sooner if needed    Reason For Follow Up: review progress and or barriers towards patient's goals.     Time Spent This Encounter Total: 20 min medical record review, telephone communication, care plan updates where needed, education, goals, and action plan recreation/update. Provided acknowledgment and validation to patient's concerns.   Monthly Minute  Total including today: 20  Physical assessment, complete health history, and need for CCM established by Jessica Bunn DO.

## 2024-06-11 ENCOUNTER — OFFICE VISIT (OUTPATIENT)
Dept: FAMILY MEDICINE CLINIC | Facility: CLINIC | Age: 79
End: 2024-06-11
Payer: MEDICARE

## 2024-06-11 ENCOUNTER — TELEPHONE (OUTPATIENT)
Dept: FAMILY MEDICINE CLINIC | Facility: CLINIC | Age: 79
End: 2024-06-11

## 2024-06-11 VITALS — BODY MASS INDEX: 34 KG/M2 | WEIGHT: 196 LBS

## 2024-06-11 DIAGNOSIS — N30.00 ACUTE CYSTITIS WITHOUT HEMATURIA: ICD-10-CM

## 2024-06-11 DIAGNOSIS — R30.9 PAINFUL URINATION: Primary | ICD-10-CM

## 2024-06-11 LAB
APPEARANCE: CLEAR
BILIRUBIN: NEGATIVE
GLUCOSE (URINE DIPSTICK): NEGATIVE MG/DL
KETONES (URINE DIPSTICK): NEGATIVE MG/DL
MULTISTIX LOT#: ABNORMAL NUMERIC
NITRITE, URINE: NEGATIVE
PH, URINE: 6 (ref 4.5–8)
SPECIFIC GRAVITY: 1.02 (ref 1–1.03)
URINE-COLOR: YELLOW
UROBILINOGEN,SEMI-QN: 0.2 MG/DL (ref 0–1.9)

## 2024-06-11 PROCEDURE — 87186 SC STD MICRODIL/AGAR DIL: CPT | Performed by: FAMILY MEDICINE

## 2024-06-11 PROCEDURE — 87086 URINE CULTURE/COLONY COUNT: CPT | Performed by: FAMILY MEDICINE

## 2024-06-11 PROCEDURE — 99213 OFFICE O/P EST LOW 20 MIN: CPT | Performed by: FAMILY MEDICINE

## 2024-06-11 PROCEDURE — 87088 URINE BACTERIA CULTURE: CPT | Performed by: FAMILY MEDICINE

## 2024-06-11 PROCEDURE — 81003 URINALYSIS AUTO W/O SCOPE: CPT | Performed by: FAMILY MEDICINE

## 2024-06-11 RX ORDER — NITROFURANTOIN 25; 75 MG/1; MG/1
100 CAPSULE ORAL 2 TIMES DAILY
Qty: 14 CAPSULE | Refills: 0 | Status: SHIPPED | OUTPATIENT
Start: 2024-06-11

## 2024-06-11 NOTE — PROGRESS NOTES
Subjective:   Patient ID: Arcelia Conklin is a 78 year old female.    Last year started having dysuria.  No hematuria.  No F/C.  No N/V.  No abd pain.  Mild back pain.        History/Other:   Review of Systems   All other systems reviewed and are negative.    Current Outpatient Medications   Medication Sig Dispense Refill    nitrofurantoin monohydrate macro (MACROBID) 100 MG Oral Cap Take 1 capsule (100 mg total) by mouth 2 (two) times daily. 14 capsule 0    FOLIC ACID 1 MG Oral Tab TAKE ONE TABLET BY MOUTH ONE TIME DAILY 30 tablet 0    METFORMIN 500 MG Oral Tab TAKE ONE TABLET BY MOUTH IN THE MORNING BEFORE BREAKFAST 90 tablet 0    metoprolol succinate ER 25 MG Oral Tablet 24 Hr Take 1 tablet (25 mg total) by mouth daily. 90 tablet 3    magnesium citrate 1.745 GM/30ML Oral Solution Take 296 mL by mouth once.      QUEtiapine 100 MG Oral Tab TAKE 1 TABLET BY MOUTH NIGHTLY. MAY TAKE 1 EXTRA TABLET UP TO 3 TIMES WEEKLY IF UNABLE TO SLEEP. 90 tablet 0    glimepiride 1 MG Oral Tab Take 1 tablet (1 mg total) by mouth daily with breakfast. 90 tablet 0    linaCLOtide (LINZESS) 72 MCG Oral Cap Take 72 mcg by mouth daily. 90 capsule 3    linaCLOtide (LINZESS) 72 MCG Oral Cap Take 1 tablet by mouth daily. 90 capsule 1    clonazePAM 1 MG Oral Tab Take 1 tablet (1 mg total) by mouth 2 (two) times daily as needed for Anxiety. 60 tablet 2    memantine 10 MG Oral Tab       HYDROcodone-acetaminophen 5-325 MG Oral Tab Take 1 tablet by mouth every 8 (eight) hours as needed for Pain.      cholecalciferol 1000 UNITS Oral Cap Take 1 capsule (1,000 Units total) by mouth daily.      Fish Oil-Cholecalciferol (OMEGA-3 FISH OIL-VITAMIN D3) 2046-8317 MG-UNIT Oral Cap Take 1 capsule by mouth 3 (three) times daily with meals.      bisacodyl 10 MG Rectal Suppos Place 1 suppository (10 mg total) rectally daily as needed. 1 suppository 0    docusate sodium 100 MG Oral Cap Take 100 mg by mouth 2 (two) times daily. 20 capsule 0    ATORVASTATIN 40  MG Oral Tab TAKE ONE TABLET BY MOUTH NIGHTLY 90 tablet 0    TORSEMIDE 20 MG Oral Tab TAKE ONE TABLET BY MOUTH ONE TIME DAILY 30 tablet 0    Omeprazole 40 MG Oral Capsule Delayed Release Take 1 capsule (40 mg total) by mouth every morning.      metoprolol succinate 25 MG Oral Tablet 24 Hr Take 1 tablet (25 mg total) by mouth 2 (two) times a day. 60 tablet 3     Allergies:No Known Allergies    Objective:   Physical Exam  Vitals reviewed.   Constitutional:       General: She is not in acute distress.     Appearance: She is well-developed. She is not diaphoretic.   Eyes:      General: No scleral icterus.        Right eye: No discharge.         Left eye: No discharge.      Conjunctiva/sclera: Conjunctivae normal.   Cardiovascular:      Rate and Rhythm: Normal rate and regular rhythm.      Heart sounds: Normal heart sounds. No murmur heard.     No friction rub. No gallop.   Pulmonary:      Effort: Pulmonary effort is normal. No respiratory distress.      Breath sounds: Normal breath sounds. No wheezing or rales.   Abdominal:      General: Bowel sounds are normal. There is no distension.      Palpations: Abdomen is soft. There is no mass.      Tenderness: There is no abdominal tenderness.         Assessment & Plan:   1. Painful urination    2. Acute cystitis without hematuria      1. Painful urination  - Urine Dip, auto without Micro  - nitrofurantoin monohydrate macro (MACROBID) 100 MG Oral Cap; Take 1 capsule (100 mg total) by mouth 2 (two) times daily.  Dispense: 14 capsule; Refill: 0  - Urine Culture, Routine [E]; Future  - Urine Culture, Routine [E]    2. Acute cystitis without hematuria  - nitrofurantoin monohydrate macro (MACROBID) 100 MG Oral Cap; Take 1 capsule (100 mg total) by mouth 2 (two) times daily.  Dispense: 14 capsule; Refill: 0    Orders Placed This Encounter   Procedures    Urine Dip, auto without Micro    Urine Culture, Routine [E]       Meds This Visit:  Requested Prescriptions     Signed  Prescriptions Disp Refills    nitrofurantoin monohydrate macro (MACROBID) 100 MG Oral Cap 14 capsule 0     Sig: Take 1 capsule (100 mg total) by mouth 2 (two) times daily.       Imaging & Referrals:  None

## 2024-07-03 ENCOUNTER — PATIENT OUTREACH (OUTPATIENT)
Dept: CASE MANAGEMENT | Age: 79
End: 2024-07-03

## 2024-07-03 DIAGNOSIS — E11.51 TYPE 2 DIABETES MELLITUS WITH DIABETIC PERIPHERAL ANGIOPATHY WITHOUT GANGRENE, WITHOUT LONG-TERM CURRENT USE OF INSULIN (HCC): Primary | ICD-10-CM

## 2024-07-03 DIAGNOSIS — I10 ESSENTIAL HYPERTENSION: ICD-10-CM

## 2024-07-03 DIAGNOSIS — E78.2 HYPERLIPIDEMIA, MIXED: ICD-10-CM

## 2024-07-03 DIAGNOSIS — M51.37 DDD (DEGENERATIVE DISC DISEASE), LUMBOSACRAL: ICD-10-CM

## 2024-07-03 NOTE — PROGRESS NOTES
Spoke to Arcelia for Chronic Care Management.      Updates to patient care team/comments: No changes per patient  Patient reported changes in medications: Reviewed with patient  Med Adherence  Comment: Taking as prescribed    Health Maintenance: Pt aware  Health Maintenance   Topic Date Due    Zoster Vaccines (2 of 2) 09/10/2021    COVID-19 Vaccine (3 - 2023-24 season) 09/01/2023    Annual Physical  07/10/2024    Diabetes Care Foot Exam  09/01/2024    Influenza Vaccine (1) 10/01/2024    Diabetes Care A1C  10/25/2024    Diabetes Care: Microalb/Creat Ratio  04/25/2025    Diabetes Care: GFR  05/17/2025    Diabetes Care Dilated Eye Exam  06/24/2025    DEXA Scan  Completed    Annual Depression Screening  Completed    Fall Risk Screening (Annual)  Completed    Pneumococcal Vaccine: 65+ Years  Completed    Colorectal Cancer Screening  Discontinued    Mammogram  Discontinued       Patient updates/concerns:  Patient states she is scheduled 7/8/2024 to see the eye doctor for blurred vision. Patient stared she is trying to avoid electronic for long period of time to prevent them from worsening patient states its only in the right eye. Patient stated her sleep habits are stable. Patient stated she was under a bit of stress due to to her rent increasing but her kids are going to help her out with that so patient is grateful for their help. Patient states she has no new barriers or concerns.    Goals/Action Plan:    Active goal from previous outreach:     Medication compliance and diet improvement  Patient reported progress towards goals:              - What: Patient states she is scheduled 7/8/2024 to see the eye doctor for blurred vision. Patient stared she is trying to avoid electronic for long period of time to prevent them from worsening patient states its only in the right eye. Patient stated her sleep habits are stable.           - Where/When/How: Afsaneht stated she was under a bit of stress due to to her rent increasing but  her kids are going to help her out with that so patient is grateful for their help.  Patient Reported Barriers and Concerns: Patient continues to have blurred vision in her right eye. Scheduled to see the eye doctor on 7/8/ 2024.                   - Plan for overcoming barriers: Patient to continue to follow up with care team as scheduled or as needed.    Care Managers Interventions: The patient was educated on proper nutrition, hydration and exercise. The patient is aware of our next outreach, has agreed to being contacted via telephone. The patient verbalized understanding. The patient is aware she may contact me if further assistance is needed.    Future Appointments: Pt aware  Future Appointments   Date Time Provider Department Center   7/11/2024  2:00 PM Areli Clark, APRN [facility]   7/18/2024 10:00 AM Jessiac Bunn,  [facility] [address]   7/25/2024  2:00 PM Areli Clark, APRN [facility]   8/8/2024  2:00 PM Areli Clark, APRN [facility]   8/22/2024  2:00 PM Areli Clark, APRN [facility]   9/12/2024  2:00 PM GreAreli horn, APRN [facility]   9/26/2024  2:00 PM Areli Clark, APRN [facility]   10/10/2024  2:00 PM GreAreli horn, APRN [facility]   10/31/2024  2:00 PM Areli Clark, APRN [facility]   11/14/2024  2:00 PM Areli Clark, APRN [facility]   12/5/2024  2:00 PM GreAreli horn, APRN [facility]   12/19/2024  2:00 PM Areli Clark, APRN [facility]     Next Care Manager Follow Up Date: 1 month follow up or sooner if needed    Reason For Follow Up: review progress and or barriers towards patient's goals.     Time Spent This Encounter Total: 32 min medical record review, telephone communication, care plan updates where needed, education, goals, and action plan recreation/update. Provided acknowledgment and validation to patient's concerns.   Monthly Minute Total including today: 32  Physical assessment, complete health history,  and need for CCM established by Jessica Bunn DO.

## 2024-07-18 ENCOUNTER — OFFICE VISIT (OUTPATIENT)
Dept: FAMILY MEDICINE CLINIC | Facility: CLINIC | Age: 79
End: 2024-07-18
Payer: MEDICARE

## 2024-07-18 VITALS
RESPIRATION RATE: 16 BRPM | HEART RATE: 76 BPM | OXYGEN SATURATION: 97 % | HEIGHT: 64 IN | BODY MASS INDEX: 33.63 KG/M2 | DIASTOLIC BLOOD PRESSURE: 72 MMHG | WEIGHT: 197 LBS | SYSTOLIC BLOOD PRESSURE: 122 MMHG

## 2024-07-18 DIAGNOSIS — R45.88: ICD-10-CM

## 2024-07-18 DIAGNOSIS — M06.9 RHEUMATOID ARTHRITIS, INVOLVING UNSPECIFIED SITE, UNSPECIFIED WHETHER RHEUMATOID FACTOR PRESENT (HCC): ICD-10-CM

## 2024-07-18 DIAGNOSIS — E11.21 TYPE 2 DIABETES MELLITUS WITH DIABETIC NEPHROPATHY, WITHOUT LONG-TERM CURRENT USE OF INSULIN (HCC): ICD-10-CM

## 2024-07-18 DIAGNOSIS — I50.42 CHRONIC COMBINED SYSTOLIC AND DIASTOLIC CONGESTIVE HEART FAILURE (HCC): ICD-10-CM

## 2024-07-18 DIAGNOSIS — M46.1 SACROILIITIS (HCC): ICD-10-CM

## 2024-07-18 DIAGNOSIS — F33.2 MDD (MAJOR DEPRESSIVE DISORDER), RECURRENT SEVERE, WITHOUT PSYCHOSIS (HCC): ICD-10-CM

## 2024-07-18 DIAGNOSIS — Z00.00 ENCOUNTER FOR ANNUAL HEALTH EXAMINATION: Primary | ICD-10-CM

## 2024-07-18 DIAGNOSIS — I25.118 ATHEROSCLEROTIC HEART DISEASE OF NATIVE CORONARY ARTERY WITH OTHER FORMS OF ANGINA PECTORIS (HCC): ICD-10-CM

## 2024-07-18 NOTE — PROGRESS NOTES
Subjective:   Arcelia Conklin is a 78 year old female who presents for a Medicare Wellness Visit charge within the last 11 months and Patient may not meet criteria for AWV: Please evaluate for correct coding and scheduled follow up of multiple significant but stable problems.     2. Type 2 diabetes mellitus with diabetic nephropathy, without long-term current use of insulin (HCC).  Last A1c value was 6.9% done 4/25/2024.  Watching what she eats.  Walkign mildly.  Not checking sugars.    3. Atherosclerotic heart disease of native coronary artery with other forms of angina pectoris (HCC)  Chronic, stable. No new symptoms.     4. Chronic combined systolic and diastolic congestive heart failure (HCC)  Chronic, stable. No new symptoms.     5. MDD (major depressive disorder), recurrent severe, 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)  Chronic, not fully controlled.  Seeing psychiatry.  Recently cut hersefl. No new symptoms.     6. Rheumatoid arthritis, involving unspecified site, unspecified whether rheumatoid factor present (HCC)  Chronic, stable. No new symptoms.     7. Sacroiliitis (HCC)  Chronic, stable. No new symptoms.     8. Non-suicidal self-harm as coping mechanism (HCC)  As above.      History/Other:   Fall Risk Assessment:   She has been screened for Falls and is High Risk. Fall Prevention information provided to patient in After Visit Summary.    Do you feel unsteady when standing or walking?: Yes  Do you worry about falling?: No  Have you fallen in the past year?: Yes  How many times have you fallen?: (P) 1     Cognitive Assessment:   She had a completely normal cognitive assessment - see flowsheet entries     Functional Ability/Status:   Arcelia Conklin has some abnormal functions as listed below:  She has difficulties Managing Money/Bills based on screening of functional status.She has difficulties Affording Meds based on screening of functional status. She has Vision problems based on screening of functional status.  She has problems with Memory based on screening of functional status.       Depression Screening (PHQ):            Advanced Directives:   She does NOT have a Living Will. [Do you have a living will?: No]  She does NOT have a Power of  for Health Care. [Do you have a healthcare power of ?: No]  Discussed Advance Care Planning with patient (and family/surrogate if present). Standard forms made available to patient in After Visit Summary.      Patient Active Problem List   Diagnosis    Depression hx suicide attempt    Chronic back pain    Cervical radiculopathy    Tremor    Hyperlipidemia, mixed    Trigger finger    Contusion of knee, right    Spondylosis of lumbar joint    DDD (degenerative disc disease), lumbosacral    Unspecified internal derangement of knee    Effusion of lower leg joint    Tear of PCL (posterior cruciate ligament) of knee    Enthesopathy of hip region left    Osteoarthrosis, unspecified whether generalized or localized, lower leg    Osteopenia    Borderline personality disorder (HCC)    Lumbar stenosis    S/P lumbar fusion    Borderline abnormal biopsy of kidney    Sprain of medial collateral ligament of right knee, subsequent encounter    Primary osteoarthritis involving multiple joints    Diabetes mellitus type 2 in obese    Essential hypertension    History of right knee joint replacement    Degenerative joint disease    Age-related osteoporosis without current pathological fracture    Trochanteric bursitis of right hip    Lumbosacral spondylosis without myelopathy    Old cerebrovascular accident (CVA) without late effect    Sacroiliitis (HCC)    MDD (major depressive disorder), recurrent severe, 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)    Coronary artery disease involving native coronary artery of native heart without angina pectoris    Status post coronary artery bypass graft    Chronic combined systolic and diastolic congestive heart failure (HCC)    Atherosclerotic heart disease of native  coronary artery with other forms of angina pectoris (HCC)    Pes anserinus bursitis of right knee    Iron deficiency anemia secondary to inadequate dietary iron intake    History of falling, presenting hazards to health    Major neurocognitive disorder due to multiple etiologies, without accompanying behavioral or psychological disturbance (HCC)    Gastro-esophageal reflux disease without esophagitis    Long term (current) use of oral hypoglycemic drugs    Obstructive sleep apnea    Presence of right artificial knee joint    Rheumatoid arthritis, unspecified (HCC)    Long term (current) use of aspirin    Pure hypercholesterolemia, unspecified    Presence of aortocoronary bypass graft    Iron deficiency anemia secondary to blood loss (chronic)    Slow transit constipation    Closed fracture of left tibial plateau, initial encounter    Inability to ambulate due to knee    Generalized anxiety disorder     Allergies:  She has No Known Allergies.    Current Medications:  Outpatient Medications Marked as Taking for the 7/18/24 encounter (Office Visit) with Jessica Bunn,    Medication Sig    FOLIC ACID 1 MG Oral Tab TAKE ONE TABLET BY MOUTH ONE TIME DAILY    METFORMIN 500 MG Oral Tab TAKE ONE TABLET BY MOUTH IN THE MORNING BEFORE BREAKFAST    metoprolol succinate ER 25 MG Oral Tablet 24 Hr Take 1 tablet (25 mg total) by mouth daily.    QUEtiapine 100 MG Oral Tab TAKE 1 TABLET BY MOUTH NIGHTLY. MAY TAKE 1 EXTRA TABLET UP TO 3 TIMES WEEKLY IF UNABLE TO SLEEP.    glimepiride 1 MG Oral Tab Take 1 tablet (1 mg total) by mouth daily with breakfast.    linaCLOtide (LINZESS) 72 MCG Oral Cap Take 72 mcg by mouth daily.    clonazePAM 1 MG Oral Tab Take 1 tablet (1 mg total) by mouth 2 (two) times daily as needed for Anxiety.    memantine 10 MG Oral Tab     HYDROcodone-acetaminophen 5-325 MG Oral Tab Take 1 tablet by mouth every 8 (eight) hours as needed for Pain.    cholecalciferol 1000 UNITS Oral Cap Take 1 capsule (1,000 Units  total) by mouth daily.    Fish Oil-Cholecalciferol (OMEGA-3 FISH OIL-VITAMIN D3) 5904-7475 MG-UNIT Oral Cap Take 1 capsule by mouth 3 (three) times daily with meals.    docusate sodium 100 MG Oral Cap Take 100 mg by mouth 2 (two) times daily.    ATORVASTATIN 40 MG Oral Tab TAKE ONE TABLET BY MOUTH NIGHTLY    TORSEMIDE 20 MG Oral Tab TAKE ONE TABLET BY MOUTH ONE TIME DAILY    Omeprazole 40 MG Oral Capsule Delayed Release Take 1 capsule (40 mg total) by mouth every morning.       Medical History:  She  has a past medical history of Abdominal pain, Acute, but ill-defined, cerebrovascular disease, Anesthesia complication, Anxiety state, unspecified, Arthritis, Atherosclerosis of coronary artery (11/17/2021), Back pain, Back problem, Blood disorder, Blood in the stool, Chronic pain, Constipation, Coronary atherosclerosis, COVID (05/13/2022), Deep vein thrombosis (HCC), Dementia (HCC), Depression, Diabetes mellitus (HCC), Easy bruising, Fatigue, Feeling lonely, Frequent use of laxatives, Heart palpitations, Heartburn, Hemorrhoids, Hiatal hernia, High cholesterol, History of blood transfusion, History of depression, History of mental disorder, Indigestion, Irregular bowel habits, Migraines, Obesity, unspecified, Obstructive sleep apnea (adult) (pediatric) ([facility] 9-14-16 [place] 10-16-16), Osteoarthritis, Pain with bowel movements, Pneumothorax on right, PONV (postoperative nausea and vomiting), Psoriasis, Rheumatoid arthritis (HCC), Shortness of breath, Sleep apnea, Stroke (HCC), Torn meniscus, Type II or unspecified type diabetes mellitus without mention of complication, not stated as uncontrolled, Uncomfortable fullness after meals, Visual impairment, Wears glasses, and Weight gain.  Surgical History:  She  has a past surgical history that includes repair rotator cuff,acute (Left, 10/18/2012); appendectomy; other surgical history (1982 ); colonoscopy; upper gi endoscopy,exam; other (Left); laparoscopy,diagnostic; tubal  ligation; back surgery (07/17/2017); total knee replacement; colonoscopy; knee replacement surgery; spine surgery procedure unlisted; bypass surgery (11/17/2021); colonoscopy (N/A, 9/14/2022); and cabg.   Family History:  Her family history includes Diabetes in her mother; Heart Attack in her mother; Heart Disorder in her father; Hypertension in her mother; Mental Disorder in her mother; Stroke in her father and mother; cad in her sister.  Social History:  She  reports that she has never smoked. She has never used smokeless tobacco. She reports that she does not drink alcohol and does not use drugs.    Tobacco:  She has never smoked tobacco.    CAGE Alcohol Screen:   CAGE screening score of 0 on 7/12/2024, showing low risk of alcohol abuse.      Patient Care Team:  Jessica Bunn DO as PCP - General (Family Medicine)  Elijah Flannery MD as Referring Physician (GASTROENTEROLOGY)  Vinnie Graves MD as Consulting Physician (NEUROLOGY)  Amy Urbano CMA as [facility] Care Manager  Aakash Rodriguez LCSW as Clinical Therapist ()  Cornelius De La Fuente MD (Cardiovascular Diseases)  Sophia Salas APRN (Nurse Practitioner)    Review of Systems   All other systems reviewed and are negative.         Objective:   Physical Exam  Vitals reviewed.   Constitutional:       General: She is not in acute distress.     Appearance: She is well-developed. She is not diaphoretic.   Eyes:      General: No scleral icterus.        Right eye: No discharge.         Left eye: No discharge.      Conjunctiva/sclera: Conjunctivae normal.   Cardiovascular:      Rate and Rhythm: Normal rate and regular rhythm.      Heart sounds: Normal heart sounds. No murmur heard.     No friction rub. No gallop.   Pulmonary:      Effort: Pulmonary effort is normal. No respiratory distress.      Breath sounds: Normal breath sounds. No wheezing or rales.   Musculoskeletal:      Comments: No calf swelling, no calf tenderness.   Skin:     Comments: Healing  cuts x 3 on left arm from cutting.  No sign of infections.        /72   Pulse 76   Resp 16   Ht 5' 4\" (1.626 m)   Wt 197 lb (89.4 kg)   SpO2 97%   BMI 33.81 kg/m²  Estimated body mass index is 33.81 kg/m² as calculated from the following:    Height as of this encounter: 5' 4\" (1.626 m).    Weight as of this encounter: 197 lb (89.4 kg).    Medicare Hearing Assessment:   Hearing Screening    Time taken: 7/18/2024 10:14 AM  Screening Method: Whisper Test  Whisper Test Result: Fail     Refusing hearing aids    Visual Acuity:   Right Eye Visual Acuity: Corrected Right Eye Chart Acuity: 20/200   Left Eye Visual Acuity: Corrected Left Eye Chart Acuity: 20/40   Both Eyes Visual Acuity: Corrected     Able To Tolerate Visual Acuity: Yes        Assessment & Plan:   Arcelia Conklin is a 78 year old female who presents for a Medicare Assessment.     1. Encounter for annual health examination (Primary)  -     CBC With Differential With Platelet; Future; Expected date: 07/18/2024  -     Comp Metabolic Panel (14); Future; Expected date: 07/18/2024  -     TSH W Reflex To Free T4; Future; Expected date: 07/18/2024  -     Hemoglobin A1C; Future; Expected date: 07/18/2024  2. Type 2 diabetes mellitus with diabetic nephropathy, without long-term current use of insulin (HCC)  -     Hemoglobin A1C; Future; Expected date: 07/18/2024  3. Atherosclerotic heart disease of native coronary artery with other forms of angina pectoris (HCC)  Chronic, stable. CPM   4. Chronic combined systolic and diastolic congestive heart failure (HCC)  Chronic, stable. CPM   5. MDD (major depressive disorder), recurrent severe, 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)  Chronic, stable. CPM   6. Rheumatoid arthritis, involving unspecified site, unspecified whether rheumatoid factor present (HCC)  Chronic, stable. CPM   7. Sacroiliitis (HCC)  Chronic, stable. CPM   8. Non-suicidal self-harm as coping mechanism (HCC)  Chronic, stable. CPM   Follow up with  psychiatry.    The patient indicates understanding of these issues and agrees to the plan.  Reinforced healthy diet, lifestyle, and exercise.      Return in about 6 months (around 1/18/2025).     Jessica Bunn DO, 7/18/2024     Supplementary Documentation:   General Health:  In the past six months, have you lost more than 10 pounds without trying?: 2 - No  Has your appetite been poor?: No  Type of Diet: Other  How does the patient maintain a good energy level?: Appropriate Exercise  How would you describe your daily physical activity?: Light  How would you describe your current health state?: Fair  How do you maintain positive mental well-being?: Social Interaction  On a scale of 0 to 10, with 0 being no pain and 10 being severe pain, what is your pain level?: 4 - (Moderate)  In the past six months, have you experienced urine leakage?: 0-No  At any time do you feel concerned for the safety/well-being of yourself and/or your children, in your home or elsewhere?: Yes  Have you had any immunizations at another office such as Influenza, Hepatitis B, Tetanus, or Pneumococcal?: No    Health Maintenance   Topic Date Due    Zoster Vaccines (2 of 2) 09/10/2021    COVID-19 Vaccine (3 - 2023-24 season) 09/01/2023    Annual Physical  07/10/2024    Diabetes Care Foot Exam  09/01/2024    Influenza Vaccine (1) 10/01/2024    Diabetes Care A1C  10/25/2024    Diabetes Care: Microalb/Creat Ratio  04/25/2025    Diabetes Care: GFR  05/17/2025    Diabetes Care Dilated Eye Exam  06/24/2025    DEXA Scan  Completed    Annual Depression Screening  Completed    Fall Risk Screening (Annual)  Completed    Pneumococcal Vaccine: 65+ Years  Completed    Colorectal Cancer Screening  Discontinued    Mammogram  Discontinued

## 2024-07-29 ENCOUNTER — TELEPHONE (OUTPATIENT)
Dept: FAMILY MEDICINE CLINIC | Facility: CLINIC | Age: 79
End: 2024-07-29

## 2024-07-29 RX ORDER — GLIMEPIRIDE 1 MG/1
1 TABLET ORAL
Qty: 90 TABLET | Refills: 0 | Status: SHIPPED | OUTPATIENT
Start: 2024-07-29

## 2024-08-05 ENCOUNTER — LAB ENCOUNTER (OUTPATIENT)
Dept: LAB | Age: 79
End: 2024-08-05
Attending: FAMILY MEDICINE
Payer: MEDICARE

## 2024-08-05 DIAGNOSIS — E11.21 TYPE 2 DIABETES MELLITUS WITH DIABETIC NEPHROPATHY, WITHOUT LONG-TERM CURRENT USE OF INSULIN (HCC): ICD-10-CM

## 2024-08-05 DIAGNOSIS — Z00.00 ENCOUNTER FOR ANNUAL HEALTH EXAMINATION: ICD-10-CM

## 2024-08-05 LAB
ALBUMIN SERPL-MCNC: 4.4 G/DL (ref 3.2–4.8)
ALBUMIN/GLOB SERPL: 1.8 {RATIO} (ref 1–2)
ALP LIVER SERPL-CCNC: 99 U/L
ALT SERPL-CCNC: 12 U/L
ANION GAP SERPL CALC-SCNC: 7 MMOL/L (ref 0–18)
AST SERPL-CCNC: 17 U/L (ref ?–34)
BASOPHILS # BLD AUTO: 0.08 X10(3) UL (ref 0–0.2)
BASOPHILS NFR BLD AUTO: 0.7 %
BILIRUB SERPL-MCNC: 0.3 MG/DL (ref 0.2–1.1)
BUN BLD-MCNC: 13 MG/DL (ref 9–23)
CALCIUM BLD-MCNC: 9.4 MG/DL (ref 8.7–10.4)
CHLORIDE SERPL-SCNC: 106 MMOL/L (ref 98–112)
CO2 SERPL-SCNC: 25 MMOL/L (ref 21–32)
CREAT BLD-MCNC: 1.16 MG/DL
EGFRCR SERPLBLD CKD-EPI 2021: 49 ML/MIN/1.73M2 (ref 60–?)
EOSINOPHIL # BLD AUTO: 0.19 X10(3) UL (ref 0–0.7)
EOSINOPHIL NFR BLD AUTO: 1.7 %
ERYTHROCYTE [DISTWIDTH] IN BLOOD BY AUTOMATED COUNT: 14.8 %
EST. AVERAGE GLUCOSE BLD GHB EST-MCNC: 160 MG/DL (ref 68–126)
FASTING STATUS PATIENT QL REPORTED: YES
GLOBULIN PLAS-MCNC: 2.5 G/DL (ref 2–3.5)
GLUCOSE BLD-MCNC: 125 MG/DL (ref 70–99)
HBA1C MFR BLD: 7.2 % (ref ?–5.7)
HCT VFR BLD AUTO: 38.3 %
HGB BLD-MCNC: 12.3 G/DL
IMM GRANULOCYTES # BLD AUTO: 0.04 X10(3) UL (ref 0–1)
IMM GRANULOCYTES NFR BLD: 0.4 %
LYMPHOCYTES # BLD AUTO: 3.24 X10(3) UL (ref 1–4)
LYMPHOCYTES NFR BLD AUTO: 29.1 %
MCH RBC QN AUTO: 28.9 PG (ref 26–34)
MCHC RBC AUTO-ENTMCNC: 32.1 G/DL (ref 31–37)
MCV RBC AUTO: 89.9 FL
MONOCYTES # BLD AUTO: 0.44 X10(3) UL (ref 0.1–1)
MONOCYTES NFR BLD AUTO: 4 %
NEUTROPHILS # BLD AUTO: 7.14 X10 (3) UL (ref 1.5–7.7)
NEUTROPHILS # BLD AUTO: 7.14 X10(3) UL (ref 1.5–7.7)
NEUTROPHILS NFR BLD AUTO: 64.1 %
OSMOLALITY SERPL CALC.SUM OF ELEC: 288 MOSM/KG (ref 275–295)
PLATELET # BLD AUTO: 386 10(3)UL (ref 150–450)
POTASSIUM SERPL-SCNC: 4.2 MMOL/L (ref 3.5–5.1)
PROT SERPL-MCNC: 6.9 G/DL (ref 5.7–8.2)
RBC # BLD AUTO: 4.26 X10(6)UL
SODIUM SERPL-SCNC: 138 MMOL/L (ref 136–145)
TSI SER-ACNC: 2.65 MIU/ML (ref 0.55–4.78)
WBC # BLD AUTO: 11.1 X10(3) UL (ref 4–11)

## 2024-08-05 PROCEDURE — 85025 COMPLETE CBC W/AUTO DIFF WBC: CPT

## 2024-08-05 PROCEDURE — 83036 HEMOGLOBIN GLYCOSYLATED A1C: CPT

## 2024-08-05 PROCEDURE — 80053 COMPREHEN METABOLIC PANEL: CPT

## 2024-08-05 PROCEDURE — 36415 COLL VENOUS BLD VENIPUNCTURE: CPT

## 2024-08-05 PROCEDURE — 84443 ASSAY THYROID STIM HORMONE: CPT

## 2024-08-07 ENCOUNTER — OFFICE VISIT (OUTPATIENT)
Dept: ORTHOPEDICS CLINIC | Facility: CLINIC | Age: 79
End: 2024-08-07
Payer: MEDICARE

## 2024-08-07 ENCOUNTER — TELEPHONE (OUTPATIENT)
Dept: ORTHOPEDICS CLINIC | Facility: CLINIC | Age: 79
End: 2024-08-07

## 2024-08-07 VITALS — WEIGHT: 196 LBS | HEIGHT: 64 IN | BODY MASS INDEX: 33.46 KG/M2

## 2024-08-07 DIAGNOSIS — M17.12 PRIMARY OSTEOARTHRITIS OF LEFT KNEE: Primary | ICD-10-CM

## 2024-08-07 PROCEDURE — 99213 OFFICE O/P EST LOW 20 MIN: CPT | Performed by: PHYSICIAN ASSISTANT

## 2024-08-07 NOTE — PROGRESS NOTES
[facility] - ORTHOPEDICS  [address] [place] [id number]  [phone]       Name: Arcelia Conklin   MRN: [id number]  Date: 8/7/2024     REASON FOR VISIT: Follow up for left nondisplaced bicondylar Schatzker type V tibial plateau fracture.      INTERVAL HISTORY:  Arcelia Conklin is a 78 year old female who returns for evaluation of left nondisplaced bicondylar Schatzker type V tibial plateau fracture.  She was last evaluated May 6, 2024 by Dr. Mata who also noted that she had tricompartmental osteoarthritis with meniscal tearing.  She was provided with a steroid injection at her last visit. She did not complete therapy.     ROS: ROS    PE:   Vitals:    08/07/24 1010   Weight: 196 lb (88.9 kg)   Height: 5' 4\" (1.626 m)     Estimated body mass index is 33.64 kg/m² as calculated from the following:    Height as of this encounter: 5' 4\" (1.626 m).    Weight as of this encounter: 196 lb (88.9 kg).    Physical Exam  Constitutional:       Appearance: Normal appearance.   HENT:      Head: Normocephalic and atraumatic.   Eyes:      Extraocular Movements: Extraocular movements intact.   Neck:      Musculoskeletal: Normal range of motion and neck supple.   Cardiovascular:      Pulses: Normal pulses.   Pulmonary:      Effort: Pulmonary effort is normal. No respiratory distress.   Abdominal:      General: There is no distension.   Skin:     General: Skin is warm.      Capillary Refill: Capillary refill takes less than 2 seconds.      Findings: No bruising.   Neurological:      General: No focal deficit present.      Mental Status: She is alert.   Psychiatric:         Mood and Affect: Mood normal.     Examination of the right knee demonstrates:     Skin is intact, warm and dry.   Atrophy: none    Effusion: none    Joint line tenderness: medial  Crepitation: none   Allyn: Positive   Patellar mobility: normal without apprehension  J-sign: none    ROM: Extension full  Flexion 90  degrees  ACL:  Negative Lachman, Negative Pivot Shift   PCL:  Negative Posterior Drawer  Collateral Ligaments: Stable to Varus and Valgus stress at 0 and 30 degrees  Strength: normal   Hip joint: normal pain-free ROM   Gait:  normal   Leg length: equal and symmetric  Alignment:  neutral     No obvious peripheral edema noted.   Distal neurovascular exam demonstrates normal perfusion, intact sensation to light touch and full strength.         Radiographic Examination/Diagnostics:    I personally viewed, independently interpreted and radiology report was reviewed.    No results found.    IMPRESSION: Arcelia Conklin is a 78 year old female who presented for follow up of left knee osteoarthritis in the setting of meniscus tear and prior left nondisplaced bicondylar Schatzker type V tibial plateau fracture.      PLAN:   We had a detailed discussion outlining the etiology, anatomy, pathophysiology, and natural history of the patient's findings.    We reviewed the treatment of this disease condition. We recommended physical therapy to aid in strengthening, range of motion, functional improvement, and return to baseline activity.  The patient had opportunity to ask questions and all questions were answered appropriately.    We will also obtain authorization for Zilretta.     FOLLOW-UP:    Left knee zilretta.             Mike Montiel [facility], PA-C Orthopedic Surgery / Sports Medicine Specialist  [facility] Orthopaedic Surgery  [address] [place] [address].org  [email or web address]  t: [phone]  o: [phone]  f: [phone]    This note was dictated using Dragon software.  While it was briefly proofread prior to completion, some grammatical, spelling, and word choice errors due to dictation may still occur.

## 2024-08-08 ENCOUNTER — PATIENT OUTREACH (OUTPATIENT)
Dept: CASE MANAGEMENT | Age: 79
End: 2024-08-08

## 2024-08-08 DIAGNOSIS — E78.2 HYPERLIPIDEMIA, MIXED: Primary | ICD-10-CM

## 2024-08-08 DIAGNOSIS — I10 ESSENTIAL HYPERTENSION: ICD-10-CM

## 2024-08-08 DIAGNOSIS — F41.1 GENERALIZED ANXIETY DISORDER: ICD-10-CM

## 2024-08-08 NOTE — PROGRESS NOTES
Spoke to Arcelia for Chronic Care Management.      Updates to patient care team/comments: No changes per patient  Patient reported changes in medications: Reviewed with patient  Med Adherence  Comment: Taking per patient    Health Maintenance:   Health Maintenance   Topic Date Due    Zoster Vaccines (2 of 2) 09/10/2021    COVID-19 Vaccine (3 - 2023-24 season) 09/01/2023    Diabetes Care Foot Exam  09/01/2024    Influenza Vaccine (1) 10/01/2024    Diabetes Care A1C  02/05/2025    Diabetes Care: Microalb/Creat Ratio  04/25/2025    Diabetes Care Dilated Eye Exam  06/24/2025    Annual Physical  07/18/2025    Diabetes Care: GFR  08/05/2025    DEXA Scan  Completed    Annual Depression Screening  Completed    Fall Risk Screening (Annual)  Completed    Pneumococcal Vaccine: 65+ Years  Completed    Colorectal Cancer Screening  Discontinued    Mammogram  Discontinued     Patient updates/concerns:  Patient stated she has scheduled to start physical therapy for left knee. Patient stated she is waiting to hear back from Dinesh Mckeon to call back once it gets approved from insurance. Patient stated she has a terrible time shopping for groceries or running errands due tot pain. Patient stated she does have a cane she uses for support.  Patient stated she continues to try to monitor her daily diet and meal portions. Patient denies complications with sleep stated she has been sleeping well. Patient stated she has no new concerns at this time.    Goals/Action Plan:    Active goal from previous outreach:     Start physical therapy as recommended y Dinesh Mckeon patient is currently scheduled  Patient reported progress towards goals:                - What: Patient stated she has scheduled to start physical therapy for left knee. Patient stated she is waiting to hear back from Dinesh Mckeon to call back once it gets approved from insurance. Patient stated she has a terrible time shopping for groceries or running errands due tot pain.  Patient stated she does have a cane she uses for support.           - Where/When/How: Patient states that she should be hearing back from Dinesh Mckeon's office soon to schedule the knee injection.  Patient Reported Barriers and Concerns: None at this time per patient                   - Plan for overcoming barriers: Patient to continue to follow up with care team as scheduled or as needed.    Care Managers Interventions: The patient was educated on proper nutrition, hydration and exercise. The patient is aware of our next outreach, has agreed to being contacted via telephone. The patient verbalized understanding. The patient is aware she may contact me if further assistance is needed.    Future Appointments: Pt aware  Future Appointments   Date Time Provider Department Center   8/8/2024  2:00 PM GreAreli horn C, APRN [facility]   8/22/2024  2:00 PM Gresk, Areli C, APRN [facility]   9/12/2024  2:00 PM Gresk, Areli C, APRN [facility]   9/26/2024  2:00 PM G[facility]k Areli C, APRN [facility]   10/10/2024  2:00 PM Gresk, Areli C, APRN [facility]   10/31/2024  2:00 PM GreskAreli C, APRN [facility]   11/14/2024  2:00 PM GreskAreli C, APRN [facility]   12/5/2024  2:00 PM Gresk, Areli C, APRN [facility]   12/19/2024  2:00 PM GreskAreli C, APRN [facility]     Next Care Manager Follow Up Date: 1 month follow up or sooner if needed    Reason For Follow Up: review progress and or barriers towards patient's goals.     Time Spent This Encounter Total: 30 min medical record review, telephone communication, care plan updates where needed, education, goals, and action plan recreation/update. Provided acknowledgment and validation to patient's concerns.   Monthly Minute Total including today: 30  Physical assessment, complete health history, and need for CCM established by Jessica Bunn DO.

## 2024-08-14 ENCOUNTER — TELEPHONE (OUTPATIENT)
Dept: ORTHOPEDICS CLINIC | Facility: CLINIC | Age: 79
End: 2024-08-14

## 2024-08-14 NOTE — TELEPHONE ENCOUNTER
Patient authorized visco to be scheduled:    DOS: 8/19/2024  PROVIDER: MAIK  MEDICATION: ZILRETTA  OFFICE LOCATION: [facility]  PULLED: 8/14/2024  LABELED: 8/14/2024  PLACED: 8/14/2024

## 2024-08-14 NOTE — TELEPHONE ENCOUNTER
Patient is scheduled for left knee Zilretta inj.  Future Appointments   Date Time Provider Department Center   8/19/2024 11:00 AM [email or web address]   8/22/2024  2:00 PM Areli Clark APRN [facility]   9/12/2024  2:00 PM Areli Clark APRN [facility]   9/26/2024  2:00 PM Areli Clark APRN [facility]   10/10/2024  2:00 PM Areli Clark APRN [facility]   10/31/2024  2:00 PM Areli Clark APRN [facility]   11/14/2024  2:00 PM Areli Clark APRN [facility]   12/5/2024  2:00 PM Areli Clark APRN [facility]   12/19/2024  2:00 PM Areli Clark APRN [facility]

## 2024-08-19 ENCOUNTER — OFFICE VISIT (OUTPATIENT)
Dept: ORTHOPEDICS CLINIC | Facility: CLINIC | Age: 79
End: 2024-08-19
Payer: MEDICARE

## 2024-08-19 DIAGNOSIS — M17.12 PRIMARY OSTEOARTHRITIS OF LEFT KNEE: Primary | ICD-10-CM

## 2024-08-19 NOTE — PROCEDURES
Left Knee Intra-articular Injection    Name: Arcelia Conklin   MRN: [id number]  Date: 8/19/2024     Clinical Indications:   Knee Osteoarthritis with symptoms refractory to conservative measures.     After informed consent, the injection site was marked, sterilized with topical chlorhexidine antiseptic, and locally anesthetized with skin refrigerant.    The patient was situation in a comfortable position. Using sterile technique: 26 mg of Zilretta was injected utilizing anterolateral approach with a 22 gauge needle.  A band-aid was applied.  The patient tolerated the procedure well.        Julian Mata MD  Knee, Shoulder, & Elbow Surgery / Sports Medicine Specialist  Orthopaedic Surgery  [address] [place] [address] [id number] [place] [email or web address]  [email or web address]  t: [phone]  o: [phone]  f: [phone]

## 2024-08-26 ENCOUNTER — MED REC SCAN ONLY (OUTPATIENT)
Dept: ORTHOPEDICS CLINIC | Facility: CLINIC | Age: 79
End: 2024-08-26

## 2024-09-03 ENCOUNTER — PATIENT OUTREACH (OUTPATIENT)
Dept: CASE MANAGEMENT | Age: 79
End: 2024-09-03

## 2024-09-03 ENCOUNTER — HOSPITAL ENCOUNTER (EMERGENCY)
Facility: HOSPITAL | Age: 79
Discharge: NURSING, REHAB OR LONG-TERM CARE | End: 2024-09-04
Attending: EMERGENCY MEDICINE
Payer: MEDICARE

## 2024-09-03 DIAGNOSIS — F41.1 GENERALIZED ANXIETY DISORDER: ICD-10-CM

## 2024-09-03 DIAGNOSIS — Z72.89 DELIBERATE SELF-CUTTING: Primary | ICD-10-CM

## 2024-09-03 DIAGNOSIS — E11.69 TYPE 2 DIABETES MELLITUS WITH OBESITY (HCC): Primary | ICD-10-CM

## 2024-09-03 DIAGNOSIS — E66.9 TYPE 2 DIABETES MELLITUS WITH OBESITY (HCC): Primary | ICD-10-CM

## 2024-09-03 DIAGNOSIS — R45.851 SUICIDAL IDEATIONS: ICD-10-CM

## 2024-09-03 DIAGNOSIS — I10 ESSENTIAL HYPERTENSION: ICD-10-CM

## 2024-09-03 DIAGNOSIS — F32.A DEPRESSION, UNSPECIFIED DEPRESSION TYPE: ICD-10-CM

## 2024-09-03 LAB
ALBUMIN SERPL-MCNC: 4.6 G/DL (ref 3.2–4.8)
ALBUMIN/GLOB SERPL: 1.6 {RATIO} (ref 1–2)
ALP LIVER SERPL-CCNC: 115 U/L
ALT SERPL-CCNC: 17 U/L
AMPHET UR QL SCN: NEGATIVE
ANION GAP SERPL CALC-SCNC: 6 MMOL/L (ref 0–18)
APAP SERPL-MCNC: 6.3 UG/ML (ref 10–20)
AST SERPL-CCNC: 21 U/L (ref ?–34)
BASOPHILS # BLD AUTO: 0.07 X10(3) UL (ref 0–0.2)
BASOPHILS NFR BLD AUTO: 0.5 %
BENZODIAZ UR QL SCN: NEGATIVE
BILIRUB SERPL-MCNC: 0.3 MG/DL (ref 0.2–1.1)
BILIRUB UR QL STRIP.AUTO: NEGATIVE
BUN BLD-MCNC: 16 MG/DL (ref 9–23)
CALCIUM BLD-MCNC: 9.4 MG/DL (ref 8.7–10.4)
CANNABINOIDS UR QL SCN: NEGATIVE
CHLORIDE SERPL-SCNC: 106 MMOL/L (ref 98–112)
CLARITY UR REFRACT.AUTO: CLEAR
CO2 SERPL-SCNC: 26 MMOL/L (ref 21–32)
COCAINE UR QL: NEGATIVE
CREAT BLD-MCNC: 1.28 MG/DL
CREAT UR-SCNC: 144.3 MG/DL
EGFRCR SERPLBLD CKD-EPI 2021: 43 ML/MIN/1.73M2 (ref 60–?)
EOSINOPHIL # BLD AUTO: 0.13 X10(3) UL (ref 0–0.7)
EOSINOPHIL NFR BLD AUTO: 0.9 %
ERYTHROCYTE [DISTWIDTH] IN BLOOD BY AUTOMATED COUNT: 14.6 %
ETHANOL SERPL-MCNC: <3 MG/DL (ref ?–3)
FENTANYL UR QL SCN: NEGATIVE
GLOBULIN PLAS-MCNC: 2.8 G/DL (ref 2–3.5)
GLUCOSE BLD-MCNC: 118 MG/DL (ref 70–99)
GLUCOSE UR STRIP.AUTO-MCNC: NORMAL MG/DL
HCT VFR BLD AUTO: 39.4 %
HGB BLD-MCNC: 13 G/DL
IMM GRANULOCYTES # BLD AUTO: 0.05 X10(3) UL (ref 0–1)
IMM GRANULOCYTES NFR BLD: 0.4 %
KETONES UR STRIP.AUTO-MCNC: NEGATIVE MG/DL
LEUKOCYTE ESTERASE UR QL STRIP.AUTO: NEGATIVE
LYMPHOCYTES # BLD AUTO: 3.86 X10(3) UL (ref 1–4)
LYMPHOCYTES NFR BLD AUTO: 27.7 %
MCH RBC QN AUTO: 29 PG (ref 26–34)
MCHC RBC AUTO-ENTMCNC: 33 G/DL (ref 31–37)
MCV RBC AUTO: 87.8 FL
MDMA UR QL SCN: NEGATIVE
MONOCYTES # BLD AUTO: 0.76 X10(3) UL (ref 0.1–1)
MONOCYTES NFR BLD AUTO: 5.4 %
NEUTROPHILS # BLD AUTO: 9.09 X10 (3) UL (ref 1.5–7.7)
NEUTROPHILS # BLD AUTO: 9.09 X10(3) UL (ref 1.5–7.7)
NEUTROPHILS NFR BLD AUTO: 65.1 %
NITRITE UR QL STRIP.AUTO: NEGATIVE
OPIATES UR QL SCN: NEGATIVE
OSMOLALITY SERPL CALC.SUM OF ELEC: 288 MOSM/KG (ref 275–295)
OXYCODONE UR QL SCN: NEGATIVE
PH UR STRIP.AUTO: 5.5 [PH] (ref 5–8)
PLATELET # BLD AUTO: 417 10(3)UL (ref 150–450)
POTASSIUM SERPL-SCNC: 4 MMOL/L (ref 3.5–5.1)
PROT SERPL-MCNC: 7.4 G/DL (ref 5.7–8.2)
PROT UR STRIP.AUTO-MCNC: NEGATIVE MG/DL
RBC # BLD AUTO: 4.49 X10(6)UL
RBC UR QL AUTO: NEGATIVE
SALICYLATES SERPL-MCNC: <3 MG/DL (ref 3–20)
SARS-COV-2 RNA RESP QL NAA+PROBE: NOT DETECTED
SODIUM SERPL-SCNC: 138 MMOL/L (ref 136–145)
SP GR UR STRIP.AUTO: 1.02 (ref 1–1.03)
UROBILINOGEN UR STRIP.AUTO-MCNC: NORMAL MG/DL
WBC # BLD AUTO: 14 X10(3) UL (ref 4–11)

## 2024-09-03 PROCEDURE — 99285 EMERGENCY DEPT VISIT HI MDM: CPT

## 2024-09-03 PROCEDURE — 85025 COMPLETE CBC W/AUTO DIFF WBC: CPT | Performed by: EMERGENCY MEDICINE

## 2024-09-03 PROCEDURE — 80179 DRUG ASSAY SALICYLATE: CPT | Performed by: EMERGENCY MEDICINE

## 2024-09-03 PROCEDURE — 82077 ASSAY SPEC XCP UR&BREATH IA: CPT | Performed by: EMERGENCY MEDICINE

## 2024-09-03 PROCEDURE — 80143 DRUG ASSAY ACETAMINOPHEN: CPT | Performed by: EMERGENCY MEDICINE

## 2024-09-03 PROCEDURE — 80053 COMPREHEN METABOLIC PANEL: CPT | Performed by: EMERGENCY MEDICINE

## 2024-09-03 PROCEDURE — 81003 URINALYSIS AUTO W/O SCOPE: CPT | Performed by: EMERGENCY MEDICINE

## 2024-09-03 PROCEDURE — 80307 DRUG TEST PRSMV CHEM ANLYZR: CPT | Performed by: EMERGENCY MEDICINE

## 2024-09-03 PROCEDURE — 93010 ELECTROCARDIOGRAM REPORT: CPT

## 2024-09-03 PROCEDURE — 36415 COLL VENOUS BLD VENIPUNCTURE: CPT

## 2024-09-03 NOTE — PROGRESS NOTES
Spoke to [facility] for Chronic Care Management.      Updates to patient care team/comments: No changes per patient  Patient reported changes in medications: No changes per patient  Med Adherence  Comment: Taking     Health Maintenance:   Health Maintenance   Topic Date Due    Zoster Vaccines (2 of 2) 09/10/2021    Diabetes Care Foot Exam  09/01/2024    COVID-19 Vaccine (3 - 2023-24 season) 09/01/2024    Influenza Vaccine (1) 10/01/2024    Diabetes Care A1C  02/05/2025    Diabetes Care: Microalb/Creat Ratio  04/25/2025    Diabetes Care Dilated Eye Exam  06/24/2025    Annual Physical  07/18/2025    Diabetes Care: GFR  08/05/2025    DEXA Scan  Completed    Annual Depression Screening  Completed    Fall Risk Screening (Annual)  Completed    Pneumococcal Vaccine: 65+ Years  Completed    Colorectal Cancer Screening  Discontinued    Mammogram  Discontinued     Patient updates/concerns:  Patient reported that she has been doing well. She also mentioned that she continues to take her daily medication without any obstacles. Patient states she has not started physical therapy yet as she is waiting to hear back about coverage. The patient stated that she is still monitoring her daily diet and meal portions. Patient states she tries to stay active in and around her home. At this time, the patient denies having any new barriers or concerns. I reviewed the upcoming appointments with the patient and discussed the scheduled times.    Goals/Action Plan:    Active goal from previous outreach:     Start physical therapy  Patient reported progress towards goals:                - What: Patient reported that she has been doing well. She also mentioned that she continues to take her daily medication without any obstacles. Patient states she has not started physical therapy yet as she is waiting to hear back about coverage.            - Where/When/How: he patient stated that she is still monitoring her daily diet and meal portions. Patient  states she tries to stay active in and around her home. At this time, the patient denies having any new barriers or concerns. I reviewed the upcoming appointments with the patient and discussed the scheduled times.  Patient Reported Barriers and Concerns: None at this this time per patient                   - Plan for overcoming barriers: Patient to continue to follow up with care team as scheduled or as needed.    Care Managers Interventions: The patient was educated on proper nutrition, hydration and exercise. The patient is aware of our next outreach, has agreed to being contacted via telephone. The patient verbalized understanding. The patient is aware she may contact me if further assistance is needed.    Future Appointments: Pt aware  Future Appointments   Date Time Provider Department Center   9/12/2024  2:00 PM Gresk, Areli C, APRN [facility]   9/26/2024  2:00 PM Gresk, Areli C, APRN [facility]   10/10/2024  2:00 PM Gresk, Areli C, APRN [facility]   10/31/2024  2:00 PM Gresk, Areli C, APRN [facility]   11/14/2024  2:00 PM Gresk, Areli C, APRN [facility]   12/5/2024  2:00 PM Gresk, Areli C, APRN [facility]   12/19/2024  2:00 PM Gresk, Areli C, APRN [facility]     Next Care Manager Follow Up Date: 1 month follow up or sooner if needed    Reason For Follow Up: review progress and or barriers towards patient's goals.     Time Spent This Encounter Total: 20 min medical record review, telephone communication, care plan updates where needed, education, goals, and action plan recreation/update. Provided acknowledgment and validation to patient's concerns.   Monthly Minute Total including today: 20  Physical assessment, complete health history, and need for CCM established by Jessica Bunn DO.

## 2024-09-04 VITALS
HEART RATE: 78 BPM | SYSTOLIC BLOOD PRESSURE: 110 MMHG | TEMPERATURE: 98 F | WEIGHT: 198.44 LBS | RESPIRATION RATE: 18 BRPM | BODY MASS INDEX: 34 KG/M2 | OXYGEN SATURATION: 93 % | DIASTOLIC BLOOD PRESSURE: 52 MMHG

## 2024-09-04 PROBLEM — F32.9 MDD (MAJOR DEPRESSIVE DISORDER): Status: ACTIVE | Noted: 2024-09-04

## 2024-09-04 PROCEDURE — 93005 ELECTROCARDIOGRAM TRACING: CPT

## 2024-09-04 PROCEDURE — 90471 IMMUNIZATION ADMIN: CPT

## 2024-09-04 PROCEDURE — 96372 THER/PROPH/DIAG INJ SC/IM: CPT

## 2024-09-04 RX ORDER — LORAZEPAM 2 MG/ML
INJECTION INTRAMUSCULAR
Status: COMPLETED
Start: 2024-09-04 | End: 2024-09-04

## 2024-09-04 RX ORDER — HALOPERIDOL 5 MG/ML
2 INJECTION INTRAMUSCULAR ONCE
Status: COMPLETED | OUTPATIENT
Start: 2024-09-04 | End: 2024-09-04

## 2024-09-04 RX ORDER — LORAZEPAM 2 MG/ML
1 INJECTION INTRAMUSCULAR ONCE
Status: COMPLETED | OUTPATIENT
Start: 2024-09-04 | End: 2024-09-04

## 2024-09-04 RX ORDER — HALOPERIDOL 5 MG/ML
INJECTION INTRAMUSCULAR
Status: COMPLETED
Start: 2024-09-04 | End: 2024-09-04

## 2024-09-04 NOTE — PROGRESS NOTES
09/04/24 0332   COVID Exposure Risk Screening   Do you have any of the following new or worsening symptoms of COVID-19? None   Have you been diagnosed with COVID-19 within the past 10 days? No   Are you awaiting COVID-19 test results or do you have a COVID-19 test scheduled? No   In the past 10 days, have you been in contact with someone who was confirmed or suspected to have COVID-19? No

## 2024-09-04 NOTE — ED QUICK NOTES
Pt undressed into hospital gown and socks. All belongings bagged and labeled and sent to security. Restriction of Rights completed. Petition completed per [employer].
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
## 2024-09-04 NOTE — ED NOTES
Patient was assessed and recommended inpatient admission.  Per [facility], there are no beds currently.  [facility] will reassess bed availability later today.  Patient was updated on the POC and was not in agreement.  Patient became upset stating she needs to leave.  Security was called to bedside.  The petition was activated.  The petition, certificate, and patient's rights were sent securely to the [facility].

## 2024-09-04 NOTE — ED QUICK NOTES
Patient resting in chair, no daily medications put in at this time. MD raymond ok'd. Patient sleeping, so unable to verbally confirm medications with patient. Equal chest rise and fall.

## 2024-09-04 NOTE — ED QUICK NOTES
Patient aware that she is going to be admitted inpatient and very upset and stating she is going to leave. Security at bedside.

## 2024-09-04 NOTE — ED INITIAL ASSESSMENT (HPI)
Pt arrives with medics after making SI statements to her daughter on the phone and self inflicting wounds to her left forearm with a razor blade. Pt is cooperative. Unknown last tetanus.

## 2024-09-04 NOTE — ED NOTES
SBAR given to Eduardo RN ([phone]) at 1350. Nurse to nurse information given to Waylon ED RN at 1400. Pt accepted to [facility] by Dr. Hernandez, pt going to room 917B. Instructed to send all signed original copies of paperwork with pt. As well instructed to set transport at 1530.

## 2024-09-04 NOTE — BH PREADMISSION INTAKE NOTE
Preadmission Intake Note           Referral Source  Where was crisis eval performed?: On-site  Referral Source: Hospital  Referral Source Info: daughter called police  Hospital: [facility] Suicide Severity Rating Scale Screener   In what setting is the screener performed?: in person  1. Have you wished you were dead or wished you could go to sleep and not wake up? (past 30 days): Yes (\"I've done that all my life. That's normal for me.\")  2. Have you actually had any thoughts of killing yourself? (past 30 days): Yes (pt denies / per [employer], pt texted daughter \" I will kill myself tonight. I love you, goodbye\". She \"admitted to police she had cut her left arm 3 times earlier in the afternoon after being scammed on Facebook.\")  3. Have you been thinking about how you might kill yourself? (past 30 days): Yes (pt denies / per [employer], pt texted daughter \" I will kill myself tonight. I love you, goodbye\". She \"admitted to police she had cut her left arm 3 times earlier in the afternoon after being scammed on Facebook.\")  4. Have you had these thoughts and had some intention of acting on them? (past 30 days): Yes (pt denies / per [employer], pt texted daughter \" I will kill myself tonight. I love you, goodbye\". She \"admitted to police she had cut her left arm 3 times earlier in the afternoon after being scammed on Facebook.\")  5a. Have you started to work out or worked out the details of how to kill yourself? (past 30 days): No  5b. Do you intend to carry out this plan? (past 30 days): No  6. Have you ever done anything, started to do anything, or prepared to do anything to end your life? (lifetime): Yes  7. How long ago did you do any of these?: Within the last three months               Level of Care Recommendations  Consulted with: MICKI Almaraz was paged. Dr. Hernandez recommends inpatient admission. Dr. Hopkins in agreement.  Level of Care Recommendation: Inpatient Acute Care  Unit: A2  Inpatient Criteria:  Suicidal/homicidal risk  Behavioral Precautions: Suicide  Medical Precautions: Fall                                                      Precaution Review  Behavioral Precautions: Suicide  Medical Precautions: Fall      Airborne Precautions TB Screening  1. Cough (Current/Recent): No (go to Question 2)  .   1a. Duration of Cough: 2 week or less  .   1b. Have You Coughed Up Blood?: No  2. Fever (Current/Recent): No (go to Question 3)  .   2a. Duration of Fever: 2 weeks or less  3. Night Sweats (Recent): No (go to Question 4)  .   3a. Duration of Night Sweats: 2 weeks or less  4. Weight Loss (Recent and Unexplained): No  Subtotal- Resp. Symptoms: 0  No TB Screening Protocol Indicated: Screening Complete       Organism  Pt from acute care/rehab/nursing home : No  In the PAST YEAR, have you had an infection with: CDIFF, MRSA or other drug resistant organisms?:  (none reported)    Isolation Precautions  Isolation Precautions:  (n/a)  Animal Assisted Therapy Visits:  ([facility] pt not present)  Animal Assisted Therapy Visits:  ([facility] pt not present)      Current Medical  Medical Problems Under Current Treatment that will need to be continued after psychiatric admission: DMII, HTN, back pain, CAD, GERD, CHF, ALISIA, DVT, Migraines, Stroke, RA, high cholesterol  Current Medications: see MAR  Do you have a Primary Care Physician?: Yes  Primary Care Physician Name: Jessica Bunn  Does the Patient Have: None  Active Eating Disorder: No

## 2024-09-04 NOTE — ED QUICK NOTES
Pt expressing frustration that she may not use pen, have phone and  at bedside \" your're treating me like I'm in a longterm\" this RN explained that r/t SI expressed, belongings are obtained and safety measures are in place to protect pt and staff. Heber Nicolas at bedside assisting pt in writing down phone numbers, belongings to be resealed after pt obtains phone numbers

## 2024-09-04 NOTE — ED NOTES
Change of status completed and sent to [facility]. Change of status scanned into patient chart
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
## 2024-09-04 NOTE — ED QUICK NOTES
Pt eating lunch tray, requesting purse to obtain phone to write down and make phone calls \"so someone knows I'm here\". Pt is calm and cooperative stating \"this is a misunderstanding.\"

## 2024-09-04 NOTE — ED PROVIDER NOTES
EKG shows a normal sinus rhythm ventricular rate 84 the rate axis interval reviewed there is a possible inferior infarct age undetermined.  This is an abnormal EKG      During my shift the patient became somewhat agitated and was attempting to leave she was given an IM dose of 1 mg Ativan and 2 mg Haldol.  Subsequently she was more calm and cooperative

## 2024-09-05 PROBLEM — F33.2 SEVERE EPISODE OF RECURRENT MAJOR DEPRESSIVE DISORDER, WITHOUT PSYCHOTIC FEATURES (HCC): Status: ACTIVE | Noted: 2024-09-04

## 2024-09-05 LAB
ATRIAL RATE: 84 BPM
P AXIS: 50 DEGREES
P-R INTERVAL: 152 MS
Q-T INTERVAL: 400 MS
QRS DURATION: 84 MS
QTC CALCULATION (BEZET): 472 MS
R AXIS: -2 DEGREES
T AXIS: 60 DEGREES
VENTRICULAR RATE: 84 BPM

## 2024-10-01 ENCOUNTER — PATIENT OUTREACH (OUTPATIENT)
Dept: CASE MANAGEMENT | Age: 79
End: 2024-10-01

## 2024-10-01 DIAGNOSIS — I10 ESSENTIAL HYPERTENSION: Primary | ICD-10-CM

## 2024-10-01 DIAGNOSIS — E78.00 PURE HYPERCHOLESTEROLEMIA, UNSPECIFIED: ICD-10-CM

## 2024-10-01 DIAGNOSIS — E78.2 HYPERLIPIDEMIA, MIXED: ICD-10-CM

## 2024-10-01 NOTE — PROGRESS NOTES
Spoke to Arcelia for Chronic Care Management.      Updates to patient care team/comments: No changes per patient  Patient reported changes in medications: No changes per patient  Med Adherence  Comment: taking    Health Maintenance:   Health Maintenance   Topic Date Due    Zoster Vaccines (2 of 2) 09/10/2021    Diabetes Care Foot Exam  09/01/2024    COVID-19 Vaccine (3 - 2023-24 season) 09/01/2024    Influenza Vaccine (1) 10/01/2024    Diabetes Care A1C  02/05/2025    Diabetes Care: Microalb/Creat Ratio  04/25/2025    Diabetes Care Dilated Eye Exam  06/24/2025    Annual Physical  07/18/2025    Diabetes Care: GFR  09/03/2025    DEXA Scan  Completed    Annual Depression Screening  Completed    Fall Risk Screening (Annual)  Completed    Pneumococcal Vaccine: 65+ Years  Completed    Colorectal Cancer Screening  Discontinued    Mammogram  Discontinued     Patient updates/concerns:  Patient stated that she has been taking it day by day. She mentioned that she was thinking about going to [facility]. I provided the patient with information on the hours and days it is open. The patient also mentioned that she is recovering from cold symptoms. She stated that she has had a runny nose and fatigue, feeling under the weather. The patient reported that her son recently had COVID, so she is cautious about going anywhere. She stated that she is now feeling better and feels good enough to go get some groceries. Patient stated she has no other concerns at this time.     Goals/Action Plan:    Active goal from previous outreach:     Monitor daily diet and medication management  Patient reported progress towards goals:                - What: Patient stated that she has been taking it day by day. She mentioned that she was thinking about going to [facility]. I provided the patient with information on the hours and days it is open.           - Where/When/How: patient also mentioned that she is recovering from cold symptoms.  She stated that she has had a runny nose and fatigue, feeling under the weather. The patient reported that her son recently had COVID, so she is cautious about going anywhere. She stated that she is now feeling better and feels good enough to go get some groceries  Patient Reported Barriers and Concerns: None at this time per patient                   - Plan for overcoming barriers: Patient to continue to follow up with care team as scheduled or as needed.    Care Managers Interventions: Patient was educated on proper nutrition, hydration and exercise. Patient is aware of our next outreach, has agreed to being contacted via telephone. Patient verbalized understanding. Patient is aware she may contact me if further assistance is needed.    Future Appointments: Pt aware  Future Appointments   Date Time Provider Department Center   10/10/2024  2:00 PM Areli Clark, APRN [facility]   10/31/2024  2:00 PM GreAreli horn, APRN [facility]   11/14/2024  2:00 PM GreAreli horn, APRN [facility]   12/5/2024  2:00 PM Areli Clark, APRN [facility]   12/19/2024  2:00 PM Areli Clark, APRN [facility]     Next Care Manager Follow Up Date: 1 month follow up or sooner if needed    Reason For Follow Up: review progress and or barriers towards patient's goals.     Time Spent This Encounter Total: 20 min medical record review, telephone communication, care plan updates where needed, education, goals, and action plan 7recreation/update. Provided acknowledgment and validation to patient's concerns.   Monthly Minute Total including today: 20  Physical assessment, complete health history, and need for CCM established by Jessica Bunn DO.

## 2024-10-23 ENCOUNTER — OFFICE VISIT (OUTPATIENT)
Dept: ORTHOPEDICS CLINIC | Facility: CLINIC | Age: 79
End: 2024-10-23
Payer: MEDICARE

## 2024-10-23 DIAGNOSIS — M17.12 PRIMARY OSTEOARTHRITIS OF LEFT KNEE: Primary | ICD-10-CM

## 2024-10-23 PROCEDURE — 99213 OFFICE O/P EST LOW 20 MIN: CPT | Performed by: PHYSICIAN ASSISTANT

## 2024-10-23 PROCEDURE — 1159F MED LIST DOCD IN RCRD: CPT | Performed by: PHYSICIAN ASSISTANT

## 2024-10-23 PROCEDURE — 1125F AMNT PAIN NOTED PAIN PRSNT: CPT | Performed by: PHYSICIAN ASSISTANT

## 2024-10-23 PROCEDURE — 1160F RVW MEDS BY RX/DR IN RCRD: CPT | Performed by: PHYSICIAN ASSISTANT

## 2024-10-23 NOTE — PROGRESS NOTES
[facility] - ORTHOPEDICS  [address] [place] [address]  [phone]       Name: Arcelia Conklin   MRN: [id number]  Date: 10/23/2024     REASON FOR VISIT: Follow up for LEFT KNEE PAIN.     INTERVAL HISTORY:  Arcelia Conklin is a 79 year old female who returns for evaluation of left LEFT KNEE pain consistent with meniscal tears confirmed on MRI in 05/01/2024.     To summarize, she was last evaluated May 6, 2024 by Dr. Mata who also noted that she had tricompartmental osteoarthritis with meniscal tearing.  She was provided with a steroid injection Zilretta on 08/19/2024 - and had some relief until 10 days ago. She complains of 7/10 pain.   Noted to have left nondisplaced bicondylar Schatzker type V tibial plateau fracture in 2023.     ROS: ROS    PE:   There were no vitals filed for this visit.  Estimated body mass index is 33.09 kg/m² as calculated from the following:    Height as of 9/5/24: 5' 4\" (1.626 m).    Weight as of 9/6/24: 192 lb 12.8 oz (87.5 kg).    Physical Exam  Constitutional:       Appearance: Normal appearance.   HENT:      Head: Normocephalic and atraumatic.   Eyes:      Extraocular Movements: Extraocular movements intact.   Neck:      Musculoskeletal: Normal range of motion and neck supple.   Cardiovascular:      Pulses: Normal pulses.   Pulmonary:      Effort: Pulmonary effort is normal. No respiratory distress.   Abdominal:      General: There is no distension.   Skin:     General: Skin is warm.      Capillary Refill: Capillary refill takes less than 2 seconds.      Findings: No bruising.   Neurological:      General: No focal deficit present.      Mental Status: She is alert.   Psychiatric:         Mood and Affect: Mood normal.     Examination of the left knee demonstrates:     Skin is intact, warm and dry.   Atrophy: none    Effusion: small    Joint line tenderness: none  Crepitation: none   Allyn: Positive   Patellar mobility: normal without  apprehension  J-sign: none    ROM: Extension full  Flexion 90 degrees  ACL:  Negative Lachman, Negative Pivot Shift   PCL:  Negative Posterior Drawer  Collateral Ligaments: Stable to Varus and Valgus stress at 0 and 30 degrees  Strength: normal   Hip joint: normal pain-free ROM   Gait:  normal   Leg length: equal and symmetric  Alignment:  neutral     No obvious peripheral edema noted.   Distal neurovascular exam demonstrates normal perfusion, intact sensation to light touch and full strength.         Radiographic Examination/Diagnostics:    I personally viewed, independently interpreted and radiology report was reviewed.    MRI Left Knee - 04/30/2024-   Impression   CONCLUSION:    1. Mild tricompartmental osteoarthritis.  2. Medial and lateral meniscal tears as described above.  3. Mild distal quadriceps tendinosis.  4. Minimal joint effusion and tiny Baker's cyst.  No intra-articular bodies are suggested.       IMPRESSION: Arcelia Conklin is a 79 year old female who presented for follow up of left knee osteoarthritis/meniscal tearing in the setting of previous left tibial plateau fracture.     PLAN:   We had a detailed discussion outlining the etiology, anatomy, pathophysiology, and natural history of the patient's findings.    We reviewed the treatment of this disease condition.  We discussed the etiology, pathophysiology, clinical manifestations and treatment of knee osteoarthritis. We discussed treatment including, but not limited to: weight loss, activity modification, RICE modalities, NSAIDs, steroid injections, viscosupplementation, and surgical intervention.     We will obtain authorization for viscosupplementation.     FOLLOW-UP:  Left knee visco.             Mike Montiel [facility], PA-C Orthopedic Surgery / Sports Medicine Specialist  [facility]  [address] [place] [address]   [email or web address]  [email or web address]  t: [phone]  o: [phone]  f: [phone]    This note  was dictated using Dragon software.  While it was briefly proofread prior to completion, some grammatical, spelling, and word choice errors due to dictation may still occur.

## 2024-10-29 ENCOUNTER — TELEPHONE (OUTPATIENT)
Dept: ORTHOPEDICS CLINIC | Facility: CLINIC | Age: 79
End: 2024-10-29

## 2024-10-29 NOTE — TELEPHONE ENCOUNTER
Patient authorized visco to be scheduled:    DOS: 11/1/2024  PROVIDER: ODILIA  MEDICATION: MONOVISC  OFFICE LOCATION: [facility]  PULLED: 10/31/2024  LABELED: 10/31/2024  PLACED: 10/31/2024

## 2024-10-29 NOTE — TELEPHONE ENCOUNTER
Future Appointments   Date Time Provider Department Center   10/31/2024  2:00 PM Areli Clark APRN [facility]   11/1/2024 10:20 AM [id number]   11/14/2024  2:00 PM Areli Clark APRN [facility]   12/5/2024  2:00 PM Areli Clark APRN [facility]   12/19/2024  2:00 PM Areli Clark APRN [facility]

## 2024-10-31 ENCOUNTER — TELEPHONE (OUTPATIENT)
Dept: FAMILY MEDICINE CLINIC | Facility: CLINIC | Age: 79
End: 2024-10-31

## 2024-10-31 ENCOUNTER — APPOINTMENT (OUTPATIENT)
Dept: GENERAL RADIOLOGY | Facility: HOSPITAL | Age: 79
End: 2024-10-31
Attending: EMERGENCY MEDICINE
Payer: MEDICARE

## 2024-10-31 ENCOUNTER — PATIENT OUTREACH (OUTPATIENT)
Dept: CASE MANAGEMENT | Age: 79
End: 2024-10-31

## 2024-10-31 ENCOUNTER — HOSPITAL ENCOUNTER (EMERGENCY)
Facility: HOSPITAL | Age: 79
Discharge: HOME | End: 2024-10-31
Attending: EMERGENCY MEDICINE
Payer: MEDICARE

## 2024-10-31 VITALS
OXYGEN SATURATION: 100 % | HEART RATE: 66 BPM | DIASTOLIC BLOOD PRESSURE: 67 MMHG | HEIGHT: 64 IN | SYSTOLIC BLOOD PRESSURE: 143 MMHG | TEMPERATURE: 98 F | WEIGHT: 195 LBS | RESPIRATION RATE: 16 BRPM | BODY MASS INDEX: 33.29 KG/M2

## 2024-10-31 DIAGNOSIS — R07.89 CHEST PAIN, ATYPICAL: Primary | ICD-10-CM

## 2024-10-31 LAB
ALBUMIN SERPL-MCNC: 4.6 G/DL (ref 3.2–4.8)
ALBUMIN/GLOB SERPL: 1.8 {RATIO} (ref 1–2)
ALP LIVER SERPL-CCNC: 110 U/L
ALT SERPL-CCNC: 14 U/L
ANION GAP SERPL CALC-SCNC: 9 MMOL/L (ref 0–18)
AST SERPL-CCNC: 20 U/L (ref ?–34)
BASOPHILS # BLD AUTO: 0.06 X10(3) UL (ref 0–0.2)
BASOPHILS NFR BLD AUTO: 0.6 %
BILIRUB SERPL-MCNC: 0.4 MG/DL (ref 0.2–1.1)
BUN BLD-MCNC: 10 MG/DL (ref 9–23)
CALCIUM BLD-MCNC: 9.7 MG/DL (ref 8.7–10.4)
CHLORIDE SERPL-SCNC: 107 MMOL/L (ref 98–112)
CO2 SERPL-SCNC: 22 MMOL/L (ref 21–32)
CREAT BLD-MCNC: 1.23 MG/DL
D DIMER PPP FEU-MCNC: 0.67 UG/ML FEU (ref ?–0.78)
EGFRCR SERPLBLD CKD-EPI 2021: 45 ML/MIN/1.73M2 (ref 60–?)
EOSINOPHIL # BLD AUTO: 0.18 X10(3) UL (ref 0–0.7)
EOSINOPHIL NFR BLD AUTO: 1.7 %
ERYTHROCYTE [DISTWIDTH] IN BLOOD BY AUTOMATED COUNT: 14.6 %
GLOBULIN PLAS-MCNC: 2.6 G/DL (ref 2–3.5)
GLUCOSE BLD-MCNC: 124 MG/DL (ref 70–99)
HCT VFR BLD AUTO: 38.3 %
HGB BLD-MCNC: 12.6 G/DL
IMM GRANULOCYTES # BLD AUTO: 0.05 X10(3) UL (ref 0–1)
IMM GRANULOCYTES NFR BLD: 0.5 %
LYMPHOCYTES # BLD AUTO: 2.22 X10(3) UL (ref 1–4)
LYMPHOCYTES NFR BLD AUTO: 21.2 %
MCH RBC QN AUTO: 29.1 PG (ref 26–34)
MCHC RBC AUTO-ENTMCNC: 32.9 G/DL (ref 31–37)
MCV RBC AUTO: 88.5 FL
MONOCYTES # BLD AUTO: 0.44 X10(3) UL (ref 0.1–1)
MONOCYTES NFR BLD AUTO: 4.2 %
NEUTROPHILS # BLD AUTO: 7.51 X10 (3) UL (ref 1.5–7.7)
NEUTROPHILS # BLD AUTO: 7.51 X10(3) UL (ref 1.5–7.7)
NEUTROPHILS NFR BLD AUTO: 71.8 %
OSMOLALITY SERPL CALC.SUM OF ELEC: 286 MOSM/KG (ref 275–295)
PLATELET # BLD AUTO: 400 10(3)UL (ref 150–450)
POTASSIUM SERPL-SCNC: 4 MMOL/L (ref 3.5–5.1)
PROT SERPL-MCNC: 7.2 G/DL (ref 5.7–8.2)
RBC # BLD AUTO: 4.33 X10(6)UL
SODIUM SERPL-SCNC: 138 MMOL/L (ref 136–145)
TROPONIN I SERPL HS-MCNC: <3 NG/L
WBC # BLD AUTO: 10.5 X10(3) UL (ref 4–11)

## 2024-10-31 PROCEDURE — 85379 FIBRIN DEGRADATION QUANT: CPT | Performed by: EMERGENCY MEDICINE

## 2024-10-31 PROCEDURE — 99285 EMERGENCY DEPT VISIT HI MDM: CPT

## 2024-10-31 PROCEDURE — 93010 ELECTROCARDIOGRAM REPORT: CPT

## 2024-10-31 PROCEDURE — 80053 COMPREHEN METABOLIC PANEL: CPT | Performed by: EMERGENCY MEDICINE

## 2024-10-31 PROCEDURE — 84484 ASSAY OF TROPONIN QUANT: CPT | Performed by: EMERGENCY MEDICINE

## 2024-10-31 PROCEDURE — 36415 COLL VENOUS BLD VENIPUNCTURE: CPT

## 2024-10-31 PROCEDURE — 85025 COMPLETE CBC W/AUTO DIFF WBC: CPT | Performed by: EMERGENCY MEDICINE

## 2024-10-31 PROCEDURE — 71045 X-RAY EXAM CHEST 1 VIEW: CPT | Performed by: EMERGENCY MEDICINE

## 2024-10-31 PROCEDURE — 93005 ELECTROCARDIOGRAM TRACING: CPT

## 2024-10-31 RX ORDER — CLONAZEPAM 0.5 MG/1
1 TABLET ORAL ONCE
Status: COMPLETED | OUTPATIENT
Start: 2024-10-31 | End: 2024-10-31

## 2024-10-31 NOTE — ED PROVIDER NOTES
Patient Seen in: [facility] Emergency Department      History     Chief Complaint   Patient presents with    Difficulty Breathing     Stated Complaint: Shortness of Breath, from Clinic    Subjective:   HPI      79-year-old female with history of depression and anxiety who has been out of her Klonopin for the past 3 days presents to the emergency department stating that when she awakened this morning she had some anterior chest tightness and difficulty breathing as well as shakiness in both hands.  She believes it is because she has been out of the Klonopin.  No diaphoresis or nausea.  She states that she was recently suicidal and caught her arms but was hospitalized subsequent to that.    Objective:     No pertinent past medical history.            No pertinent past surgical history.              No pertinent social history.                Physical Exam     ED Triage Vitals [10/31/24 0959]   /84   Pulse 75   Resp 22   Temp 97.9 °F (36.6 °C)   Temp src Oral   SpO2 99 %   O2 Device None (Room air)       Current Vitals:   Vital Signs  BP: 143/67  Pulse: 66  Resp: 16  Temp: 97.9 °F (36.6 °C)  Temp src: Oral  MAP (mmHg): 91    Oxygen Therapy  SpO2: 100 %  O2 Device: None (Room air)        Physical Exam     General Appearance: This is a an older female who is anxious lying on a gurney.  Vital signs were reviewed per nurses notes.  HEENT: Normocephalic/atraumatic.  Anicteric sclera.  Oral mucosa is moist.  Oropharynx is normal.  Neck: No adenopathy or thyromegaly.  Lungs are clear to auscultation.  Heart exam: Normal S1-S2 without extra sounds or murmurs.  Regular rate and rhythm.  Chest wall is minimally tender to palpation but does not reproduce the patient's symptoms.  Abdomen is nontender.  Extremities are atraumatic.  No clubbing claudication or edema.  No cords or calf tenderness.  Skin is dry without rashes or lesions.  Neuroexam: Awake, conversive and moving all 4 extremities well.  ED Course     Labs  Reviewed   COMP METABOLIC PANEL (14) - Abnormal; Notable for the following components:       Result Value    Glucose 124 (*)     Creatinine 1.23 (*)     eGFR-Cr 45 (*)     All other components within normal limits   TROPONIN I HIGH SENSITIVITY - Normal   D-DIMER - Normal   CBC WITH DIFFERENTIAL WITH PLATELET   RAINBOW DRAW LAVENDER   RAINBOW DRAW LIGHT GREEN   RAINBOW DRAW BLUE   RAINBOW DRAW GOLD     EKG    Rate, intervals and axes as noted on EKG Report.  Rate: 71  Rhythm: Sinus Rhythm  Reading: Normal EKG.  Agree with EKG report.                Intravenous access was obtained.  Laboratory studies were drawn.  Oral Klonopin 1 mg was administered.    XR CHEST AP PORTABLE  (CPT=71045)    Result Date: 10/31/2024  PROCEDURE:  XR CHEST AP PORTABLE  (CPT=71045)  TECHNIQUE:  AP chest radiograph was obtained.  COMPARISON:  [facility] , XR, XR CHEST AP PORTABLE  (CPT=71045), 9/05/2023, 6:25 PM.  INDICATIONS:  Shortness of Breath, from Clinic  PATIENT STATED HISTORY: (As transcribed by Technologist)  patient states she has had shortness of breath    FINDINGS:  Median sternotomy changes noted.  Borderline heart size with normal pulmonary vascularity.  No pleural effusion or pneumothorax.  No lobar consolidation.            CONCLUSION:  No active cardiopulmonary process identified.   LOCATION:  [facility]      Dictated by (CST): Sebastian Landeros MD on 10/31/2024 at 11:35 AM     Finalized by (CST): Sebastian Landeros MD on 10/31/2024 at 11:36 AM       I personally reviewed the images myself and went over results with patient.    I viewed the chest x-ray films myself and there is no cardiopulmonary abnormality.    Upon reevaluation the patient was symptomatically improved.  Case management was involved to help with funding for patient's prescription medications.    Test results and treatment plan were discussed with the patient.       MDM      #1.  Atypical chest pain.  Evaluated for acute coronary syndrome with negative troponin and  normal EKG.  No evidence of pneumonia or pneumothorax.  D-dimer is normal so PE workup was not pursued.  Symptomatically improved with Klonopin which the patient takes for anxiety.  2.  Anxiety.  Recent history of suicide gesture.  There was a subsequent hospitalization psychiatrically after that.  Currently being followed by psych.        Medical Decision Making      Disposition and Plan     Clinical Impression:  1. Chest pain, atypical         Disposition:  Discharge  10/31/2024 11:45 am    Follow-up:  Jessica Bunn DO  [address] [place] [address]  [address] [place] [id number] [phone]    Call  As needed          Medications Prescribed:  Current Discharge Medication List              Supplementary Documentation:

## 2024-10-31 NOTE — ED QUICK NOTES
Pt reevaluated by dr. Zhu, pt informed of her test reports and plan of care. Verbalizing understanding. Waiting for  to talk to her.

## 2024-10-31 NOTE — ED INITIAL ASSESSMENT (HPI)
Pt woke up this am shaky, SOB with pressure in her chest. Pt states she is out of her medications including her Clonzepam. Pt scored Medium on SI questions. She had a recent stay in [facility] and admits to cutting her L arm.

## 2024-10-31 NOTE — CM/SW NOTE
Patient having trouble affording her medication.  Patient provided with the [employer] discounted med list.  Patient provided with a GoodRX card.  Patient provided with GoodRX coupon for Clonezepam at [employer].  Patient provided with the [email or web address] discounted med list with her medications highlighted, along with the cost of each med.

## 2024-10-31 NOTE — PROGRESS NOTES
ED Hospital Follow up for PCP (Discharge 10/31 edw )           PCP   Jessica Bunn   [address] [place] [address]   [address]   [facility] [address] [phone]   Office closed; left voice mail for office to call pt back for appointment     Confirmed with pt   Closing encounter

## 2024-10-31 NOTE — TELEPHONE ENCOUNTER
Pt walked in requesting to speak with Dr. Bunn regarding chest pressure and sob.  No available openings today. Encouraged pt, ER   Spoke to pt,  states since 7 am this morning feeling shaky, SOB, chest pressure (denies pain)    Took B/P at 124/72  O2 at 98%  P 89    Advised pt we are able to call an ambulance to transport her to ER if she is having symptoms.  Pt declined and states she is okay to drive herself.  She states she is not having chest pain only some SOB.    Has hx of bypass surgery    Encouraged pt to go via ambulance to ER, pt refused and will drive her self to ER.  Will follow up on pt.

## 2024-11-01 ENCOUNTER — OFFICE VISIT (OUTPATIENT)
Dept: ORTHOPEDICS CLINIC | Facility: CLINIC | Age: 79
End: 2024-11-01
Payer: MEDICARE

## 2024-11-01 DIAGNOSIS — M17.12 PRIMARY OSTEOARTHRITIS OF LEFT KNEE: Primary | ICD-10-CM

## 2024-11-01 PROBLEM — N18.30 CKD (CHRONIC KIDNEY DISEASE) STAGE 3, GFR 30-59 ML/MIN (HCC): Chronic | Status: ACTIVE | Noted: 2024-11-01

## 2024-11-01 LAB
ATRIAL RATE: 71 BPM
P AXIS: 46 DEGREES
P-R INTERVAL: 150 MS
Q-T INTERVAL: 390 MS
QRS DURATION: 80 MS
QTC CALCULATION (BEZET): 423 MS
R AXIS: 12 DEGREES
T AXIS: 40 DEGREES
VENTRICULAR RATE: 71 BPM

## 2024-11-01 PROCEDURE — 20610 DRAIN/INJ JOINT/BURSA W/O US: CPT | Performed by: PHYSICIAN ASSISTANT

## 2024-11-01 PROCEDURE — 1160F RVW MEDS BY RX/DR IN RCRD: CPT | Performed by: PHYSICIAN ASSISTANT

## 2024-11-01 PROCEDURE — 1159F MED LIST DOCD IN RCRD: CPT | Performed by: PHYSICIAN ASSISTANT

## 2024-11-01 PROCEDURE — 1125F AMNT PAIN NOTED PAIN PRSNT: CPT | Performed by: PHYSICIAN ASSISTANT

## 2024-11-01 NOTE — PROCEDURES
Left Knee Intra-articular Injection    Name: Arcelia Conklin   MRN: [id number]  Date: 11/1/2024     Clinical Indications:   Knee Osteoarthritis with symptoms refractory to conservative measures.     After informed consent, the injection site was marked, sterilized with topical chlorhexidine antiseptic, and locally anesthetized with skin refrigerant.    The patient was situation in a comfortable position. Using sterile technique: a single Monovisc 4mL (22mg/mL premixed syringe)  was injected utilizing anterolateral approach with a 21 gauge needle.  A band-aid was applied.  The patient tolerated the procedure well.    Disposition:   Return to clinic on an as needed basis.             Mike Montiel [facility], PA-C Orthopedic Surgery / Sports Medicine Specialist  [facility]  [address] [place] [address].org  [email or web address]  t: [phone]  o: [phone]  f: [phone]          This note was dictated using Dragon software.  While it was briefly proofread prior to completion, some grammatical, spelling, and word choice errors due to dictation may still occur.

## 2024-11-04 ENCOUNTER — PATIENT OUTREACH (OUTPATIENT)
Dept: CASE MANAGEMENT | Age: 79
End: 2024-11-04

## 2024-11-04 DIAGNOSIS — I50.42 CHRONIC COMBINED SYSTOLIC AND DIASTOLIC CONGESTIVE HEART FAILURE (HCC): Primary | ICD-10-CM

## 2024-11-04 DIAGNOSIS — F60.3 BORDERLINE PERSONALITY DISORDER (HCC): ICD-10-CM

## 2024-11-04 DIAGNOSIS — E78.2 HYPERLIPIDEMIA, MIXED: ICD-10-CM

## 2024-11-04 NOTE — PROGRESS NOTES
Spoke to Arcelia for Chronic Care Management.      Updates to patient care team/comments: No changes  Patient reported changes in medications: Reviewed with patient  Med Adherence  Comment: Taking    Health Maintenance:   Health Maintenance   Topic Date Due    Zoster Vaccines (2 of 2) 09/10/2021    Diabetes Care Foot Exam  09/01/2024    COVID-19 Vaccine (3 - 2023-24 season) 09/01/2024    Influenza Vaccine (1) 10/01/2024    Diabetes Care A1C  02/05/2025    Diabetes Care: Microalb/Creat Ratio  04/25/2025    Diabetes Care Dilated Eye Exam  06/24/2025    Diabetes Care: GFR  10/31/2025    DEXA Scan  Completed    MA Annual Health Assessment  Completed    Annual Depression Screening  Completed    Fall Risk Screening (Annual)  Completed    Pneumococcal Vaccine: 65+ Years  Completed    Colorectal Cancer Screening  Discontinued    Mammogram  Discontinued     Patient updates/concerns:  Patient stated that she was seen in the ER for chest pain and shortness of breath. She mentioned that she had run out of clonazepam. Patient stated that she was given one and within 30 minutes she felt better. Patient stated she received a knee injection on her left knee. Patient stated that she is still experiencing pain but was advised to wait a few days for it to take effect. She expressed hope that it works so she can be more active. The patient stated that she currently has no new barriers.      Goals/Action Plan:    Active goal from previous outreach:     Monitor daily diet and medication  Patient reported progress towards goals:                - What: Patient stated that she was seen in the ER for chest pain and shortness of breath. She mentioned that she had run out of clonazepam. Patient stated that she was given one and within 30 minutes she felt better.           - Where/When/How: Patient stated she received a knee injection on her left knee. Patient stated that she is still experiencing pain but was advised to wait a few days for it to  take effect. She expressed hope that it works so she can be more active.   Patient Reported Barriers and Concerns:None at this time per patient                   - Plan for overcoming barriers:  Assisted in scheduling a hospital follow up with Dr. Bunn as recommended upon discharge for patient.    Care Managers Interventions: Patient was educated on proper nutrition, hydration and exercise. Patient is aware of our next outreach, has agreed to being contacted via telephone. Patient verbalized understanding. Patient is aware she may contact me if further assistance is needed.    Future Appointments: Patient aware  Future Appointments   Date Time Provider Department Center   11/14/2024  2:00 PM Areli Clark, APRN [facility]   12/5/2024  2:00 PM Areli Clark, APRN [facility]   12/19/2024  2:00 PM Areli Clark, APRN [facility]     Next Care Manager Follow Up Date: 1 month follow up or sooner if needed    Reason For Follow Up: review progress and or barriers towards patient's goals.     Time Spent This Encounter Total: 30 min medical record review, telephone communication, care plan updates where needed, education, goals, and action plan recreation/update. Provided acknowledgment and validation to patient's concerns.   Monthly Minute Total including today: 30  Physical assessment, complete health history, and need for CCM established by Jessica Bunn DO.  Friendly reminder - 2025 Benefits Open Enrollment is here!   We encourage you to review your current Medicare coverage and explore your options during this period. We have developed a Medicare Information Page on our website to serve as a resource for guidance and answers to any questions you might have.   You can access it by visiting, [email or web address]

## 2024-11-15 ENCOUNTER — LAB ENCOUNTER (OUTPATIENT)
Dept: LAB | Age: 79
End: 2024-11-15
Attending: FAMILY MEDICINE
Payer: MEDICARE

## 2024-11-15 ENCOUNTER — OFFICE VISIT (OUTPATIENT)
Dept: FAMILY MEDICINE CLINIC | Facility: CLINIC | Age: 79
End: 2024-11-15
Payer: MEDICARE

## 2024-11-15 VITALS
WEIGHT: 193 LBS | DIASTOLIC BLOOD PRESSURE: 66 MMHG | BODY MASS INDEX: 32.95 KG/M2 | SYSTOLIC BLOOD PRESSURE: 100 MMHG | RESPIRATION RATE: 16 BRPM | HEART RATE: 71 BPM | HEIGHT: 64 IN | OXYGEN SATURATION: 97 %

## 2024-11-15 DIAGNOSIS — Z72.89 DELIBERATE SELF-CUTTING: ICD-10-CM

## 2024-11-15 DIAGNOSIS — R05.3 CHRONIC COUGH: ICD-10-CM

## 2024-11-15 DIAGNOSIS — E11.21 TYPE 2 DIABETES MELLITUS WITH DIABETIC NEPHROPATHY, WITHOUT LONG-TERM CURRENT USE OF INSULIN (HCC): Primary | ICD-10-CM

## 2024-11-15 DIAGNOSIS — F03.A4 MILD DEMENTIA WITH ANXIETY, UNSPECIFIED DEMENTIA TYPE (HCC): ICD-10-CM

## 2024-11-15 DIAGNOSIS — E11.21 TYPE 2 DIABETES MELLITUS WITH DIABETIC NEPHROPATHY, WITHOUT LONG-TERM CURRENT USE OF INSULIN (HCC): ICD-10-CM

## 2024-11-15 DIAGNOSIS — J30.9 CHRONIC ALLERGIC RHINITIS: ICD-10-CM

## 2024-11-15 DIAGNOSIS — N18.31 STAGE 3A CHRONIC KIDNEY DISEASE (HCC): ICD-10-CM

## 2024-11-15 LAB
ALBUMIN SERPL-MCNC: 4.2 G/DL (ref 3.2–4.8)
ALBUMIN/GLOB SERPL: 1.4 {RATIO} (ref 1–2)
ALP LIVER SERPL-CCNC: 98 U/L
ALT SERPL-CCNC: 12 U/L
ANION GAP SERPL CALC-SCNC: 6 MMOL/L (ref 0–18)
AST SERPL-CCNC: 15 U/L (ref ?–34)
BASOPHILS # BLD AUTO: 0.04 X10(3) UL (ref 0–0.2)
BASOPHILS NFR BLD AUTO: 0.4 %
BILIRUB SERPL-MCNC: 0.3 MG/DL (ref 0.2–1.1)
BUN BLD-MCNC: 12 MG/DL (ref 9–23)
CALCIUM BLD-MCNC: 9.5 MG/DL (ref 8.7–10.4)
CHLORIDE SERPL-SCNC: 110 MMOL/L (ref 98–112)
CO2 SERPL-SCNC: 22 MMOL/L (ref 21–32)
CREAT BLD-MCNC: 1.2 MG/DL
EGFRCR SERPLBLD CKD-EPI 2021: 46 ML/MIN/1.73M2 (ref 60–?)
EOSINOPHIL # BLD AUTO: 0.16 X10(3) UL (ref 0–0.7)
EOSINOPHIL NFR BLD AUTO: 1.6 %
ERYTHROCYTE [DISTWIDTH] IN BLOOD BY AUTOMATED COUNT: 14.6 %
EST. AVERAGE GLUCOSE BLD GHB EST-MCNC: 146 MG/DL (ref 68–126)
FASTING STATUS PATIENT QL REPORTED: NO
GLOBULIN PLAS-MCNC: 2.9 G/DL (ref 2–3.5)
GLUCOSE BLD-MCNC: 172 MG/DL (ref 70–99)
HBA1C MFR BLD: 6.7 % (ref ?–5.7)
HCT VFR BLD AUTO: 39 %
HGB BLD-MCNC: 12.4 G/DL
IMM GRANULOCYTES # BLD AUTO: 0.04 X10(3) UL (ref 0–1)
IMM GRANULOCYTES NFR BLD: 0.4 %
LYMPHOCYTES # BLD AUTO: 2.15 X10(3) UL (ref 1–4)
LYMPHOCYTES NFR BLD AUTO: 21.5 %
MCH RBC QN AUTO: 28.6 PG (ref 26–34)
MCHC RBC AUTO-ENTMCNC: 31.8 G/DL (ref 31–37)
MCV RBC AUTO: 90.1 FL
MONOCYTES # BLD AUTO: 0.41 X10(3) UL (ref 0.1–1)
MONOCYTES NFR BLD AUTO: 4.1 %
NEUTROPHILS # BLD AUTO: 7.19 X10 (3) UL (ref 1.5–7.7)
NEUTROPHILS # BLD AUTO: 7.19 X10(3) UL (ref 1.5–7.7)
NEUTROPHILS NFR BLD AUTO: 72 %
OSMOLALITY SERPL CALC.SUM OF ELEC: 290 MOSM/KG (ref 275–295)
PLATELET # BLD AUTO: 411 10(3)UL (ref 150–450)
POTASSIUM SERPL-SCNC: 4.2 MMOL/L (ref 3.5–5.1)
PROT SERPL-MCNC: 7.1 G/DL (ref 5.7–8.2)
RBC # BLD AUTO: 4.33 X10(6)UL
SODIUM SERPL-SCNC: 138 MMOL/L (ref 136–145)
WBC # BLD AUTO: 10 X10(3) UL (ref 4–11)

## 2024-11-15 PROCEDURE — 99214 OFFICE O/P EST MOD 30 MIN: CPT | Performed by: FAMILY MEDICINE

## 2024-11-15 PROCEDURE — 83036 HEMOGLOBIN GLYCOSYLATED A1C: CPT

## 2024-11-15 PROCEDURE — 1160F RVW MEDS BY RX/DR IN RCRD: CPT | Performed by: FAMILY MEDICINE

## 2024-11-15 PROCEDURE — 3078F DIAST BP <80 MM HG: CPT | Performed by: FAMILY MEDICINE

## 2024-11-15 PROCEDURE — 85025 COMPLETE CBC W/AUTO DIFF WBC: CPT

## 2024-11-15 PROCEDURE — 3074F SYST BP LT 130 MM HG: CPT | Performed by: FAMILY MEDICINE

## 2024-11-15 PROCEDURE — 3008F BODY MASS INDEX DOCD: CPT | Performed by: FAMILY MEDICINE

## 2024-11-15 PROCEDURE — 36415 COLL VENOUS BLD VENIPUNCTURE: CPT

## 2024-11-15 PROCEDURE — 80053 COMPREHEN METABOLIC PANEL: CPT

## 2024-11-15 PROCEDURE — 1159F MED LIST DOCD IN RCRD: CPT | Performed by: FAMILY MEDICINE

## 2024-11-15 PROCEDURE — 1111F DSCHRG MED/CURRENT MED MERGE: CPT | Performed by: FAMILY MEDICINE

## 2024-11-15 RX ORDER — FLUTICASONE PROPIONATE 50 MCG
2 SPRAY, SUSPENSION (ML) NASAL DAILY
Qty: 3 EACH | Refills: 3 | Status: SHIPPED | OUTPATIENT
Start: 2024-11-15

## 2024-11-15 RX ORDER — FOLIC ACID 1 MG/1
1 TABLET ORAL DAILY
Qty: 30 TABLET | Refills: 3 | Status: SHIPPED | OUTPATIENT
Start: 2024-11-15

## 2024-11-15 RX ORDER — ATORVASTATIN CALCIUM 40 MG/1
40 TABLET, FILM COATED ORAL NIGHTLY
Qty: 90 TABLET | Refills: 0 | Status: SHIPPED | OUTPATIENT
Start: 2024-11-15

## 2024-11-15 RX ORDER — GLIMEPIRIDE 1 MG/1
1 TABLET ORAL
Qty: 90 TABLET | Refills: 0 | Status: SHIPPED | OUTPATIENT
Start: 2024-11-15

## 2024-11-15 RX ORDER — BENZONATATE 200 MG/1
200 CAPSULE ORAL EVERY 8 HOURS PRN
Qty: 30 CAPSULE | Refills: 0 | Status: SHIPPED | OUTPATIENT
Start: 2024-11-15

## 2024-11-15 NOTE — PATIENT INSTRUCTIONS
Get claritin (Allerclear) from [facility].  Do that and fluticasone daily.      If cough not better after 2 weeks use benzonatate as needed.    Follow up in 2-4 weeks if not better with cough.    Check sugars and BP at home and let me know.

## 2024-11-23 NOTE — PROGRESS NOTES
Subjective:   Patient ID: Arcelia Conklin is a 79 year old female.    Type 2 diabetes mellitus with diabetic nephropathy, without long-term current use of insulin ([facility]).  Last A1c value was 6.7% done 11/15/2024.    Chronic allergic rhinitis.  Not doing any treatment.  No sinus pain. No F/C.     Deliberate self-cutting.  Seeing psychiatrist.  Cuts healed.  Denied suicidal plans.  Last suicidal thoughts 1 week ago.    Chronic cough.  For a few months.    Stage 3a chronic kidney disease ([facility]). Chronic, stable. No new symptoms.     Mild deentia with anxiety, unspecified dementia type ([facility]). Chronic, stable. No new symptoms.         History/Other:   Review of Systems   All other systems reviewed and are negative.    Current Outpatient Medications   Medication Sig Dispense Refill    folic acid 1 MG Oral Tab Take 1 tablet (1 mg total) by mouth daily. 30 tablet 3    glimepiride 1 MG Oral Tab Take 1 tablet (1 mg total) by mouth daily with breakfast. 90 tablet 0    atorvastatin 40 MG Oral Tab Take 1 tablet (40 mg total) by mouth nightly. 90 tablet 0    fluticasone propionate 50 MCG/ACT Nasal Suspension 2 sprays by Each Nare route daily. 3 each 3    benzonatate 200 MG Oral Cap Take 1 capsule (200 mg total) by mouth every 8 (eight) hours as needed for cough. 30 capsule 0    CLONAZEPAM 1 MG Oral Tab TAKE ONE TABLET BY MOUTH TWICE DAILY AS NEEDED FOR ANXIETY 30 tablet 0    QUEtiapine 100 MG Oral Tab TAKE 1 TABLET BY MOUTH NIGHTLY. MAY TAKE 1 EXTRA TABLET UP TO 3 TIMES WEEKLY IF UNABLE TO SLEEP. 90 tablet 0    METFORMIN 500 MG Oral Tab TAKE ONE TABLET BY MOUTH IN THE MORNING BEFORE BREAKFAST 90 tablet 0    metoprolol succinate ER 25 MG Oral Tablet 24 Hr Take 1 tablet (25 mg total) by mouth daily. 90 tablet 3    Fish Oil-Cholecalciferol (OMEGA-3 FISH OIL-VITAMIN D3) [id number] MG-UNIT Oral Cap Take 1 capsule by mouth daily.      TORSEMIDE 20 MG Oral Tab TAKE ONE TABLET BY MOUTH ONE TIME DAILY 30 tablet 0    Omeprazole 40 MG Oral  Capsule Delayed Release Take 1 capsule (40 mg total) by mouth every morning.      memantine 10 MG Oral Tab Take 1 tablet (10 mg total) by mouth daily. (Patient not taking: Reported on 11/15/2024)      HYDROcodone-acetaminophen 5-325 MG Oral Tab Take 1 tablet by mouth every 8 (eight) hours as needed for Pain. (Patient not taking: Reported on 11/15/2024)       Allergies:Allergies[1]    Objective:   Physical Exam  Vitals reviewed.   Constitutional:       General: She is not in acute distress.     Appearance: She is well-developed. She is not diaphoretic.   Eyes:      General: No scleral icterus.        Right eye: No discharge.         Left eye: No discharge.      Conjunctiva/sclera: Conjunctivae normal.   Cardiovascular:      Rate and Rhythm: Normal rate and regular rhythm.      Heart sounds: Normal heart sounds. No murmur heard.     No friction rub. No gallop.   Pulmonary:      Effort: Pulmonary effort is normal. No respiratory distress.      Breath sounds: Normal breath sounds. No wheezing or rales.   Skin:     Comments: Healing cuts on left arm.  No signs of infection.     Assessment & Plan:   1. Type 2 diabetes mellitus with diabetic nephropathy, without long-term current use of insulin ([facility])    2. Chronic allergic rhinitis    3. Deliberate self-cutting    4. Chronic cough    5. Stage 3a chronic kidney disease ([facility])    6. Mild dementia with anxiety, unspecified dementia type ([facility])      1. Type 2 diabetes mellitus with diabetic nephropathy, without long-term current use of insulin ([facility])  - glimepiride 1 MG Oral Tab; Take 1 tablet (1 mg total) by mouth daily with breakfast.  Dispense: 90 tablet; Refill: 0  - Hemoglobin A1C; Future  - CBC W Differential W Platelet [E]; Future  - Comp Metabolic Panel (14) [E]; Future    2. Chronic allergic rhinitis  - fluticasone propionate 50 MCG/ACT Nasal Suspension; 2 sprays by Each Nare route daily.  Dispense: 3 each; Refill: 3    3. Deliberate self-cutting  - CBC W Differential W  Platelet [E]; Future  - Comp Metabolic Panel (14) [E]; Future    4. Chronic cough  - benzonatate 200 MG Oral Cap; Take 1 capsule (200 mg total) by mouth every 8 (eight) hours as needed for cough.  Dispense: 30 capsule; Refill: 0    5. Stage 3a chronic kidney disease ([facility])  Chronic, stable. CPM     6. Mild dementia with anxiety, unspecified dementia type ([facility])  Chronic, stable. CPM       Orders Placed This Encounter   Procedures    Hemoglobin A1C    CBC W Differential W Platelet [E]    Comp Metabolic Panel (14) [E]       Meds This Visit:  Requested Prescriptions     Signed Prescriptions Disp Refills    folic acid 1 MG Oral Tab 30 tablet 3     Sig: Take 1 tablet (1 mg total) by mouth daily.    glimepiride 1 MG Oral Tab 90 tablet 0     Sig: Take 1 tablet (1 mg total) by mouth daily with breakfast.    atorvastatin 40 MG Oral Tab 90 tablet 0     Sig: Take 1 tablet (40 mg total) by mouth nightly.    fluticasone propionate 50 MCG/ACT Nasal Suspension 3 each 3     Si sprays by Each Nare route daily.    benzonatate 200 MG Oral Cap 30 capsule 0     Sig: Take 1 capsule (200 mg total) by mouth every 8 (eight) hours as needed for cough.       Imaging & Referrals:  None         [1] No Known Allergies

## 2024-12-04 ENCOUNTER — PATIENT OUTREACH (OUTPATIENT)
Dept: CASE MANAGEMENT | Age: 79
End: 2024-12-04

## 2024-12-04 DIAGNOSIS — F03.A4 MILD DEMENTIA WITH ANXIETY, UNSPECIFIED DEMENTIA TYPE (HCC): ICD-10-CM

## 2024-12-04 DIAGNOSIS — E78.2 HYPERLIPIDEMIA, MIXED: Primary | ICD-10-CM

## 2024-12-04 DIAGNOSIS — I10 ESSENTIAL HYPERTENSION: ICD-10-CM

## 2024-12-04 NOTE — PROGRESS NOTES
Spoke to Arcelia for Chronic Care Management.      Updates to patient care team/comments: No changes per patient  Patient reported changes in medications: No changes per patient  Med Adherence  Comment: Taking     Health Maintenance:   Health Maintenance   Topic Date Due    Zoster Vaccines (2 of 2) 09/10/2021    Diabetes Care Foot Exam  09/01/2024    COVID-19 Vaccine (3 - 2024-25 season) 09/01/2024    Influenza Vaccine (1) 10/01/2024    Diabetes Care: Microalb/Creat Ratio  04/25/2025    Diabetes Care A1C  05/15/2025    Diabetes Care Dilated Eye Exam  06/24/2025    Diabetes Care: GFR  11/15/2025    DEXA Scan  Completed    MA Annual Health Assessment  Completed    Annual Depression Screening  Completed    Fall Risk Screening (Annual)  Completed    Pneumococcal Vaccine: 65+ Years  Completed    Colorectal Cancer Screening  Discontinued    Mammogram  Discontinued     Patient updates/concerns:  Patient stated that she continues to monitor her daily diet and meal portions. She also mentioned that she continues to experience pain in her leg even after receiving the injection. Patient reports that she didn't really get any relief from it. She plans to schedule a follow-up appointment with the ortho to explore her options, if any. Patient mentioned that she has been sleeping well and denies any complications. Patient stated that she has no other concerns at this time.    Goals/Action Plan:    Active goal from previous outreach:     Staying active and healthy  Patient reported progress towards goals:                - What: Patient stated that she continues to monitor her daily diet and meal portions. She also mentioned that she continues to experience pain in her leg even after receiving the injection. Patient reports that she didn't really get any relief from it.           - Where/When/How: She plans to schedule a follow-up appointment with the ortho to explore her options, if any. Patient mentioned that she has been sleeping  well and denies any complications.  Patient Reported Barriers and Concerns: None at this time per patient                   - Plan for overcoming barriers: Patient to continue to follow up with care team as scheduled or as needed.    Care Managers Interventions: Patient was educated on proper nutrition, hydration and exercise. Patient is aware of our next outreach, has agreed to being contacted via telephone. Patient verbalized understanding. Patient is aware she may contact me if further assistance is needed.    Future Appointments: Patient aware  Future Appointments   Date Time Provider Department Center   12/5/2024  2:00 PM Areli Clark APRN [facility]   12/19/2024  2:00 PM Areli Clark, APRN [facility]     Next Care Manager Follow Up Date: 1 month follow up or sooner if needed    Reason For Follow Up: review progress and or barriers towards patient's goals.     Time Spent This Encounter Total: 20 min medical record review, telephone communication, care plan updates where needed, education, goals, and action plan recreation/update. Provided acknowledgment and validation to patient's concerns.   Monthly Minute Total including today: 20  Physical assessment, complete health history, and need for CCM established by Jessica Bunn DO.    Friendly reminder - 2025 Benefits Open Enrollment is here!   We encourage you to review your current Medicare coverage and explore your options during this period. We have developed a Medicare Information Page on our website to serve as a resource for guidance and answers to any questions you might have.   You can access it by visiting, [email or web address]

## 2024-12-09 ENCOUNTER — OFFICE VISIT (OUTPATIENT)
Dept: ORTHOPEDICS CLINIC | Facility: CLINIC | Age: 79
End: 2024-12-09
Payer: MEDICARE

## 2024-12-09 DIAGNOSIS — M17.12 PRIMARY OSTEOARTHRITIS OF LEFT KNEE: Primary | ICD-10-CM

## 2024-12-09 PROCEDURE — 99213 OFFICE O/P EST LOW 20 MIN: CPT | Performed by: PHYSICIAN ASSISTANT

## 2024-12-09 PROCEDURE — 20610 DRAIN/INJ JOINT/BURSA W/O US: CPT | Performed by: PHYSICIAN ASSISTANT

## 2024-12-09 RX ORDER — KETOROLAC TROMETHAMINE 30 MG/ML
30 INJECTION, SOLUTION INTRAMUSCULAR; INTRAVENOUS ONCE
Status: COMPLETED | OUTPATIENT
Start: 2024-12-09 | End: 2024-12-09

## 2024-12-09 RX ORDER — TRIAMCINOLONE ACETONIDE 40 MG/ML
40 INJECTION, SUSPENSION INTRA-ARTICULAR; INTRAMUSCULAR ONCE
Status: COMPLETED | OUTPATIENT
Start: 2024-12-09 | End: 2024-12-09

## 2024-12-09 RX ADMIN — KETOROLAC TROMETHAMINE 30 MG: 30 INJECTION, SOLUTION INTRAMUSCULAR; INTRAVENOUS at 10:25:00

## 2024-12-09 RX ADMIN — TRIAMCINOLONE ACETONIDE 40 MG: 40 INJECTION, SUSPENSION INTRA-ARTICULAR; INTRAMUSCULAR at 10:25:00

## 2024-12-09 NOTE — PROCEDURES
Left Knee Intra-articular Injection    Name: Arcelia Conklin   MRN: [id number]  Date: 12/9/2024     Clinical Indications:   Persistent knee pain refractory to conservative measures.     After informed consent, the injection site was marked, sterilized with topical chlorhexidine antiseptic, and locally anesthetized with skin refrigerant.    The patient was situation in a comfortable position. Using sterile technique: 1 mL of 30mg/mL of Ketorolac, 2 mL of 0.5% Bupivicaine, 2 mL of 1% Lidocaine, and 1 mL of 40 mg/ml Triamcinolone was injected utilizing anterolateral approach with a 21 gauge needle.  A band-aid was applied.  The patient tolerated the procedure well.    Disposition:   Return to clinic on an as needed basis.             Taqueriar MARGOT Montiel [facility], PA-C Orthopedic Surgery / Sports Medicine Specialist  [facility]  [address] [place] [address].org  [email or web address]  t: [phone]  o: [phone]  f: [phone]    This note was dictated using Dragon software.  While it was briefly proofread prior to completion, some grammatical, spelling, and word choice errors due to dictation may still occur.

## 2024-12-09 NOTE — PROGRESS NOTES
[facility] - ORTHOPEDICS  [address] [place] [address]  [phone]       Name: Arcelia Conklin   MRN: [id number]  Date: 12/9/2024     REASON FOR VISIT: Follow up left knee osteoarthritis/meniscal tearing in the setting of previous left tibial plateau fracture.        INTERVAL HISTORY:  Arcelia Conklin is a 79 year old female who returns for evaluation of left knee osteoarthritis/meniscal tearing in the setting of previous left tibial plateau fracture.  At the patient's last visit we recommended viscosupplementation.  This was provided on November 1, 2024.    To summarize, she was last evaluated May 6, 2024 by Dr. Mata who also noted that she had tricompartmental osteoarthritis with meniscal tearing.  She was provided with a steroid injection Zilretta on 08/19/2024 - and had some relief until 10 days ago. She complains of 7/10 pain.     Noted to have left nondisplaced bicondylar Schatzker type V tibial plateau fracture in 2023.     ROS: ROS    PE:   There were no vitals filed for this visit.  Estimated body mass index is 33.13 kg/m² as calculated from the following:    Height as of 11/15/24: 5' 4\" (1.626 m).    Weight as of 11/15/24: 193 lb (87.5 kg).    Physical Exam  Constitutional:       Appearance: Normal appearance.   HENT:      Head: Normocephalic and atraumatic.   Eyes:      Extraocular Movements: Extraocular movements intact.   Neck:      Musculoskeletal: Normal range of motion and neck supple.   Cardiovascular:      Pulses: Normal pulses.   Pulmonary:      Effort: Pulmonary effort is normal. No respiratory distress.   Abdominal:      General: There is no distension.   Skin:     General: Skin is warm.      Capillary Refill: Capillary refill takes less than 2 seconds.      Findings: No bruising.   Neurological:      General: No focal deficit present.      Mental Status: She is alert.   Psychiatric:         Mood and Affect: Mood normal.     Examination of the left knee  demonstrates:     Skin is intact, warm and dry.   Atrophy: none    Effusion: none    Joint line tenderness: none  Crepitation: none   Allyn: Positive   Patellar mobility: normal without apprehension  J-sign: none    ROM: Extension full  Flexion 120 degrees  ACL:  Negative Lachman, Negative Pivot Shift   PCL:  Negative Posterior Drawer  Collateral Ligaments: Stable to Varus and Valgus stress at 0 and 30 degrees  Strength: normal   Hip joint: normal pain-free ROM   Gait:  mildly antalgic   Leg length: equal and symmetric  Alignment:  neutral     No obvious peripheral edema noted.   Distal neurovascular exam demonstrates normal perfusion, intact sensation to light touch and full strength.     Examination of the contralateral knee demonstrates:  No significant atrophy, swelling or effusion. Full range of motion. Neurovascularly intact distally.      Radiographic Examination/Diagnostics:    I personally viewed, independently interpreted and radiology report was reviewed.    MRI Left Knee, 4/30/2024-         Impression   CONCLUSION:    1. Mild tricompartmental osteoarthritis.  2. Medial and lateral meniscal tears as described above.  3. Mild distal quadriceps tendinosis.  4. Minimal joint effusion and tiny Baker's cyst.  No intra-articular bodies are suggested.       IMPRESSION: Arcelia Conklin is a 79 year old female who presented for follow up of left knee osteoarthritis/meniscal tearing in the setting of previous left tibial plateau fracture.        PLAN:   We had a detailed discussion outlining the etiology, anatomy, pathophysiology, and natural history of the patient's findings.    We reviewed the treatment of this disease condition.     After a discussion of a variety of conservative treatment options we elected to proceed with the injection procedure at today's visit. We discussed the risk and benefits of the procedure, including, but not limited to: infection, injury to blood vessels, nerve injury, prolonged  pain, swelling, site soreness, failure to progress, and need for advanced treatments.  The patient voiced understanding and agreed to proceed with the treatment plan.   The patient had opportunity to ask questions and all questions were answered appropriately.    FOLLOW-UP:  Return to clinic on an as needed basis.             Taqueriar MARGOT Montiel [facility], PA-C Orthopedic Surgery / Sports Medicine Specialist  [facility] Orthopaedic Surgery  [address] [place] [address].org  [email or web address]  t: [phone]  o: [phone]  f: [phone]    This note was dictated using Dragon software.  While it was briefly proofread prior to completion, some grammatical, spelling, and word choice errors due to dictation may still occur.

## 2024-12-30 ENCOUNTER — TELEPHONE (OUTPATIENT)
Dept: ORTHOPEDICS CLINIC | Facility: CLINIC | Age: 79
End: 2024-12-30

## 2024-12-30 NOTE — TELEPHONE ENCOUNTER
Patient called wanting to get in to see Dr. Mckeon right away. She states her Left knee pain has increased more since getting an injection the other day and she is unable to bear any weight.  Pain is currently 9/10. I did schedule her for next available but she would like to be seen sooner. Please contact patient at [phone].    Future Appointments   Date Time Provider Department Center   1/2/2025  2:00 PM Areli Clark, APRN [facility]   1/6/2025  9:20 AM [id number]   1/16/2025  2:00 PM GreAreli horn, APRN [facility]   1/30/2025  2:00 PM Areli Clark, APRN [facility]   2/13/2025  2:00 PM Areli Clark, APRN [facility]   2/27/2025  2:00 PM Areli Clark, APRN [facility]   3/13/2025  2:00 PM Radha Clarkah EFREN, APRN [facility]   3/27/2025  2:00 PM Radha Clarkah EFREN, APRN [facility]   4/10/2025  2:00 PM Areli Clark, APRN [facility]   4/24/2025  2:00 PM GreRadha hornah EFREN, APRN [facility]   5/8/2025  2:00 PM Areli Clark, APRN [facility]   5/22/2025  2:00 PM Areli Clark, APRN [facility]   6/5/2025  2:00 PM GreRadha hornah EFREN, APRN [facility]   6/19/2025  2:00 PM Areli Clark, APRN [facility]   7/3/2025  2:00 PM Areli Clark, APRN [facility]   7/17/2025 12:30 PM Areli Clark, APRN [facility]   7/31/2025  2:00 PM Areli Clark, APRN [facility]   8/14/2025  2:00 PM Areli Clark APRN [facility]

## 2024-12-30 NOTE — TELEPHONE ENCOUNTER
Patient called and she is not seeing a physical therapist at this time. She was unable to afford the Physical therapy and is working on getting her financial support back.  She states she will call and see if she can get her financial aid in order prior to appointment.      Future Appointments   Date Time Provider Department Center   1/2/2025  2:00 PM Areli Clark APRN [facility]   1/6/2025  9:20 AM [facility]iel [id number]

## 2024-12-30 NOTE — TELEPHONE ENCOUNTER
Patient has increased pain and states she is having difficulty bearing weight.  Can we fit her in (SDA) on 12/31/24 or keep as next available for 1/6/25?       Future Appointments   Date Time Provider Department Center   1/2/2025  2:00 PM Areli Clark APRN [facility]   1/6/2025  9:20 AM [facility]iel PA [id number]

## 2025-01-06 ENCOUNTER — TELEPHONE (OUTPATIENT)
Dept: ORTHOPEDICS CLINIC | Facility: CLINIC | Age: 80
End: 2025-01-06

## 2025-01-06 ENCOUNTER — PATIENT OUTREACH (OUTPATIENT)
Dept: CASE MANAGEMENT | Age: 80
End: 2025-01-06

## 2025-01-06 ENCOUNTER — OFFICE VISIT (OUTPATIENT)
Dept: ORTHOPEDICS CLINIC | Facility: CLINIC | Age: 80
End: 2025-01-06
Payer: MEDICARE

## 2025-01-06 DIAGNOSIS — S83.232A COMPLEX TEAR OF MEDIAL MENISCUS OF LEFT KNEE AS CURRENT INJURY, INITIAL ENCOUNTER: Primary | ICD-10-CM

## 2025-01-06 DIAGNOSIS — M94.262 CHONDROMALACIA OF LEFT KNEE: ICD-10-CM

## 2025-01-06 DIAGNOSIS — F32.A DEPRESSION, UNSPECIFIED DEPRESSION TYPE: ICD-10-CM

## 2025-01-06 DIAGNOSIS — M65.969 SYNOVITIS OF KNEE: ICD-10-CM

## 2025-01-06 DIAGNOSIS — F41.1 GENERALIZED ANXIETY DISORDER: ICD-10-CM

## 2025-01-06 DIAGNOSIS — I10 ESSENTIAL HYPERTENSION: Primary | ICD-10-CM

## 2025-01-06 PROCEDURE — 99215 OFFICE O/P EST HI 40 MIN: CPT | Performed by: ORTHOPAEDIC SURGERY
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
## 2025-01-06 NOTE — TELEPHONE ENCOUNTER
Spoke with patient and we scheduled surgery, post op and went over pre operative procedures. All questions answered.      PCP CLEARANCE SENT
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
## 2025-01-06 NOTE — PROGRESS NOTES
OR BOOKING SHEET KNEE ARTHROSCOPY  Location: [x] [facility]   [x] [facility]  Name: Arcelia Conklin  MRN: [id number]   : 10/12/1945  Diagnosis:  [x] Complex tear of medial meniscus of left knee as current injury, initial encounter [S83.345A]  Disposition:    [x] Ambulatory  Operative Time Required: 60 minutes ([facility])  Procedure:  Antibiotics: 2 g cefazolin within 60 minutes of surgical incision (3 g if > 120 kg)  Laterality: [] RIGHT  [x] LEFT                       [] BILATERAL  Procedures:   [x] Knee Arthroscopy     [x] Partial Medial Meniscectomy (30529)      [] Lateral Meniscus Repair (92391)                    [] Medial Meniscus Repair (66519)     [x] Synovectomy (39054)   [x] Abrasionplasty (89669)     [] Partial Medial Meniscectomy vs Medial Meniscus Repair (96352 vs 72237)   [] Partial Lateral Meniscectomy vs Lateral Meniscus Repair (81417 vs 23682)   [] Irrigation & Debridement (86717)   [] Manipulation Under Anesthesia (18905)   [] Chondroplasty (27623)   [] Removal of Loose Body (81736)    Injections:   [] Stem cells from bone marrow aspiration (48605)    From:  [] Left Iliac crest  [] Right Iliac crest    To:  [] Left knee  [] Right knee  [] Other     Additional info:   [x] PCP Clearance Needed  [] MRSA  [x] Physical Therapy Internal    [] DME Rx  [] Appt with Dr. Mata needed  Implants needed: None  Positioning Equipment: Supine with Lateral Post

## 2025-01-06 NOTE — PROGRESS NOTES
Spoke to Arcelia for Chronic Care Management.      Updates to patient care team/comments: No changes   Patient reported changes in medications: Reviewed with patient  Med Adherence  Comment: Taking    Health Maintenance:   Health Maintenance   Topic Date Due    Zoster Vaccines (2 of 2) 09/10/2021    COVID-19 Vaccine (3 - 2024-25 season) 09/01/2024    Influenza Vaccine (1) 10/01/2024    Annual Depression Screening  01/01/2025    Fall Risk Screening (Annual)  01/01/2025    Diabetes Care: Foot Exam (Annual)  01/01/2025    Diabetes Care: Microalb/Creat Ratio (Annual)  01/01/2025    Diabetes Care A1C  05/15/2025    Diabetes Care Dilated Eye Exam  06/24/2025    Annual Physical  07/18/2025    Diabetes Care: GFR  11/15/2025    DEXA Scan  Completed    Pneumococcal Vaccine: 65+ Years  Completed    Colorectal Cancer Screening  Discontinued    Mammogram  Discontinued     Patient updates/concerns:  Patient stated that she saw the orthopedic this morning and reported that she will be having a procedure for her left knee. The procedure is called Left Knee Arthroscopy and is currently scheduled for 1/21/2025. Dr. Mata will be performing the procedure for the patient. Patient also mentioned that she continues to monitor her diet and is trying to make sure she has well-balanced meals. She mentioned that she is not too active as she needs assistance from a cane to get around due to her knee. Patient stated that she has no other concerns at this time.    Goals/Action Plan:    Active goal from previous outreach:     Monitor daily diet and staying healthy  Patient reported progress towards goals:                - What: Patient stated that she saw the orthopedic this morning and reported that she will be having a procedure for her left knee. The procedure is called Left Knee Arthroscopy and is currently scheduled for 1/21/2025.            - Where/When/How: Dr. Mata will be performing the procedure for the patient. Patient also mentioned  that she continues to monitor her diet and is trying to make sure she has well-balanced meals. She mentioned that she is not too active as she needs assistance from a cane to get around due to her knee.  Patient Reported Barriers and Concerns: None at this time per patient                   - Plan for overcoming barriers: Patient to continue to follow up with care team as scheduled or as needed.    Care Managers Interventions:   Patient was educated on proper nutrition, hydration and exercise. Patient is aware of our next outreach, has agreed to being contacted via telephone. Patient verbalized understanding. Patient is aware she may contact me if further assistance is needed.    Future Appointments: Patient aware  Future Appointments   Date Time Provider Department Center   1/16/2025 11:15 AM Jessica Bunn DO [facility] [address]   1/16/2025  2:00 PM Areli Clark, APRN [facility]   1/28/2025  2:30 PM [email or web address]   1/30/2025  2:00 PM Arlei Clark, APRN [facility]   2/13/2025  2:00 PM Areli Clark, APRN [facility]   2/27/2025  2:00 PM Areli Clark, APRN [facility]   3/13/2025  2:00 PM Areli Clark, APRN [facility]   3/27/2025  2:00 PM Areli Clark, APRN [facility]   4/10/2025  2:00 PM Areli Clark, APRN [facility]   4/24/2025  2:00 PM Areli Clark, APRN [facility]   5/8/2025  2:00 PM Areli Clark, APRN [facility]   5/22/2025  2:00 PM Areli Clark, APRN [facility]   6/5/2025  2:00 PM Areli Clark, APRN [facility]   6/19/2025  2:00 PM Areli Clark, APRN [facility]   7/3/2025  2:00 PM Areli Clark, APRN [facility]   7/17/2025 12:30 PM Areli Clark APRN [facility]   7/31/2025  2:00 PM Areli Clark APRN [facility]   8/14/2025  2:00 PM Areli Clark APRN [facility]     Next Care Manager Follow Up Date: 1 month follow up or sooner if needed    Reason For Follow  Up: review progress and or barriers towards patient's goals.     Time Spent This Encounter Total: 25 min medical record review, telephone communication, care plan updates where needed, education, goals, and action plan recreation/update. Provided acknowledgment and validation to patient's concerns.   Monthly Minute Total including today: 25  Physical assessment, complete health history, and need for CCM established by Jessica Bunn DO.

## 2025-01-06 NOTE — TELEPHONE ENCOUNTER
Date of Surgery: 2025       Post Op Appt:  2025 230    Case ID: [id number]     Notes: Lets please add for  at [facility], thanks!             OR BOOKING SHEET KNEE ARTHROSCOPY  Location: [x] [facility]                    [x] [facility]  Name: Arcelia Conklin  MRN: [id number]   : 10/12/1945  Diagnosis:  [x] Complex tear of medial meniscus of left knee as current injury, initial encounter [S83.232A]  Disposition:    [x] Ambulatory  Operative Time Required: 60 minutes ([facility])  Procedure:  Antibiotics: 2 g cefazolin within 60 minutes of surgical incision (3 g if > 120 kg)  Laterality:                  [] RIGHT                  [x] LEFT                          [] BILATERAL  Procedures:                    [x] Knee Arthroscopy                                                   [x] Partial Medial Meniscectomy (93377)                        [] Lateral Meniscus Repair (73753)                    [] Medial Meniscus Repair (72198)                       [x] Synovectomy (75938)                    [x] Abrasionplasty (05986)                       [] Partial Medial Meniscectomy vs Medial Meniscus Repair (86748 vs 98281)                    [] Partial Lateral Meniscectomy vs Lateral Meniscus Repair (40642 vs 50441)                    [] Irrigation & Debridement (97117)                    [] Manipulation Under Anesthesia (66067)                    [] Chondroplasty (84641)                    [] Removal of Loose Body (03302)     Injections:                    [] Stem cells from bone marrow aspiration (15817)                                      From:                   [] Left Iliac crest                   [] Right Iliac crest                                      To:                   [] Left knee         [] Right knee                          [] Other      Additional info:   [x] PCP Clearance Needed  [] MRSA  [x] Physical Therapy Internal    [] DME Rx  [] Appt with Dr. Mata needed  Implants needed: None  Positioning Equipment: Supine  with Lateral Post

## 2025-01-07 ENCOUNTER — TELEPHONE (OUTPATIENT)
Dept: PHYSICAL THERAPY | Facility: HOSPITAL | Age: 80
End: 2025-01-07

## 2025-01-08 ENCOUNTER — ANESTHESIA EVENT (OUTPATIENT)
Dept: SURGERY | Facility: HOSPITAL | Age: 80
End: 2025-01-08
Payer: MEDICARE

## 2025-01-13 ENCOUNTER — PATIENT OUTREACH (OUTPATIENT)
Dept: CASE MANAGEMENT | Age: 80
End: 2025-01-13

## 2025-01-13 NOTE — PROGRESS NOTES
Patient called to review upcoming appointments and verify time and dates. Patient aware she may contact me if further assistance is needed.     Time Spent This Encounter Total: 5  min medical record review  Monthly Minute Total including today: 30 minutes

## 2025-01-16 ENCOUNTER — LAB ENCOUNTER (OUTPATIENT)
Dept: LAB | Age: 80
End: 2025-01-16
Attending: FAMILY MEDICINE
Payer: MEDICARE

## 2025-01-16 ENCOUNTER — OFFICE VISIT (OUTPATIENT)
Dept: FAMILY MEDICINE CLINIC | Facility: CLINIC | Age: 80
End: 2025-01-16
Payer: MEDICARE

## 2025-01-16 VITALS
OXYGEN SATURATION: 99 % | WEIGHT: 193 LBS | DIASTOLIC BLOOD PRESSURE: 77 MMHG | HEIGHT: 64 IN | SYSTOLIC BLOOD PRESSURE: 112 MMHG | BODY MASS INDEX: 32.95 KG/M2 | RESPIRATION RATE: 18 BRPM | HEART RATE: 77 BPM

## 2025-01-16 DIAGNOSIS — S83.242D TEAR OF MEDIAL MENISCUS OF LEFT KNEE, UNSPECIFIED TEAR TYPE, UNSPECIFIED WHETHER OLD OR CURRENT TEAR, SUBSEQUENT ENCOUNTER: ICD-10-CM

## 2025-01-16 DIAGNOSIS — I25.118 ATHEROSCLEROTIC HEART DISEASE OF NATIVE CORONARY ARTERY WITH OTHER FORMS OF ANGINA PECTORIS (HCC): ICD-10-CM

## 2025-01-16 DIAGNOSIS — E11.69 TYPE 2 DIABETES MELLITUS WITH OBESITY (HCC): ICD-10-CM

## 2025-01-16 DIAGNOSIS — R05.2 SUBACUTE COUGH: ICD-10-CM

## 2025-01-16 DIAGNOSIS — G89.29 CHRONIC PAIN OF LEFT KNEE: ICD-10-CM

## 2025-01-16 DIAGNOSIS — M25.562 CHRONIC PAIN OF LEFT KNEE: ICD-10-CM

## 2025-01-16 DIAGNOSIS — I10 ESSENTIAL HYPERTENSION: ICD-10-CM

## 2025-01-16 DIAGNOSIS — Z01.818 PREOP EXAMINATION: Primary | ICD-10-CM

## 2025-01-16 DIAGNOSIS — N18.31 STAGE 3A CHRONIC KIDNEY DISEASE (HCC): ICD-10-CM

## 2025-01-16 DIAGNOSIS — E66.9 TYPE 2 DIABETES MELLITUS WITH OBESITY (HCC): ICD-10-CM

## 2025-01-16 LAB
ALBUMIN SERPL-MCNC: 4.5 G/DL (ref 3.2–4.8)
ALBUMIN/GLOB SERPL: 2 {RATIO} (ref 1–2)
ALP LIVER SERPL-CCNC: 109 U/L
ALT SERPL-CCNC: 15 U/L
ANION GAP SERPL CALC-SCNC: 10 MMOL/L (ref 0–18)
AST SERPL-CCNC: 19 U/L (ref ?–34)
BASOPHILS # BLD AUTO: 0.06 X10(3) UL (ref 0–0.2)
BASOPHILS NFR BLD AUTO: 0.5 %
BILIRUB SERPL-MCNC: 0.3 MG/DL (ref 0.2–1.1)
BUN BLD-MCNC: 9 MG/DL (ref 9–23)
CALCIUM BLD-MCNC: 9.8 MG/DL (ref 8.7–10.6)
CHLORIDE SERPL-SCNC: 107 MMOL/L (ref 98–112)
CO2 SERPL-SCNC: 27 MMOL/L (ref 21–32)
CREAT BLD-MCNC: 1.17 MG/DL
EGFRCR SERPLBLD CKD-EPI 2021: 48 ML/MIN/1.73M2 (ref 60–?)
EOSINOPHIL # BLD AUTO: 0.15 X10(3) UL (ref 0–0.7)
EOSINOPHIL NFR BLD AUTO: 1.3 %
ERYTHROCYTE [DISTWIDTH] IN BLOOD BY AUTOMATED COUNT: 14.5 %
FASTING STATUS PATIENT QL REPORTED: YES
GLOBULIN PLAS-MCNC: 2.3 G/DL (ref 2–3.5)
GLUCOSE BLD-MCNC: 87 MG/DL (ref 70–99)
HCT VFR BLD AUTO: 40.3 %
HGB BLD-MCNC: 12.9 G/DL
IMM GRANULOCYTES # BLD AUTO: 0.05 X10(3) UL (ref 0–1)
IMM GRANULOCYTES NFR BLD: 0.4 %
LYMPHOCYTES # BLD AUTO: 2.17 X10(3) UL (ref 1–4)
LYMPHOCYTES NFR BLD AUTO: 18.7 %
MCH RBC QN AUTO: 29.3 PG (ref 26–34)
MCHC RBC AUTO-ENTMCNC: 32 G/DL (ref 31–37)
MCV RBC AUTO: 91.4 FL
MONOCYTES # BLD AUTO: 0.52 X10(3) UL (ref 0.1–1)
MONOCYTES NFR BLD AUTO: 4.5 %
NEUTROPHILS # BLD AUTO: 8.65 X10 (3) UL (ref 1.5–7.7)
NEUTROPHILS # BLD AUTO: 8.65 X10(3) UL (ref 1.5–7.7)
NEUTROPHILS NFR BLD AUTO: 74.6 %
OSMOLALITY SERPL CALC.SUM OF ELEC: 296 MOSM/KG (ref 275–295)
PLATELET # BLD AUTO: 388 10(3)UL (ref 150–450)
POTASSIUM SERPL-SCNC: 4.9 MMOL/L (ref 3.5–5.1)
PROT SERPL-MCNC: 6.8 G/DL (ref 5.7–8.2)
RBC # BLD AUTO: 4.41 X10(6)UL
SODIUM SERPL-SCNC: 144 MMOL/L (ref 136–145)
WBC # BLD AUTO: 11.6 X10(3) UL (ref 4–11)

## 2025-01-16 PROCEDURE — 85025 COMPLETE CBC W/AUTO DIFF WBC: CPT

## 2025-01-16 PROCEDURE — 80053 COMPREHEN METABOLIC PANEL: CPT

## 2025-01-16 PROCEDURE — 99214 OFFICE O/P EST MOD 30 MIN: CPT | Performed by: FAMILY MEDICINE

## 2025-01-16 PROCEDURE — 36415 COLL VENOUS BLD VENIPUNCTURE: CPT

## 2025-01-16 NOTE — PROGRESS NOTES
Subjective:   Patient ID: Arcelia Conklin is a 79 year old female.    Chronic left knee pain. Slowly worsening.  Pain is severe now.  Having arthoscopic surgery on 1/21/25 with Dr. Mata.         History/Other:   Review of Systems   All other systems reviewed and are negative.    Current Outpatient Medications   Medication Sig Dispense Refill    diclofenac 1 % External Gel Apply 2 g topically 4 (four) times daily as needed. 1 each 11    clonazePAM 1 MG Oral Tab Take 1 tablet (1 mg total) by mouth 2 (two) times daily as needed for Anxiety. 30 tablet 1    QUEtiapine 100 MG Oral Tab TAKE ONE TABLET BY MOUTH NIGHTLY, MAY TAKE ONE EXTRA TABLET UP TO 3 TIMES A WEEK IF UNABLE TO SLEEP 90 tablet 0    METFORMIN 500 MG Oral Tab TAKE ONE TABLET BY MOUTH EVERY MORNING BEFORE BREAKFAST 90 tablet 0    folic acid 1 MG Oral Tab Take 1 tablet (1 mg total) by mouth daily. 30 tablet 3    glimepiride 1 MG Oral Tab Take 1 tablet (1 mg total) by mouth daily with breakfast. 90 tablet 0    atorvastatin 40 MG Oral Tab Take 1 tablet (40 mg total) by mouth nightly. 90 tablet 0    fluticasone propionate 50 MCG/ACT Nasal Suspension 2 sprays by Each Nare route daily. 3 each 3    metoprolol succinate ER 25 MG Oral Tablet 24 Hr Take 1 tablet (25 mg total) by mouth daily. 90 tablet 3    HYDROcodone-acetaminophen 5-325 MG Oral Tab Take 1 tablet by mouth every 8 (eight) hours as needed for Pain.      Fish Oil-Cholecalciferol (OMEGA-3 FISH OIL-VITAMIN D3) 3137-0937 MG-UNIT Oral Cap Take 1 capsule by mouth daily.      TORSEMIDE 20 MG Oral Tab TAKE ONE TABLET BY MOUTH ONE TIME DAILY 30 tablet 0    Omeprazole 40 MG Oral Capsule Delayed Release Take 1 capsule (40 mg total) by mouth as needed.       Allergies:Allergies[1]    Objective:   Physical Exam  Vitals reviewed.   Constitutional:       General: She is not in acute distress.     Appearance: She is not diaphoretic.   HENT:      Right Ear: External ear normal.      Left Ear: External ear normal.       Nose: Nose normal.      Mouth/Throat:      Pharynx: No oropharyngeal exudate.   Eyes:      General: No scleral icterus.        Right eye: No discharge.         Left eye: No discharge.      Conjunctiva/sclera: Conjunctivae normal.   Neck:      Thyroid: No thyromegaly.   Cardiovascular:      Rate and Rhythm: Normal rate and regular rhythm.      Heart sounds: Normal heart sounds. No murmur heard.  Pulmonary:      Effort: Pulmonary effort is normal. No respiratory distress.      Breath sounds: Normal breath sounds. No wheezing.   Abdominal:      General: Bowel sounds are normal. There is no distension.      Palpations: Abdomen is soft. There is no mass.      Tenderness: There is no abdominal tenderness. There is no guarding or rebound.   Musculoskeletal:      Cervical back: Normal range of motion and neck supple.   Lymphadenopathy:      Cervical: No cervical adenopathy.   Skin:     General: Skin is warm and dry.      Findings: No erythema or rash.   Neurological:      Mental Status: She is alert and oriented to person, place, and time.      Motor: No abnormal muscle tone.   Psychiatric:         Mood and Affect: Mood normal.         Cognition and Memory: Memory normal.         Judgment: Judgment normal.      Comments: Stressed, dejected, mildly depressed.           Assessment & Plan:   1. Preop examination    2. Chronic pain of left knee    3. Tear of medial meniscus of left knee, unspecified tear type, unspecified whether old or current tear, subsequent encounter    4. Stage 3a chronic kidney disease (HCC)    5. Atherosclerotic heart disease of native coronary artery with other forms of angina pectoris (HCC)    6. Essential hypertension    7. Type 2 diabetes mellitus with obesity (HCC)    8. Subacute cough      Cleared for surgery.    Orders Placed This Encounter   Procedures    CBC With Differential With Platelet    Comp Metabolic Panel (14)       Meds This Visit:  Requested Prescriptions     Signed Prescriptions Disp  Refills    diclofenac 1 % External Gel 1 each 11     Sig: Apply 2 g topically 4 (four) times daily as needed.       Imaging & Referrals:  None         [1] No Known Allergies

## 2025-01-17 ENCOUNTER — TELEPHONE (OUTPATIENT)
Dept: INTERNAL MEDICINE CLINIC | Facility: CLINIC | Age: 80
End: 2025-01-17

## 2025-01-17 ENCOUNTER — HOSPITAL ENCOUNTER (OUTPATIENT)
Dept: GENERAL RADIOLOGY | Age: 80
Discharge: HOME | End: 2025-01-17
Attending: FAMILY MEDICINE
Payer: MEDICARE

## 2025-01-17 DIAGNOSIS — R05.2 SUBACUTE COUGH: ICD-10-CM

## 2025-01-17 PROCEDURE — 71046 X-RAY EXAM CHEST 2 VIEWS: CPT | Performed by: FAMILY MEDICINE

## 2025-01-17 NOTE — TELEPHONE ENCOUNTER
Everything in epic and is viewable.  I have routed to Dr Mata ofc and pre admission    [phone]

## 2025-01-17 NOTE — TELEPHONE ENCOUNTER
Corey called again.  He's requesting H&P asap.  He will not be in-office on Monday and pt sched on Tuesday.

## 2025-01-20 RX ORDER — TRAMADOL HYDROCHLORIDE 50 MG/1
TABLET ORAL
Qty: 20 TABLET | Refills: 1 | Status: SHIPPED | OUTPATIENT
Start: 2025-01-20

## 2025-01-20 RX ORDER — ONDANSETRON 4 MG/1
TABLET, FILM COATED ORAL
Qty: 8 TABLET | Refills: 0 | Status: SHIPPED | OUTPATIENT
Start: 2025-01-20

## 2025-01-20 RX ORDER — MELOXICAM 15 MG/1
15 TABLET ORAL DAILY
Qty: 14 TABLET | Refills: 1 | Status: SHIPPED | OUTPATIENT
Start: 2025-01-20

## 2025-01-20 NOTE — DISCHARGE INSTRUCTIONS
Knee Arthroscopy - Meniscectomy / Debridement  Postoperative Guidelines      This document will help you plan for your post-operative recovery course following surgery. Please read and retain this information for future reference. Many of the questions you may have later can be answered by referring to this information.      DIET   Begin with clear liquids and light foods (jellos, soups, etc.).   Progress to your normal diet if you are not experiencing nausea.       WOUND CARE   Please keep the ACE wrap on for the first 2-3 days, you may remove the ACE wrap for ice therapy.  Underneath the ACE wrap are waterproof bandages, please keep these in place until your first post-op appointment with Dr. Mata.  Under the waterproof bandages is Dermabond, this is a surgical glue and tape that is used in conjunction with absorbable stitches to close the incision.   Please do not touch the Dermabond or place any ointments lotions or creams directly over the incisions.  You may shower after removing the ACE wrap by placing Saran wrap around the leg and covering the bandages.  NO soaking of the operative leg (ie: bath or pool) is allowed until 6 weeks after surgery.     CRUTCHES   Crutches are to aid your progression to walking.   You may discontinue the crutches as soon as you are comfortable with full weight on the leg (usually 1-3 days). Your physical therapist may guide you with this process.     PAIN MANAGEMENT  Local numbing medications are injected into the wound around the knee at the time of surgery. These will wear off within 8-12 hours and it is not uncommon for you to encounter more pain on the first or second day after surgery when swelling peaks.   Do not drive a car or operate machinery while taking the narcotic medication.   Please avoid alcohol use while taking the narcotic pain medication.     NON-NARCOTIC PAIN MEDICATIONS   Extra-Strength Tylenol (Acetaminophen) 500mg Tablets (available over the  counter)  Indication: pain, non-narcotic pain reliever  Use: Take 1-2 tablets every 6 hours.  Do not exceed 8 tablets in a 24-hour period.  Mobic (Meloxicam) 15mg Tablets (Prescription sent to pharmacy)  Indication: pain and anti-inflammatory, non-narcotic pain reliever  Use: Take 1 tablets daily with meals for the first 14 days after surgery.  Side Effects: upset stomach, acid reflux.  If this occurs, stop the medication.    NARCOTIC PAIN MEDICATION  Tramadol is a prescription pain medication that should only be used if adequate pain control is not achieved with combination of ice, extra-strength Tylenol and Aleve as outlined above.   Side effects include constipation, nausea, drowsiness, dry mouth, itchiness.  Use a 0-10 pain scale to decide how much medication to take:                                Pain 0-4/10: No tramadol necessary                                Pain 5-7/10: Take one tablet                                 Pain 8-10/10: Take two tablets   Tramadol 50mg tablet  Indication: pain 5/10 or greater.  Narcotic pain medication.  Use: 1-2 tabs every 4-6 hours as needed for pain.  Do not exceed more than 10 tabs in any 24-hour time period unless otherwise directed.    MEDICATIONS TO MANAGE SIDE EFFECTS  Narcotics may cause nausea and constipation. Bowel regimen is recommended.  Colace (Docusate) 100 mg - available OTC  Indication:  constipation, stool softener.  Take consistently while on narcotics.  Use:  Take 1 pill three times per day while you are taking narcotics.    Senokot (Senna) 8.6 mg - available OTC  Indication: constipation, stool laxative.  Take consistently while on narcotics.   Use: Take 2 pills at bedtime.  Increase to 2 pills twice daily if no bowel movement by post-surgery day two.    Zofran (Ondansetron ODT) 4 mg- Prescription sent to pharmacy  Indication: nausea.  Take as needed.  Use: Take 1 pill AS NEEDED every 8 hours, do not exceed 3 pills in 24 hours      ACTIVITY   Raise the  operative leg to chest level whenever possible to decrease swelling.   Do not place pillows under knees (i.e. do not maintain knee in a flexed or bent position), but rather place pillows under the foot/ankle.   Use crutches to assist with walking for the first 24-48 hours  Do not engage in activities which increase knee pain/swelling (prolonged periods of standing or walking) for the first 2 weeks following surgery.   Avoid long periods of sitting (without leg elevated) or long distance traveling for 2 weeks.   NO driving until instructed otherwise by physician.   You may return to sedentary work or school 1-2 days after surgery if feeling well.    You will not be needing a knee brace      ICE THERAPY   Icing is very important in the initial post-operative period and should begin immediately after surgery.   Use ice packs / bags for 30-45 minutes every 2 hours daily until there is no swelling and pain is relieved - remember to keep leg elevated to level of chest while icing. Care should be taken with icing to avoid frostbite to the skin.       EXERCISES   Begin exercises 24 hours after surgery, including knee extension, quad sets, straight leg raises, and active flexion / extension unless otherwise instructed. (Please see handout)  Discomfort and knee stiffness are normal for a few days following surgery. It is safe to bend your knee in a non-weightbearing position when performing exercises unless otherwise instructed.   Complete exercises 3-4 times daily until your first post-operative visit - your motion goals are to have complete extension (straightening) and 90 degrees of flexion (bending) at your first post- operative appointment unless otherwise instructed.   Perform ankle pumps continuously throughout the day to reduce the risk of developing a blood clot in your calf.   Formal physical therapy (PT) typically begins as soon as possible, ideally 1-3 days after surgery. A prescription and protocol will be  provided prior to surgery.            EMERGENCIES**   Contact Dr. Mata’s Team via [employer] or [phone] if any of the following are present:     Painful swelling or numbness (note that some swelling and numbness is normal).   Unrelenting pain.  Fever (over 101° - it is normal to have a low-grade fever for the first day or two following surgery) or chills.   Redness around incisions.   Color change in foot or ankle.   Continuous drainage or bleeding from incision (a small amount of drainage is expected).   Difficulty breathing.   Excessive nausea/vomiting   Severe calf pain.   If you have an emergency after office hours or on the weekend, contact the office at [phone] and you will be connected to our pager service. This will connect you with the Physician on call.   If you have an emergency that requires immediate attention proceed to the nearest emergency room.       FOLLOW-UP CARE/QUESTIONS   Your first post-operative appointment will be 7-10 days following surgery for wound evaluation and to answer any questions you have regarding the procedure.   Typically, the first physical therapy appointment is made for 1-3 days following surgery. This prescription will be communicated prior to surgery.  If you have any further questions please contact Dr. Mata’s team directly.

## 2025-01-21 ENCOUNTER — HOSPITAL ENCOUNTER (OUTPATIENT)
Facility: HOSPITAL | Age: 80
Setting detail: HOSPITAL OUTPATIENT SURGERY
Discharge: HOME | End: 2025-01-21
Attending: ORTHOPAEDIC SURGERY | Admitting: ORTHOPAEDIC SURGERY
Payer: MEDICARE

## 2025-01-21 ENCOUNTER — ANESTHESIA (OUTPATIENT)
Dept: SURGERY | Facility: HOSPITAL | Age: 80
End: 2025-01-21
Payer: MEDICARE

## 2025-01-21 VITALS
RESPIRATION RATE: 18 BRPM | SYSTOLIC BLOOD PRESSURE: 136 MMHG | HEIGHT: 64 IN | TEMPERATURE: 98 F | HEART RATE: 92 BPM | BODY MASS INDEX: 32.95 KG/M2 | DIASTOLIC BLOOD PRESSURE: 72 MMHG | WEIGHT: 193 LBS | OXYGEN SATURATION: 96 %

## 2025-01-21 DIAGNOSIS — Z48.89 AFTERCARE FOLLOWING SURGERY: Primary | ICD-10-CM

## 2025-01-21 DIAGNOSIS — S82.142A CLOSED FRACTURE OF LEFT TIBIAL PLATEAU, INITIAL ENCOUNTER: ICD-10-CM

## 2025-01-21 LAB — GLUCOSE BLD-MCNC: 108 MG/DL (ref 70–99)

## 2025-01-21 PROCEDURE — 0SBD4ZZ EXCISION OF LEFT KNEE JOINT, PERCUTANEOUS ENDOSCOPIC APPROACH: ICD-10-PCS | Performed by: ORTHOPAEDIC SURGERY

## 2025-01-21 PROCEDURE — 82962 GLUCOSE BLOOD TEST: CPT

## 2025-01-21 RX ORDER — SODIUM CHLORIDE, SODIUM LACTATE, POTASSIUM CHLORIDE, CALCIUM CHLORIDE 600; 310; 30; 20 MG/100ML; MG/100ML; MG/100ML; MG/100ML
INJECTION, SOLUTION INTRAVENOUS CONTINUOUS
Status: DISCONTINUED | OUTPATIENT
Start: 2025-01-21 | End: 2025-01-21

## 2025-01-21 RX ORDER — ACETAMINOPHEN 500 MG
1000 TABLET ORAL ONCE
Status: DISCONTINUED | OUTPATIENT
Start: 2025-01-21 | End: 2025-01-21 | Stop reason: HOSPADM

## 2025-01-21 RX ORDER — TRAMADOL HYDROCHLORIDE 50 MG/1
TABLET ORAL EVERY 4 HOURS PRN
Qty: 20 TABLET | Refills: 0 | Status: SHIPPED | OUTPATIENT
Start: 2025-01-21

## 2025-01-21 RX ORDER — NALOXONE HYDROCHLORIDE 0.4 MG/ML
0.08 INJECTION, SOLUTION INTRAMUSCULAR; INTRAVENOUS; SUBCUTANEOUS AS NEEDED
Status: DISCONTINUED | OUTPATIENT
Start: 2025-01-21 | End: 2025-01-21

## 2025-01-21 RX ORDER — DEXTROSE MONOHYDRATE 25 G/50ML
50 INJECTION, SOLUTION INTRAVENOUS
Status: DISCONTINUED | OUTPATIENT
Start: 2025-01-21 | End: 2025-01-21 | Stop reason: HOSPADM

## 2025-01-21 RX ORDER — HYDROMORPHONE HYDROCHLORIDE 1 MG/ML
0.2 INJECTION, SOLUTION INTRAMUSCULAR; INTRAVENOUS; SUBCUTANEOUS EVERY 5 MIN PRN
Status: DISCONTINUED | OUTPATIENT
Start: 2025-01-21 | End: 2025-01-21

## 2025-01-21 RX ORDER — METOCLOPRAMIDE HYDROCHLORIDE 5 MG/ML
5 INJECTION INTRAMUSCULAR; INTRAVENOUS EVERY 8 HOURS PRN
Status: DISCONTINUED | OUTPATIENT
Start: 2025-01-21 | End: 2025-01-21

## 2025-01-21 RX ORDER — HYDROCODONE BITARTRATE AND ACETAMINOPHEN 5; 325 MG/1; MG/1
2 TABLET ORAL ONCE AS NEEDED
Status: DISCONTINUED | OUTPATIENT
Start: 2025-01-21 | End: 2025-01-21

## 2025-01-21 RX ORDER — DEXAMETHASONE SODIUM PHOSPHATE 4 MG/ML
VIAL (ML) INJECTION AS NEEDED
Status: DISCONTINUED | OUTPATIENT
Start: 2025-01-21 | End: 2025-01-21 | Stop reason: SURG

## 2025-01-21 RX ORDER — HYDROMORPHONE HYDROCHLORIDE 1 MG/ML
0.4 INJECTION, SOLUTION INTRAMUSCULAR; INTRAVENOUS; SUBCUTANEOUS EVERY 5 MIN PRN
Status: DISCONTINUED | OUTPATIENT
Start: 2025-01-21 | End: 2025-01-21

## 2025-01-21 RX ORDER — LIDOCAINE HYDROCHLORIDE 10 MG/ML
INJECTION, SOLUTION EPIDURAL; INFILTRATION; INTRACAUDAL; PERINEURAL AS NEEDED
Status: DISCONTINUED | OUTPATIENT
Start: 2025-01-21 | End: 2025-01-21 | Stop reason: SURG

## 2025-01-21 RX ORDER — LABETALOL HYDROCHLORIDE 5 MG/ML
INJECTION, SOLUTION INTRAVENOUS AS NEEDED
Status: DISCONTINUED | OUTPATIENT
Start: 2025-01-21 | End: 2025-01-21 | Stop reason: SURG

## 2025-01-21 RX ORDER — HYDROCODONE BITARTRATE AND ACETAMINOPHEN 5; 325 MG/1; MG/1
1 TABLET ORAL ONCE AS NEEDED
Status: DISCONTINUED | OUTPATIENT
Start: 2025-01-21 | End: 2025-01-21

## 2025-01-21 RX ORDER — NICOTINE POLACRILEX 4 MG
15 LOZENGE BUCCAL
Status: DISCONTINUED | OUTPATIENT
Start: 2025-01-21 | End: 2025-01-21 | Stop reason: HOSPADM

## 2025-01-21 RX ORDER — BUPIVACAINE HYDROCHLORIDE AND EPINEPHRINE 5; 5 MG/ML; UG/ML
INJECTION, SOLUTION EPIDURAL; INTRACAUDAL; PERINEURAL AS NEEDED
Status: DISCONTINUED | OUTPATIENT
Start: 2025-01-21 | End: 2025-01-21 | Stop reason: HOSPADM

## 2025-01-21 RX ORDER — ACETAMINOPHEN 500 MG
1000 TABLET ORAL ONCE AS NEEDED
Status: DISCONTINUED | OUTPATIENT
Start: 2025-01-21 | End: 2025-01-21

## 2025-01-21 RX ORDER — NICOTINE POLACRILEX 4 MG
30 LOZENGE BUCCAL
Status: DISCONTINUED | OUTPATIENT
Start: 2025-01-21 | End: 2025-01-21 | Stop reason: HOSPADM

## 2025-01-21 RX ORDER — HYDROMORPHONE HYDROCHLORIDE 1 MG/ML
0.6 INJECTION, SOLUTION INTRAMUSCULAR; INTRAVENOUS; SUBCUTANEOUS EVERY 5 MIN PRN
Status: DISCONTINUED | OUTPATIENT
Start: 2025-01-21 | End: 2025-01-21

## 2025-01-21 RX ORDER — ONDANSETRON 2 MG/ML
4 INJECTION INTRAMUSCULAR; INTRAVENOUS EVERY 6 HOURS PRN
Status: DISCONTINUED | OUTPATIENT
Start: 2025-01-21 | End: 2025-01-21

## 2025-01-21 RX ADMIN — LABETALOL HYDROCHLORIDE 5 MG: 5 INJECTION, SOLUTION INTRAVENOUS at 14:36:00

## 2025-01-21 RX ADMIN — LABETALOL HYDROCHLORIDE 2.5 MG: 5 INJECTION, SOLUTION INTRAVENOUS at 14:34:00

## 2025-01-21 RX ADMIN — LIDOCAINE HYDROCHLORIDE 50 MG: 10 INJECTION, SOLUTION EPIDURAL; INFILTRATION; INTRACAUDAL; PERINEURAL at 14:00:00

## 2025-01-21 RX ADMIN — DEXAMETHASONE SODIUM PHOSPHATE 4 MG: 4 MG/ML VIAL (ML) INJECTION at 14:04:00

## 2025-01-21 NOTE — OR PREOP
Dr. Barragan and Dr. Ramirez aware patient will have a ride home from apartment neighbor who will check in periodically on patient

## 2025-01-21 NOTE — OPERATIVE REPORT
OPERATIVE REPORT  ATTENDING SURGEON: FLAVIA PLEITEZ MD  ASSISTANT(S): John Levine (AJ)  STATEMENT OF MEDICAL NECESSITY  The aid of assistant John Levine (AJ) was needed for patient positioning, preparation, drape, retraction,  wound closure, dressing application and critical portions of the procedure.   PRELIMINARY DIAGNOSIS:  1.  Left Medial Meniscus Tear  2.  Left Patellofemoral and Medial Femoral Chondromalacia  3.  Left Knee synovitis involving multiple compartments  POSTOPERATIVE DIAGNOSIS:  1.  Left Medial Meniscus Tear  2.  Left Patellofemoral and Medial Femoral Chondromalacia  3.  Left Knee synovitis involving multiple compartments    THE OPERATION:  1.  Left Knee Arthroscopy, Partial Medial Meniscectomy (96528)  2.  Left Knee arthroscopic extensive debridement and synovectomy involving multiple compartments (99386)   3.  Left knee arthroscopic abrasion plasty of weightbearing medial femoral condyle (76225)     ANESTHESIA: General Endotracheal  ANESTHESIOLOGIST:  MD James  ANTIBIOTICS: Cefazolin 2g within 60 minutes of surgical incision.    IMPLANTS:   None  PATHOLOGY/CULTURES: None  ESTIMATED BLOOD LOSS: Blood Output: 5 mL (1/21/2025  2:37 PM)    DRAINS: None  COMPLICATIONS: No intraoperative nor immediate postoperative complications noted.  COUNTS: All sponge and needle counts were correct at the conclusion of the procedure.  TOURNIQUET TIME: Not Applicable  OPERATIVE FINDINGS:  Evidence of complex medial meniscus tearing with displaced undersurface flap tear in zone B this was managed with debridement of medial meniscus to a stable margin using the aid of a mechanical shaver as well as Coblation device.  Approximately 20% of the medial meniscus was debrided successfully.  Overlying medial femoral condyle and tibial plateau demonstrated areas of full-thickness articular cartilage wear managed with abrasion plasty down to bleeding bone with curved mechanical 4.0 mm shaver.  Mild to moderate  synovitis throughout the knee managed with synovectomy to a stable margin.  Central patellar grade II chondromalacia was noted and managed with chondroplasty to a stable margin.  Lateral compartment was largely within normal limits.    Indications:  Arcelia is a 79 year old female with history, physical examination, and imaging findings consistent with medial meniscus pathology that has been managed with extensive nonsurgical management.  Physical therapy greater than 3 months, intra-articular corticosteroid and ketorolac injection, activity modification, and anti-inflammatory medications without successful symptomatic resolution.  Operative and nonoperative options were discussed with her in my office and we ultimately agreed that surgery would provide the highest likelihood of symptomatic relief and functional improvement.  The risks and benefits of surgery as well as the postoperative rehabilitation plan were reviewed. She voiced a good understanding of treatment options, risks and benefits, and alternatives to surgery. She was given the opportunity to ask questions, which were all answered to the best of my ability and to her satisfaction. Arcelia wished to proceed.   On the day of surgery, the Arcelia was seen in the preoperative area.  I confirmed her identity by name and birthday. We reviewed and confirmed the surgical consent including laterality.  The surgical site was marked with my initials.  We re-reviewed the risks and benefits of the procedure and I answered all additional questions to her satisfaction.      OPERATIVE REPORT:   Arcelia was taken to the operating room in stable condition.    A clear 1010 drape x 2 was applied to the upper thigh. An adjustable lateral post was utilized. The extremity underwent a prescrub with chlorhexidine and alcohol followed by a chlorhexidine formal preparation.  A preoperative timeout was performed confirming the correct surgical site, procedure, and preoperative imaging was  reviewed.      Exam under anesthesia demonstrated a Stable knee with normal range of motion.      Diagnostic knee arthroscopy was performed with standard anterolateral visualization portal was made utilizing a 15 blade, and an arthroscope was introduced. The medial working portal was established under spinal needle localization and diagnostic arthroscopy was commenced.          Diagnostic arthroscopy revealed:      Suprapatellar No evidence of loose bodies   Patellofemoral Diffuse grade 2 articular cartilage wear in the central aspect of the patella approximately 10 x 15 mm managed with chondroplasty using Coblation as well as shaver down to a stable margin.  Minimal articular cartilage wear overlying the femoral trochlea.   Gutters Slight medial based osteophyte formation noted.   Lateral compartment Grade 1 tibial cartilage  Grade 1 femoral cartilage  Lateral meniscus intact without tearing.   Medial compartment Grade 3 tibial cartilage  Grade 4 femoral cartilage, managed with abrasion plasty down to bleeding bone using curved mechanical 4.0 mm shaver.  Evidence of complex medial meniscus tearing with displaced undersurface flap tear in zone B this was managed with debridement of medial meniscus to a stable margin using the aid of a mechanical shaver as well as Coblation device.  Approximately 20% of the medial meniscus was debrided successfully.  Overlying medial femoral condyle and tibial plateau demonstrated areas of full-thickness articular cartilage wear managed with abrasion plasty down to bleeding bone with curved mechanical 4.0 mm shaver.    No pathologic plica   Ligaments ACL Intact.   PCL intact  Popliteus intact    Other Moderate synovitis involving the medial, lateral and patellofemoral compartments managed with synovectomy using curved mechanical 4.0 mm shaver and coablation device.     Within the medial compartment, approximately 20% of the meniscus volume was extracted utilizing a curved 4.0 mm  mechanical shaver.  Peripheral fraying was ablated with a Coblation device to prevent further propagation of tearing.  Comprehensive partial meniscectomy was performed.  Weightbearing medial femoral condyle managed with abrasion plasty down bleeding bone using curved mechanical 4.0 mm shaver.  This was over an area approximately 15 x 10 mm.  Stable margin of articular cartilage was noted.  Within the patellofemoral compartment, a 4.0 mm curved mechanical shaver was used to debride chondral surfaces of the patella and trochlea to stable margin.    Extensive synovectomy was performed in all 3 compartments with the aid of 4.0 mm curved mechanical shaver and Coblation to ensure hemostasis.  Adequate hemostasis were noted.  Wound closure was performed with buried 3-0 monocryl and overlying Dermabond and steri strips were applied.    At this point in time approximately 30 cc of Marcaine with epinephrine were utilized to provide local anesthesia.    4 x 4 and Tegaderm, [place] style dressing was applied.  The knee was wrapped in a 6 inch Ace wrap.   The final count prior to closure was correct. The patient tolerated the procedure well without complications.   Arcelia was taken to the recovery room in a stable condition with plan for ambulatory discharge to home. She was provided my postoperative discharge booklet, appropriate home exercises, script for outpatient physical therapy, and a copy of my rehabilitation guidelines.      POST OPERATIVE PLAN:  Activity Precautions: WBAT / ROMAT. To begin physical therapy ASAP.   DVT Prophylaxis: Not indicated as patient is ambulatory.   Follow-up Visit: POD #7-14        Julian Mata MD  Knee, Shoulder, & Elbow Surgery / Sports Medicine Specialist  Orthopaedic Surgery  [address] [place] [address] [id number] [place] [email or web address]  [email or web address]  t: [phone]  o: [phone]  f: [phone]    This note was dictated using Dragon software.  While it was briefly  proofread prior to completion, some grammatical, spelling, and word choice errors due to dictation may still occur.
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
## 2025-01-21 NOTE — ANESTHESIA PROCEDURE NOTES
Airway  Date/Time: 1/21/2025 2:01 PM  Urgency: elective      General Information and Staff    Patient location during procedure: OR  Anesthesiologist: Huber Ramirez MD  Performed: anesthesiologist   Performed by: Huber Ramirez MD  Authorized by: Huber Ramirez MD      Indications and Patient Condition  Indications for airway management: anesthesia  Sedation level: deep  Preoxygenated: yes  Patient position: sniffing  Mask difficulty assessment: 0 - not attempted    Final Airway Details  Final airway type: supraglottic airway      Successful airway: classic  Size 3       Number of attempts at approach: 1

## 2025-01-21 NOTE — ANESTHESIA POSTPROCEDURE EVALUATION
[facility]    Arcelia Conklin Patient Status:  Hospital Outpatient Surgery   Age/Gender 79 year old female MRN [id number]   Location [facility] POST ANESTHESIA CARE UNIT Attending Julian aMta MD   Hosp Day # 0 PCP Jessica Bunn DO       Anesthesia Post-op Note    Left Knee Arthroscopy Partial Medial Meniscectomy Synovectomy Abrasionplasty    Procedure Summary       Date: 01/21/25 Room / Location:  MAIN OR  /  MAIN OR    Anesthesia Start: 1355 Anesthesia Stop: 1455    Procedure: Left Knee Arthroscopy Partial Medial Meniscectomy Synovectomy Abrasionplasty (Left: Knee) Diagnosis:       Complex tear of medial meniscus of left knee as current injury, initial encounter      (Complex tear of medial meniscus of left knee as current injury, initial encounter [S83.232A])    Surgeons: Julian Mata MD Anesthesiologist: Huber Ramirez MD    Anesthesia Type: general ASA Status: 3            Anesthesia Type: general    Vitals Value Taken Time   /67 01/21/25 1500   Temp 97.5 °F (36.4 °C) 01/21/25 1500   Pulse 79 01/21/25 1500   Resp 15 01/21/25 1500   SpO2 99 % 01/21/25 1500   Vitals shown include unfiled device data.        Patient Location: PACU    Anesthesia Type: general    Airway Patency: patent    Postop Pain Control: adequate    Mental Status: preanesthetic baseline    Nausea/Vomiting: none    Cardiopulmonary/Hydration status: stable euvolemic    Complications: no apparent anesthesia related complications    Postop vital signs: stable    Dental Exam: Unchanged from Preop    Patient to be discharged from PACU when criteria met.

## 2025-01-22 ENCOUNTER — OFFICE VISIT (OUTPATIENT)
Dept: PHYSICAL THERAPY | Facility: HOSPITAL | Age: 80
End: 2025-01-22
Attending: FAMILY MEDICINE
Payer: MEDICARE

## 2025-01-22 PROCEDURE — 97162 PT EVAL MOD COMPLEX 30 MIN: CPT

## 2025-01-22 PROCEDURE — 97110 THERAPEUTIC EXERCISES: CPT

## 2025-01-22 NOTE — PROGRESS NOTES
POST-OP KNEE EVALUATION:     Diagnosis:   Primary osteoarthritis of left knee (M17.12)      S/p Left Knee Arthroscopy Partial Medial Meniscectomy Synovectomy Abrasionplasty 1/21/2025 Referring Provider: Mike Montiel Date of Evaluation:    1/22/2025    Precautions:  None Next MD visit:   1/28/2025  Date of Surgery: 1/21/2025     PATIENT SUMMARY   Arcelia Conklin is a 79 year old female who presents to therapy today s/p L knee arthroscopy with medial menisectomy, synovectomy, and abrasionplasty on 1/21/2025.  She c/o L knee pain post-op.  She reports she had been having pain in the L knee for about 2 months prior to surgery, stating she had broken her leg about a year ago reporting she had tripped over a fan and fell, causing the tibia to break. Patient report chief complaint is pain in the medial aspect of the knee with any M/L movement of the knee.  She rates current pain 3/10,3/10 at best and 9/10 at worst. She reports she lives in an apartment building, having to navigate 12 steps total with handrail to enter the building.  Patient reports she lives alone with standard bathtub.  She reports she has difficulty getting in/out of the tub and does utilize a shower chair. Patient is currently having difficulty walking (using straight cane - had been using the cane prior to the surgery for about 2 months).  Reports pain/difficulty navigating stairs, bending knee for activities such as getting in/out of the care or tub, difficulty standing for a prolonged period. Patient reports she would like to return to activities as prior including walking without an assistive device.      Past medical history was reviewed. Significant findings include  has a past medical history of Abdominal pain, Acute, but ill-defined, cerebrovascular disease, Anesthesia complication, Anxiety state, unspecified, Arthritis, Atherosclerosis of coronary artery (11/17/2021), Back pain, Back problem, Blood disorder, Blood in the stool,  Chronic pain, Constipation, Coronary atherosclerosis, COVID (05/13/2022), Deep vein thrombosis (HCC), Depression, Diabetes mellitus (HCC), Easy bruising, Fatigue, Feeling lonely, Frequent use of laxatives, Heart palpitations, Heartburn, Hemorrhoids, Hiatal hernia, High blood pressure, History of blood transfusion, History of depression, History of mental disorder, Indigestion, Irregular bowel habits, Obesity, unspecified, Obstructive sleep apnea (adult) (pediatric) ([facility] 9-14-16 [place] 10-16-16), Osteoarthritis, Pain with bowel movements, Pneumothorax on right, PONV (postoperative nausea and vomiting), Psoriasis, Rheumatoid arthritis (HCC), Sleep apnea, Stroke (HCC), Torn meniscus, Type II or unspecified type diabetes mellitus without mention of complication, not stated as uncontrolled, Uncomfortable fullness after meals, Visual impairment, Wears glasses, and Weight gain.     ASSESSMENT  Patient present with findings consistent with expectations s/p L knee scope per above. Current impairments including the following: pain, swelling, decreased ROM, decreased flexibility, decreased strength, impaired gait, and impaired strength.. Patient will benefit from physical therapy to address the above impairments and promote return to ADL and recreational activities as prior without pain or compensation.     OBJECTIVE:   Observation/Skin: post-surgical dressing is in place.  Moderate swelling present globally throughout knee  Palpation: mild tenderness along medial joint line  Edema: at knee jt line R: 41.5 cm, L 44 cm  Sensation: unremarkable    AROM: (* denotes performed with pain)   Knee    Flexion: R 120; L 115   Extension: R 0; L 0*       Patellar Mobility/Accessory motion: decreased patellar mobility all planes on L    Flexibility:   Hamstrings: mild tightness B  Gastroc-soleus: tightness B  Hip flexor: short B  Quads: tightness B, L>R  ITB: short B    Strength/MMT: (* denotes performed with pain)  Hip Knee   Flexion:  R 5/5; L 5/5  Extension: R 5/5; L 4+/5  Abduction: R 5/5; L 4/5  ER: R 5/5; L 4/5* Flexion: R 5/5; L 4+/5*  Extension: R 5/5; L 4/5*        Balance: unable to perform on L due to post-op pain    Functional Mobility:  5x sit<>stand: test next session  TUG (AD, time): test next session   Gait: pt ambulates on level ground with antalgia and use of straight cane.     Today’s Treatment and Response:   Pt education was provided on exam findings, treatment diagnosis, treatment plan, expectations, and prognosis. Pt was also provided recommendations for ice and elevation and issued Tensogrip for compression (size F) Patient was instructed in and issued a HEP for: towel roll quad set, SLR, and heel slides    Charges: PT Eval Moderate Complexity, SHARYN      Total Timed Treatment: 10 min     Total Treatment Time: 45 min     Based on clinical rationale and outcome measures, this evaluation involved Moderate Complexity decision making due to 3+ personal factors/comorbidities, 4+ body structures involved/activity limitations, and evolving symptoms including changing pain levels.  PLAN OF CARE:    Goals: (To be met in 10 visits)   Pt will improve knee extension ROM to 0 deg to allow proper heel strike during gait and terminal knee extension in stance  Pt will improve knee AROM flexion to >120 degrees to improve ability to perform stair negotiation and getting in/out of car  Pt will improve quad strength to 5/5 to ascend 1 flight of stairs reciprocally with handrail  Pt will increase hip and knee strength to grossly 5/5 to be able to get up and down from the floor safely  Pt will demonstrate increased hip ER/ABD strength to 5/5 to perform stepping and squatting activities without excessive femoral IR/ADD  Pt will improve SLS to >10s to improve safety and independence with gait on uneven surfaces such as grass  Pt will be independent and compliant with comprehensive HEP to maintain progress achieved in PT    Frequency / Duration:  Patient will be seen for 2 x/week or a total of 10 visits over a 90 day period.  Treatment will include: Gait training, Manual Therapy, Neuromuscular Re-education, Therapeutic Activities, Therapeutic Exercise, and Home Exercise Program instruction    Education or treatment limitation: None  Rehab Potential:good    LEFS Score  LEFS Score: (Patient-Rptd) 18.75 % (1/22/2025  9:42 AM)      Patient/Family/Caregiver was advised of these findings, precautions, and treatment options and has agreed to actively participate in planning and for this course of care.    Thank you for your referral. Please co-sign or sign and return this letter via fax as soon as possible to [phone]. If you have any questions, please contact me at Dept: [phone]    Sincerely,  Electronically signed by therapist: Mayra Cabral PT  Physician's certification required: Yes  I certify the need for these services furnished under this plan of treatment and while under my care.    X___________________________________________________ Date____________________    Certification From: 1/22/2025  To:4/22/2025

## 2025-01-24 ENCOUNTER — OFFICE VISIT (OUTPATIENT)
Dept: PHYSICAL THERAPY | Facility: HOSPITAL | Age: 80
End: 2025-01-24
Attending: ORTHOPAEDIC SURGERY
Payer: MEDICARE

## 2025-01-24 PROCEDURE — 97140 MANUAL THERAPY 1/> REGIONS: CPT

## 2025-01-24 PROCEDURE — 97110 THERAPEUTIC EXERCISES: CPT

## 2025-01-24 NOTE — PROGRESS NOTES
Dx: Primary osteoarthritis of left knee (M17.12)      S/p Left Knee Arthroscopy Partial Medial Meniscectomy Synovectomy Abrasionplasty 1/21/2025   Authorized # of visit: 10 (MDCR) Next MD visits: 1/28/25  Fall Risk: standard Precautions: none    Date: 1/24/25  Tx #: 2    Current Pain Level: 3/10      Subjective: Patient reports she was able to navigate the stairs this morning non-reciprocally without any trouble.  States she was able to get in/out of the car without any pain or difficulty.  States she has done a little walking at home without the can but felt a little unsteady.      Objective: Treatment per flow sheet.  R knee flexion  deg    Assessment: Added bridge and standing hip abd to HEP today.  Patient tolerating these activities without pain.  Patient notes crepitus and pain with shuttle SLP activity so range with limited to 0-45 deg and weight was reduced and patient tolerates this much better.      Plan: Continue per plan of care.      Treatment Flow:  Manual: (8 min)  L patellar mobs all planes  L knee ext mob grade 2-3, multiple bouts  L knee PROM  Therex: (37 min)  90/90 HSS 2x30\"  EOB hif flexor/quad stretch   L ITB foam roll x2'  Bridge 2x10  Heel slide x10  Glut med standing hip ABD 2x10  Tilt board gastroc stretch 2x30\"  4\" fsu/rsd 2x10      (HEP: Towel roll quad set, SLR, and heel slides, bridge, glut med standing hip abd)    Charges: Man, 2 SHARYN  Total Timed Treatment: 45 minutes  Total Treatment Time: 45 minutes

## 2025-01-26 ENCOUNTER — HOSPITAL ENCOUNTER (EMERGENCY)
Facility: HOSPITAL | Age: 80
Discharge: HOME | End: 2025-01-26
Attending: EMERGENCY MEDICINE
Payer: MEDICARE

## 2025-01-26 ENCOUNTER — APPOINTMENT (OUTPATIENT)
Dept: GENERAL RADIOLOGY | Facility: HOSPITAL | Age: 80
End: 2025-01-26
Attending: EMERGENCY MEDICINE
Payer: MEDICARE

## 2025-01-26 VITALS
SYSTOLIC BLOOD PRESSURE: 156 MMHG | WEIGHT: 191 LBS | DIASTOLIC BLOOD PRESSURE: 82 MMHG | RESPIRATION RATE: 16 BRPM | TEMPERATURE: 99 F | HEART RATE: 78 BPM | OXYGEN SATURATION: 100 % | BODY MASS INDEX: 32.61 KG/M2 | HEIGHT: 64 IN

## 2025-01-26 DIAGNOSIS — K59.00 CONSTIPATION, UNSPECIFIED CONSTIPATION TYPE: Primary | ICD-10-CM

## 2025-01-26 PROCEDURE — 74018 RADEX ABDOMEN 1 VIEW: CPT | Performed by: EMERGENCY MEDICINE

## 2025-01-26 PROCEDURE — 99284 EMERGENCY DEPT VISIT MOD MDM: CPT

## 2025-01-26 PROCEDURE — 99283 EMERGENCY DEPT VISIT LOW MDM: CPT

## 2025-01-26 NOTE — ED INITIAL ASSESSMENT (HPI)
Constipation  since 1 week last bowel movement 1 week ago . Patient had orthopedic surgery last week  patient said she is only taking tylenol for her pain . Patient took stool softner  at home but did not helping . No nausea and vomiting . Mild abdominal discomfort.
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
## 2025-01-28 ENCOUNTER — OFFICE VISIT (OUTPATIENT)
Dept: ORTHOPEDICS CLINIC | Facility: CLINIC | Age: 80
End: 2025-01-28
Payer: MEDICARE

## 2025-01-28 ENCOUNTER — PATIENT OUTREACH (OUTPATIENT)
Dept: CASE MANAGEMENT | Age: 80
End: 2025-01-28

## 2025-01-28 DIAGNOSIS — Z98.890 S/P ARTHROSCOPY OF KNEE: Primary | ICD-10-CM

## 2025-01-28 PROCEDURE — 99024 POSTOP FOLLOW-UP VISIT: CPT | Performed by: PHYSICIAN ASSISTANT

## 2025-01-28 NOTE — PROGRESS NOTES
Transitions of Care Navigation  Discharge Date: 25  Contact Date: 2025    Transitions of Care Assessment:  DONG Initial Assessment    General:  Assessment completed with: Patient  Patient Subjective: Spoke with patient who reports she has been feeling ok. The patient reports she has had multiple bms since being home from the hospital now. Last bm this morning. The patient denies severe abdominal pain, swelling, or distention. The patient denies inability to pass gas.  The patient denies chest pain, shortness of breath, fever, chills, nausea, vomiting. The patient denies any questions or concerns at this time.  Chief Complaint: Constipation  Verify patient name and  with patient/ caregiver: Yes    Hospital Stay/Discharge:  Tell me what you understand of why you were in the hospital or emergency department: Patient reports it was over a week she had a bm so she went to the ER  Prior to leaving the hospital were your Discharge Instructions reviewed with you?: Yes  Did you receive a copy of your written Discharge Instructions?: Yes  What questions do you have about your Discharge Instructions?: patient denies  Do you feel better or worse since you left the hospital or emergency department?: Better    Follow - Up Appointment:  Do you have a follow-up appointment?: Yes  Date: 25  Physician: Dr. Mata  Are there any barriers to getting to your follow-up appointment?: No    Home Health/DME:  Prior to leaving the hospital was Home Health (HH) arranged for you?: N/A  Are HH needs identified by staff during the assessment?: No     Prior to leaving the hospital or emergency department was Durable Medical Equipment (DME), medical supplies, or infusions arranged for you?: N/A  Are DME/medical supply/infusions needs identified by staff during this assessment?: No     Medications/Diet:  Did any of your medications change, during or after your hospital stay or ED visit?: Yes  Do you have your new or updated  medications?: Yes  Do you understand what your medications are for and possible side effects?: Yes  Are there any reasons that keep you from taking your medication as prescribed?: No  Any concerns about medication refills?: No    Were you given a different diet per your Discharge Instructions?: Yes  Diet Type: high fiber  Reason: constipation  Are there any barriers to following that diet?: No     Questions/Concerns:  Do you have any questions or concerns that have not been discussed?: No       Nursing Interventions:    NCM reviewed ways to alleviate constipation with the patient-- advised patient to try an otc stool softener such as Miralax- 1 capful in 8 oz water daily 3-4 days. NCM did advise patient a BM may take hours to a day after taking Miralax and to repeat the dose if needed. NCM advised patient to increase fiber in diet, fluids, physical activity. Add prunes or prune juice, fresh fruits, whole grains into their diet. NCM reviewed constipating foods to avoid. If still no relief contact PCP for further recommendations. The patient verbalized understanding and no further questions.     Reviewed discharge instructions with patient: TRY DULCOLAX SUPPOSITORY OTC IF SYMPTOMS RECUR DESPITE LINZESS, MIRALAX, COLACE     Patient confirms she is going today for her POSTOP appointment with Dr. Mata. She declined an ER follow up visit with Dr. Bunn at this time.   All d/c instructions reviewed with pt.  Reviewed when to call MD vs when to go to ER/call 911.  Educated pt on the importance of taking all meds as prescribed as well as close f/u with PCP/specialists.  Pt verbalized understanding and will contact office with any further questions or concerns.     Medications:  Medication Reconciliation:  I am aware of an inpatient discharge within the last 30 days.  The discharge medication list has been reconciled with the patient's current medication list and reviewed by me. See medication list for additions of new  medication, and changes to current doses of medications and discontinued medications.  Current Outpatient Medications   Medication Sig Dispense Refill    traMADol 50 MG Oral Tab Take 1-2 tablets ( mg total) by mouth every 4 (four) hours as needed for Pain. 20 tablet 0    Naloxone HCl 4 MG/0.1ML Nasal Liquid 4 mg by Nasal route as needed. If patient remains unresponsive, repeat dose in other nostril 2-5 minutes after first dose. 1 kit 0    Acetaminophen 500 MG Oral Cap Take 2 capsules (1,000 mg total) by mouth every 6 (six) hours for 14 days. 50 capsule 1    traMADol 50 MG Oral Tab Please take 1 tablet every 6 hours as needed for pain. Max of 4 tablets per day. Collaborating - Dr. Julian Mata. 20 tablet 1    Sennosides-Docusate Sodium 8.6-50 MG Oral Cap Take 1 capsule by mouth daily as needed. 30 capsule 1    ondansetron (ZOFRAN) 4 mg tablet Take 1 tablet by mouth every 8 hours as needed for nausea. 8 tablet 0    Meloxicam 15 MG Oral Tab Take 1 tablet (15 mg total) by mouth daily. 14 tablet 1    diclofenac 1 % External Gel Apply 2 g topically 4 (four) times daily as needed. 1 each 11    clonazePAM 1 MG Oral Tab Take 1 tablet (1 mg total) by mouth 2 (two) times daily as needed for Anxiety. 30 tablet 1    QUEtiapine 100 MG Oral Tab TAKE ONE TABLET BY MOUTH NIGHTLY, MAY TAKE ONE EXTRA TABLET UP TO 3 TIMES A WEEK IF UNABLE TO SLEEP 90 tablet 0    METFORMIN 500 MG Oral Tab TAKE ONE TABLET BY MOUTH EVERY MORNING BEFORE BREAKFAST 90 tablet 0    folic acid 1 MG Oral Tab Take 1 tablet (1 mg total) by mouth daily. 30 tablet 3    glimepiride 1 MG Oral Tab Take 1 tablet (1 mg total) by mouth daily with breakfast. 90 tablet 0    atorvastatin 40 MG Oral Tab Take 1 tablet (40 mg total) by mouth nightly. 90 tablet 0    fluticasone propionate 50 MCG/ACT Nasal Suspension 2 sprays by Each Nare route daily. 3 each 3    metoprolol succinate ER 25 MG Oral Tablet 24 Hr Take 1 tablet (25 mg total) by mouth daily. 90 tablet 3     HYDROcodone-acetaminophen 5-325 MG Oral Tab Take 1 tablet by mouth every 8 (eight) hours as needed for Pain.      Fish Oil-Cholecalciferol (OMEGA-3 FISH OIL-VITAMIN D3) 0539-1016 MG-UNIT Oral Cap Take 1 capsule by mouth daily.      TORSEMIDE 20 MG Oral Tab TAKE ONE TABLET BY MOUTH ONE TIME DAILY 30 tablet 0    Omeprazole 40 MG Oral Capsule Delayed Release Take 1 capsule (40 mg total) by mouth as needed.         Follow-up Appointments:  Your appointments       Date & Time Appointment Department ([place])    Jan 28, 2025 2:30 PM CST Post Op Visit with Mike Montiel PA [facility], [address] [place] [address] ([place] [address])        Jan 29, 2025 10:30 AM CST Physical Therapy Ortho Treatment with Mayra Cabral, PT [facility])        Jan 30, 2025 2:00 PM CST Virtual Phone Visit with Areli Clark APRN [facility] ([facility] )    You have been scheduled for a Virtual Telephone visit with your provider. Please be available at your phone so that your physician can contact you, and be prepared with any questions or concerns. As always, your health is our priority.     We suggest confirming with your insurance regarding coverage prior to your appointment as some payors may no longer cover telephone visits. Depending on your insurance you may also be billed a copay at a later time.        Jan 31, 2025 10:30 AM CST Physical Therapy Ortho Treatment with Mayra Cabral, PT [facility] ([facility])        Feb 04, 2025 9:45 AM CST Physical Therapy Ortho Treatment with Mayra Cabral, PT [facility] ([facility])        Feb 06, 2025 9:45 AM CST Physical Therapy Ortho Treatment with Mayra Cabral, PT [facility] ([facility])        Feb 13, 2025 11:15 AM  CST Physical Therapy Ortho Treatment with Tabour, Mayra E, PT [facility] ([facility])        Feb 13, 2025 2:00 PM CST Virtual Phone Visit with Areli Clark APRN [facility] ([facility] )    You have been scheduled for a Virtual Telephone visit with your provider. Please be available at your phone so that your physician can contact you, and be prepared with any questions or concerns. As always, your health is our priority.     We suggest confirming with your insurance regarding coverage prior to your appointment as some payors may no longer cover telephone visits. Depending on your insurance you may also be billed a copay at a later time.        Feb 17, 2025 11:15 AM CST Physical Therapy Ortho Treatment with Tabour, Mayra E, PT [facility] ([facility])        Feb 20, 2025 11:15 AM CST Physical Therapy Ortho Treatment with Tabour, Mayra E, PT [facility])        Feb 25, 2025 9:45 AM CST Physical Therapy Ortho Treatment with Tabour, Mayra E, PT [facility] ([facility])        Feb 27, 2025 11:15 AM CST Physical Therapy Ortho Treatment with Tabour, Mayra E, PT [facility] ([facility])        Feb 27, 2025 2:00 PM CST Virtual Phone Visit with Areli Clark APRN [facility] ([facility] )    You have been scheduled for a Virtual Telephone visit with your provider. Please be available at your phone so that your physician can contact you, and be prepared with any questions or concerns. As always, your health is our priority.     We suggest confirming with your insurance regarding coverage prior to your appointment as some payors may no longer cover telephone visits. Depending on your insurance you may also  be billed a copay at a later time.        Mar 13, 2025 2:00 PM CDT Virtual Phone Visit with Areli Clark APRN [facility] ([facility] )    You have been scheduled for a Virtual Telephone visit with your provider. Please be available at your phone so that your physician can contact you, and be prepared with any questions or concerns. As always, your health is our priority.     We suggest confirming with your insurance regarding coverage prior to your appointment as some payors may no longer cover telephone visits. Depending on your insurance you may also be billed a copay at a later time.        Mar 27, 2025 2:00 PM CDT Virtual Phone Visit with Areli Clark APRN [facility] ([facility] )    You have been scheduled for a Virtual Telephone visit with your provider. Please be available at your phone so that your physician can contact you, and be prepared with any questions or concerns. As always, your health is our priority.     We suggest confirming with your insurance regarding coverage prior to your appointment as some payors may no longer cover telephone visits. Depending on your insurance you may also be billed a copay at a later time.        Apr 10, 2025 2:00 PM CDT Virtual Phone Visit with Areli Clark APRN [facility] ([facility] )    You have been scheduled for a Virtual Telephone visit with your provider. Please be available at your phone so that your physician can contact you, and be prepared with any questions or concerns. As always, your health is our priority.     We suggest confirming with your insurance regarding coverage prior to your appointment as some payors may no longer cover telephone visits. Depending on your insurance you may also be billed a copay at a later time.        Apr 24, 2025 2:00 PM CDT Virtual Phone Visit with Areli Clark APRN [facility] ([facility] )    You have been scheduled for a Virtual Telephone visit with your provider. Please be available at your phone so that your physician can contact you, and be prepared with any questions or concerns. As always, your health is our priority.     We suggest confirming with your insurance regarding coverage prior to your appointment as some payors may no longer cover telephone visits. Depending on your insurance you may also be billed a copay at a later time.        May 08, 2025 2:00 PM CDT Virtual Phone Visit with Areli Clark APRN [facility] ([facility] )    You have been scheduled for a Virtual Telephone visit with your provider. Please be available at your phone so that your physician can contact you, and be prepared with any questions or concerns. As always, your health is our priority.     We suggest confirming with your insurance regarding coverage prior to your appointment as some payors may no longer cover telephone visits. Depending on your insurance you may also be billed a copay at a later time.        May 22, 2025 2:00 PM CDT Virtual Phone Visit with Areli Clark APRN [facility] ([facility] )    You have been scheduled for a Virtual Telephone visit with your provider. Please be available at your phone so that your physician can contact you, and be prepared with any questions or concerns. As always, your health is our priority.     We suggest confirming with your insurance regarding coverage prior to your appointment as some payors may no longer cover telephone visits. Depending on your insurance you may also be billed a copay at a later time.        Jun 05, 2025 2:00 PM CDT Virtual Phone Visit with Areli Clark APRN [facility] ([facility] )    You have been scheduled for a Virtual Telephone visit with your provider. Please be available at your phone so that your physician can  contact you, and be prepared with any questions or concerns. As always, your health is our priority.     We suggest confirming with your insurance regarding coverage prior to your appointment as some payors may no longer cover telephone visits. Depending on your insurance you may also be billed a copay at a later time.        Jun 19, 2025 2:00 PM CDT Virtual Phone Visit with Areli Clark APRN [facility] ([facility] )    You have been scheduled for a Virtual Telephone visit with your provider. Please be available at your phone so that your physician can contact you, and be prepared with any questions or concerns. As always, your health is our priority.     We suggest confirming with your insurance regarding coverage prior to your appointment as some payors may no longer cover telephone visits. Depending on your insurance you may also be billed a copay at a later time.        Jul 03, 2025 2:00 PM CDT Virtual Phone Visit with Areli Clark APRN [facility] ([facility] )    You have been scheduled for a Virtual Telephone visit with your provider. Please be available at your phone so that your physician can contact you, and be prepared with any questions or concerns. As always, your health is our priority.     We suggest confirming with your insurance regarding coverage prior to your appointment as some payors may no longer cover telephone visits. Depending on your insurance you may also be billed a copay at a later time.        Jul 17, 2025 12:30 PM CDT Virtual Phone Visit with Areli Clark APRN [facility] ([facility] )    You have been scheduled for a Virtual Telephone visit with your provider. Please be available at your phone so that your physician can contact you, and be prepared with any questions or concerns. As always, your health is our priority.     We suggest confirming with your insurance regarding  coverage prior to your appointment as some payors may no longer cover telephone visits. Depending on your insurance you may also be billed a copay at a later time.        Jul 31, 2025 2:00 PM CDT Virtual Phone Visit with Areli Clark APRN [facility] ([facility] )    You have been scheduled for a Virtual Telephone visit with your provider. Please be available at your phone so that your physician can contact you, and be prepared with any questions or concerns. As always, your health is our priority.     We suggest confirming with your insurance regarding coverage prior to your appointment as some payors may no longer cover telephone visits. Depending on your insurance you may also be billed a copay at a later time.        Aug 14, 2025 2:00 PM CDT Virtual Phone Visit with Areli lCark APRN [facility] ([facility] )    You have been scheduled for a Virtual Telephone visit with your provider. Please be available at your phone so that your physician can contact you, and be prepared with any questions or concerns. As always, your health is our priority.     We suggest confirming with your insurance regarding coverage prior to your appointment as some payors may no longer cover telephone visits. Depending on your insurance you may also be billed a copay at a later time.              [facility] Physical Therapy  [facility]  [address] [place] [address]  [phone] [address] [address]  [phone] [facility]   [address] [facility] [id number] [phone]            Transitional [facility] Clinic  Was TCC Ordered: No      Primary Care Provider (If no TCC appointment)  Does patient already have a PCP appointment scheduled? No  Nurse Care Manager Attempted to  schedule PCP office ER Follow-up appointment with patient   -If no appointment scheduled: Explain     NCM offered appointment with Dr. Bunn. Patient declined at this time as she is going today to see her Orthopedic Surgeon- Dr. Mata for a follow up.     Specialist  Does the patient have any other follow-up appointment(s) need to be scheduled? No   -If yes: Nurse Care Manager reviewed upcoming specialist appointments with patient: Yes   -Does the patient need assistance scheduling appointment(s): No      Book By Date: 02/02/2025

## 2025-01-28 NOTE — PROGRESS NOTES
[facility] ORTHOPEDICS  [address] [place] [address]  [phone]       Name: Arcelia Conklin   MRN: [id number]  Date: 1/28/2025     REASON FOR VISIT: First Post-Surgical Visit   Surgery: Left knee medial meniscectomy, extensive debridement, abrasionplasty on 01/21/2025.     INTERVAL HISTORY:  Arcelia Conklin is a 79 year old female who returns after the aforementioned procedure.  The post-operative course has been unremarkable with pain well controlled and overall progress noted.     Physical therapy was started and is progressing well.  The patient denies any calf pain or tenderness, fevers, chills, sweats or signs of active infection. The patient has been compliant with the postoperative protocol, and was evaluated for constipation in the ER and had a bowel movement yesterday.     No acute issues.     ROS: ROS    PE:   There were no vitals filed for this visit.  Estimated body mass index is 32.79 kg/m² as calculated from the following:    Height as of 1/26/25: 5' 4\" (1.626 m).    Weight as of 1/26/25: 191 lb (86.6 kg).    Physical Exam  Constitutional:       Appearance: Normal appearance.   HENT:      Head: Normocephalic and atraumatic.   Eyes:      Extraocular Movements: Extraocular movements intact.   Neck:      Musculoskeletal: Normal range of motion and neck supple.   Cardiovascular:      Pulses: Normal pulses.   Pulmonary:      Effort: Pulmonary effort is normal. No respiratory distress.   Abdominal:      General: There is no distension.   Skin:     General: Skin is warm.      Capillary Refill: Capillary refill takes less than 2 seconds.      Findings: No bruising.   Neurological:      General: No focal deficit present.      Mental Status: She is alert.   Psychiatric:         Mood and Affect: Mood normal.     Examination of the left knee demonstrates:     Physical examination the patient is alert and oriented x3, well-developed, well-nourished, no acute distress.      Tegaderm dressings were removed, and Steri-Strips were maintained and kept in place.    Incisional sites are clean dry intact without signs of active pathology.  Calf is soft and nontender to palpation.    The contralateral knee is without limitation in range of motion or strength, no positive provocative maneuvers.       IMPRESSION: Arcelia Conklin is a 79 year old female who presents 7 days s/p Left knee medial meniscectomy, extensive debridement, abrasionplasty on 01/21/2025.     PLAN:   We had a lengthy discussion with the patient regarding the patient's findings consistent with the expected postoperative course. We recommend continuation of physical therapy with rehabilitation efforts focused on strengthening, range of motion, functional ability, and return to baseline activity. The patient can continue to progress per protocol.    All questions were answered appropriately and the patient was in agreement with the treatment plan.       FOLLOW-UP:  Return to clinic on an as needed basis.             Mike Montiel [facility], PA-C Orthopedic Surgery / Sports Medicine Specialist  [facility]  [address] [place] [address].org  [email or web address]  t: [phone]  o: [phone]  f: [phone]    This note was dictated using Dragon software.  While it was briefly proofread prior to completion, some grammatical, spelling, and word choice errors due to dictation may still occur.

## 2025-01-29 ENCOUNTER — OFFICE VISIT (OUTPATIENT)
Dept: PHYSICAL THERAPY | Facility: HOSPITAL | Age: 80
End: 2025-01-29
Attending: ORTHOPAEDIC SURGERY
Payer: MEDICARE

## 2025-01-29 PROCEDURE — 97110 THERAPEUTIC EXERCISES: CPT

## 2025-01-29 PROCEDURE — 97140 MANUAL THERAPY 1/> REGIONS: CPT

## 2025-01-29 NOTE — PROGRESS NOTES
Dx: Primary osteoarthritis of left knee (M17.12)      S/p Left Knee Arthroscopy Partial Medial Meniscectomy Synovectomy Abrasionplasty 1/21/2025   Authorized # of visit: 10 (MDCR) Next MD visits: 1/28/25  Fall Risk: standard Precautions: none    Date: 1/29/25  Tx #: 3    Current Pain Level: 4-5/10      Subjective: Patient reports she took the compression sleeve off and notes the knee has been a little more sore.  States the pain is along the medial aspect of the knee and comes up into the inner thigh.     Objective: Treatment per flow sheet.  R knee flexion  deg    Assessment: Trial of KT taping today to assist in post-op tenderness and pain complaint (band-aids over the healing incisions).  Patient experiencing knee pain with 6\" fsu trial but tolerates 4\" fsu activity well today - requires cuing for improved eccentric control on step down phase.      Plan: Continue per plan of care.      Treatment Flow:  Manual: (8 min)  L patellar mobs all planes  L knee ext mob grade 2-3, multiple bouts  L knee PROM  KT taping to L knee  Therex: (37 min)  90/90 HSS 2x30\"  EOB hif flexor/quad stretch   L ITB foam roll x2'  Tilt board calf stretch 2x30\"  SB Bridge 2x10  GTB seated HS curl 2x15  4\" fsu/rsd x20  YTB lateral walk x2 laps        (HEP: Towel roll quad set, SLR, and heel slides, bridge, glut med standing hip abd)    Charges: Man, 2 SHARYN  Total Timed Treatment: 45 minutes  Total Treatment Time: 45 minutes

## 2025-01-31 ENCOUNTER — OFFICE VISIT (OUTPATIENT)
Dept: PHYSICAL THERAPY | Facility: HOSPITAL | Age: 80
End: 2025-01-31
Attending: ORTHOPAEDIC SURGERY
Payer: MEDICARE

## 2025-01-31 PROCEDURE — 97110 THERAPEUTIC EXERCISES: CPT

## 2025-01-31 PROCEDURE — 97140 MANUAL THERAPY 1/> REGIONS: CPT

## 2025-01-31 NOTE — PROGRESS NOTES
Dx: Primary osteoarthritis of left knee (M17.12)      S/p Left Knee Arthroscopy Partial Medial Meniscectomy Synovectomy Abrasionplasty 1/21/2025   Authorized # of visit: 10 (MDCR) Next MD visits: 1/28/25  Fall Risk: standard Precautions: none    Date: 1/31/25  Tx #: 4    Current Pain Level: 3/10      Subjective: Patient reports the knee is doing a bit better.  She does report she does get some pain if she is sitting for a longer period.    Objective: Treatment per flow sheet.    Assessment: Patient tolerating leg press activity today with absence of pain during DLP.  Reports mild discomfort in anterior knee with SLP so load was kept light enough to avoid pain. Patient ambulating today without assistive device and demonstrates mild antalgia.      Plan: Continue per plan of care.      Treatment Flow:  Manual: (20 min)  L patellar mobs all planes  L knee ext mob grade 2-3, multiple bouts  L knee PROM  STM globally throughout knee on L  Issued marcos F Tensogrip for L knee  Therex: (25 min)  90/90 HSS 2x30\"  EOB hip flexor/quad stretch   L ITB foam roll x2'  Tilt board calf stretch 2x30\"  Shuttle DLP 2B x30  Shuttle SLP 1G x30  A/P tilt board tap x30  YTB lateral walk x2 laps  4\"rsd x15        (HEP: Towel roll quad set, SLR, and heel slides, bridge, glut med standing hip abd)    Charges: Man, 2 SHARYN  Total Timed Treatment: 45 minutes

## 2025-02-04 ENCOUNTER — OFFICE VISIT (OUTPATIENT)
Dept: PHYSICAL THERAPY | Facility: HOSPITAL | Age: 80
End: 2025-02-04
Attending: ORTHOPAEDIC SURGERY
Payer: MEDICARE

## 2025-02-04 PROCEDURE — 97140 MANUAL THERAPY 1/> REGIONS: CPT

## 2025-02-04 PROCEDURE — 97110 THERAPEUTIC EXERCISES: CPT

## 2025-02-04 NOTE — PROGRESS NOTES
Dx: Primary osteoarthritis of left knee (M17.12)      S/p Left Knee Arthroscopy Partial Medial Meniscectomy Synovectomy Abrasionplasty 1/21/2025   Authorized # of visit: 10 (MDCR) Next MD visits: 1/28/25  Fall Risk: standard Precautions: none    Date: 2/4/25  Tx #: 5    Current Pain Level: 3/10      Subjective: Patient reports she vacuumed on Sunday stating this hurt a little bit but then the next morning the knee was very irritated and painful.  States it has calmed down some but is still a bit painful.    Objective: Treatment per flow sheet.    Assessment: Patient demonstrating increased knee irritation today after having performed vacuuming 2 days ago.  Patient reporting good symptom relief with STM throughout the knee and demonstrating good knee ROM 0-125 post-tx today.  Avoided weight bearing exercises today to allow knee joint to calm down.    Plan: Continue to address ROM, flexibility and progress therex as tolerated focusing on core, hip, and LE strength and alignment control.       Treatment Flow:  Manual: (30 min)  L patellar mobs all planes  L knee ext mob grade 2-3, multiple bouts  L knee PROM  STM globally throughout knee on L  KT taping to L knee (covered healing incisions with band-aids)  Therex: (15 min)  Nustep L 5 x5'  90/90 HSS 2x30\"  EOB hip flexor/quad stretch   L ITB foam roll x2'  SLR 2x10  GTB seated HS curl 2x15      (HEP: Towel roll quad set, SLR, and heel slides, bridge, glut med standing hip abd)    Charges: 2 MAN, SHARYN  Total Timed Treatment: 45 minutes  Total treatment time: 45 minutes

## 2025-02-05 ENCOUNTER — PATIENT OUTREACH (OUTPATIENT)
Dept: CASE MANAGEMENT | Age: 80
End: 2025-02-05

## 2025-02-05 DIAGNOSIS — I10 ESSENTIAL HYPERTENSION: Primary | ICD-10-CM

## 2025-02-05 DIAGNOSIS — N18.32 STAGE 3B CHRONIC KIDNEY DISEASE (HCC): ICD-10-CM

## 2025-02-05 DIAGNOSIS — F03.A4 MILD DEMENTIA WITH ANXIETY, UNSPECIFIED DEMENTIA TYPE (HCC): ICD-10-CM

## 2025-02-05 NOTE — PROGRESS NOTES
Spoke to [facility] for Chronic Care Management.      Updates to patient care team/comments: No changes per patient  Patient reported changes in medications: Reviewed with patient  Med Adherence  Comment: Taking      Health Maintenance:   Health Maintenance   Topic Date Due    Zoster Vaccines (2 of 2) 09/10/2021    COVID-19 Vaccine (3 - 2024-25 season) 09/01/2024    Influenza Vaccine (1) 10/01/2024    Diabetes Care: Foot Exam (Annual)  01/01/2025    Diabetes Care: Microalb/Creat Ratio (Annual)  01/01/2025    Diabetes Care A1C  05/15/2025    Diabetes Care Dilated Eye Exam  06/24/2025    Annual Physical  07/18/2025    Diabetes Care: GFR  01/16/2026    DEXA Scan  Completed    Annual Depression Screening  Completed    Fall Risk Screening (Annual)  Completed    Pneumococcal Vaccine: 50+ Years  Completed    Meningococcal B Vaccine  Aged Out    Colorectal Cancer Screening  Discontinued    Mammogram  Discontinued     Patient updates/concerns:  Patient stated that she has completed the left knee procedure and has been attending physical therapy twice a week. She states that she still experiences a significant amount of pain, although it is not constant. Patient states that she does not require pain medication as the pain is manageable. She tries to avoid taking pain medication due to the side effect of constipation. Patient shared that she visited the emergency room for the constipation but now has a better understanding of how to maintain regular bowel movements. She expressed satisfaction with her progress so far. At this time, the patient denies having any concerns or questions.    Goals/Action Plan:    Active goal from previous outreach:     Continue with physical therapy and knee surgery recovery   Patient reported progress towards goals:                - What: Patient stated that she has completed the left knee procedure and has been attending physical therapy twice a week. She states that she still experiences a  significant amount of pain, although it is not constant.            - Where/When/How: Patient states that she does not require pain medication as the pain is manageable. She tries to avoid taking pain medication due to the side effect of constipation. Patient shared that she visited the emergency room for the constipation but now has a better understanding of how to maintain regular bowel movements.  Patient Reported Barriers and Concerns: None at thi this time per patient                   - Plan for overcoming barriers: Patient to continue to follow up with care team as scheduled or as needed.    Care Managers Interventions: Patient was educated on proper nutrition, hydration and exercise. Educated patient on the importance of taking all meds as prescribed as well as close f/u with PCP/specialists. Pt verbalized understanding and will contact the office with any further questions or concerns. Patient is aware of our next outreach, has agreed to being contacted via telephone. Patient verbalized understanding. Patient is aware she may contact me if further assistance is needed.    Future Appointments: Patient aware  Future Appointments   Date Time Provider Department Center   2/6/2025  9:45 AM Mayra Cabral, PT [facility]   2/13/2025 11:15 AM Mayra Cabral, PT [facility]   2/13/2025  2:00 PM Areli Clark, APRN [facility]   2/17/2025 11:15 AM Mayra Cabral, PT [facility]   2/20/2025 11:15 AM Mayra Cabral, PT [facility]   2/25/2025  9:45 AM Mayra Cabral, PT [facility]   2/27/2025 11:15 AM Mayra Cabral, PT [facility]   2/27/2025  2:00 PM Areli Clark, APRN [facility]   3/13/2025  2:00 PM Areli Clark, APRN [facility]   3/27/2025  2:00 PM Areli Clark, APRN [facility]   4/10/2025  2:00 PM Areli Clark, APRN [facility]   4/24/2025  2:00 PM Areli Clark, APRN [facility]    5/8/2025  2:00 PM GreAreli horn, APRN [facility]   5/22/2025  2:00 PM GreskAreli, APRN [facility]   6/5/2025  2:00 PM GreskAreli C, APRN [facility]   6/19/2025  2:00 PM GreskAreli C, APRN [facility]   7/3/2025  2:00 PM GreAreli horn, APRN [facility]   7/17/2025 12:30 PM GreskAreli, APRN [facility]   7/31/2025  2:00 PM GreAreli horn, APRN [facility]   8/14/2025  2:00 PM GreAreli horn, APRN [facility]     Next Care Manager Follow Up Date: 1 month follow up or sooner if needed    Reason For Follow Up: review progress and or barriers towards patient's goals.     Time Spent This Encounter Total: 30 min medical record review, telephone communication, care plan updates where needed, education, goals, and action plan recreation/update. Provided acknowledgment and validation to patient's concerns.   Monthly Minute Total including today: 30  Physical assessment, complete health history, and need for CCM established by Jessica Bunn DO.

## 2025-02-06 ENCOUNTER — OFFICE VISIT (OUTPATIENT)
Dept: PHYSICAL THERAPY | Facility: HOSPITAL | Age: 80
End: 2025-02-06
Attending: ORTHOPAEDIC SURGERY
Payer: MEDICARE

## 2025-02-06 PROCEDURE — 97110 THERAPEUTIC EXERCISES: CPT

## 2025-02-06 PROCEDURE — 97140 MANUAL THERAPY 1/> REGIONS: CPT

## 2025-02-06 NOTE — PROGRESS NOTES
Dx: Primary osteoarthritis of left knee (M17.12)      S/p Left Knee Arthroscopy Partial Medial Meniscectomy Synovectomy Abrasionplasty 1/21/2025   Authorized # of visit: 10 (MDCR) Next MD visits: 1/28/25  Fall Risk: standard Precautions: none    Date: 2/6/25  Tx #: 6    Current Pain Level: 4/10      Subjective: Patient reports the knee has still been a little bit sore.  States it was painful last night and disrupted her sleep, stating she tried to sleep in the recliner but the knee was still quite achy.    Objective: Treatment per flow sheet.    Assessment: Patient continues to note slight pain in the knee during toe off during gait.  Able to perform 0-125 deg AROM in supine with no c/o pain.  Patient demonstrating excellent quad control with SLR activity and no pain. Minimized loaded knee exercises today to prevent irritation.    Plan: Continue per plan of care.       Treatment Flow:  Manual: (20 min)  L patellar mobs all planes  L knee ext mob grade 2-3, multiple bouts  L knee PROM  STM globally throughout knee on L  Therex: (25 min)  90/90 HSS 2x30\"  EOB hip flexor/quad stretch   L ITB foam roll x2'  RTB sdly clam 2x10  SB DKTC x20  SLR 2x10  GTB seated HS curl 2x15      (HEP: Towel roll quad set, SLR, and heel slides, bridge, glut med standing hip abd)    Charges: 2 MAN, SHARYN  Total Timed Treatment: 45 minutes  Total treatment time: 45 minutes

## 2025-02-13 ENCOUNTER — OFFICE VISIT (OUTPATIENT)
Dept: PHYSICAL THERAPY | Facility: HOSPITAL | Age: 80
End: 2025-02-13
Attending: ORTHOPAEDIC SURGERY
Payer: MEDICARE

## 2025-02-13 PROCEDURE — 97140 MANUAL THERAPY 1/> REGIONS: CPT

## 2025-02-13 PROCEDURE — 97110 THERAPEUTIC EXERCISES: CPT

## 2025-02-13 NOTE — PROGRESS NOTES
Dx: Primary osteoarthritis of left knee (M17.12)      S/p Left Knee Arthroscopy Partial Medial Meniscectomy Synovectomy Abrasionplasty 1/21/2025   Authorized # of visit: 10 (MDCR) Next MD visits: 1/28/25  Fall Risk: standard Precautions: none    Date: 2/13/25  Tx #: 7    Current Pain Level: 4/10      Subjective: Patient reports she has been struggling the past few days with initial standing, stating when she goes to put weight on the left leg it feels like it wants to buckle and gets a sharp pain in the knee.    Objective: Treatment per flow sheet.  Knee AROM on L 0-132 deg without pain today    Assessment: Issued size E Tensogrip today as patient reports previous sleeve has worn out, able to go down a size (previous size was F). Focused on non-weight bearing therex today with emphasis on quad.  Patient not having any instability with initial standing/walking post-tx today and notes decreased pain in the L knee during gait post-tx.    Plan: Continue to advance hip and LE strengthening as tolerated with focus on quad strength.      Treatment Flow:  Manual: (20 min)  L patellar mobs all planes  L knee ext mob grade 2-3, multiple bouts  L knee PROM  STM globally throughout knee on L  Therex: (25 min)  90/90 HSS 2x30\"  EOB hip flexor/quad stretch   L ITB foam roll x2'  SAQ 2x10  RTB sdly clam 2x10  SB DKTC x20  SLR 2x10  GTB seated HS curl 2x15      (HEP: Towel roll quad set, SLR, and heel slides, bridge, glut med standing hip abd)    Charges: MAN, 2 SHARYN  Total Timed Treatment: 45 minutes  Total treatment time: 45 minutes

## 2025-02-17 ENCOUNTER — APPOINTMENT (OUTPATIENT)
Dept: PHYSICAL THERAPY | Facility: HOSPITAL | Age: 80
End: 2025-02-17
Attending: ORTHOPAEDIC SURGERY
Payer: MEDICARE

## 2025-02-17 RX ORDER — ATORVASTATIN CALCIUM 40 MG/1
40 TABLET, FILM COATED ORAL NIGHTLY
Qty: 90 TABLET | Refills: 0 | Status: SHIPPED | OUTPATIENT
Start: 2025-02-17

## 2025-02-20 ENCOUNTER — OFFICE VISIT (OUTPATIENT)
Dept: PHYSICAL THERAPY | Facility: HOSPITAL | Age: 80
End: 2025-02-20
Attending: ORTHOPAEDIC SURGERY
Payer: MEDICARE

## 2025-02-20 PROCEDURE — 97140 MANUAL THERAPY 1/> REGIONS: CPT

## 2025-02-20 PROCEDURE — 97110 THERAPEUTIC EXERCISES: CPT

## 2025-02-20 NOTE — PROGRESS NOTES
Dx: Primary osteoarthritis of left knee (M17.12)      S/p Left Knee Arthroscopy Partial Medial Meniscectomy Synovectomy Abrasionplasty 1/21/2025   Authorized # of visit: 10 (MDCR) Next MD visits: 1/28/25  Fall Risk: standard Precautions: none    Date: 2/20/25  Tx #: 8    Current Pain Level: 0/10 at best but 5/10 when it \"zings\" when walking      Subjective: Patient reports the knee is fine when sitting or standing but when she walks she gets a \"zing\" in the knee which occurs when she is bending the knee into toe off.      Objective: Treatment per flow sheet.  Knee AROM on L 0-132 deg without pain today    Assessment: Incorporated GIASTM today, focusing treatment on soft tissue release throughout the peripatellar area and distal quad with patient reports significant reduction in the intensity of the knee pain during gait post tx.  Note band of tightness just medial to the patella which is likely contributing to patient's current pain complaint.    Plan: Continue per plan of care.      Treatment Flow:  Manual: (30 min)  L patellar mobs all planes  L knee ext mob grade 2-3, multiple bouts  L knee PROM  STMGIASTM globally throughout knee on L with focus on areas noted above  Therex: (10 min)  Nustep L5 x5'  EOB hip flexor/quad stretch   L ITB foam roll x2'  SLR 2x10      (HEP: Towel roll quad set, SLR, and heel slides, bridge, glut med standing hip abd)    Charges: 2MAN, SHARYN  Total Timed Treatment: 40 minutes  Total treatment time: 40 minutes

## 2025-02-21 DIAGNOSIS — E11.21 TYPE 2 DIABETES MELLITUS WITH DIABETIC NEPHROPATHY, WITHOUT LONG-TERM CURRENT USE OF INSULIN (HCC): ICD-10-CM

## 2025-02-21 NOTE — TELEPHONE ENCOUNTER
A refill request was received for:  Requested Prescriptions     Pending Prescriptions Disp Refills    GLIMEPIRIDE 1 MG Oral Tab [Pharmacy Med Name: Glimepiride Oral Tablet 1 MG] 90 tablet 0     Sig: TAKE ONE TABLET BY MOUTH DAILY WITH BREAKFAST    METFORMIN 500 MG Oral Tab [Pharmacy Med Name: metFORMIN HCl Oral Tablet 500 MG] 90 tablet 0     Sig: TAKE ONE TABLET BY MOUTH EVERY MORNING BEFORE BREAKFAST       Last refill date:     11/15/2024 Glimepiride  11/25/2024 Metformin    Last office visit: 1/16/2025    Follow up due:  Future Appointments   Date Time Provider Department Center   2/27/2025 11:15 AM Mayra Cabral, PT [facility]   2/27/2025  2:00 PM Areli Clark, APRN [facility]   3/6/2025  4:00 PM Mayra Cabral, PT [facility]   3/10/2025  9:45 AM Mayra Cabral, PT [facility]   3/13/2025 11:45 AM Mayra Cabral, PT [facility]   3/13/2025  2:00 PM Areli Clark, APRN [facility]   3/17/2025  9:45 AM Mayra Cabral, PT [facility]   3/19/2025 10:30 AM Mayra Cabral, PT [facility]   3/26/2025 12:00 PM Mayra Cabral, PT [facility]   3/27/2025  2:00 PM Areli Clark, APRN [facility]   3/28/2025  9:00 AM Mayra Cabral, PT [facility]   4/10/2025  2:00 PM Areli Clark, APRN [facility]   4/24/2025  2:00 PM Areli Clark, APRN [facility]   5/8/2025  2:00 PM Areli Clark, APRN [facility]   5/22/2025  2:00 PM Areli Clark, APRN [facility]   6/5/2025  2:00 PM Areli Clark APRN [facility]   6/19/2025  2:00 PM Areli Clark APRN [facility]   7/3/2025  2:00 PM Areli Clark APRN [facility]   7/17/2025 12:30 PM Areli Clark APRN [facility]   7/31/2025  2:00 PM Areli Clark APRN [facility]   8/14/2025  2:00 PM Areli Clark APRN [facility]     `

## 2025-02-22 RX ORDER — GLIMEPIRIDE 1 MG/1
1 TABLET ORAL
Qty: 90 TABLET | Refills: 0 | Status: SHIPPED | OUTPATIENT
Start: 2025-02-22

## 2025-02-25 ENCOUNTER — APPOINTMENT (OUTPATIENT)
Dept: PHYSICAL THERAPY | Facility: HOSPITAL | Age: 80
End: 2025-02-25
Attending: ORTHOPAEDIC SURGERY
Payer: MEDICARE

## 2025-02-27 ENCOUNTER — OFFICE VISIT (OUTPATIENT)
Dept: PHYSICAL THERAPY | Facility: HOSPITAL | Age: 80
End: 2025-02-27
Attending: ORTHOPAEDIC SURGERY
Payer: MEDICARE

## 2025-02-27 PROCEDURE — 97110 THERAPEUTIC EXERCISES: CPT

## 2025-02-27 PROCEDURE — 97140 MANUAL THERAPY 1/> REGIONS: CPT

## 2025-02-27 NOTE — PROGRESS NOTES
Dx: Primary osteoarthritis of left knee (M17.12)      S/p Left Knee Arthroscopy Partial Medial Meniscectomy Synovectomy Abrasionplasty 1/21/2025   Authorized # of visit: 10 (MDCR) Next MD visits: 1/28/25  Fall Risk: standard Precautions: none    Date: 2/27/25  Tx #: 9    Current Pain Level: 0/10 at best but 3-4/10 when it \"zings\" when walking      Subjective: Patient reports the knee has been about 50% better since last session with the addition of GIASTM.  States she is still getting a little \"twinge\" during gait but not nearly as intense. Notes she has improved range and is able to assume a figure 4 position seated without pain in the knee    Objective: Treatment per flow sheet.      Assessment: Patient tolerating GIASTM treatment well today, reporting she is able to walk post treatment without pain.  Patient notes if she tries to increase toe off during gait this can reproduce the \"twinge\" in the medial aspect of her knee.  Also incorporated adductor stretch to to address medial sided tightness.      Plan: Continue to address soft tissue mobility and flexibility, progressing therex as tolerated focusing on core, hip, and LE strengthening but avoiding excessive loading or loading within a greater ROM to avoid joint irritation.      Treatment Flow:  Manual: (30 min)  L patellar mobs all planes  L knee PROM  STM/GIASTM globally throughout knee on L with focus on medial aspect of knee and inferior patellar region  Therex: (10 min)  EOB hip flexor/quad stretch   Adductor stretch 2x30\"  L ITB foam roll x2'  SLR 2x10  SAQ 2x10  (+) RTB sdly clam       (HEP: Towel roll quad set, SLR, and heel slides, bridge, glut med standing hip abd)    Charges: 2MAN, SHARYN  Total Timed Treatment: 40 minutes  Total treatment time: 40 minutes

## 2025-03-04 ENCOUNTER — PATIENT OUTREACH (OUTPATIENT)
Dept: CASE MANAGEMENT | Age: 80
End: 2025-03-04

## 2025-03-04 NOTE — PROGRESS NOTES
Called patient and left a message for patient to call back when they can. Reviewed patient chart. Left contact my contact number [phone]        Time Spent This Encounter Total: 5  min medical record review  Monthly Minute Total including today: 5 minutes

## 2025-03-06 ENCOUNTER — APPOINTMENT (OUTPATIENT)
Dept: PHYSICAL THERAPY | Facility: HOSPITAL | Age: 80
End: 2025-03-06
Attending: ORTHOPAEDIC SURGERY
Payer: MEDICARE

## 2025-03-10 ENCOUNTER — OFFICE VISIT (OUTPATIENT)
Dept: PHYSICAL THERAPY | Facility: HOSPITAL | Age: 80
End: 2025-03-10
Attending: ORTHOPAEDIC SURGERY
Payer: MEDICARE

## 2025-03-10 ENCOUNTER — TELEPHONE (OUTPATIENT)
Dept: ORTHOPEDICS CLINIC | Facility: CLINIC | Age: 80
End: 2025-03-10

## 2025-03-10 DIAGNOSIS — Z01.89 ENCOUNTER FOR LOWER EXTREMITY COMPARISON IMAGING STUDY: ICD-10-CM

## 2025-03-10 DIAGNOSIS — M25.562 LEFT KNEE PAIN, UNSPECIFIED CHRONICITY: Primary | ICD-10-CM

## 2025-03-10 PROCEDURE — 97110 THERAPEUTIC EXERCISES: CPT

## 2025-03-10 PROCEDURE — 97140 MANUAL THERAPY 1/> REGIONS: CPT

## 2025-03-10 NOTE — PROGRESS NOTES
Dx: Primary osteoarthritis of left knee (M17.12)      S/p Left Knee Arthroscopy Partial Medial Meniscectomy Synovectomy Abrasionplasty   Date of surgery: 1/21/2025   Authorized # of visit: 10 (MDCR) Next MD visits: none  Fall Risk: standard Precautions: none      PROGRESS NOTE:  Date: 3/10/25  Tx #: 10    Current Pain Level: 0/10 at best but 3-4/10 when it \"zings\" when walking      Subjective: Patient reports that her left knee pain has been improving with PT since her surgery but she still gets pretty frequent \"zinging\" pain in the medial aspect of the knee when walking or with certain movements.  She reports she hasn't used the straight for walking in about a week or so now and is tolerating this well.  Reports she is still very careful navigating stairs and uses the handrail.  States the pain is typically in the medial aspect of the knee, along the medial side of the patellar and \"shooting\" down into the medial aspect of the lower leg (points toward pes anserine). States the pain is a quick \"zing\" and then goes away almost immediately.    Objective: Treatment per flow sheet.  ROM: (* denotes performed with pain)   Knee    Flexion: R 120; L 132 (was 115)   Extension: R 0; L 0       Patellar Mobility/Accessory motion: improved patellar mobility all planes on L to WNL    Flexibility:   Hamstrings: mild tightness B  Gastroc-soleus: tightness B  Hip flexor: short B  Quads: tightness B, L>R  ITB: short B  Adductor: tightness on L    Strength/MMT: (* denotes performed with pain)  Hip Knee   Flexion: R 5/5; L 5/5  Extension: R 5/5; L 5/5 (was  4+/5)  Abduction: R 5/5; L 5/5 (was 4/5)  ER: R 5/5; L 4+/5* (was 4/5*) Flexion: R 5/5; L 5/5 (was 4+/5*)  Extension: R 5/5; L 4+/5* (was 4/5*)        Balance: 12 sec on L    Functional Mobility:  5x sit<>stand: 13.09 sec     Gait: pt ambulates on level ground with normalized gait       Assessment: Patient has been demonstrating steady progress across PT sessions.  Current pain  complaint is consistent with pain along pes anserine and patient does get some pain relief with STM and stretching of the surrounding musculature.  Patient is demonstrating significantly improved ROM and will benefit from PT to continue to address soft tissue mobility and progress core, hip, and LE strengthening as tolerated to promote pain-free return to ADL as prior.      Goals: (To be met in 10 visits)   Pt will improve knee extension ROM to 0 deg to allow proper heel strike during gait and terminal knee extension in stance MET  Pt will improve knee AROM flexion to >120 degrees to improve ability to perform stair negotiation and getting in/out of car MET  Pt will improve quad strength to 5/5 to ascend 1 flight of stairs reciprocally with handrail MET  Pt will increase hip and knee strength to grossly 5/5 to be able to get up and down from the floor safely PROGRESS  Pt will demonstrate increased hip ER/ABD strength to 5/5 to perform stepping and squatting activities without excessive femoral IR/ADD PROGRESS  Pt will improve SLS to >10s to improve safety and independence with gait on uneven surfaces such as grass MET  Pt will be independent and compliant with comprehensive HEP to maintain progress achieved in PT PROGRESSING as TOLERATED    Plan: Continue with PT 2x/week for an additional 8 visits to address soft tissue mobility and flexibility, progressing therex as tolerated focusing on core, hip, and LE strengthening but avoiding excessive loading or loading within a greater ROM to avoid joint irritation.      Treatment Flow:  Manual: (30 min)  L patellar mobs all planes  L knee PROM  STM/GIASTM globally throughout knee on L with focus on medial aspect of knee, distal adductor, and inferior patellar region  KT tape to knee  Therex: (12 min)  EOB hip flexor/quad stretch   Adductor stretch 2x30\"  L ITB foam roll x2'  SLR 2x10  SAQ 2x10  RTB sdly clam 2x10      (HEP: Towel roll quad set, SLR, and heel slides,  bridge, glut med standing hip abd)    Charges: 2MAN, SHARYN  Total Timed Treatment: 42 minutes  Total treatment time: 42 minutes

## 2025-03-10 NOTE — TELEPHONE ENCOUNTER
Patient made an appointment on 3/21 for left knee pain and possible cortisone injection. She states she's been going for PT since her 1/21/2025 surgery but the pain isn't getting any better so her PT suggested she RTC for a cortisone injection.    Please advise if xrays are needed with this appointment.    Future Appointments   Date Time Provider Department Center   3/13/2025 11:45 AM Mayra Cabral, PT  [facility]   3/13/2025  2:00 PM Areli Clark, APRN [facility]   3/17/2025  9:45 AM Mayra Cabral, PT  [facility]   3/19/2025 10:30 AM Mayra Cabral, PT  [facility]   3/21/2025  8:20 AM Julian Mata MD [id number]   3/24/2025  9:00 AM Reyes Montez MD [facility]   3/26/2025 12:00 PM Mayra Cabral, PT  [facility]   3/27/2025  2:00 PM Areli Clark, APRN [facility]   3/28/2025  9:00 AM Mayra Cabral, PT [facility]   4/10/2025  2:00 PM Areli Clark, APRN [facility]   4/24/2025  2:00 PM Areli Clark, APRN [facility]   5/8/2025  2:00 PM Areli Clark, APRN [facility]   5/22/2025  2:00 PM Areli Clark, APRN [facility]   6/5/2025  2:00 PM Areli Clark, APRN [facility]   6/19/2025  2:00 PM Areli Clark, APRN [facility]   7/3/2025  2:00 PM Areli Clrak, APRN [facility]   7/17/2025 12:30 PM Areli Clark, APRN [facility]   7/31/2025  2:00 PM Areli Clark APRN [facility]   8/14/2025  2:00 PM Areli Clark APRN [facility]

## 2025-03-13 ENCOUNTER — OFFICE VISIT (OUTPATIENT)
Dept: PHYSICAL THERAPY | Facility: HOSPITAL | Age: 80
End: 2025-03-13
Attending: ORTHOPAEDIC SURGERY
Payer: MEDICARE

## 2025-03-13 PROCEDURE — 97110 THERAPEUTIC EXERCISES: CPT

## 2025-03-13 PROCEDURE — 97140 MANUAL THERAPY 1/> REGIONS: CPT

## 2025-03-13 NOTE — PROGRESS NOTES
Dx: Primary osteoarthritis of left knee (M17.12)      S/p Left Knee Arthroscopy Partial Medial Meniscectomy Synovectomy Abrasionplasty   Date of surgery: 1/21/2025   Authorized # of visit: 10 (MDCR A&B) Next MD visits: 3/21/25 (Dr. Mata)  Fall Risk: standard Precautions: none      Date: 3/13/25  Tx #: 11    Current Pain Level: 0/10 at best but 3-4/10 when it \"zings\" when walking      Subjective: Patient reports she feels like she is about 75% better since before the surgery but is still getting very sharp twinges of pain with certain movements (stair negotiation, in/out of car, vacuuming, lateral weight shift) and this can be so sudden It makes the knee feel like it's going to buckle. She reports she has been able to walk without the cane and is doing a lot of walking and not avoiding activities so as to help strengthen the leg but it still frustrated with the pain.    Objective: Treatment per flow sheet.  Mild swelling/tenderness present today over L pes anserine bursa.  Medial jt line tenderness to palpation.      Assessment: Patient is able to reproduce knee pain with certain movements (medial weight shift putting valgus stress on knee) today during subjective intake. Mild swelling of pes anserine on left noted as well as tenderness with medial joint line palpation. Patient tolerating therex without presence and is walking without pain but if she \"mis-steps\" this can reproduce her \"zinging\" pain in the medial aspect of the knee.    Plan: Continue to address soft tissue mobility/flexibility and progress therex as tolerated for improved hip/LE strengthening but avoiding jt irritation with WB activities.    Treatment Flow:  Manual: (30 min)  L patellar mobs all planes  L knee PROM  STM/GIASTM globally throughout knee on L with focus on medial aspect of knee, distal adductor, and inferior patellar region  KT tape to knee (DNP)  Therex: (15 min)  EOB hip flexor/quad stretch   Adductor stretch 2x30\"  L ITB foam  roll x2'  SLR 2x10  SAQ 2x10  RTB sdly clam 2x10      (HEP: Towel roll quad set, SLR, and heel slides, bridge, glut med standing hip abd)    Charges: 2MAN, SHARYN  Total Timed Treatment: 45 minutes  Total treatment time: 45 minutes

## 2025-03-17 ENCOUNTER — OFFICE VISIT (OUTPATIENT)
Dept: PHYSICAL THERAPY | Facility: HOSPITAL | Age: 80
End: 2025-03-17
Attending: FAMILY MEDICINE
Payer: MEDICARE

## 2025-03-17 ENCOUNTER — TELEPHONE (OUTPATIENT)
Dept: FAMILY MEDICINE CLINIC | Facility: CLINIC | Age: 80
End: 2025-03-17

## 2025-03-17 ENCOUNTER — PATIENT OUTREACH (OUTPATIENT)
Dept: CASE MANAGEMENT | Age: 80
End: 2025-03-17

## 2025-03-17 DIAGNOSIS — E78.2 HYPERLIPIDEMIA, MIXED: ICD-10-CM

## 2025-03-17 DIAGNOSIS — F03.A4 MILD DEMENTIA WITH ANXIETY, UNSPECIFIED DEMENTIA TYPE (HCC): Primary | ICD-10-CM

## 2025-03-17 DIAGNOSIS — I10 ESSENTIAL HYPERTENSION: ICD-10-CM

## 2025-03-17 DIAGNOSIS — F41.1 GENERALIZED ANXIETY DISORDER: ICD-10-CM

## 2025-03-17 PROCEDURE — 97140 MANUAL THERAPY 1/> REGIONS: CPT

## 2025-03-17 PROCEDURE — 97110 THERAPEUTIC EXERCISES: CPT

## 2025-03-17 NOTE — PROGRESS NOTES
Dx: Primary osteoarthritis of left knee (M17.12)      S/p Left Knee Arthroscopy Partial Medial Meniscectomy Synovectomy Abrasionplasty   Date of surgery: 1/21/2025   Authorized # of visit: 10 (MDCR A&B) Next MD visits: 3/21/25 (Dr. Mata)  Fall Risk: standard Precautions: none      Date: 3/17/25  Tx #: 12    Current Pain Level: 0/10 at best but 3-4/10 when it \"zings\" when walking      Subjective: Patient reports she was up last night because the knee was aching and she couldn't get comfortable.  States she is still having some pain this morning along the medial knee.    Objective: Treatment per flow sheet.  Mild swelling/tenderness present today over L pes anserine bursa.  Medial jt line tenderness to palpation. - Issued additional Tenso  compression sleeve to assist in edema management today      Assessment: Patient tolerating therex today but patient was instructed to avoid full active knee extension with SAQ as this does cause pain reproduction throughout the peripatellar area. Patient continues to have tenderness along the medial aspect of the knee and notes area of increased tenderness today in distal lateral quad.     Plan: Continue per plan of care. Message ortho MD next session with patient f/u in office with Dr. Mata on Friday.    Treatment Flow:  Manual: (30 min)  L patellar mobs all planes  L knee PROM  STM/GIASTM globally throughout knee on L with focus on medial aspect of knee, distal adductor, and inferior patellar region  KT tape to knee (DNP)  Therex: (15 min)  EOB hip flexor/quad stretch   Adductor stretch 2x30\"  L ITB foam roll x2'  SLR 2x10  SAQ 2x10  RTB sdly clam 2x10  GTB HS curl 2x15      (HEP: Towel roll quad set, SLR, and heel slides, bridge, glut med standing hip abd)    Charges: 2MAN, SHARYN  Total Timed Treatment: 45 minutes  Total treatment time: 45 minutes

## 2025-03-17 NOTE — TELEPHONE ENCOUNTER
Will you add in this week or next for follow up? Last ov was for preop in January.  Last A1c 11/15/24,  Diabetic neuropathy? Do you want to recheck labs?

## 2025-03-17 NOTE — TELEPHONE ENCOUNTER
Patient stated that she has been experiencing numbness in the tips of her fingers in both hands. She is not sure if it is being caused by the Metformin medication or something else. Patient states she is having redness on her second toe in both feet, which has been present for a while but without any other symptoms. Please advise

## 2025-03-17 NOTE — PROGRESS NOTES
Spoke to Arcelia for Chronic Care Management.      Updates to patient care team/comments: No changes per patient  Patient reported changes in medications: No changes per patient  Med Adherence  Comment: Taking     Health Maintenance:   Health Maintenance   Topic Date Due    Zoster Vaccines (2 of 2) 09/10/2021    COVID-19 Vaccine (3 - 2024-25 season) 09/01/2024    Influenza Vaccine (1) 10/01/2024    Annual Well Visit  01/01/2025    Diabetes Care: Foot Exam (Annual)  01/01/2025    Diabetes Care: Microalb/Creat Ratio (Annual)  01/01/2025    Diabetes Care A1C  05/15/2025    Diabetes Care Dilated Eye Exam  06/24/2025    Diabetes Care: GFR  01/16/2026    DEXA Scan  Completed    Annual Depression Screening  Completed    Fall Risk Screening (Annual)  Completed    Pneumococcal Vaccine: 50+ Years  Completed    Meningococcal B Vaccine  Aged Out    Colorectal Cancer Screening  Discontinued    Mammogram  Discontinued       Patient updates/concerns:     Patient stated that she has been experiencing numbness in the tips of her fingers in both hands. She is not sure if it is being caused by the Metformin medication or something else. Patient states she is having redness on her second toe in both feet, which has been present for a while but without any other symptoms.  Patient is unsure if these two issues are related. She requested a message to be sent to Dr. Bunn's office on her behalf. Patient is aware that she will be contacted for further assistance.    Goals/Action Plan:    Active goal from previous outreach:     Complete physical  therapy and improve daily diet  Patient reported progress towards goals:                - What: Patient stated that she has been experiencing numbness in the tips of her fingers in both hands. She is not sure if it is being caused by the Metformin medication or something else. Additionally, she mentioned having redness on her second toe in both feet, which has been present for a while but without any  other symptoms.           - Where/When/How: The patient is aware that a message will be sent on her behalf, and the triage team will contact her to provide further assistance  Patient Reported Barriers and Concerns: Numbness in both hands at the fingertips. There is also redness in both second toes of both feet.                    - Plan for overcoming barriers:Patient should continue to follow up with the care team as scheduled or as needed    Care Managers Interventions: Patient was educated on proper nutrition, hydration and exercise. Patient is aware of our next outreach, has agreed to being contacted via telephone. Patient verbalized understanding. Patient is aware she may contact me if further assistance is needed.    Future Appointments: Patient aware  Future Appointments   Date Time Provider Department Center   3/19/2025 10:30 AM Mayra Cabral, PT [facility]   3/21/2025  8:20 AM Juilan Mata MD [id number]   3/24/2025  9:00 AM Reyes Montez MD [facility]   3/26/2025 12:00 PM Mayra Cabral, PT [facility]   3/27/2025  2:00 PM Areli Clark, APRN [facility]   3/28/2025  9:00 AM Mayra Cabral, PT [facility]   4/10/2025  2:00 PM Areli Clark, APRN [facility]   4/24/2025  2:00 PM Areli Clark, APRN [facility]   5/8/2025  2:00 PM GreAreli horn, APRN [facility]   5/22/2025  2:00 PM GreAreli horn, APRN [facility]   6/5/2025  2:00 PM Areli Clark, APRN [facility]   6/19/2025  2:00 PM Areli Clark, APRN [facility]   7/3/2025  2:00 PM GreAreli horn, APRN [facility]   7/17/2025 12:30 PM Areli Clark, APRN [facility]   7/31/2025  2:00 PM Areli Clark APRN [facility]   8/14/2025  2:00 PM Areli Clark APRN [facility]     Next Care Manager Follow Up Date: 1 month follow up or sooner if needed    Reason For Follow Up: review progress and or barriers towards  patient's goals.     Time Spent This Encounter Total: 30 min medical record review, telephone communication, care plan updates where needed, education, goals, and action plan recreation/update. Provided acknowledgment and validation to patient's concerns.   Monthly Minute Total including today: 30  Physical assessment, complete health history, and need for CCM established by Jessica Bunn DO.

## 2025-03-18 NOTE — TELEPHONE ENCOUNTER
Have patient follow-up in 2 weeks.  Okay to squeeze her in somewhere.  This likely not from metformin, but if she wants start taking B12 over-the-counter 1000 mcg daily.

## 2025-03-19 ENCOUNTER — OFFICE VISIT (OUTPATIENT)
Dept: PHYSICAL THERAPY | Facility: HOSPITAL | Age: 80
End: 2025-03-19
Attending: FAMILY MEDICINE
Payer: MEDICARE

## 2025-03-19 PROCEDURE — 97140 MANUAL THERAPY 1/> REGIONS: CPT

## 2025-03-19 PROCEDURE — 97110 THERAPEUTIC EXERCISES: CPT

## 2025-03-19 NOTE — PROGRESS NOTES
Dx: Primary osteoarthritis of left knee (M17.12)      S/p Left Knee Arthroscopy Partial Medial Meniscectomy Synovectomy Abrasionplasty   Date of surgery: 1/21/2025   Authorized # of visit: 10 (MDCR A&B) Next MD visits: 3/21/25 (Dr. Mata)  Fall Risk: standard Precautions: none      Date: 3/19/25  Tx #: 13    Current Pain Level: 5/10    Subjective: Patient reports the left knee is very achy this morning.  She reports it had been doing better where she would get the occasional sharp \"zing\" with certain movements but today the whole knee is just achy. Notes aching in the knee at night if laying with the knee out straight.  Gets sharp pain in the knee if going down stairs with weight on the left and the pain causes the knee to want to buckle.    Objective: Treatment per flow sheet.  (From progress note 3/10/25)  ROM: (* denotes performed with pain)   Knee    Flexion: R 120; L 132 (was 115)   Extension: R 0; L 0       Patellar Mobility/Accessory motion: improved patellar mobility all planes on L to WNL    Flexibility:   Hamstrings: mild tightness B  Gastroc-soleus: tightness B  Hip flexor: short B  Quads: tightness B, L>R  ITB: short B  Adductor: tightness on L    Strength/MMT: (* denotes performed with pain)  Hip Knee   Flexion: R 5/5; L 5/5  Extension: R 5/5; L 5/5 (was  4+/5)  Abduction: R 5/5; L 5/5 (was 4/5)  ER: R 5/5; L 4+/5* (was 4/5*) Flexion: R 5/5; L 5/5 (was 4+/5*)  Extension: R 5/5; L 4+/5* (was 4/5*)        Balance: 12 sec on L    Functional Mobility:  5x sit<>stand: 13.09 sec     Gait: pt ambulates on level ground with normalized gait       Assessment: Patient tolerating treatment/therex today without c/o pain but therex is limited to non weight bearing to avoid joint irritation.  Patient is able to reproduce pain if standing and performing weight shift from left leg on to right.       Plan: Patient f/u with Dr. Mata this Friday.  Plan to continue with PT per plan of care to work on advancing therex  and improving tolerance to weight bearing activities.    Treatment Flow:  Manual: (20 min)  L patellar mobs all planes  L knee PROM  STM/GIASTM globally throughout knee on L with focus on medial aspect of knee, distal adductor, and inferior patellar region  KT tape to knee (DNP)  Therex: (25 min)  90/90 HSS 2x30\"  EOB hip flexor/quad stretch 2x30\"  EOB Mart position w/ quad foam roll release x2'  Adductor stretch 2x30\"  L ITB foam roll x2'  SLR 2x10  SB bridge 2x10  RTB sdly clam 2x10  GTB HS curl 2x15      (HEP: Towel roll quad set, SLR, and heel slides, bridge, glut med standing hip abd)    Charges: MAN, 2TEX  Total Timed Treatment: 45 minutes  Total treatment time: 45 minutes

## 2025-03-21 ENCOUNTER — OFFICE VISIT (OUTPATIENT)
Dept: ORTHOPEDICS CLINIC | Facility: CLINIC | Age: 80
End: 2025-03-21
Payer: MEDICARE

## 2025-03-21 DIAGNOSIS — M17.12 PRIMARY OSTEOARTHRITIS OF LEFT KNEE: Primary | ICD-10-CM

## 2025-03-21 PROCEDURE — 99024 POSTOP FOLLOW-UP VISIT: CPT | Performed by: ORTHOPAEDIC SURGERY

## 2025-03-21 NOTE — PROGRESS NOTES
[facility]  [address] [place] [id number]  [phone]       Name: Arcelia Conklin   MRN: [id number]  Date: 03/21/2025    REASON FOR VISIT: Second Post-Surgical Visit   Surgery: Left knee medial meniscectomy, extensive debridement, abrasionplasty on 01/21/2025. Last cortisone injection: 05/2024, 08/2024, 11/2024 and 12/2024.     INTERVAL HISTORY:  Arcelia Conklin is a 79 year old female who returns after the aforementioned procedure.  The post-operative course has been unremarkable with pain well controlled and overall progress noted.     The patient states that she is experiencing pain rated 5/10 with swelling and achy. She feels the arthritis is progressing. She is not able to walk long distances without having to sit and rest. She also notes pain and discomfort while laying down.        PE:   There were no vitals filed for this visit.  Estimated body mass index is 32.79 kg/m² as calculated from the following:    Height as of 1/26/25: 5' 4\" (1.626 m).    Weight as of 1/26/25: 191 lb.    Physical Exam  Constitutional:       Appearance: Normal appearance.   HENT:      Head: Normocephalic and atraumatic.   Eyes:      Extraocular Movements: Extraocular movements intact.   Neck:      Musculoskeletal: Normal range of motion and neck supple.   Cardiovascular:      Pulses: Normal pulses.   Pulmonary:      Effort: Pulmonary effort is normal. No respiratory distress.   Abdominal:      General: There is no distension.   Skin:     General: Skin is warm.      Capillary Refill: Capillary refill takes less than 2 seconds.      Findings: No bruising.   Neurological:      General: No focal deficit present.      Mental Status: She is alert.   Psychiatric:         Mood and Affect: Mood normal.     Examination of the left knee demonstrates:     Skin is intact, warm and dry.   Atrophy: mild    Effusion: small    Joint line tenderness: medial  Crepitation: mild   Allyn: Equivocal    Patellar mobility: normal without apprehension  J-sign: none    ROM: Extension lacking less than 5 degrees  Flexion 120 degrees  ACL:  Negative Lachman, Negative Pivot Shift   PCL:  Negative Posterior Drawer  Collateral Ligaments: Stable to Varus and Valgus stress at 0 and 30 degrees  Strength: mild weakness   Hip joint: normal pain-free ROM   Gait:  mildly antalgic   Leg length: equal and symmetric  Alignment:  varus     No obvious peripheral edema noted.   Distal neurovascular exam demonstrates normal perfusion, intact sensation to light touch and full strength.     Examination of the contralateral knee demonstrates:  No significant atrophy, swelling or effusion. Full range of motion. Neurovascularly intact distally.       IMPRESSION: Arcelia Conklin is a 79 year old female who presents 3 months s/p Left knee medial meniscectomy, extensive debridement, abrasionplasty on 01/21/2025. We discussed providing the patient with a knee brace that will help with offloading support, she notes that she has a brace at home that she can wear.     We also discussed the role of injection which she ultimately declined in favor of discussion of knee replacement consultation.    PLAN:   We had a lengthy discussion with the patient regarding the patient's findings consistent with the expected postoperative course. We recommend continuation of physical therapy with rehabilitation efforts focused on strengthening, range of motion, functional ability, and return to baseline activity. The patient can continue to progress per protocol.    Recommendation for a consult of a knee replacement with one of our arthroplasty surgeons..     All questions were answered appropriately and the patient was in agreement with the treatment plan.         FOLLOW-UP:  Return to clinic on an as needed basis.         Julian Mata MD  Knee, Shoulder, & Elbow Surgery / Sports Medicine Specialist  Orthopaedic Surgery  [address] [place] [address]  [id number]   [email or web address]  [email or web address]  t: [phone]  o: [phone]  f: [phone]       TRANG, Kaern Jalloh, attest that this documentation has been prepared under the direction and in the presence of Dr. Julian Mata MD.

## 2025-03-26 ENCOUNTER — OFFICE VISIT (OUTPATIENT)
Dept: PHYSICAL THERAPY | Facility: HOSPITAL | Age: 80
End: 2025-03-26
Attending: FAMILY MEDICINE
Payer: MEDICARE

## 2025-03-26 ENCOUNTER — TELEPHONE (OUTPATIENT)
Facility: CLINIC | Age: 80
End: 2025-03-26

## 2025-03-26 DIAGNOSIS — M25.562 LEFT KNEE PAIN, UNSPECIFIED CHRONICITY: Primary | ICD-10-CM

## 2025-03-26 PROCEDURE — 97140 MANUAL THERAPY 1/> REGIONS: CPT

## 2025-03-26 PROCEDURE — 97110 THERAPEUTIC EXERCISES: CPT

## 2025-03-26 NOTE — TELEPHONE ENCOUNTER
X-Ray ordered and scheduled.  No new x ray since arthroscopic surgery.  Patient called and notified to come early for imaging.     Future Appointments   Date Time Provider Department Center   3/27/2025  2:00 PM Areli Clark APRN [facility]   3/28/2025  9:00 AM [facility]   4/4/2025 12:15 PM Jessica Bunn DO [facility] [address]   4/10/2025  2:00 PM Areli Clark APRN [facility]   4/11/2025  9:30 AM NAP XR RM1 [facility] XRAY [facility] [place]   4/11/2025 10:00 AM Alexander Tellez MD [facility]O [facility]   4/24/2025  2:00 PM Areli Clark APRN [facility]

## 2025-03-26 NOTE — PROGRESS NOTES
Dx: Primary osteoarthritis of left knee (M17.12)      S/p Left Knee Arthroscopy Partial Medial Meniscectomy Synovectomy Abrasionplasty   Date of surgery: 1/21/2025   Authorized # of visit: 10 (MDCR A&B) Next MD visits: 3/21/25 (Dr. Mata)  Fall Risk: standard Precautions: none      Date: 3/26/25  Tx #: 14    Current Pain Level: 5-6/10    Subjective: Patient reports she met with Dr. Mata this past Friday.  She reports they discussed a brace but she has her own and has started using this since her appointment with him and it does lessen the pain a little bit.  She reports she is still getting sharp, \"twingy\" pain at times during gait or if pivoting or standing and shifting weight.  Continues to note increased pain with descending stairs and states at times the knee feels like it will buckle.  She states she was told her knee is bone on bone based on the most recent x-ray and she has made an appointment with Dr. Lombardi who did her TKA on the R knee to discuss the possibility of TKA on the left.  This appointment is on 4/8/25.    Objective: Treatment per flow sheet. View previous note from 3/19 for objective measures.    Assessment: The patient has progressed with regard to ROM and strength following her recent knee surgery (menisectomy) on 1/21/25; however, the patient continues to report pain in the left knee with ADL such as weight shifting, pivoting, and descending stairs.  The patient has an appointment with ortho in about 2 weeks to discuss the possibility of TKA (Dr. Jim) given she is still having pain with these functional activities.  Discussed with patient putting PT on hold at this time as she has plateued with respect to ROM and strength gains and is not able to tolerate progression of therex to advance strengthening due to the presence of knee pain - she is agreeable to this and will reach out to PT should she need to resume PT in the interm.  Patient will contact PT following her upcoming ortho  appointment to discuss future plan of care.       Plan: Per above assessment, PT will be placed on hold with patient to meed with ortho in ~2 weeks to discuss possibility of L TKA.    Treatment Flow:  Manual: (20 min)  L patellar mobs all planes  L knee PROM  STM/GIASTM globally throughout knee on L with focus on medial aspect of knee, distal adductor, and inferior patellar region  KT tape to knee   Therex: (25 min)  90/90 HSS 2x30\"  EOB hip flexor/quad stretch 2x30\"  EOB Mart position w/ quad foam roll release x2'  Adductor stretch 2x30\"  L ITB foam roll x2'  SLR 2x10  SB bridge 2x10  RTB sdly clam 2x10  GTB HS curl 2x15  Pt edu to utilize cane or walker as needed to offload the L knee to minimize pain with ADL      (HEP: Towel roll quad set, SLR, and heel slides, bridge, glut med standing hip abd)    Charges: MAN, 2TEX  Total Timed Treatment: 45 minutes  Total treatment time: 45 minutes

## 2025-03-26 NOTE — TELEPHONE ENCOUNTER
New pt appt for left knee imaging avail in [facility]   Future Appointments   Date Time Provider Department Center   3/27/2025  2:00 PM Areli Clark APRN [facility]   3/28/2025  9:00 AM Mayra Cabral, PT [facility] PHYS  [facility]   4/4/2025 12:15 PM Jessica Bunn DO [facility] [address] [facility] [address]   4/10/2025  2:00 PM Areli Clark APRN [facility]   4/11/2025 10:00 AM Alexander Tellez MD [facility] 75 [facility]   4/24/2025  2:00 PM Areli Clark APRN [facility]   5/8/2025  2:00 PM Areli Clark APRN [facility]   5/22/2025  2:00 PM Areli Clark, APRN [facility]   6/5/2025  2:00 PM Areli Clark APRN [facility]   6/19/2025  2:00 PM Areli Clark, APRN [facility]   7/3/2025  2:00 PM Areli Clark, APRN [facility]   7/17/2025 12:30 PM Areli Clark, APRN [facility]   7/31/2025  2:00 PM Areli Clark, APRN [facility]   8/14/2025  2:00 PM Areli Clark, APRN [facility]

## 2025-03-28 ENCOUNTER — APPOINTMENT (OUTPATIENT)
Dept: PHYSICAL THERAPY | Facility: HOSPITAL | Age: 80
End: 2025-03-28
Attending: FAMILY MEDICINE
Payer: MEDICARE

## 2025-04-04 ENCOUNTER — OFFICE VISIT (OUTPATIENT)
Dept: FAMILY MEDICINE CLINIC | Facility: CLINIC | Age: 80
End: 2025-04-04
Payer: MEDICARE

## 2025-04-04 VITALS
SYSTOLIC BLOOD PRESSURE: 110 MMHG | RESPIRATION RATE: 20 BRPM | BODY MASS INDEX: 32.61 KG/M2 | OXYGEN SATURATION: 98 % | HEIGHT: 64 IN | HEART RATE: 74 BPM | DIASTOLIC BLOOD PRESSURE: 72 MMHG | WEIGHT: 191 LBS

## 2025-04-04 DIAGNOSIS — G89.29 CHRONIC PAIN OF LEFT KNEE: ICD-10-CM

## 2025-04-04 DIAGNOSIS — R20.0 NUMBNESS AND TINGLING IN BOTH HANDS: ICD-10-CM

## 2025-04-04 DIAGNOSIS — I10 ESSENTIAL HYPERTENSION: ICD-10-CM

## 2025-04-04 DIAGNOSIS — R20.2 NUMBNESS AND TINGLING IN BOTH HANDS: ICD-10-CM

## 2025-04-04 DIAGNOSIS — N18.31 STAGE 3A CHRONIC KIDNEY DISEASE (HCC): ICD-10-CM

## 2025-04-04 DIAGNOSIS — M25.562 CHRONIC PAIN OF LEFT KNEE: ICD-10-CM

## 2025-04-04 DIAGNOSIS — E11.21 TYPE 2 DIABETES MELLITUS WITH DIABETIC NEPHROPATHY, WITHOUT LONG-TERM CURRENT USE OF INSULIN (HCC): Primary | ICD-10-CM

## 2025-04-04 PROCEDURE — 99214 OFFICE O/P EST MOD 30 MIN: CPT | Performed by: FAMILY MEDICINE

## 2025-04-04 NOTE — PROGRESS NOTES
Subjective:   Patient ID: Arcelia Conklin is a 79 year old female.    Feels cracking sounds while moving the neck.    Complains of knee pains.  Chronic.  Hx of fracture. Pain since then.     DMII.  Chronic.  Last A1c value was 6.7% done 11/15/2024. Not checking sugars at home.      History/Other:   Review of Systems   All other systems reviewed and are negative.    Current Outpatient Medications   Medication Sig Dispense Refill    clonazePAM 1 MG Oral Tab Take 1 tablet (1 mg total) by mouth 2 (two) times daily as needed for Anxiety. 60 tablet 1    GLIMEPIRIDE 1 MG Oral Tab TAKE ONE TABLET BY MOUTH DAILY WITH BREAKFAST 90 tablet 0    METFORMIN 500 MG Oral Tab TAKE ONE TABLET BY MOUTH EVERY MORNING BEFORE BREAKFAST 90 tablet 0    ATORVASTATIN 40 MG Oral Tab TAKE ONE TABLET BY MOUTH NIGHTLY 90 tablet 0    QUETIAPINE 100 MG Oral Tab TAKE ONE TABLET BY MOUTH NIGHTLY, MAY TAKE ONE EXTRA TABLET UP TO 3 TIMES A WEEK IF UNABLE TO SLEEP 90 tablet 0    DULoxetine 30 MG Oral Cap DR Particles Take 3 capsules (90 mg total) by mouth daily. 90 capsule 2    traMADol 50 MG Oral Tab Take 1-2 tablets ( mg total) by mouth every 4 (four) hours as needed for Pain. 20 tablet 0    Naloxone HCl 4 MG/0.1ML Nasal Liquid 4 mg by Nasal route as needed. If patient remains unresponsive, repeat dose in other nostril 2-5 minutes after first dose. 1 kit 0    Sennosides-Docusate Sodium 8.6-50 MG Oral Cap Take 1 capsule by mouth daily as needed. 30 capsule 1    ondansetron (ZOFRAN) 4 mg tablet Take 1 tablet by mouth every 8 hours as needed for nausea. 8 tablet 0    Meloxicam 15 MG Oral Tab Take 1 tablet (15 mg total) by mouth daily. 14 tablet 1    diclofenac 1 % External Gel Apply 2 g topically 4 (four) times daily as needed. 1 each 11    folic acid 1 MG Oral Tab Take 1 tablet (1 mg total) by mouth daily. 30 tablet 3    fluticasone propionate 50 MCG/ACT Nasal Suspension 2 sprays by Each Nare route daily. 3 each 3    metoprolol succinate ER 25  MG Oral Tablet 24 Hr Take 1 tablet (25 mg total) by mouth daily. 90 tablet 3    Fish Oil-Cholecalciferol (OMEGA-3 FISH OIL-VITAMIN D3) 5393-0736 MG-UNIT Oral Cap Take 1 capsule by mouth daily.      TORSEMIDE 20 MG Oral Tab TAKE ONE TABLET BY MOUTH ONE TIME DAILY 30 tablet 0    Omeprazole 40 MG Oral Capsule Delayed Release Take 1 capsule (40 mg total) by mouth as needed.       Allergies:Allergies[1]    Objective:   Physical Exam  Vitals reviewed.   Constitutional:       General: She is not in acute distress.     Appearance: She is well-developed. She is not diaphoretic.   Eyes:      General: No scleral icterus.        Right eye: No discharge.         Left eye: No discharge.      Conjunctiva/sclera: Conjunctivae normal.   Cardiovascular:      Rate and Rhythm: Normal rate and regular rhythm.      Heart sounds: Normal heart sounds. No murmur heard.     No friction rub. No gallop.   Pulmonary:      Effort: Pulmonary effort is normal. No respiratory distress.      Breath sounds: Normal breath sounds. No wheezing or rales.   Skin:     Comments: Bilateral barefoot skin diabetic exam is normal, visualized feet and the appearance is normal.  Bilateral monofilament/sensation of both feet is normal.  Pulsation pedal pulse exam of both lower legs/feet is normal as well.       -Callus formation on 2nd toes B/L , mild Tenderness + , no swelling  -Left bottom foot numbness +  -Carpal tunnel test -ve    Assessment & Plan:   1. Type 2 diabetes mellitus with diabetic nephropathy, without long-term current use of insulin ([facility])    2. Chronic pain of left knee    3. Stage 3a chronic kidney disease (HCC)    4. Essential hypertension    5. Numbness and tingling in both hands        Orders Placed This Encounter   Procedures    CBC With Differential With Platelet    Hemoglobin A1C    Comp Metabolic Panel (14)    Lipid Panel     B12 1000mcg every other day  Vit D3 2000 units daily  Calcium 600mg daily    1. Type 2 diabetes mellitus with  diabetic nephropathy, without long-term current use of insulin (HCC)  - CBC With Differential With Platelet; Future  - Hemoglobin A1C; Future  - Comp Metabolic Panel (14); Future  - Lipid Panel; Future    2. Chronic pain of left knee  Follow up with Orthopedics.    3. Stage 3a chronic kidney disease (HCC)  - CBC With Differential With Platelet; Future  - Hemoglobin A1C; Future  - Comp Metabolic Panel (14); Future  - Lipid Panel; Future    4. Essential hypertension  - CBC With Differential With Platelet; Future  - Hemoglobin A1C; Future  - Comp Metabolic Panel (14); Future  - Lipid Panel; Future    5. Numbness and tingling in both hands  Wear wrist splint b/l.      Meds This Visit:  Requested Prescriptions      No prescriptions requested or ordered in this encounter       Imaging & Referrals:  None         [1] No Known Allergies

## 2025-04-11 ENCOUNTER — HOSPITAL ENCOUNTER (OUTPATIENT)
Dept: GENERAL RADIOLOGY | Age: 80
Discharge: HOME | End: 2025-04-11
Attending: STUDENT IN AN ORGANIZED HEALTH CARE EDUCATION/TRAINING PROGRAM
Payer: MEDICARE

## 2025-04-11 ENCOUNTER — OFFICE VISIT (OUTPATIENT)
Dept: ORTHOPEDICS CLINIC | Facility: CLINIC | Age: 80
End: 2025-04-11
Payer: MEDICARE

## 2025-04-11 VITALS — BODY MASS INDEX: 32.78 KG/M2 | HEIGHT: 64 IN | WEIGHT: 192 LBS

## 2025-04-11 DIAGNOSIS — M25.562 LEFT KNEE PAIN, UNSPECIFIED CHRONICITY: ICD-10-CM

## 2025-04-11 DIAGNOSIS — M17.32 POST-TRAUMATIC OSTEOARTHRITIS OF LEFT KNEE: Primary | ICD-10-CM

## 2025-04-11 PROCEDURE — 73564 X-RAY EXAM KNEE 4 OR MORE: CPT | Performed by: STUDENT IN AN ORGANIZED HEALTH CARE EDUCATION/TRAINING PROGRAM

## 2025-04-11 PROCEDURE — 99214 OFFICE O/P EST MOD 30 MIN: CPT | Performed by: STUDENT IN AN ORGANIZED HEALTH CARE EDUCATION/TRAINING PROGRAM
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
## 2025-04-15 ENCOUNTER — PATIENT OUTREACH (OUTPATIENT)
Dept: CASE MANAGEMENT | Age: 80
End: 2025-04-15

## 2025-04-15 NOTE — PROGRESS NOTES
Spoke to Arcelia for Chronic Care Management.      Updates to patient care team/comments: No changes per patient  Patient reported changes in medications: No changes per patient  Med Adherence  Comment: Taking     Health Maintenance:   Health Maintenance   Topic Date Due    Zoster Vaccines (2 of 2) 09/10/2021    COVID-19 Vaccine (3 - 2024-25 season) 09/01/2024    Diabetes Care: Microalb/Creat Ratio (Annual)  01/01/2025    Diabetes Care A1C  05/15/2025    Diabetes Care Dilated Eye Exam  06/24/2025    Annual Physical  07/18/2025    Influenza Vaccine (Season Ended) 10/01/2025    Diabetes Care: GFR  01/16/2026    DEXA Scan  Completed    Annual Depression Screening  Completed    Fall Risk Screening (Annual)  Completed    Diabetes Care: Foot Exam (Annual)  Completed    Pneumococcal Vaccine: 50+ Years  Completed    Meningococcal B Vaccine  Aged Out    Colorectal Cancer Screening  Discontinued    Mammogram  Discontinued     Patient updates/concerns:    Patient stated she continues to monitor her knee pain. She mentioned she is only taking over-the-counter Tylenol for pain when needed. Patient also mentioned she was finally told that the pain in her knee is due to having bone-on-bone. She stated she has to wait until July to discuss the possibility of a knee replacement. Patient expressed that she is not too happy about another surgery but understands it may be her only option for overall health improvement. She stated she has no other concerns at this time.    Goals/Action Plan:    Active goal from previous outreach:     Monitor knee pain and staying active  Patient reported progress towards goals: progressing               - What: Patient stated she continues to monitor her knee pain. She mentioned she is only taking over-the-counter Tylenol for pain when needed. Patient also mentioned she was finally told that the pain in her knee is due to having bone-on-bone.            - Where/When/How: She stated she has to wait until  July to discuss the possibility of a knee replacement. Patient expressed that she is not too happy about another surgery but understands it may be her only option for overall health improvement.  Patient Reported Barriers and Concerns: Monitor chronic knee pain per patient                   - Plan for overcoming barriers: Patient to continue to follow up with care team as scheduled or as needed.    Care Managers Interventions:   Patient was educated on proper nutrition, hydration and exercise. Patient is aware of our next outreach, has agreed to being contacted via telephone. Patient verbalized understanding. Patient is aware she may contact me if further assistance is needed.    Future Appointments: Patient aware  Future Appointments   Date Time Provider Department Center   4/24/2025  2:00 PM GreAreli horn C, APRN [facility]   5/8/2025  2:00 PM Gresk, Areli C, APRN [facility]   5/22/2025  2:00 PM Gresk, Areli C, APRN [facility]   6/5/2025  2:00 PM Gresk, Areli C, APRN [facility]   6/19/2025  2:00 PM Gresk Areli C, APRN [facility]   7/3/2025  2:00 PM Gresk, Areli C, APRN [facility]   7/17/2025 12:30 PM Gresk, Areli C, APRN [facility]   7/31/2025  2:00 PM Gresk, Areli C, APRN [facility]   8/14/2025  2:00 PM Gresk, Areli C, APRN [facility]     Next Care Manager Follow Up Date: 1 month follow up or sooner if needed    Reason For Follow Up: review progress and or barriers towards patient's goals.     Time Spent This Encounter Total: 25 min medical record review, telephone communication, care plan updates where needed, education, goals, and action plan recreation/update. Provided acknowledgment and validation to patient's concerns.   Monthly Minute Total including today: 25  Physical assessment, complete health history, and need for CCM established by Jessica Bunn DO.

## 2025-05-06 ENCOUNTER — LAB ENCOUNTER (OUTPATIENT)
Dept: LAB | Age: 80
End: 2025-05-06
Attending: FAMILY MEDICINE
Payer: MEDICARE

## 2025-05-06 DIAGNOSIS — I10 ESSENTIAL HYPERTENSION: ICD-10-CM

## 2025-05-06 DIAGNOSIS — E11.21 TYPE 2 DIABETES MELLITUS WITH DIABETIC NEPHROPATHY, WITHOUT LONG-TERM CURRENT USE OF INSULIN (HCC): ICD-10-CM

## 2025-05-06 DIAGNOSIS — N18.31 STAGE 3A CHRONIC KIDNEY DISEASE (HCC): ICD-10-CM

## 2025-05-06 LAB
ALBUMIN SERPL-MCNC: 4.3 G/DL (ref 3.2–4.8)
ALBUMIN/GLOB SERPL: 1.9 {RATIO} (ref 1–2)
ALP LIVER SERPL-CCNC: 106 U/L (ref 55–142)
ALT SERPL-CCNC: 14 U/L (ref 10–49)
ANION GAP SERPL CALC-SCNC: 12 MMOL/L (ref 0–18)
AST SERPL-CCNC: 18 U/L (ref ?–34)
BASOPHILS # BLD AUTO: 0.07 X10(3) UL (ref 0–0.2)
BASOPHILS NFR BLD AUTO: 0.7 %
BILIRUB SERPL-MCNC: 0.3 MG/DL (ref 0.2–1.1)
BUN BLD-MCNC: 9 MG/DL (ref 9–23)
CALCIUM BLD-MCNC: 9 MG/DL (ref 8.7–10.6)
CHLORIDE SERPL-SCNC: 109 MMOL/L (ref 98–112)
CHOLEST SERPL-MCNC: 143 MG/DL (ref ?–200)
CO2 SERPL-SCNC: 23 MMOL/L (ref 21–32)
CREAT BLD-MCNC: 1.29 MG/DL (ref 0.55–1.02)
EGFRCR SERPLBLD CKD-EPI 2021: 42 ML/MIN/1.73M2 (ref 60–?)
EOSINOPHIL # BLD AUTO: 0.29 X10(3) UL (ref 0–0.7)
EOSINOPHIL NFR BLD AUTO: 3 %
ERYTHROCYTE [DISTWIDTH] IN BLOOD BY AUTOMATED COUNT: 14.3 %
EST. AVERAGE GLUCOSE BLD GHB EST-MCNC: 146 MG/DL (ref 68–126)
FASTING PATIENT LIPID ANSWER: YES
FASTING STATUS PATIENT QL REPORTED: YES
GLOBULIN PLAS-MCNC: 2.3 G/DL (ref 2–3.5)
GLUCOSE BLD-MCNC: 121 MG/DL (ref 70–99)
HBA1C MFR BLD: 6.7 % (ref ?–5.7)
HCT VFR BLD AUTO: 39.8 % (ref 35–48)
HDLC SERPL-MCNC: 37 MG/DL (ref 40–59)
HGB BLD-MCNC: 12.4 G/DL (ref 12–16)
IMM GRANULOCYTES # BLD AUTO: 0.04 X10(3) UL (ref 0–1)
IMM GRANULOCYTES NFR BLD: 0.4 %
LDLC SERPL CALC-MCNC: 78 MG/DL (ref ?–100)
LYMPHOCYTES # BLD AUTO: 3 X10(3) UL (ref 1–4)
LYMPHOCYTES NFR BLD AUTO: 30.9 %
MCH RBC QN AUTO: 28.6 PG (ref 26–34)
MCHC RBC AUTO-ENTMCNC: 31.2 G/DL (ref 31–37)
MCV RBC AUTO: 91.9 FL (ref 80–100)
MONOCYTES # BLD AUTO: 0.4 X10(3) UL (ref 0.1–1)
MONOCYTES NFR BLD AUTO: 4.1 %
NEUTROPHILS # BLD AUTO: 5.91 X10 (3) UL (ref 1.5–7.7)
NEUTROPHILS # BLD AUTO: 5.91 X10(3) UL (ref 1.5–7.7)
NEUTROPHILS NFR BLD AUTO: 60.9 %
NONHDLC SERPL-MCNC: 106 MG/DL (ref ?–130)
OSMOLALITY SERPL CALC.SUM OF ELEC: 298 MOSM/KG (ref 275–295)
PLATELET # BLD AUTO: 386 10(3)UL (ref 150–450)
POTASSIUM SERPL-SCNC: 4.4 MMOL/L (ref 3.5–5.1)
PROT SERPL-MCNC: 6.6 G/DL (ref 5.7–8.2)
RBC # BLD AUTO: 4.33 X10(6)UL (ref 3.8–5.3)
SODIUM SERPL-SCNC: 144 MMOL/L (ref 136–145)
TRIGL SERPL-MCNC: 162 MG/DL (ref 30–149)
VLDLC SERPL CALC-MCNC: 25 MG/DL (ref 0–30)
WBC # BLD AUTO: 9.7 X10(3) UL (ref 4–11)

## 2025-05-06 PROCEDURE — 83036 HEMOGLOBIN GLYCOSYLATED A1C: CPT

## 2025-05-06 PROCEDURE — 36415 COLL VENOUS BLD VENIPUNCTURE: CPT

## 2025-05-06 PROCEDURE — 80053 COMPREHEN METABOLIC PANEL: CPT

## 2025-05-06 PROCEDURE — 85025 COMPLETE CBC W/AUTO DIFF WBC: CPT

## 2025-05-06 PROCEDURE — 80061 LIPID PANEL: CPT

## 2025-05-13 ENCOUNTER — TELEPHONE (OUTPATIENT)
Dept: ORTHOPEDICS CLINIC | Facility: CLINIC | Age: 80
End: 2025-05-13

## 2025-05-13 ENCOUNTER — PATIENT OUTREACH (OUTPATIENT)
Dept: CASE MANAGEMENT | Age: 80
End: 2025-05-13

## 2025-05-13 DIAGNOSIS — E78.2 HYPERLIPIDEMIA, MIXED: ICD-10-CM

## 2025-05-13 DIAGNOSIS — I10 ESSENTIAL HYPERTENSION: ICD-10-CM

## 2025-05-13 DIAGNOSIS — N18.32 STAGE 3B CHRONIC KIDNEY DISEASE (HCC): Primary | ICD-10-CM

## 2025-05-13 NOTE — TELEPHONE ENCOUNTER
Patient calling to see if she is able to have surgery done prior to July, she states that is when she was told it could be scheduled for. Nothing scheduled at this time.    Patient is having a very hard time walking.    Please advise, thanks.

## 2025-05-13 NOTE — TELEPHONE ENCOUNTER
Returned patient's call and advised patient that it is not recommended to have surgery earlier than July due to her having left knee arthroscopy by Dr. Mata 1/21/25.  She verbalised understanding and would like to get a July date for surgery on the books if possible.   Message routed to Dr. Tellez and surgery scheduling as MARZENA harris/Dr. Tellez 4/11/25:    Plan: She has done a course of conservative measures include activity modification, home exercise programs, physical therapy, oral anti-inflammatories and acetaminophen, assistive devices, as well as knee arthroscopy without significant relief.      I discussed with her that she is a candidate for total knee arthroplasty. However at present time, recommendations are to wait on TKA for at least 6 months following knee arthroscopy to proceed with total knee arthroplasty due to increased risk of infection and decreased post-operative outcomes (TAHMINA March 2025). We will have her follow up in July and plan to schedule surgery at that time.

## 2025-05-13 NOTE — PROGRESS NOTES
Spoke to Arcelia for Chronic Care Management.      Updates to patient care team/comments: No changes  Patient reported changes in medications: Reviewed with patient  Med Adherence  Comment: Taking     Health Maintenance:   Health Maintenance   Topic Date Due    Zoster Vaccines (2 of 2) 09/10/2021    COVID-19 Vaccine (3 - 2024-25 season) 09/01/2024    Diabetes Care: Microalb/Creat Ratio (Annual)  01/01/2025    Diabetes Care Dilated Eye Exam  06/24/2025    Annual Physical  07/18/2025    Influenza Vaccine (Season Ended) 10/01/2025    Diabetes Care A1C  11/06/2025    Diabetes Care Foot Exam  04/04/2026    Diabetes Care: GFR  05/06/2026    DEXA Scan  Completed    Annual Depression Screening  Completed    Fall Risk Screening (Annual)  Completed    Pneumococcal Vaccine: 50+ Years  Completed    Meningococcal B Vaccine  Aged Out    Colorectal Cancer Screening  Discontinued    Mammogram  Discontinued     Patient updates/concerns:   Patient stated she continues to monitor her daily diet and is trying to stay as active as possible. Patient states she received Dr. Bunn's message regarding her test results and was informed that everything came back stable and okay. Patient also noted that she is waiting to hear back from the orthopedic doctor regarding her pending knee surgery scheduled for sometime in July. Patient inquired about the possibility of having the surgery sooner due to her chronic knee pain. Patient stated she has no other concerns at this time.    Goals/Action Plan:    Active goal from previous outreach:     Stay active and monitor daily diet  Patient reported progress towards goals:                - What: Patient stated she continues to monitor her daily diet and is trying to stay as active as possible. Patient states she received Dr. Bunn's message regarding her test results and was informed that everything came back stable and okay.           - Where/When/How: Patient also noted that she is waiting to hear back  from the orthopedic doctor regarding her pending knee surgery scheduled for sometime in July. Patient inquired about the possibility of having the surgery sooner due to her chronic knee pain  Patient Reported Barriers and Concerns: None at this time per patient                   - Plan for overcoming barriers: Patient to continue to follow up with care team as scheduled or as needed.    Care Managers Interventions: Patient was educated on proper nutrition, hydration and exercise.  Patient is aware of our next outreach, has agreed to being contacted via telephone. Patient verbalized understanding. Patient is aware she may contact me if further assistance is needed.    Future Appointments: Patient aware  Future Appointments   Date Time Provider Department Center   5/22/2025  2:00 PM GreskAreli C, APRN [facility]   6/5/2025  2:00 PM Gresk, Areli C, APRN [facility]   6/19/2025  2:00 PM Gresk, Areli C, APRN [facility]   7/3/2025  2:00 PM Gresk, Areli C, APRN [facility]   7/17/2025 12:30 PM Gresk, Areli C, APRN [facility]   7/31/2025  2:00 PM Gresk, Areli C, APRN [facility]   8/14/2025  2:00 PM Gresk, Areli C, APRN [facility]     Next Care Manager Follow Up Date: 1 month follow up or sooner if needed    Reason For Follow Up: review progress and or barriers towards patient's goals.     Time Spent This Encounter Total: 20 min medical record review, telephone communication, care plan updates where needed, education, goals, and action plan recreation/update. Provided acknowledgment and validation to patient's concerns.   Monthly Minute Total including today: 20  Physical assessment, complete health history, and need for CCM established by Jessica Bunn DO.

## 2025-05-14 ENCOUNTER — PATIENT MESSAGE (OUTPATIENT)
Dept: ORTHOPEDICS CLINIC | Facility: CLINIC | Age: 80
End: 2025-05-14

## 2025-05-19 ENCOUNTER — TELEPHONE (OUTPATIENT)
Dept: ORTHOPEDICS CLINIC | Facility: CLINIC | Age: 80
End: 2025-05-19

## 2025-05-19 ENCOUNTER — OFFICE VISIT (OUTPATIENT)
Dept: ORTHOPEDICS CLINIC | Facility: CLINIC | Age: 80
End: 2025-05-19
Payer: MEDICARE

## 2025-05-19 VITALS — WEIGHT: 193 LBS | HEIGHT: 64 IN | BODY MASS INDEX: 32.95 KG/M2

## 2025-05-19 DIAGNOSIS — M17.12 PRIMARY OSTEOARTHRITIS OF LEFT KNEE: Primary | ICD-10-CM

## 2025-05-19 DIAGNOSIS — M17.32 POST-TRAUMATIC OSTEOARTHRITIS OF LEFT KNEE: Primary | ICD-10-CM

## 2025-05-19 PROCEDURE — 99214 OFFICE O/P EST MOD 30 MIN: CPT | Performed by: STUDENT IN AN ORGANIZED HEALTH CARE EDUCATION/TRAINING PROGRAM
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
## 2025-05-19 NOTE — TELEPHONE ENCOUNTER
Date of Surgery:  7/30/25      Post Op Appt:  8/15/25 @ 1000    Case ID: [id number]    Notes:       SURGERY SCHEDULING SHEET     Arcelia Conklin  10/12/1945  [id number]     Procedure: Left total knee arthroplasty     CPT: 65240     Diagnosis: Left knee osteoarthritis     Anesthesia: Spinal with adductor canal block     Antibiotics: IV Ancef     TXA: IV      Anticoagulation: ASA     Length of Surgery: 2.5 hours     Disposition: Inpatient     Instruments: Dexter TKA - Persona with [facility] Poly     Assist: Ask for Mikey Espitia ([phone]) first assist. If Mikey unavailable, then please contact Sy [place] for first assist. If Mikey and Sy unavailable, then contact [facility] for first assist.     Pre-op Testing: CBC, CMP, PT/INR, PTT, TYPE AND SCREEN, MRSA/MSSA THROUGH EDW PAT, and HEMOGLOBIN A1C THROUGH EDW PAT     Clearance: MEDICAL/PCP and CARDIAC if PCP deems necessary     Post op: 2 weeks with Komal Lin PA-C for Hip and 2.5 weeks for Knee     Surgery date specifics: July 30th     Alexander Tellez MD  Adult Reconstruction     Department of Orthopaedic Surgery  [facility]     [address] [place] [address]  [address] [place] [address]     t: [phone]  f: [phone]       [facility]

## 2025-05-22 RX ORDER — ATORVASTATIN CALCIUM 40 MG/1
40 TABLET, FILM COATED ORAL NIGHTLY
Qty: 90 TABLET | Refills: 0 | Status: SHIPPED | OUTPATIENT
Start: 2025-05-22

## 2025-05-22 NOTE — TELEPHONE ENCOUNTER
Called and spoke with Arcelia in regards to getting her scheduled for surgery. I did inform her that per dr. Tellez he would like to offer her a surgical date of 7/30/25, in which she has elected to proceed with surgery on this date.     I confirmed that surgery will take place at [facility]. She states that she was not provided with a surgical folder at her last office visit. I did ask that she stop in at the office to  a surgical folder so that we can discuss the surgical protocols once she has the information. She states that she would stop into the [place] office to pick it up.She states understanding with no questions or concerns. Advised to call the office back if she has any questions or concerns.      Patient discharged home at this time. Instructions and follow up explained and given to the pt's mother, understanding verbalized. PT was carried in car seat out of ED with no signs of distress.

## 2025-05-28 NOTE — PROGRESS NOTES
HPI:    Patient ID: Cassidy Ryder is a 77year old female. Diabetes  She presents for her follow-up diabetic visit. She has type 2 diabetes mellitus. Disease course: 7/29/2020 a1c of 7.9. There are no hypoglycemic associated symptoms.  There are no lidia [FreeTextEntry1] : . Plan: - Pt has symptoms of TMJ dysfunction syndrome and hypertrophy of muscles of mastication refractory to first-line therapy of mouthguard that would benefit from Botox chemodenervation of the bilateral muscles of mastication. Additionally, she had endorsed improvement after last round of injections. Risks, benefits, and alternatives were reviewed with the patient including expected outcome and timeline for Botox to work. Need for reinjection every 3 months was also reviewed. Pt expressed understanding and elected to proceed. - Will send for insurance authorization and schedule accordingly.  -- Crystal Schroeder MD Facial Plastic & Reconstructive Surgery. fatigue. Eyes: Negative for blurred vision. Respiratory: Negative for shortness of breath. Cardiovascular: Negative for chest pain. Musculoskeletal: Negative for myalgias. Neurological: Negative for focal weakness.    All other systems reviewed a EXAM:   Physical Exam   Constitutional: She appears well-developed and well-nourished. No distress. Eyes: Conjunctivae are normal. Right eye exhibits no discharge. Left eye exhibits no discharge.    Cardiovascular: Normal rate, regular rhythm and normal h

## 2025-05-30 ENCOUNTER — TELEPHONE (OUTPATIENT)
Dept: ORTHOPEDICS CLINIC | Facility: CLINIC | Age: 80
End: 2025-05-30

## 2025-06-06 ENCOUNTER — TELEPHONE (OUTPATIENT)
Dept: FAMILY MEDICINE CLINIC | Facility: CLINIC | Age: 80
End: 2025-06-06

## 2025-06-06 DIAGNOSIS — I25.10 CORONARY ARTERY DISEASE INVOLVING NATIVE CORONARY ARTERY OF NATIVE HEART WITHOUT ANGINA PECTORIS: ICD-10-CM

## 2025-06-06 DIAGNOSIS — E11.21 TYPE 2 DIABETES MELLITUS WITH DIABETIC NEPHROPATHY, WITHOUT LONG-TERM CURRENT USE OF INSULIN (HCC): ICD-10-CM

## 2025-06-06 NOTE — TELEPHONE ENCOUNTER
Spoke to pt need refiils    Requesting   Requested Prescriptions     Pending Prescriptions Disp Refills    metoprolol succinate ER 25 MG Oral Tablet 24 Hr 90 tablet 2     Sig: Take 1 tablet (25 mg total) by mouth daily.    glimepiride 1 MG Oral Tab 90 tablet 2     Sig: Take 1 tablet (1 mg total) by mouth daily with breakfast.       LOV: 4/4/25      Future Appointments   Date Time Provider Department Center   6/19/2025  2:00 PM Areli Clark APRN [facility]   7/3/2025  2:00 PM Areli Clark APRN [facility]   7/7/2025 10:00 AM Jessica Bunn, DO [facility] [address] [facility] [address]   7/17/2025 12:30 PM Areli Clark APRN [facility]   7/31/2025  2:00 PM Areli Clark APRN [facility]   8/14/2025  2:00 PM Areli Clark APRN [facility]

## 2025-06-07 DIAGNOSIS — I25.10 CORONARY ARTERY DISEASE INVOLVING NATIVE CORONARY ARTERY OF NATIVE HEART WITHOUT ANGINA PECTORIS: ICD-10-CM

## 2025-06-07 DIAGNOSIS — E11.21 TYPE 2 DIABETES MELLITUS WITH DIABETIC NEPHROPATHY, WITHOUT LONG-TERM CURRENT USE OF INSULIN (HCC): ICD-10-CM

## 2025-06-09 RX ORDER — METOPROLOL SUCCINATE 25 MG/1
25 TABLET, EXTENDED RELEASE ORAL DAILY
Qty: 90 TABLET | Refills: 0 | Status: SHIPPED | OUTPATIENT
Start: 2025-06-09

## 2025-06-09 RX ORDER — GLIMEPIRIDE 1 MG/1
1 TABLET ORAL
Qty: 90 TABLET | Refills: 0 | Status: SHIPPED | OUTPATIENT
Start: 2025-06-09

## 2025-06-09 NOTE — TELEPHONE ENCOUNTER
A refill request was received for:  Requested Prescriptions     Pending Prescriptions Disp Refills    METOPROLOL SUCCINATE ER 25 MG Oral Tablet 24 Hr [Pharmacy Med Name: Metoprolol Succinate ER Oral Tablet Extended Release 24 Hour 25 MG] 90 tablet 0     Sig: TAKE ONE TABLET BY MOUTH ONE TIME DAILY    GLIMEPIRIDE 1 MG Oral Tab [Pharmacy Med Name: Glimepiride Oral Tablet 1 MG] 90 tablet 0     Sig: TAKE ONE TABLET BY MOUTH DAILY WITH BREAKFAST       Last refill date: 2/22/2025    Last office visit: 4/4/2025    Follow up due:  Future Appointments   Date Time Provider Department Center   6/19/2025  2:00 PM Areli Clark APRN [facility]   7/3/2025  2:00 PM Areli Clark APRN [facility]   7/7/2025 10:00 AM Jessica Bunn, DO [facility]   7/17/2025 12:30 PM Areli Clark APRN [facility]   7/31/2025  2:00 PM Areli Clark APRN [facility]   8/14/2025  2:00 PM Areli Clark APRN [facility]

## 2025-06-11 ENCOUNTER — PATIENT OUTREACH (OUTPATIENT)
Dept: CASE MANAGEMENT | Age: 80
End: 2025-06-11

## 2025-06-11 RX ORDER — METOPROLOL SUCCINATE 25 MG/1
25 TABLET, EXTENDED RELEASE ORAL DAILY
Qty: 90 TABLET | Refills: 2 | Status: SHIPPED | OUTPATIENT
Start: 2025-06-11

## 2025-06-11 RX ORDER — GLIMEPIRIDE 1 MG/1
1 TABLET ORAL
Qty: 90 TABLET | Refills: 2 | Status: SHIPPED | OUTPATIENT
Start: 2025-06-11

## 2025-06-11 NOTE — PROGRESS NOTES
Spoke to Arcelia for Chronic Care Management.      Updates to patient care team/comments: No changes at this time per patient  Patient reported changes in medications: Reviewed with patient  Med Adherence  Comment: Taking     Health Maintenance:   Health Maintenance   Topic Date Due    Zoster Vaccines (2 of 2) 09/10/2021    COVID-19 Vaccine (3 - 2024-25 season) 09/01/2024    Diabetes Care: Microalb/Creat Ratio (Annual)  01/01/2025    Diabetes Care Dilated Eye Exam  06/24/2025    Annual Physical  07/18/2025    Influenza Vaccine (Season Ended) 10/01/2025    Diabetes Care A1C  11/06/2025    Diabetes Care Foot Exam  04/04/2026    Diabetes Care: GFR  05/06/2026    DEXA Scan  Completed    Annual Depression Screening  Completed    Fall Risk Screening (Annual)  Completed    Pneumococcal Vaccine: 50+ Years  Completed    Meningococcal B Vaccine  Aged Out    Colorectal Cancer Screening  Discontinued    Mammogram  Discontinued     Patient updates/concerns:  Spoke with the patient for the monthly CCM call. Patient stated she is preparing to complete the lab order prior to seeing Dr. Bunn so he has everything in order to hopefully approve her for her upcoming knee surgery. Patient stated she is scheduled to have the surgery on July 30, 2025. Patient stateded she will be having a left knee replacement. Patient stated she continues to monitor her daily diet and meal portions. Patient indicated that overall she is doing okay and denies any barriers or concerns at this time.     Goals/Action Plan:    Active goal from previous outreach:     Stay active, monitor daily diet and prepare for knee surgery  Patient reported progress towards goals: progressing per patient               - What:  Patient stated she is preparing to complete the lab order prior to seeing Dr. Bunn so he has everything in order to hopefully approve her for her upcoming knee surgery.            - Where/When/How: Patient stated she is scheduled to have the surgery  on July 30, 2025. Patient stated she will be having a left knee replacement. Patient stated she continues to monitor her daily diet and meal portions.  Patient Reported Barriers and Concerns: None at this time per patient.                   - Plan for overcoming barriers: Patient to continue to follow up with care team as scheduled or as needed.     Care Managers Interventions:   Patient was educated on proper nutrition, hydration and exercise. Patient is aware of our next outreach, has agreed to being contacted via telephone. Patient verbalized understanding. Patient is aware she may contact me if further assistance is needed.    Future Appointments: Patient aware  Future Appointments   Date Time Provider Department Center   6/19/2025  2:00 PM Areli Clark, APRN [facility]   7/3/2025  2:00 PM Areli Clark C, APRN [facility]   7/7/2025 10:00 AM Jessica Bunn DO [facility] [address] [address]   7/17/2025 12:30 PM GreAreli horn C, APRN [facility]   7/31/2025  2:00 PM Areli Clark, APRN [facility]   8/14/2025  2:00 PM GreskAreli C, APRN [facility]     Next Care Manager Follow Up Date: 1 month follow up or sooner if needed.    Reason For Follow Up: review progress and or barriers towards patient's goals.     Time Spent This Encounter Total: 22 min medical record review, telephone communication, care plan updates where needed, education, goals, and action plan recreation/update. Provided acknowledgment and validation to patient's concerns.   Monthly Minute Total including today: 22  Physical assessment, complete health history, and need for CCM established by Jessica Bunn DO.

## 2025-07-02 NOTE — TELEPHONE ENCOUNTER
Spoke with Arcelia in regards to discussing the surgical protocol with her. I confirmed that surgery will take place at [facility]. I also discussed the surgical protocol and scheduled her post op appt. I informed her that she will need to see her pcp dr. Walter for surgical clearance, in which she states that she already has an appt scheduled. She states understanding with no questions or concerns. Advised to call the office back if she has any questions or concerns.

## 2025-07-07 ENCOUNTER — EKG ENCOUNTER (OUTPATIENT)
Dept: LAB | Age: 80
End: 2025-07-07
Attending: FAMILY MEDICINE
Payer: MEDICARE

## 2025-07-07 ENCOUNTER — LAB ENCOUNTER (OUTPATIENT)
Dept: LAB | Age: 80
End: 2025-07-07
Attending: FAMILY MEDICINE
Payer: MEDICARE

## 2025-07-07 ENCOUNTER — OFFICE VISIT (OUTPATIENT)
Dept: FAMILY MEDICINE CLINIC | Facility: CLINIC | Age: 80
End: 2025-07-07
Payer: MEDICARE

## 2025-07-07 VITALS
DIASTOLIC BLOOD PRESSURE: 78 MMHG | WEIGHT: 185.38 LBS | HEART RATE: 76 BPM | HEIGHT: 64 IN | BODY MASS INDEX: 31.65 KG/M2 | RESPIRATION RATE: 15 BRPM | SYSTOLIC BLOOD PRESSURE: 118 MMHG | OXYGEN SATURATION: 97 %

## 2025-07-07 DIAGNOSIS — M17.12 ARTHRITIS OF LEFT KNEE: ICD-10-CM

## 2025-07-07 DIAGNOSIS — Z01.818 PREOP EXAMINATION: ICD-10-CM

## 2025-07-07 DIAGNOSIS — I25.10 CORONARY ARTERY DISEASE INVOLVING NATIVE CORONARY ARTERY OF NATIVE HEART WITHOUT ANGINA PECTORIS: ICD-10-CM

## 2025-07-07 DIAGNOSIS — Z01.818 PRE-OP TESTING: ICD-10-CM

## 2025-07-07 DIAGNOSIS — Z01.818 PREOP EXAMINATION: Primary | ICD-10-CM

## 2025-07-07 LAB
ANTIBODY SCREEN: NEGATIVE
APTT PPP: 28.7 SECONDS (ref 23–36)
ATRIAL RATE: 67 BPM
EST. AVERAGE GLUCOSE BLD GHB EST-MCNC: 143 MG/DL (ref 68–126)
HBA1C MFR BLD: 6.6 % (ref ?–5.7)
INR BLD: 0.99 (ref 0.8–1.2)
P AXIS: 16 DEGREES
P-R INTERVAL: 148 MS
PROTHROMBIN TIME: 13.2 SECONDS (ref 11.6–14.8)
Q-T INTERVAL: 404 MS
QRS DURATION: 78 MS
QTC CALCULATION (BEZET): 426 MS
R AXIS: 21 DEGREES
RH BLOOD TYPE: POSITIVE
T AXIS: 43 DEGREES
VENTRICULAR RATE: 67 BPM

## 2025-07-07 PROCEDURE — 86850 RBC ANTIBODY SCREEN: CPT

## 2025-07-07 PROCEDURE — 87081 CULTURE SCREEN ONLY: CPT

## 2025-07-07 PROCEDURE — 87147 CULTURE TYPE IMMUNOLOGIC: CPT

## 2025-07-07 PROCEDURE — 93010 ELECTROCARDIOGRAM REPORT: CPT | Performed by: INTERNAL MEDICINE

## 2025-07-07 PROCEDURE — 86900 BLOOD TYPING SEROLOGIC ABO: CPT

## 2025-07-07 PROCEDURE — 83036 HEMOGLOBIN GLYCOSYLATED A1C: CPT

## 2025-07-07 PROCEDURE — 86901 BLOOD TYPING SEROLOGIC RH(D): CPT

## 2025-07-07 PROCEDURE — 85730 THROMBOPLASTIN TIME PARTIAL: CPT

## 2025-07-07 PROCEDURE — 85610 PROTHROMBIN TIME: CPT

## 2025-07-07 PROCEDURE — 93005 ELECTROCARDIOGRAM TRACING: CPT

## 2025-07-07 PROCEDURE — 99214 OFFICE O/P EST MOD 30 MIN: CPT | Performed by: FAMILY MEDICINE

## 2025-07-07 PROCEDURE — 36415 COLL VENOUS BLD VENIPUNCTURE: CPT

## 2025-07-07 RX ORDER — ASPIRIN 81 MG/1
81 TABLET ORAL DAILY
COMMUNITY
Start: 2025-07-07

## 2025-07-07 RX ORDER — ASPIRIN 81 MG/1
81 TABLET ORAL DAILY
Qty: 90 TABLET | Refills: 3 | Status: SHIPPED | OUTPATIENT
Start: 2025-07-07 | End: 2025-07-07

## 2025-07-07 NOTE — PROGRESS NOTES
The following individual(s) verbally consented to Scribe in room and Student and understands that they can  withdraw their consent at any point.    Patient name: Arcelia Conklin  Additional names:

## 2025-07-07 NOTE — PROGRESS NOTES
Subjective:   Patient ID: Arcelia Conklin is a 79 year old female.    She is here for the presurgical clearance, going for the left total knee arthroplasty with Dr. Tellez, on 7/30/25.    No fever/chills/CP/SOB.    No trouble with the anesthesia in the past.        History/Other:   Review of Systems   All other systems reviewed and are negative.    Current Medications[1]  Allergies:Allergies[2]    Objective:   Physical Exam  Constitutional:       Appearance: She is well-developed.   HENT:      Head: Normocephalic and atraumatic.      Right Ear: External ear normal.      Left Ear: External ear normal.      Nose: Nose normal.   Eyes:      Conjunctiva/sclera: Conjunctivae normal.      Pupils: Pupils are equal, round, and reactive to light.   Cardiovascular:      Rate and Rhythm: Normal rate and regular rhythm.      Heart sounds: Normal heart sounds.   Pulmonary:      Effort: Pulmonary effort is normal.      Breath sounds: Normal breath sounds.   Abdominal:      General: Bowel sounds are normal.      Palpations: Abdomen is soft.   Genitourinary:     Vagina: Normal.   Musculoskeletal:         General: Normal range of motion.      Cervical back: Normal range of motion and neck supple.   Skin:     General: Skin is warm and dry.   Neurological:      Mental Status: She is alert and oriented to person, place, and time.      Deep Tendon Reflexes: Reflexes are normal and symmetric.   Psychiatric:         Behavior: Behavior normal.         Thought Content: Thought content normal.         Judgment: Judgment normal.         Assessment & Plan:   - Cleared for the surgery.  - ok to see cardiologist after the surgery.  - Stop blood thinner(baby aspirin) medication 1 week before surgery.    1. Preop examination    2. Arthritis of left knee    3. Coronary artery disease involving native coronary artery of native heart without angina pectoris    Follow up in 3 months.    1. Preop examination  - Prothrombin Time (PT) [E]; Future  - PTT,  Activated [E]; Future  - EKG 12 Lead; Future    2. Arthritis of left knee  - Prothrombin Time (PT) [E]; Future  - PTT, Activated [E]; Future  - EKG 12 Lead; Future    3. Coronary artery disease involving native coronary artery of native heart without angina pectoris  - Cardio Referral - Internal  - aspirin 81 MG Oral Tab EC; Take 1 tablet (81 mg total) by mouth daily.    Orders Placed This Encounter   Procedures    Prothrombin Time (PT) [E]    PTT, Activated [E]     Meds This Visit:  Requested Prescriptions      No prescriptions requested or ordered in this encounter     Imaging & Referrals:  CARDIO - INTERNAL    Note written by jun.  Vikkie is in the room with me.  Scribe has written note according to my dictation.  Reviewed scribe note.  Changed as appropriate.       [1]   Current Outpatient Medications   Medication Sig Dispense Refill    aspirin 81 MG Oral Tab EC Take 1 tablet (81 mg total) by mouth daily.      linaCLOtide (LINZESS OR) Take by mouth. Every 3 days      QUETIAPINE 100 MG Oral Tab TAKE ONE TABLET BY MOUTH NIGHTLY, MAY TAKE ONE EXTRA TABLET UP TO 3 TIMES A WEEK IF UNABLE TO SLEEP 90 tablet 0    metoprolol succinate ER 25 MG Oral Tablet 24 Hr Take 1 tablet (25 mg total) by mouth daily. 90 tablet 2    glimepiride 1 MG Oral Tab Take 1 tablet (1 mg total) by mouth daily with breakfast. 90 tablet 2    ATORVASTATIN 40 MG Oral Tab TAKE ONE TABLET BY MOUTH NIGHTLY 90 tablet 0    METFORMIN 500 MG Oral Tab TAKE ONE TABLET BY MOUTH EVERY MORNING BEFORE BREAKFAST 90 tablet 0    clonazePAM 1 MG Oral Tab Take 1 tablet (1 mg total) by mouth 2 (two) times daily as needed for Anxiety. 60 tablet 1    DULoxetine 30 MG Oral Cap DR Particles Take 3 capsules (90 mg total) by mouth daily. 90 capsule 2    fluticasone propionate 50 MCG/ACT Nasal Suspension 2 sprays by Each Nare route daily. 3 each 3   [2] No Known Allergies

## 2025-07-09 ENCOUNTER — TELEPHONE (OUTPATIENT)
Dept: ORTHOPEDICS CLINIC | Facility: CLINIC | Age: 80
End: 2025-07-09

## 2025-07-09 ENCOUNTER — RESULTS FOLLOW-UP (OUTPATIENT)
Dept: SURGERY | Facility: HOSPITAL | Age: 80
End: 2025-07-09

## 2025-07-09 RX ORDER — MUPIROCIN 2 %
1 OINTMENT (GRAM) TOPICAL 2 TIMES DAILY
Qty: 15 G | Refills: 0 | Status: SHIPPED | OUTPATIENT
Start: 2025-07-09 | End: 2025-07-14

## 2025-07-09 NOTE — TELEPHONE ENCOUNTER
Spoke with patient to review the mupirocin and Hibiclens instructions.  Patient verbalized understanding.  No other questions or concerns.    Start 7/25/25  DOS  7/30/25  Left total knee arthroplasty

## 2025-07-09 NOTE — TELEPHONE ENCOUNTER
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 Instruct them on the decolonization protocol, which is 1) purchase hibiclens/4% chlorhexidine solution (can obtain at [employer], etc) and shower once daily with this soap for 5 days (avoid face and genitals), and 2) obtain mupirocin ointment (order has been sent to their pharmacy) which they should apply twice daily for 5 days to each nostril using a cotton tip swab - advise them to roll the swab around each nostril for ~30 seconds, and use a different swab for each nostril.

## 2025-07-10 NOTE — TELEPHONE ENCOUNTER
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   Instruct them on the decolonization protocol, which is 1) purchase hibiclens/4% chlorhexidine solution (can obtain at [employer], [employer], etc) and shower once daily with this soap for 5 days (avoid face   and genitals), and 2) obtain mupirocin ointment (order has been sent to their pharmacy) which they should apply twice daily for 5 days to each nostril using a cotton tip swab - advise them to roll   the swab around each nostril for ~30 seconds, and use a different swab for each nostril.      ----- Message -----  From: Lab, Background User  Sent: 7/7/2025   3:44 PM CDT  To: Alexander Tellez MD

## 2025-07-10 NOTE — TELEPHONE ENCOUNTER
Duplicate message    Melani Gordon RN  AR    7/9/25 11:20 AM  Note     Spoke with patient to review the mupirocin and Hibiclens instructions.  Patient verbalized understanding.  No other questions or concerns.     Start 7/25/25  DOS  7/30/25  Left total knee arthroplasty

## 2025-07-18 NOTE — ANESTHESIA PROCEDURE NOTES
[facility]  Performed by: Froy Martinez MD  Authorized by: Froy Martinez MD     General Information and Staff    Procedure Start:  11/17/2021 10:44 AM  Procedure End:  11/17/2021 10:54 AM  Anesthesiologist:  Froy Martinez MD Patient called in directly again, questioning Stim machine yesterday. Made aware I would be in touch with the surgeon and reach out directly.

## 2025-07-28 ENCOUNTER — TELEPHONE (OUTPATIENT)
Dept: ORTHOPEDICS CLINIC | Facility: CLINIC | Age: 80
End: 2025-07-28

## 2025-07-28 ENCOUNTER — PATIENT OUTREACH (OUTPATIENT)
Dept: CASE MANAGEMENT | Age: 80
End: 2025-07-28

## 2025-07-28 NOTE — PROGRESS NOTES
Spoke to Arcelia for Chronic Care Management.      Updates to patient care team/comments: No changes  Patient reported changes in medications: No changes  Med Adherence  Comment: Taking    Health Maintenance:   Health Maintenance   Topic Date Due    Zoster Vaccines (2 of 2) 09/10/2021    COVID-19 Vaccine (3 - 2024-25 season) 09/01/2024    Diabetes Care: Microalb/Creat Ratio (Annual)  01/01/2025    Diabetes Care Dilated Eye Exam  06/24/2025    Annual Physical  07/18/2025    Influenza Vaccine (1) 10/01/2025    Diabetes Care A1C  01/07/2026    Diabetes Care Foot Exam  04/04/2026    Diabetes Care: GFR  05/06/2026    DEXA Scan  Completed    Annual Depression Screening  Completed    Fall Risk Screening (Annual)  Completed    Pneumococcal Vaccine: 50+ Years  Completed    Meningococcal B Vaccine  Aged Out    Colorectal Cancer Screening  Discontinued    Mammogram  Discontinued       Patient updates/concerns:  Patient stated she is preparing for her upcoming procedure for left total knee arthroplasty. She mentioned that she is scheduled for Wednesday, 7/30/2025. Patient expressed feeling a little anxious, but she understands that this procedure is necessary for her overall health. At this time, she indicated that she has no other barriers.    Care Managers Interventions:   Patient was educated on proper nutrition, hydration and exercise. Patient is aware of our next outreach, has agreed to being contacted via telephone. Patient verbalized understanding. Patient is aware she may contact me if further assistance is needed.    Future Appointments: Patient aware  Future Appointments   Date Time Provider Department Center   7/31/2025  2:00 PM Areli Clark APRN [facility]   8/14/2025  2:00 PM Areli Clark APRN [facility]   8/15/2025 10:00 AM Komal Lin PA-C [facility]     Next Care Manager Follow Up Date: 1 month follow up or sooner if needed    Reason For Follow Up: review progress and or barriers  towards patient's goals.     Time Spent This Encounter Total: 10 min medical record review, telephone communication, care plan updates where needed, education, goals, and action plan recreation/update. Provided acknowledgment and validation to patient's concerns.   Monthly Minute Total including today: 10  Physical assessment, complete health history, and need for CCM established by Jessica Bunn DO.

## 2025-07-28 NOTE — TELEPHONE ENCOUNTER
Patient called having a couple questions regarding her surgery that will be on 7/30 with Dr. Tellez

## 2025-07-28 NOTE — TELEPHONE ENCOUNTER
DOS- Left TKA 07/30/2025    Patient was confused on the gatorade. Went over the instructions- several times and in depth ~     Advised her that she drink 12 ounces at the time she was told by PAT- which she states was 630-700. Then she is to drink it another 12 ounces at the second time she was told by PAT which she states is 230-300 am.     This must be wrong? Because it says not to drink or eat after 11pm, unless instructed to do so.     Advised her not wrong, she needs to do the Tylenol at the same time as the second dose of Gatorade. This is what she was instructed to do by the surgery center- she verbalized understanding.       She was told that she would get a call today from [facility] for her bgl. However, she will not be able to check her blood sugar because her BG monitor is broken. Is this an issue?     Advised that I am not sure what she was told by pre-admission testing, but that she may want to call them to verify if they need to know her level prior to the day of the procedure. She is hoping that they will just check her that day. Advised that she should clarify.     The phone number for preadmission testing provided. Patient is going to call to make sure she is good to go.

## 2025-07-29 ENCOUNTER — TELEPHONE (OUTPATIENT)
Dept: PHYSICAL THERAPY | Facility: HOSPITAL | Age: 80
End: 2025-07-29

## 2025-07-29 ENCOUNTER — ANESTHESIA EVENT (OUTPATIENT)
Dept: SURGERY | Facility: HOSPITAL | Age: 80
End: 2025-07-29
Payer: MEDICARE

## 2025-07-29 RX ORDER — VANCOMYCIN HYDROCHLORIDE
15 ONCE
Status: COMPLETED | OUTPATIENT
Start: 2025-07-29 | End: 2025-07-30

## 2025-07-30 ENCOUNTER — APPOINTMENT (OUTPATIENT)
Dept: GENERAL RADIOLOGY | Facility: HOSPITAL | Age: 80
End: 2025-07-30

## 2025-07-30 ENCOUNTER — ANESTHESIA (OUTPATIENT)
Dept: SURGERY | Facility: HOSPITAL | Age: 80
End: 2025-07-30
Payer: MEDICARE

## 2025-07-30 ENCOUNTER — HOSPITAL ENCOUNTER (OUTPATIENT)
Facility: HOSPITAL | Age: 80
LOS: 1 days | Discharge: HOME HEALTH | End: 2025-07-31
Attending: STUDENT IN AN ORGANIZED HEALTH CARE EDUCATION/TRAINING PROGRAM | Admitting: STUDENT IN AN ORGANIZED HEALTH CARE EDUCATION/TRAINING PROGRAM

## 2025-07-30 ENCOUNTER — HOSPITAL ENCOUNTER (OUTPATIENT)
Facility: HOSPITAL | Age: 80
LOS: 1 days | Discharge: HOME | End: 2025-07-31
Attending: STUDENT IN AN ORGANIZED HEALTH CARE EDUCATION/TRAINING PROGRAM | Admitting: STUDENT IN AN ORGANIZED HEALTH CARE EDUCATION/TRAINING PROGRAM

## 2025-07-30 DIAGNOSIS — M17.12 PRIMARY OSTEOARTHRITIS OF LEFT KNEE: ICD-10-CM

## 2025-07-30 DIAGNOSIS — Z01.818 PRE-OP TESTING: Primary | ICD-10-CM

## 2025-07-30 PROBLEM — Z96.652 STATUS POST TOTAL LEFT KNEE REPLACEMENT: Status: ACTIVE | Noted: 2019-01-28

## 2025-07-30 LAB
GLUCOSE BLD-MCNC: 119 MG/DL (ref 70–99)
GLUCOSE BLD-MCNC: 182 MG/DL (ref 70–99)
GLUCOSE BLD-MCNC: 190 MG/DL (ref 70–99)

## 2025-07-30 PROCEDURE — 73560 X-RAY EXAM OF KNEE 1 OR 2: CPT

## 2025-07-30 PROCEDURE — 76942 ECHO GUIDE FOR BIOPSY: CPT | Performed by: ANESTHESIOLOGY

## 2025-07-30 PROCEDURE — 99205 OFFICE O/P NEW HI 60 MIN: CPT | Performed by: HOSPITALIST

## 2025-07-30 DEVICE — IMPLANTABLE DEVICE: Type: IMPLANTABLE DEVICE | Site: KNEE | Status: FUNCTIONAL

## 2025-07-30 DEVICE — CEMENT BNE 40GM W/ GENT HI VISC RADPQ: Type: IMPLANTABLE DEVICE | Site: KNEE | Status: FUNCTIONAL

## 2025-07-30 RX ORDER — ONDANSETRON 2 MG/ML
4 INJECTION INTRAMUSCULAR; INTRAVENOUS EVERY 6 HOURS PRN
Status: DISCONTINUED | OUTPATIENT
Start: 2025-07-30 | End: 2025-07-30 | Stop reason: HOSPADM

## 2025-07-30 RX ORDER — TRAMADOL HYDROCHLORIDE 50 MG/1
50 TABLET ORAL EVERY 8 HOURS PRN
Qty: 40 TABLET | Refills: 0 | Status: SHIPPED | OUTPATIENT
Start: 2025-07-30

## 2025-07-30 RX ORDER — NICOTINE POLACRILEX 4 MG
30 LOZENGE BUCCAL
Status: DISCONTINUED | OUTPATIENT
Start: 2025-07-30 | End: 2025-07-31

## 2025-07-30 RX ORDER — SENNOSIDES 8.6 MG
17.2 TABLET ORAL NIGHTLY
Status: DISCONTINUED | OUTPATIENT
Start: 2025-07-30 | End: 2025-07-31

## 2025-07-30 RX ORDER — ASPIRIN 81 MG/1
81 TABLET ORAL 2 TIMES DAILY
Qty: 60 TABLET | Refills: 0 | Status: SHIPPED | OUTPATIENT
Start: 2025-07-30 | End: 2025-08-29

## 2025-07-30 RX ORDER — HYDROMORPHONE HYDROCHLORIDE 1 MG/ML
0.4 INJECTION, SOLUTION INTRAMUSCULAR; INTRAVENOUS; SUBCUTANEOUS EVERY 2 HOUR PRN
Status: DISCONTINUED | OUTPATIENT
Start: 2025-07-30 | End: 2025-07-31

## 2025-07-30 RX ORDER — METOPROLOL SUCCINATE 25 MG/1
25 TABLET, EXTENDED RELEASE ORAL
Status: DISCONTINUED | OUTPATIENT
Start: 2025-07-31 | End: 2025-07-31

## 2025-07-30 RX ORDER — KETOROLAC TROMETHAMINE 15 MG/ML
15 INJECTION, SOLUTION INTRAMUSCULAR; INTRAVENOUS EVERY 6 HOURS
Status: DISCONTINUED | OUTPATIENT
Start: 2025-07-30 | End: 2025-07-31

## 2025-07-30 RX ORDER — HYDROMORPHONE HYDROCHLORIDE 1 MG/ML
0.2 INJECTION, SOLUTION INTRAMUSCULAR; INTRAVENOUS; SUBCUTANEOUS EVERY 2 HOUR PRN
Status: DISCONTINUED | OUTPATIENT
Start: 2025-07-30 | End: 2025-07-31

## 2025-07-30 RX ORDER — HYDROMORPHONE HYDROCHLORIDE 1 MG/ML
0.6 INJECTION, SOLUTION INTRAMUSCULAR; INTRAVENOUS; SUBCUTANEOUS EVERY 5 MIN PRN
Refills: 0 | Status: DISCONTINUED | OUTPATIENT
Start: 2025-07-30 | End: 2025-07-30 | Stop reason: HOSPADM

## 2025-07-30 RX ORDER — OXYCODONE HYDROCHLORIDE 5 MG/1
5 TABLET ORAL EVERY 4 HOURS PRN
Status: DISCONTINUED | OUTPATIENT
Start: 2025-07-30 | End: 2025-07-31

## 2025-07-30 RX ORDER — ACETAMINOPHEN 500 MG
1000 TABLET ORAL ONCE
Status: DISCONTINUED | OUTPATIENT
Start: 2025-07-30 | End: 2025-07-30 | Stop reason: HOSPADM

## 2025-07-30 RX ORDER — SODIUM CHLORIDE, SODIUM LACTATE, POTASSIUM CHLORIDE, CALCIUM CHLORIDE 600; 310; 30; 20 MG/100ML; MG/100ML; MG/100ML; MG/100ML
INJECTION, SOLUTION INTRAVENOUS CONTINUOUS
Status: DISCONTINUED | OUTPATIENT
Start: 2025-07-30 | End: 2025-07-30

## 2025-07-30 RX ORDER — ATORVASTATIN CALCIUM 40 MG/1
40 TABLET, FILM COATED ORAL NIGHTLY
Status: DISCONTINUED | OUTPATIENT
Start: 2025-07-30 | End: 2025-07-31

## 2025-07-30 RX ORDER — ACETAMINOPHEN 500 MG
1000 TABLET ORAL ONCE AS NEEDED
Status: DISCONTINUED | OUTPATIENT
Start: 2025-07-30 | End: 2025-07-30 | Stop reason: HOSPADM

## 2025-07-30 RX ORDER — OXYCODONE HYDROCHLORIDE 5 MG/1
5 TABLET ORAL
Qty: 40 TABLET | Refills: 0 | Status: SHIPPED | OUTPATIENT
Start: 2025-07-30

## 2025-07-30 RX ORDER — ONDANSETRON 2 MG/ML
4 INJECTION INTRAMUSCULAR; INTRAVENOUS EVERY 6 HOURS PRN
Status: DISCONTINUED | OUTPATIENT
Start: 2025-07-30 | End: 2025-07-31

## 2025-07-30 RX ORDER — POLYETHYLENE GLYCOL 3350 17 G/17G
17 POWDER, FOR SOLUTION ORAL DAILY PRN
Qty: 14 EACH | Refills: 0 | Status: SHIPPED | COMMUNITY
Start: 2025-07-30 | End: 2025-08-13

## 2025-07-30 RX ORDER — POLYETHYLENE GLYCOL 3350 17 G/17G
17 POWDER, FOR SOLUTION ORAL DAILY PRN
Status: DISCONTINUED | OUTPATIENT
Start: 2025-07-30 | End: 2025-07-31

## 2025-07-30 RX ORDER — VANCOMYCIN HYDROCHLORIDE
15 EVERY 12 HOURS
Status: COMPLETED | OUTPATIENT
Start: 2025-07-30 | End: 2025-07-31

## 2025-07-30 RX ORDER — DULOXETIN HYDROCHLORIDE 30 MG/1
30 CAPSULE, DELAYED RELEASE ORAL DAILY
Status: DISCONTINUED | OUTPATIENT
Start: 2025-07-31 | End: 2025-07-31

## 2025-07-30 RX ORDER — METOCLOPRAMIDE HYDROCHLORIDE 5 MG/ML
10 INJECTION INTRAMUSCULAR; INTRAVENOUS EVERY 8 HOURS PRN
Status: DISCONTINUED | OUTPATIENT
Start: 2025-07-30 | End: 2025-07-31

## 2025-07-30 RX ORDER — HYDROMORPHONE HYDROCHLORIDE 1 MG/ML
0.4 INJECTION, SOLUTION INTRAMUSCULAR; INTRAVENOUS; SUBCUTANEOUS EVERY 5 MIN PRN
Refills: 0 | Status: DISCONTINUED | OUTPATIENT
Start: 2025-07-30 | End: 2025-07-30 | Stop reason: HOSPADM

## 2025-07-30 RX ORDER — DEXAMETHASONE SODIUM PHOSPHATE 4 MG/ML
VIAL (ML) INJECTION AS NEEDED
Status: DISCONTINUED | OUTPATIENT
Start: 2025-07-30 | End: 2025-07-30 | Stop reason: SURG

## 2025-07-30 RX ORDER — NALOXONE HYDROCHLORIDE 0.4 MG/ML
80 INJECTION, SOLUTION INTRAMUSCULAR; INTRAVENOUS; SUBCUTANEOUS AS NEEDED
Status: DISCONTINUED | OUTPATIENT
Start: 2025-07-30 | End: 2025-07-30 | Stop reason: HOSPADM

## 2025-07-30 RX ORDER — BISACODYL 10 MG
10 SUPPOSITORY, RECTAL RECTAL
Status: DISCONTINUED | OUTPATIENT
Start: 2025-07-30 | End: 2025-07-31

## 2025-07-30 RX ORDER — HYDROCODONE BITARTRATE AND ACETAMINOPHEN 5; 325 MG/1; MG/1
2 TABLET ORAL ONCE AS NEEDED
Refills: 0 | Status: DISCONTINUED | OUTPATIENT
Start: 2025-07-30 | End: 2025-07-30 | Stop reason: HOSPADM

## 2025-07-30 RX ORDER — DEXTROSE MONOHYDRATE 25 G/50ML
50 INJECTION, SOLUTION INTRAVENOUS
Status: DISCONTINUED | OUTPATIENT
Start: 2025-07-30 | End: 2025-07-30 | Stop reason: HOSPADM

## 2025-07-30 RX ORDER — SODIUM CHLORIDE, SODIUM LACTATE, POTASSIUM CHLORIDE, CALCIUM CHLORIDE 600; 310; 30; 20 MG/100ML; MG/100ML; MG/100ML; MG/100ML
INJECTION, SOLUTION INTRAVENOUS CONTINUOUS
Status: DISCONTINUED | OUTPATIENT
Start: 2025-07-30 | End: 2025-07-30 | Stop reason: HOSPADM

## 2025-07-30 RX ORDER — SODIUM PHOSPHATE, DIBASIC AND SODIUM PHOSPHATE, MONOBASIC 7; 19 G/230ML; G/230ML
1 ENEMA RECTAL ONCE AS NEEDED
Status: DISCONTINUED | OUTPATIENT
Start: 2025-07-30 | End: 2025-07-31

## 2025-07-30 RX ORDER — MIDAZOLAM HYDROCHLORIDE 1 MG/ML
1 INJECTION INTRAMUSCULAR; INTRAVENOUS EVERY 5 MIN PRN
Status: DISCONTINUED | OUTPATIENT
Start: 2025-07-30 | End: 2025-07-30 | Stop reason: HOSPADM

## 2025-07-30 RX ORDER — ACETAMINOPHEN 500 MG
1000 TABLET ORAL EVERY 8 HOURS SCHEDULED
Status: DISCONTINUED | OUTPATIENT
Start: 2025-07-30 | End: 2025-07-31

## 2025-07-30 RX ORDER — ACETAMINOPHEN 500 MG
1000 TABLET ORAL 3 TIMES DAILY
Qty: 180 TABLET | Refills: 0 | Status: SHIPPED | OUTPATIENT
Start: 2025-07-30 | End: 2025-08-29

## 2025-07-30 RX ORDER — DEXAMETHASONE SODIUM PHOSPHATE 10 MG/ML
8 INJECTION, SOLUTION INTRAMUSCULAR; INTRAVENOUS ONCE
Status: COMPLETED | OUTPATIENT
Start: 2025-07-31 | End: 2025-07-31

## 2025-07-30 RX ORDER — ACETAMINOPHEN 325 MG/1
TABLET ORAL
Status: COMPLETED
Start: 2025-07-30 | End: 2025-07-30

## 2025-07-30 RX ORDER — DIPHENHYDRAMINE HCL 25 MG
25 CAPSULE ORAL EVERY 4 HOURS PRN
Status: DISCONTINUED | OUTPATIENT
Start: 2025-07-30 | End: 2025-07-31

## 2025-07-30 RX ORDER — METOPROLOL TARTRATE 1 MG/ML
2.5 INJECTION, SOLUTION INTRAVENOUS ONCE
Status: DISCONTINUED | OUTPATIENT
Start: 2025-07-30 | End: 2025-07-30 | Stop reason: HOSPADM

## 2025-07-30 RX ORDER — TRANEXAMIC ACID 10 MG/ML
1000 INJECTION, SOLUTION INTRAVENOUS ONCE
Status: COMPLETED | OUTPATIENT
Start: 2025-07-30 | End: 2025-07-30

## 2025-07-30 RX ORDER — NICOTINE POLACRILEX 4 MG
30 LOZENGE BUCCAL
Status: DISCONTINUED | OUTPATIENT
Start: 2025-07-30 | End: 2025-07-30 | Stop reason: HOSPADM

## 2025-07-30 RX ORDER — HYDROMORPHONE HYDROCHLORIDE 1 MG/ML
0.2 INJECTION, SOLUTION INTRAMUSCULAR; INTRAVENOUS; SUBCUTANEOUS EVERY 5 MIN PRN
Refills: 0 | Status: DISCONTINUED | OUTPATIENT
Start: 2025-07-30 | End: 2025-07-30 | Stop reason: HOSPADM

## 2025-07-30 RX ORDER — NICOTINE POLACRILEX 4 MG
15 LOZENGE BUCCAL
Status: DISCONTINUED | OUTPATIENT
Start: 2025-07-30 | End: 2025-07-31

## 2025-07-30 RX ORDER — TRAMADOL HYDROCHLORIDE 50 MG/1
50 TABLET ORAL EVERY 6 HOURS SCHEDULED
Status: DISCONTINUED | OUTPATIENT
Start: 2025-07-30 | End: 2025-07-31

## 2025-07-30 RX ORDER — CLONAZEPAM 1 MG/1
1 TABLET ORAL 2 TIMES DAILY PRN
Status: DISCONTINUED | OUTPATIENT
Start: 2025-07-30 | End: 2025-07-31

## 2025-07-30 RX ORDER — DIPHENHYDRAMINE HYDROCHLORIDE 50 MG/ML
25 INJECTION, SOLUTION INTRAMUSCULAR; INTRAVENOUS ONCE AS NEEDED
Status: ACTIVE | OUTPATIENT
Start: 2025-07-30 | End: 2025-07-30

## 2025-07-30 RX ORDER — DOCUSATE SODIUM 100 MG/1
100 CAPSULE, LIQUID FILLED ORAL 2 TIMES DAILY
Status: DISCONTINUED | OUTPATIENT
Start: 2025-07-30 | End: 2025-07-31

## 2025-07-30 RX ORDER — ASPIRIN 81 MG/1
81 TABLET ORAL 2 TIMES DAILY
Status: DISCONTINUED | OUTPATIENT
Start: 2025-07-30 | End: 2025-07-31

## 2025-07-30 RX ORDER — DIPHENHYDRAMINE HYDROCHLORIDE 50 MG/ML
12.5 INJECTION, SOLUTION INTRAMUSCULAR; INTRAVENOUS EVERY 4 HOURS PRN
Status: DISCONTINUED | OUTPATIENT
Start: 2025-07-30 | End: 2025-07-31

## 2025-07-30 RX ORDER — NICOTINE POLACRILEX 4 MG
15 LOZENGE BUCCAL
Status: DISCONTINUED | OUTPATIENT
Start: 2025-07-30 | End: 2025-07-30 | Stop reason: HOSPADM

## 2025-07-30 RX ORDER — ACETAMINOPHEN 325 MG/1
650 TABLET ORAL ONCE
Status: COMPLETED | OUTPATIENT
Start: 2025-07-30 | End: 2025-07-30

## 2025-07-30 RX ORDER — QUETIAPINE FUMARATE 100 MG/1
100 TABLET, FILM COATED ORAL NIGHTLY PRN
Status: DISCONTINUED | OUTPATIENT
Start: 2025-07-30 | End: 2025-07-31

## 2025-07-30 RX ORDER — CELECOXIB 200 MG/1
200 CAPSULE ORAL 2 TIMES DAILY
Qty: 60 CAPSULE | Refills: 0 | Status: SHIPPED | OUTPATIENT
Start: 2025-07-30 | End: 2025-08-29

## 2025-07-30 RX ORDER — DOXYCYCLINE 100 MG/1
100 CAPSULE ORAL 2 TIMES DAILY
Qty: 20 CAPSULE | Refills: 0 | Status: SHIPPED | OUTPATIENT
Start: 2025-07-30 | End: 2025-08-09

## 2025-07-30 RX ORDER — EPHEDRINE SULFATE 50 MG/ML
INJECTION INTRAVENOUS AS NEEDED
Status: DISCONTINUED | OUTPATIENT
Start: 2025-07-30 | End: 2025-07-30 | Stop reason: SURG

## 2025-07-30 RX ORDER — METOCLOPRAMIDE HYDROCHLORIDE 5 MG/ML
10 INJECTION INTRAMUSCULAR; INTRAVENOUS EVERY 8 HOURS PRN
Status: DISCONTINUED | OUTPATIENT
Start: 2025-07-30 | End: 2025-07-30 | Stop reason: HOSPADM

## 2025-07-30 RX ORDER — ONDANSETRON 2 MG/ML
INJECTION INTRAMUSCULAR; INTRAVENOUS AS NEEDED
Status: DISCONTINUED | OUTPATIENT
Start: 2025-07-30 | End: 2025-07-30 | Stop reason: SURG

## 2025-07-30 RX ORDER — HYDROCODONE BITARTRATE AND ACETAMINOPHEN 5; 325 MG/1; MG/1
1 TABLET ORAL ONCE AS NEEDED
Refills: 0 | Status: DISCONTINUED | OUTPATIENT
Start: 2025-07-30 | End: 2025-07-30 | Stop reason: HOSPADM

## 2025-07-30 RX ORDER — DEXTROSE MONOHYDRATE 25 G/50ML
50 INJECTION, SOLUTION INTRAVENOUS
Status: DISCONTINUED | OUTPATIENT
Start: 2025-07-30 | End: 2025-07-31

## 2025-07-30 RX ORDER — MIDAZOLAM HYDROCHLORIDE 1 MG/ML
INJECTION INTRAMUSCULAR; INTRAVENOUS AS NEEDED
Status: DISCONTINUED | OUTPATIENT
Start: 2025-07-30 | End: 2025-07-30 | Stop reason: SURG

## 2025-07-30 RX ORDER — FERROUS SULFATE 325(65) MG
325 TABLET, DELAYED RELEASE (ENTERIC COATED) ORAL
Status: DISCONTINUED | OUTPATIENT
Start: 2025-07-30 | End: 2025-07-31

## 2025-07-30 RX ADMIN — EPHEDRINE SULFATE 10 MG: 50 INJECTION INTRAVENOUS at 08:09:00

## 2025-07-30 RX ADMIN — MIDAZOLAM HYDROCHLORIDE 2 MG: 1 INJECTION INTRAMUSCULAR; INTRAVENOUS at 07:22:00

## 2025-07-30 RX ADMIN — TRANEXAMIC ACID 1000 MG: 10 INJECTION, SOLUTION INTRAVENOUS at 08:53:00

## 2025-07-30 RX ADMIN — SODIUM CHLORIDE, SODIUM LACTATE, POTASSIUM CHLORIDE, CALCIUM CHLORIDE: 600; 310; 30; 20 INJECTION, SOLUTION INTRAVENOUS at 09:06:00

## 2025-07-30 RX ADMIN — DEXAMETHASONE SODIUM PHOSPHATE 8 MG: 4 MG/ML VIAL (ML) INJECTION at 08:03:00

## 2025-07-30 RX ADMIN — VANCOMYCIN HYDROCHLORIDE 1 G: at 07:21:00

## 2025-07-30 RX ADMIN — DEXAMETHASONE SODIUM PHOSPHATE 2 MG: 4 MG/ML VIAL (ML) INJECTION at 07:28:00

## 2025-07-30 RX ADMIN — ONDANSETRON 4 MG: 2 INJECTION INTRAMUSCULAR; INTRAVENOUS at 08:03:00

## 2025-07-30 RX ADMIN — TRANEXAMIC ACID 1000 MG: 10 INJECTION, SOLUTION INTRAVENOUS at 07:21:00

## 2025-07-30 RX ADMIN — SODIUM CHLORIDE, SODIUM LACTATE, POTASSIUM CHLORIDE, CALCIUM CHLORIDE: 600; 310; 30; 20 INJECTION, SOLUTION INTRAVENOUS at 09:30:00

## 2025-07-31 VITALS
TEMPERATURE: 98 F | RESPIRATION RATE: 18 BRPM | HEART RATE: 72 BPM | BODY MASS INDEX: 31.5 KG/M2 | HEIGHT: 64 IN | WEIGHT: 184.5 LBS | SYSTOLIC BLOOD PRESSURE: 116 MMHG | DIASTOLIC BLOOD PRESSURE: 88 MMHG | OXYGEN SATURATION: 92 %

## 2025-07-31 LAB
GLUCOSE BLD-MCNC: 170 MG/DL (ref 70–99)
GLUCOSE BLD-MCNC: 180 MG/DL (ref 70–99)

## 2025-07-31 PROCEDURE — 99214 OFFICE O/P EST MOD 30 MIN: CPT | Performed by: HOSPITALIST

## 2025-07-31 PROCEDURE — 90792 PSYCH DIAG EVAL W/MED SRVCS: CPT | Performed by: OTHER

## 2025-08-01 ENCOUNTER — PATIENT OUTREACH (OUTPATIENT)
Dept: CASE MANAGEMENT | Age: 80
End: 2025-08-01

## 2025-08-06 ENCOUNTER — PATIENT OUTREACH (OUTPATIENT)
Dept: CASE MANAGEMENT | Age: 80
End: 2025-08-06

## 2025-08-06 ENCOUNTER — TELEPHONE (OUTPATIENT)
Dept: PHYSICAL THERAPY | Facility: HOSPITAL | Age: 80
End: 2025-08-06

## 2025-08-07 ENCOUNTER — HOSPITAL ENCOUNTER (EMERGENCY)
Facility: HOSPITAL | Age: 80
Discharge: HOME | End: 2025-08-07
Attending: EMERGENCY MEDICINE

## 2025-08-07 ENCOUNTER — TELEPHONE (OUTPATIENT)
Dept: FAMILY MEDICINE CLINIC | Facility: CLINIC | Age: 80
End: 2025-08-07

## 2025-08-07 ENCOUNTER — APPOINTMENT (OUTPATIENT)
Dept: GENERAL RADIOLOGY | Facility: HOSPITAL | Age: 80
End: 2025-08-07

## 2025-08-07 ENCOUNTER — TELEPHONE (OUTPATIENT)
Facility: CLINIC | Age: 80
End: 2025-08-07

## 2025-08-07 ENCOUNTER — APPOINTMENT (OUTPATIENT)
Dept: CT IMAGING | Facility: HOSPITAL | Age: 80
End: 2025-08-07
Attending: EMERGENCY MEDICINE

## 2025-08-07 VITALS
OXYGEN SATURATION: 99 % | TEMPERATURE: 98 F | HEART RATE: 71 BPM | SYSTOLIC BLOOD PRESSURE: 149 MMHG | RESPIRATION RATE: 16 BRPM | DIASTOLIC BLOOD PRESSURE: 62 MMHG

## 2025-08-07 DIAGNOSIS — Z96.652 S/P TOTAL KNEE ARTHROPLASTY, LEFT: Primary | ICD-10-CM

## 2025-08-07 DIAGNOSIS — K59.00 CONSTIPATION, UNSPECIFIED CONSTIPATION TYPE: Primary | ICD-10-CM

## 2025-08-07 LAB
ALBUMIN SERPL-MCNC: 4.4 G/DL (ref 3.2–4.8)
ALBUMIN/GLOB SERPL: 1.8 (ref 1–2)
ALP LIVER SERPL-CCNC: 87 U/L (ref 55–142)
ALT SERPL-CCNC: <7 U/L (ref 10–49)
ANION GAP SERPL CALC-SCNC: 10 MMOL/L (ref 0–18)
AST SERPL-CCNC: 17 U/L (ref ?–34)
BASOPHILS # BLD AUTO: 0.06 X10(3) UL (ref 0–0.2)
BASOPHILS NFR BLD AUTO: 0.4 %
BILIRUB SERPL-MCNC: 0.4 MG/DL (ref 0.2–1.1)
BUN BLD-MCNC: 13 MG/DL (ref 9–23)
CALCIUM BLD-MCNC: 9.1 MG/DL (ref 8.7–10.6)
CHLORIDE SERPL-SCNC: 105 MMOL/L (ref 98–112)
CO2 SERPL-SCNC: 25 MMOL/L (ref 21–32)
CREAT BLD-MCNC: 1.23 MG/DL (ref 0.55–1.02)
EGFRCR SERPLBLD CKD-EPI 2021: 45 ML/MIN/1.73M2 (ref 60–?)
EOSINOPHIL # BLD AUTO: 0.33 X10(3) UL (ref 0–0.7)
EOSINOPHIL NFR BLD AUTO: 2.2 %
ERYTHROCYTE [DISTWIDTH] IN BLOOD BY AUTOMATED COUNT: 14.4 %
GLOBULIN PLAS-MCNC: 2.5 G/DL (ref 2–3.5)
GLUCOSE BLD-MCNC: 153 MG/DL (ref 70–99)
HCT VFR BLD AUTO: 36.4 % (ref 35–48)
HGB BLD-MCNC: 11.9 G/DL (ref 12–16)
IMM GRANULOCYTES # BLD AUTO: 0.09 X10(3) UL (ref 0–1)
IMM GRANULOCYTES NFR BLD: 0.6 %
LYMPHOCYTES # BLD AUTO: 3.29 X10(3) UL (ref 1–4)
LYMPHOCYTES NFR BLD AUTO: 22.4 %
MCH RBC QN AUTO: 29.2 PG (ref 26–34)
MCHC RBC AUTO-ENTMCNC: 32.7 G/DL (ref 31–37)
MCV RBC AUTO: 89.4 FL (ref 80–100)
MONOCYTES # BLD AUTO: 0.76 X10(3) UL (ref 0.1–1)
MONOCYTES NFR BLD AUTO: 5.2 %
NEUTROPHILS # BLD AUTO: 10.16 X10 (3) UL (ref 1.5–7.7)
NEUTROPHILS # BLD AUTO: 10.16 X10(3) UL (ref 1.5–7.7)
NEUTROPHILS NFR BLD AUTO: 69.2 %
OSMOLALITY SERPL CALC.SUM OF ELEC: 293 MOSM/KG (ref 275–295)
PLATELET # BLD AUTO: 414 10(3)UL (ref 150–450)
POTASSIUM SERPL-SCNC: 4.2 MMOL/L (ref 3.5–5.1)
PROT SERPL-MCNC: 6.9 G/DL (ref 5.7–8.2)
RBC # BLD AUTO: 4.07 X10(6)UL (ref 3.8–5.3)
SODIUM SERPL-SCNC: 140 MMOL/L (ref 136–145)
WBC # BLD AUTO: 14.7 X10(3) UL (ref 4–11)

## 2025-08-07 PROCEDURE — 96360 HYDRATION IV INFUSION INIT: CPT

## 2025-08-07 PROCEDURE — 80053 COMPREHEN METABOLIC PANEL: CPT | Performed by: EMERGENCY MEDICINE

## 2025-08-07 PROCEDURE — 96361 HYDRATE IV INFUSION ADD-ON: CPT

## 2025-08-07 PROCEDURE — 85025 COMPLETE CBC W/AUTO DIFF WBC: CPT | Performed by: EMERGENCY MEDICINE

## 2025-08-07 PROCEDURE — 85025 COMPLETE CBC W/AUTO DIFF WBC: CPT

## 2025-08-07 PROCEDURE — 74177 CT ABD & PELVIS W/CONTRAST: CPT | Performed by: EMERGENCY MEDICINE

## 2025-08-07 PROCEDURE — 80053 COMPREHEN METABOLIC PANEL: CPT

## 2025-08-07 PROCEDURE — 99285 EMERGENCY DEPT VISIT HI MDM: CPT

## 2025-08-07 PROCEDURE — 99284 EMERGENCY DEPT VISIT MOD MDM: CPT

## 2025-08-07 RX ORDER — BISACODYL 10 MG
10 SUPPOSITORY, RECTAL RECTAL DAILY
Qty: 5 SUPPOSITORY | Refills: 0 | Status: SHIPPED | OUTPATIENT
Start: 2025-08-07 | End: 2025-08-12

## 2025-08-07 RX ORDER — MAGNESIUM CARB/ALUMINUM HYDROX 105-160MG
296 TABLET,CHEWABLE ORAL ONCE
Status: DISCONTINUED | OUTPATIENT
Start: 2025-08-07 | End: 2025-08-07

## 2025-08-07 RX ORDER — SENNA AND DOCUSATE SODIUM 50; 8.6 MG/1; MG/1
2 TABLET, FILM COATED ORAL DAILY PRN
Qty: 14 TABLET | Refills: 0 | Status: SHIPPED | OUTPATIENT
Start: 2025-08-07 | End: 2025-08-14

## 2025-08-07 RX ORDER — BISACODYL 10 MG
10 SUPPOSITORY, RECTAL RECTAL
Status: DISCONTINUED | OUTPATIENT
Start: 2025-08-07 | End: 2025-08-07

## 2025-08-07 RX ORDER — POLYETHYLENE GLYCOL 3350 17 G/17G
POWDER, FOR SOLUTION ORAL
Qty: 16 PACKET | Refills: 0 | Status: SHIPPED | OUTPATIENT
Start: 2025-08-07 | End: 2025-08-20

## 2025-08-07 RX ORDER — DICYCLOMINE HYDROCHLORIDE 10 MG/1
10 CAPSULE ORAL 3 TIMES DAILY PRN
Qty: 15 CAPSULE | Refills: 0 | Status: SHIPPED | OUTPATIENT
Start: 2025-08-07 | End: 2025-08-12

## 2025-08-07 RX ORDER — SODIUM PHOSPHATE, DIBASIC AND SODIUM PHOSPHATE, MONOBASIC 7; 19 G/230ML; G/230ML
1 ENEMA RECTAL ONCE
Status: COMPLETED | OUTPATIENT
Start: 2025-08-07 | End: 2025-08-07

## 2025-08-07 RX ORDER — MAGNESIUM CARB/ALUMINUM HYDROX 105-160MG
150 TABLET,CHEWABLE ORAL ONCE
Status: COMPLETED | OUTPATIENT
Start: 2025-08-07 | End: 2025-08-07

## 2025-08-11 ENCOUNTER — PATIENT OUTREACH (OUTPATIENT)
Dept: CASE MANAGEMENT | Age: 80
End: 2025-08-11

## 2025-08-12 ENCOUNTER — TELEPHONE (OUTPATIENT)
Dept: ORTHOPEDICS CLINIC | Facility: CLINIC | Age: 80
End: 2025-08-12

## 2025-08-14 ENCOUNTER — TELEPHONE (OUTPATIENT)
Dept: ORTHOPEDICS CLINIC | Facility: CLINIC | Age: 80
End: 2025-08-14

## 2025-08-14 DIAGNOSIS — M25.562 ACUTE PAIN OF LEFT KNEE: Primary | ICD-10-CM

## 2025-08-15 ENCOUNTER — HOSPITAL ENCOUNTER (OUTPATIENT)
Dept: GENERAL RADIOLOGY | Age: 80
Discharge: HOME | End: 2025-08-15

## 2025-08-15 ENCOUNTER — TELEPHONE (OUTPATIENT)
Dept: PHYSICAL THERAPY | Facility: HOSPITAL | Age: 80
End: 2025-08-15

## 2025-08-15 ENCOUNTER — OFFICE VISIT (OUTPATIENT)
Dept: ORTHOPEDICS CLINIC | Facility: CLINIC | Age: 80
End: 2025-08-15

## 2025-08-15 VITALS — WEIGHT: 184.5 LBS | HEIGHT: 64 IN | BODY MASS INDEX: 31.5 KG/M2

## 2025-08-15 DIAGNOSIS — Z96.652 S/P TOTAL KNEE ARTHROPLASTY, LEFT: Primary | ICD-10-CM

## 2025-08-15 DIAGNOSIS — M25.562 ACUTE PAIN OF LEFT KNEE: ICD-10-CM

## 2025-08-15 PROCEDURE — 99024 POSTOP FOLLOW-UP VISIT: CPT

## 2025-08-15 PROCEDURE — 73562 X-RAY EXAM OF KNEE 3: CPT

## 2025-08-18 ENCOUNTER — PATIENT OUTREACH (OUTPATIENT)
Dept: CASE MANAGEMENT | Age: 80
End: 2025-08-18

## 2025-08-22 ENCOUNTER — TELEPHONE (OUTPATIENT)
Dept: ORTHOPEDICS CLINIC | Facility: CLINIC | Age: 80
End: 2025-08-22

## 2025-08-23 RX ORDER — ATORVASTATIN CALCIUM 40 MG/1
40 TABLET, FILM COATED ORAL NIGHTLY
Qty: 90 TABLET | Refills: 0 | Status: SHIPPED | OUTPATIENT
Start: 2025-08-23

## 2025-08-27 ENCOUNTER — OFFICE VISIT (OUTPATIENT)
Dept: PHYSICAL THERAPY | Facility: HOSPITAL | Age: 80
End: 2025-08-27
Attending: STUDENT IN AN ORGANIZED HEALTH CARE EDUCATION/TRAINING PROGRAM

## 2025-08-27 DIAGNOSIS — Z96.652 S/P TOTAL KNEE ARTHROPLASTY, LEFT: Primary | ICD-10-CM

## 2025-08-27 PROCEDURE — 97161 PT EVAL LOW COMPLEX 20 MIN: CPT

## 2025-08-27 PROCEDURE — 97110 THERAPEUTIC EXERCISES: CPT

## (undated) DIAGNOSIS — I10 ESSENTIAL HYPERTENSION: ICD-10-CM

## (undated) DIAGNOSIS — E11.9 TYPE 2 DIABETES MELLITUS WITHOUT COMPLICATION, WITHOUT LONG-TERM CURRENT USE OF INSULIN (HCC): ICD-10-CM

## (undated) DIAGNOSIS — F41.9 ANXIETY: ICD-10-CM

## (undated) DIAGNOSIS — E78.2 HYPERLIPIDEMIA, MIXED: ICD-10-CM

## (undated) DIAGNOSIS — I50.42 CHRONIC COMBINED SYSTOLIC AND DIASTOLIC CONGESTIVE HEART FAILURE (HCC): ICD-10-CM

## (undated) DIAGNOSIS — E66.9 DIABETES MELLITUS TYPE 2 IN OBESE (HCC): ICD-10-CM

## (undated) DIAGNOSIS — F60.3 BORDERLINE PERSONALITY DISORDER (HCC): ICD-10-CM

## (undated) DIAGNOSIS — F41.1 GAD (GENERALIZED ANXIETY DISORDER): ICD-10-CM

## (undated) DIAGNOSIS — F33.1 MAJOR DEPRESSIVE DISORDER, RECURRENT EPISODE, MODERATE (HCC): ICD-10-CM

## (undated) DIAGNOSIS — I25.118 ATHEROSCLEROTIC HEART DISEASE OF NATIVE CORONARY ARTERY WITH OTHER FORMS OF ANGINA PECTORIS (HCC): ICD-10-CM

## (undated) DIAGNOSIS — F32.A DEPRESSIVE DISORDER: ICD-10-CM

## (undated) DIAGNOSIS — R53.83 OTHER FATIGUE: ICD-10-CM

## (undated) DIAGNOSIS — R05.9 COUGH: ICD-10-CM

## (undated) DIAGNOSIS — E11.69 DIABETES MELLITUS TYPE 2 IN OBESE (HCC): ICD-10-CM

## (undated) DIAGNOSIS — J02.9 SORE THROAT: Primary | ICD-10-CM

## (undated) DIAGNOSIS — M51.37 DDD (DEGENERATIVE DISC DISEASE), LUMBOSACRAL: ICD-10-CM

## (undated) DEVICE — CELL SAVER RESERVOIR

## (undated) DEVICE — KNEE ARTHROSCOPY CDS: Brand: MEDLINE INDUSTRIES, INC.

## (undated) DEVICE — HOOD, PEEL-AWAY: Brand: FLYTE

## (undated) DEVICE — CHLORAPREP 26ML APPLICATOR

## (undated) DEVICE — RELINE POWER

## (undated) DEVICE — SUTURE VICRYL 2-0 FSL

## (undated) DEVICE — SUTURE VICRYL 1 OS-6

## (undated) DEVICE — Device: Brand: DEFENDO AIR/WATER/SUCTION AND BIOPSY VALVE

## (undated) DEVICE — GLOVE SUR 7.5 SENSICARE PI PIP GRN PWD F

## (undated) DEVICE — HOOD STERI-SHIELD 408-800-100

## (undated) DEVICE — Device

## (undated) DEVICE — APPLICATOR PREP 26ML CHG 2% ISO ALC 70%

## (undated) DEVICE — PADDING CAST COTTON  4

## (undated) DEVICE — SYSTEM BNE CEM MIX W/ BRKWY NOZ HI VAC PWR

## (undated) DEVICE — 2T11 #2 PDO 36 X 36: Brand: 2T11 #2 PDO 36 X 36

## (undated) DEVICE — LIGHT HANDLE

## (undated) DEVICE — C-ARMOR C-ARM EQUIPMENT COVERS CLEAR STERILE UNIVERSAL FIT 12 PER CASE: Brand: C-ARMOR

## (undated) DEVICE — GLOVE SUR 6.5 SENSICARE PI PIP GRN PWD F

## (undated) DEVICE — #15 STERILE STAINLESS BLADE: Brand: STERILE STAINLESS BLADES

## (undated) DEVICE — STERILE POLYISOPRENE POWDER-FREE SURGICAL GLOVES: Brand: PROTEXIS

## (undated) DEVICE — ANTISEPTIC 4OZ 70% ISO ALC

## (undated) DEVICE — CLEANER ES TIP W2XL2IN ADH BK RADPQ FOR SS

## (undated) DEVICE — SUTURE SILK 0 FSL

## (undated) DEVICE — DRESSING AQUACEL AG 3.5X12

## (undated) DEVICE — SLEEVE COMPR MD KNEE LEN SGL USE KENDALL SCD

## (undated) DEVICE — ENDOSCOPY PACK UPPER: Brand: MEDLINE INDUSTRIES, INC.

## (undated) DEVICE — SYRINGE 30ML LL TIP

## (undated) DEVICE — 3M™ RED DOT™ MONITORING ELECTRODE WITH FOAM TAPE AND STICKY GEL, 50/BAG, 20/CASE, 72/PLT 2570: Brand: RED DOT™

## (undated) DEVICE — OPEN HEART: Brand: MEDLINE INDUSTRIES, INC.

## (undated) DEVICE — SOLUTION IRRIG 3000ML 0.9% NACL FLX CONT

## (undated) DEVICE — SUTURE POLYDEK 2-0

## (undated) DEVICE — GRAFT DELIVERY SYS MODULE

## (undated) DEVICE — SHEET,DRAPE,70X100,STERILE: Brand: MEDLINE

## (undated) DEVICE — SUTURE VICRYL 2-0 CP-1

## (undated) DEVICE — CELL SAVER BAG 600ML 4R2023

## (undated) DEVICE — DRAPE SURG L W60XL84IN SMS POLYPR U SHP FLM

## (undated) DEVICE — BLDE PRCPT FSCL SPLTR DISP

## (undated) DEVICE — GLOVE SURG TRIUMPH SZ 8-1/2

## (undated) DEVICE — 1200CC GUARDIAN II: Brand: GUARDIAN

## (undated) DEVICE — GLOVE SUR 7 SENSICARE PI PIP CRM PWD F

## (undated) DEVICE — TRANSPOSAL ULTRAFLEX DUO/QUAD ULTRA CART MANIFOLD

## (undated) DEVICE — DRAPE TABLE COVER 44X90 TC-10

## (undated) DEVICE — WRAP COMPR UNIV KNEE HOT CLD GEL MICWV AND

## (undated) DEVICE — SUT STRATAFIX MCRYL + 3-0 30X30CM ABSRB UD PS-2

## (undated) DEVICE — DRAPE,U/SHT,SPLIT,FILM,60X84,STERILE: Brand: MEDLINE

## (undated) DEVICE — Device: Brand: STABLECUT®

## (undated) DEVICE — SUTURE WIRE DOUBLE STERNOTOMY

## (undated) DEVICE — DRAPE C-ARM UNIVERSAL

## (undated) DEVICE — COVER,MAYO STAND,STERILE: Brand: MEDLINE

## (undated) DEVICE — SOL LACT RINGERS 1000ML

## (undated) DEVICE — WRAP COOLING BACK W/NO PILLOW

## (undated) DEVICE — STERILE PATIENT PROTECTIVE PAD FOR IMP® KNEE POSITIONERS & COHESIVE WRAP (10 / CASE): Brand: DE MAYO KNEE POSITIONER®

## (undated) DEVICE — WIRE FX PRCPT KRSH BVL BLNT

## (undated) DEVICE — MEDI-VAC NON-CONDUCTIVE SUCTION TUBING: Brand: CARDINAL HEALTH

## (undated) DEVICE — GOWN,SIRUS,FAB REINF,RAGLAN,XL,STERILE: Brand: MEDLINE

## (undated) DEVICE — NV CLIP DSC&IN-LINE ACTIVATOR

## (undated) DEVICE — WRAP COOLING KNEE W/ICE PILLOW

## (undated) DEVICE — FIAPC® PROBE W/ FILTER 2200 A OD 2.3MM/6.9FR; L 2.2M/7.2FT: Brand: ERBE

## (undated) DEVICE — NVM5 NEEDLE MODULE M/E

## (undated) DEVICE — SIGMA LCS HIGH PERFORMANCE STERILE THREADED HEADED PINS: Brand: SIGMA LCS HIGH PERFORMANCE

## (undated) DEVICE — 3M™ STERI-STRIP™ REINFORCED ADHESIVE SKIN CLOSURES, R1547, 1/2 IN X 4 IN (12 MM X 100 MM), 6 STRIPS/ENVELOPE: Brand: 3M™ STERI-STRIP™

## (undated) DEVICE — MEDI-VAC SUCTION HANDLE REGULAR CAPACITY: Brand: CARDINAL HEALTH

## (undated) DEVICE — SUT MCRYL 3-0 27IN ABSRB UD 19MM PS-2 3/8

## (undated) DEVICE — SHEET,DRAPE,40X58,STERILE: Brand: MEDLINE

## (undated) DEVICE — MAXCESS MAS TLIF 2 KIT ACCESS

## (undated) DEVICE — AGGRESSIVE PLUS, ANGLED CUTTER: Brand: FORMULA

## (undated) DEVICE — SUT VCRL + 1 27IN ABSRB UD OS-6 L36MM

## (undated) DEVICE — LEAD TEMP PACING UNIPOLAR 6500

## (undated) DEVICE — LAPAROTOMY SPONGE - RF AND X-RAY DETECTABLE PRE-WASHED: Brand: SITUATE

## (undated) DEVICE — SOL  .9 1000ML BTL

## (undated) DEVICE — FORCEP BIOPSY RJ4 LG CAP W/ND

## (undated) DEVICE — PACK PBDS TOTAL KNEE MODULE

## (undated) DEVICE — MARKER SKIN 2 TIP

## (undated) DEVICE — 3M™ TEGADERM™ +PAD FILM DRESSING WITH NON-ADHERENT PAD, 3587, 3-1/2 IN X 4-1/8 IN (9 CM X 10.5 CM), 25/CAR, 4 CAR/CS: Brand: 3M™ TEGADERM™

## (undated) DEVICE — GLOVE SUR 7.5 SENSICARE PI PIP CRM PWD F

## (undated) DEVICE — GLOVE SURG TRIUMPH SZ 7

## (undated) DEVICE — DRAPE STERI 1000 UTIL AD

## (undated) DEVICE — 3M™ TEGADERM™ +PAD FILM DRESSING WITH NON-ADHERENT PAD, 3584, 2-3/8 IN X 4 IN (6 CM X 10 CM), 50/CAR, 4 CAR/CS: Brand: 3M™ TEGADERM™

## (undated) DEVICE — FLOSEAL SEALENT STERILE 10ML

## (undated) DEVICE — BLOWER CO2 MEDTRONIC/DLP 22150

## (undated) DEVICE — GAMMEX® NON-LATEX PI ORTHO SIZE 8, STERILE POLYISOPRENE POWDER-FREE SURGICAL GLOVE: Brand: GAMMEX

## (undated) DEVICE — KENDALL SCD EXPRESS SLEEVES, THIGH LENGTH, MEDIUM: Brand: KENDALL SCD

## (undated) DEVICE — DRILL SRG OIL CRTDG MAESTRO

## (undated) DEVICE — VEST SURG DISP

## (undated) DEVICE — NUVAMAP O.R. TECHNOLOGY

## (undated) DEVICE — PAD KNEE POS PROTCT MEM GEL FOAM BOOT AND

## (undated) DEVICE — NV I-PAS III DIAMOND TIP

## (undated) DEVICE — ZIMMER® STERILE DISPOSABLE TOURNIQUET CUFF WITH PLC, DUAL PORT, SINGLE BLADDER, 34 IN. (86 CM)

## (undated) DEVICE — COVER,TABLE,HVY DUTY,EXT,77"X96",STRL: Brand: MEDLINE

## (undated) DEVICE — BANDAGE COBAN 4IN X 5YD TAN E POR SLF ADH COBAN

## (undated) DEVICE — FILTERLINE NASAL ADULT O2/CO2

## (undated) DEVICE — SPONGE: LAP 18X18 W XR 200/CS: Brand: MEDICAL ACTION INDUSTRIES

## (undated) DEVICE — REM POLYHESIVE ADULT PATIENT RETURN ELECTRODE: Brand: VALLEYLAB

## (undated) DEVICE — GOWN,SIRUS,FABRIC-REINFORCED,X-LARGE: Brand: MEDLINE

## (undated) DEVICE — STANDARD HYPODERMIC NEEDLE,POLYPROPYLENE HUB: Brand: MONOJECT

## (undated) DEVICE — SUTURE PROLENE 8-0 BV-130-5

## (undated) DEVICE — SUT STRATAFIX SYMMTRC PDS+ 1 18IN CTX ABSRB V

## (undated) DEVICE — PADDING CAST 6INX4YD 100% COT ABSRB HIGHLY

## (undated) DEVICE — COVER LT HNDL PLAS RIG 2 PER PK

## (undated) DEVICE — 3M™ MICROFOAM™ TAPE 1528-4: Brand: 3M™ MICROFOAM™

## (undated) DEVICE — SUTURE VICRYL 2-0 CT-1

## (undated) DEVICE — SUTURE PROLENE 5-0 C-1

## (undated) DEVICE — NVM5 NEEDLE MODULE S

## (undated) DEVICE — GLOVE SURG TRIUMPH SZ 71/2

## (undated) DEVICE — SUTURE SILK 2-0

## (undated) DEVICE — TOTAL KNEE CDS: Brand: MEDLINE INDUSTRIES, INC.

## (undated) DEVICE — COVER LT HNDL RIG FOR SUR CAM DISP

## (undated) DEVICE — ENDOSCOPY PACK - LOWER: Brand: MEDLINE INDUSTRIES, INC.

## (undated) DEVICE — SYRINGE MED 30ML STD CLR PLAS LL TIP N CTRL

## (undated) DEVICE — HOOD STERI-SHIELD 408-800-000

## (undated) DEVICE — UNDYED BRAIDED (POLYGLACTIN 910), SYNTHETIC ABSORBABLE SUTURE: Brand: COATED VICRYL

## (undated) DEVICE — SIGMA LCS HIGH PERFORMANCE INSTRUMENTS STERILE THREADED PINS: Brand: SIGMA LCS HIGH PERFORMANCE

## (undated) DEVICE — LAMINECTOMY CDS: Brand: MEDLINE INDUSTRIES, INC.

## (undated) DEVICE — MASK ETCO2 PANORAMIC

## (undated) DEVICE — GLOVE SUR 6.5 SENSICARE PI PIP CRM PWD F

## (undated) DEVICE — 3M™ COBAN™ NL STERILE NON-LATEX SELF-ADHERENT WRAP, 2084S, 4 IN X 5 YD (10 CM X 4,5 M), 18 ROLLS/CASE: Brand: 3M™ COBAN™

## (undated) DEVICE — CONVERTORS STOCKINETTE: Brand: CONVERTORS

## (undated) DEVICE — SPONGE 4X4 10PK

## (undated) DEVICE — SUTURE FIBERWIRE 2 AR-7202

## (undated) DEVICE — NEEDLE SPNL 20GA L3.5IN YEL QNCKE STYL DISP

## (undated) DEVICE — SUPER TURBOVAC 90 WITH INTEGRATED FINGER SWITCHES IFS: Brand: COBLATION

## (undated) DEVICE — 450 ML BOTTLE OF 0.05% CHLORHEXIDINE GLUCONATE IN 99.95% STERILE WATER FOR IRRIGATION, USP AND APPLICATOR.: Brand: IRRISEPT ANTIMICROBIAL WOUND LAVAGE

## (undated) DEVICE — 3.0MM PRECISION NEURO (MATCH HEAD)

## (undated) DEVICE — SIGMA LCS HIGH PERFORMANCE INSTRUMENTS STERILE THREADED PINS
Type: IMPLANTABLE DEVICE | Site: KNEE
Brand: SIGMA LCS HIGH PERFORMANCE

## (undated) DEVICE — BOWL CEMENT MIX QUICK-VAC

## (undated) DEVICE — ADHESIVE SKIN TOP FOR WND CLSR DERMBND ADV

## (undated) DEVICE — KENDALL SCD EXPRESS SLEEVES, KNEE LENGTH, MEDIUM: Brand: KENDALL SCD

## (undated) DEVICE — TUBING PMP L16FT DISP INFLOW

## (undated) NOTE — Clinical Note
March 2, 2017    [address] [address]      Dear Iwona Quiñonez,     It was a pleasure speaking to you over the phone recently. I have enclosed some information that you may find helpful.  I look forward to talking

## (undated) NOTE — LETTER
[facility] [address], [address]  Consent for Procedure/Sedation    Date:     Time:       1.  I authorize the performance upon Paola August the following:cardiac catheterization, left ventricular cineangiography, bilateral se period, the physician will determine when the applicable recovery period ends for purposes of reinstating the Do Not Resuscitate (DNR) order.     Signature of Patient: ____________________________________________________    Signature of person authorized

## (undated) NOTE — LETTER
Your patient was recently seen at the [facility] for a hospital follow-up visit. The visit note is attached. Please contact the clinic with any questions at [phone].     Thank you,  Shannon Holter, APRN

## (undated) NOTE — MR AVS SNAPSHOT
[address]  [address] [address] [phone]               Thank you for choosing us for your health care visit with Red Giraldo DO.   We are glad to serve you and happy to provide you with this nunes Amoxicillin-Pot Clavulanate 875-125 MG Tabs   Take 1 tablet by mouth 2 (two) times daily.    Commonly known as:  AUGMENTIN           Atorvastatin Calcium 20 MG Tabs   TAKE 1 TABLET BY MOUTH EVERY EVENING AT BEDTIME   Commonly known as:  LIPITOR           b Call [phone] for help. MyChart is NOT to be used for urgent needs. For medical emergencies, dial 911. Educational Information     Your blood pressure indicates you may be at-risk for Hypertension.    Please consider the following Lifestyle M

## (undated) NOTE — LETTER
25      Orthopedic Surgery   Pre-Operative Clearance Request    Patient Name:   Arcelia Conklin             :   10/12/1945    Surgeon: Dr. Mata             Date of Surgery: 2025    Surgical Procedure: Left Knee Arthroscopy Partial Medial Meniscectomy Synovectomy Abrasionplasty       MUST COMPLETE ALL OF THE FOLLOWING 2-3 WEEKS PRIOR TO YOUR SURGERY TO AVOID CANCELLATION, DUE TO THE RULE THEIR WILL BE NO EXCEPTIONS!      [x]  History and Physical      [x]  Medical  Clearance                                        **Please fax test results, H&P, and clearance to [phone] and to P.A.T at [phone]**

## (undated) NOTE — LETTER
Date: 2020  Patient Name:  Edda De Jesus             Address: [address] [place] [id number]    : 10/12/1945    Dear Edda De Jesus,    I hope this letter finds you doing well.   I am writing to inform you of the following

## (undated) NOTE — Clinical Note
Initial assessment completed with patient.   The patient is scheduled with her Orthopedic Surgeon for a follow up today 01/28/2025. [facility] offered appointment with you and she declined at this time. No further outreach needed at this time. Thank you!

## (undated) NOTE — LETTER
Patient Name: Arcelia Conklin  -Age / Sex: 10/12/1945-A: 79 y  female  Medical Records: [id number] [facility]: [id number]    Surgery Date: 2025   Procedure: Left Knee Arthroscopy Partial Medial Meniscectomy Synovectomy Abrasionplasty, Left      Please be aware that the above-named patient DOES NOT have an adult to drive them home and or stay with him/her overnight the day of surgery. Patients are required to have adult supervision minimally overnight the day of surgery, as outlined in the [facility] Anesthesia Guidelines.      Below is a list of alternate arrangements that may be options for this patient:    []    Patient surgery date will be rescheduled in order for the patient to make arrangements to                 have overnight adult supervision.    []    Patient has made arrangements for adult to drive them home and provide supervision                 overnight the day of surgery.        []    Patient to be an outpatient in a bed after surgery.      After alternate arrangements have been made fax form to:  [phone]   Thank you.      Date_________________   MD Signature ______________________________________

## (undated) NOTE — LETTER
Your patient was recently seen at the [facility] for a hospital follow-up visit. The visit note is attached. Please contact the clinic with any questions at [phone].     Thank you,  MICKI Butts

## (undated) NOTE — MR AVS SNAPSHOT
[address]  [address] [id number] [phone]               Thank you for choosing us for your health care visit with Conor Philip DO.   We are glad to serve you and happy to provide you with this betamethasone dipropionate 0.05 % Crea   Apply 1 Application topically 2 (two) times daily. Commonly known as:  DIPROSONE           ClonazePAM 0.5 MG Tabs   Take 1 tablet (0.5 mg total) by mouth 2 (two) times daily as needed.    Commonly known as:  Carolin Cutler return for a follow-up Blood Pressure Check in 1 year.      Lifestyle Modification Recommendations:    Modification Recommendation   Weight Reduction Maintain normal body weight (body mass index 18.5-24.9 kg/m2)   DASH eating plan Adopt a diet rich in fruit

## (undated) NOTE — ED AVS SNAPSHOT
[facility] Emergency Department    [place]  [address] [address]    Phone:  [phone]    Fax:  [address]   MRN: [id number]    Department:  [facility] Emergency Department   Date of Visit:  2/ Take 10 mL by mouth 4 (four) times daily as needed for Indigestion. Clindamycin HCl 300 MG Caps   Quantity:  30 capsule   Commonly known as:  CLEOCIN   Take 1 capsule (300 mg total) by mouth 3 (three) times daily.        methylPREDNISolone 4 MG Tbpk Pediatric [phone] Emergency Department   [phone]       To Check ER Wait Times:  TEXT 'ERwait' to 87573      Click [email or web address]      Or call [phone]    If you have any problems with your follow-up, please call our case [facility] before you leave. After you leave, you should follow the attached instructions. I have read and understand the instructions given to me by my caregivers. 24-Hour Pharmacies        Pharmacy Address Phone Number   [address] XR SOFT TISSUE NECK (CPT=70360) (In process)       MyChart     Visit MyChart  You can access your MyChart to more actively manage your health care and view more details from this visit by going to [email or web address].   If you've recently had a st

## (undated) NOTE — LETTER
Patient Name: Philip Jones  YOB: 1945          MRN number:  [id number]  Date:  12/20/2017  Referring Physician:  Dr. Michael Sanchez Discharge Summary    Pt has attended 13 visits in Physical Therapy.      Subjective: Valentin Julio rep L ROMA: full range and no longer provokes R PSIS pain at the end-range; R ROMA: full range, no R PSIS pain provocation at the end-range    Accessory motion: improved mobility of right hip caudal and lateral glides  Palpation: No tenderness at R PSIS. Thank you for your referral. Please co-sign or sign and return this letter via fax as soon as possible to [phone]. If you have any questions, please contact me at Dept: [phone].     Sincerely,  Electronically signed by therapist: Stefania Medley,

## (undated) NOTE — Clinical Note
February 28, 2017    [address] [place] [address]      Dear Preet Winchester: It was a pleasure speaking with you over the phone recently.  To follow up, I wanted to send you my contact information to utilize when you have

## (undated) NOTE — LETTER
25    Orthopedic Surgery   Pre-Operative Clearance Request    Patient Name:   Arcelia Conklin             :   10/12/1945    Surgeon: Dr. Tellez             Date of Surgery: 25    Surgical Procedure: LEFT TOTAL KNEE ARTHROPLASTY      MUST COMPLETE ALL OF THE FOLLOWING 2-3 WEEKS PRIOR TO YOUR SURGERY TO AVOID CANCELLATION, DUE TO THE RULE THEIR WILL BE NO EXCEPTIONS!      [x]  History and Physical        [x]  Medical  Clearance                     Required pre-op testing to be ordered:                        [x]  CBC W/Diff                                                                   [x]  CMP                                                                                                                                                                            [x]  PT/INR    [x]  PTT     [x]  Type and Screen                          **Please fax test results, H&P, and clearance to [phone] and to P.A.T at [phone]**

## (undated) NOTE — MR AVS SNAPSHOT
[address]  [address] [id number] [phone]               Thank you for choosing us for your health care visit with Chiara Mcelroy DO.   We are glad to serve you and happy to provide you with this nunes ClonazePAM 0.5 MG Tabs   Take 1 tablet (0.5 mg total) by mouth 2 (two) times daily as needed. Commonly known as:  KLONOPIN           escitalopram 20 MG Tabs   Take 1 tablet (20 mg total) by mouth daily.    What changed:  Another medication with the same Visit [employer]  You can access your MyChart to more actively manage your health care and view more details from this visit by going to [email or web address].   If you've recently had a stay at the Hospital you can access your discharge instructions i Make half your plate fruits and vegetables Highly refined, white starches including white bread, rice and pasta   Eat plenty of protein, keep the fat content low Sugars:  sodas and sports drinks, candies and desserts   Eat plenty of low-fat dairy products

## (undated) NOTE — LETTER
[address] [facility] FOR BLOOD OR BLOOD COMPONENTS      In the course of your treatment, it may become necessary to administer a transfusion of blood or blood components.  This form provides basic information concerning this proc alternatives to you if it has not already been done. I,Arcelia Conklin, have read/had read to me the above. I understand the matters bearing on the decision whether or not to authorize a transfusion of blood or blood components.  I have no questions whic

## (undated) NOTE — LETTER
08/14/20        Jesus Conklin  [address] [facility] [address] [place] [id number]      Dear Carleen Morgan records indicate that you have outstanding lab work and or testing that was ordered for you and has not yet been completed:  Orders Placed This

## (undated) NOTE — Clinical Note
Benitez Sandy pt will be coming in today for TCC appt. Med rec will need to completed during appt as she was not able to get to her med list during our call-was getting ready for her appt today.   She was advised to bring her d/c papers, med list, and all med flavio

## (undated) NOTE — ED AVS SNAPSHOT
Konstantin Saldana   MRN: [id number]    Department:  [facility] Emergency Department   Date of Visit:  1/19/2019           Disclosure     Insurance plans vary and the physician(s) referred by the ER may not be covered by your plan.  Please contact y tell this physician (or your personal doctor if your instructions are to return to your personal doctor) about any new or lasting problems. The primary care or specialist physician will see patients referred from the [facility] Emergency Department.  Michelle Colon

## (undated) NOTE — LETTER
03/26/21        [address], [address] [address]      Dear Elisabet Smith,    [address] [facility] records indicate that you have outstanding lab work and or testing that was ordered for you and has not yet been completed:  Orders Placed This

## (undated) NOTE — LETTER
[facility] WILLIAN Byers M.D., F.A.C.S. Germán Landin M.D., F.A.C.S. Jose Erwin M.D., Garcia Lockwood. AMARILIS Conner M.D., F.A.C.S. Preet Ortega. Gio Brown M.D., F.A.C.S. TULIO Newell M. Bernardino Basset A.D. Elder Skates, M.D., F. concerns answered. If there are any issues which have not been adequately addressed, we ask you to bring them forward so that we can thoroughly address them.     A patient who is fully informed and understands their condition and options for treatment, as w as explained to me that if I should so desire, he/she is willing to explain my case and the surgical and non-surgical options to family members:             Yes _____ No _____    A [facility] surgeon has answered all of my questions regarding the topics we have di

## (undated) NOTE — Clinical Note
Hi team, surgery planned for knee arthroscopy on 1/21 - thanks for getting her set up for PT afterwards!

## (undated) NOTE — LETTER
20        Re: Mari Yuan  : 10/12/1945    To Whom It May Concern,  This patient is being followed by my office at [facility] for treatment of a neurologic condition.   There are no contraindications from Neurology standpo

## (undated) NOTE — LETTER
Jena Parker [address]  [address] [facility] [address], [address] [facility]  Authorization for Surgical Operation and Procedure     Date:___________                                                                                                         Time:__________ occur: fever and allergic reactions, hemolytic reactions, transmission of diseases such as Hepatitis, AIDS and Cytomegalovirus (CMV) and fluid overload.   In the event that I wish to have an autologous transfusion of my own blood, or a directed donor transf applicable recovery period ends for purposes of reinstating the DNAR order.   10. Patients having a sterilization procedure: I understand that if the procedure is successful the results will be permanent and it will therefore be impossible for me to insemin give me medicine and do additional procedures as necessary. Some examples are: Starting or using an “IV” to give me medicine, fluids or blood during my procedure, and having a breathing tube placed to help me breathe when I’m asleep (intubation).  In the ev rare but potential complications include headache, bleeding, infection, seizure, irregular heart rhythms, and nerve injury.     I can change my mind about having anesthesia services at any time before I get the medicine.    _________________________________

## (undated) NOTE — LETTER
11/12/19        Jamel Conklin  [address]  [phone]      Dear Nilsa Alvarenga records indicate that you have outstanding lab work and or testing that was ordered for you and has not yet been completed:  Orders Placed This

## (undated) NOTE — ED AVS SNAPSHOT
[facility] Emergency Department    [facility]  [address] [address]    Phone:  [phone]    Fax:  [address] [facility]   MRN: [id number]    Department:  [facility] Emergency Department   Date of Visit:  2/ IF THERE IS ANY CHANGE OR WORSENING OF YOUR CONDITION, CALL YOUR PRIMARY CARE PHYSICIAN AT ONCE OR RETURN IMMEDIATELY TO THE EMERGENCY DEPARTMENT.     If you have been prescribed any medication(s), please fill your prescription right away and begin taking t

## (undated) NOTE — LETTER
OUTSIDE TESTING RESULT REQUEST     IMPORTANT: FOR YOUR IMMEDIATE ATTENTION  Please FAX all test results listed below to: [phone]     Testing already done on or about: 25    * * * * If testing is NOT complete, arrange with patient A.S.A.P. * * * *      Patient Name: Arcelia Conklin  Surgery Date: 2025  Medical Record: [id number]  CSN: [id number]  : 10/12/1945 - A: 79 y     Sex: female  Surgeon(s):  Julian Mata MD  Procedure: Left Knee Arthroscopy Partial Medial Meniscectomy Synovectomy Abrasionplasty  Anesthesia Type: General     Surgeon: Julian Mata MD     The following Testing and Time Line are REQUIRED PER ANESTHESIA     BMP (requires 4 hour fast) within  60 days      Thank You,   Sent by:Yuly